# Patient Record
Sex: MALE | Race: WHITE | NOT HISPANIC OR LATINO | Employment: FULL TIME | URBAN - METROPOLITAN AREA
[De-identification: names, ages, dates, MRNs, and addresses within clinical notes are randomized per-mention and may not be internally consistent; named-entity substitution may affect disease eponyms.]

---

## 2017-01-18 DIAGNOSIS — E11.9 TYPE 2 DIABETES MELLITUS WITHOUT COMPLICATION, WITHOUT LONG-TERM CURRENT USE OF INSULIN (HCC): ICD-10-CM

## 2017-01-18 RX ORDER — METFORMIN HYDROCHLORIDE 500 MG/1
500 TABLET ORAL 2 TIMES DAILY WITH MEALS
Qty: 180 TAB | Refills: 4 | Status: SHIPPED | OUTPATIENT
Start: 2017-01-18 | End: 2017-09-21 | Stop reason: DRUGHIGH

## 2017-01-18 RX ORDER — METFORMIN HYDROCHLORIDE 500 MG/1
TABLET ORAL
Qty: 100 TAB | Refills: 4 | Status: SHIPPED | OUTPATIENT
Start: 2017-01-18 | End: 2017-01-18 | Stop reason: SDUPTHER

## 2017-01-19 ENCOUNTER — OFFICE VISIT (OUTPATIENT)
Dept: SURGERY | Facility: CLINIC | Age: 72
End: 2017-01-19

## 2017-01-19 DIAGNOSIS — Z09 FOLLOW UP: Primary | ICD-10-CM

## 2017-01-19 PROCEDURE — 99024 POSTOP FOLLOW-UP VISIT: CPT | Performed by: SURGERY

## 2017-01-19 NOTE — MR AVS SNAPSHOT
Dean AMRITA Jose   1/19/2017 1:45 PM   Office Visit    Dept Phone:  [phone]   Encounter #:  [id number]    Provider:  Chalo Dixon MD   Department:  [facility] GENERAL SURGERY                Your Full Care Plan              Your Updated Medication List          This list is accurate as of: 1/19/17  2:00 PM.  Always use your most recent med list.                amLODIPine 5 MG tablet   Commonly known as:  NORVASC       cholecalciferol 1000 UNITS tablet   Commonly known as:  VITAMIN D3       digoxin 125 MCG tablet   Commonly known as:  LANOXIN   Take 1 tablet by mouth Daily.       fish oil 1000 MG capsule capsule       glimepiride 1 MG tablet   Commonly known as:  AMARYL       INVOKANA 100 MG tablet   Generic drug:  Canagliflozin       lisinopril 20 MG tablet   Commonly known as:  PRINIVIL,ZESTRIL       metFORMIN 1000 MG tablet   Commonly known as:  GLUCOPHAGE       metoprolol succinate XL 25 MG 24 hr tablet   Commonly known as:  TOPROL-XL       omeprazole 20 MG capsule   Commonly known as:  priLOSEC       rivaroxaban 20 MG tablet   Commonly known as:  XARELTO               Instructions     None    Patient Instructions History      Upcoming Appointments     Visit Type Date Time Department    POST-OP 1/19/2017  1:45 PM [facility]    OFFICE VISIT 7/20/2017  1:00 PM [facility]      MyChart Signup     Our records indicate that you have declined [facility] MyChart signup. If you would like to sign up for MyChart, please email [email or web address] or call [phone] to obtain an activation code.             Other Info from Your Visit           Your Appointments     Jul 20, 2017  1:00 PM EDT   Office Visit with Chalo Dixon MD   [facility] GENERAL SURGERY (--)    [address]  [place] [address] [phone]           Arrive 15 minutes prior to appointment.              Allergies     No  Known Allergies      Reason for Visit     Post-op Follow-up           Vital Signs     Smoking Status                   Former Smoker

## 2017-01-19 NOTE — LETTER
January 19, 2017     Dinesh Bai MD  [address] [address]    Patient: Dean Garcia   YOB: 1945   Date of Visit: 1/19/2017       Dear Dr. Bhumika MD:    Thank you for referring Dean Garcia to me for evaluation. Below are the relevant portions of my assessment and plan of care.                   If you have questions, please do not hesitate to call me. I look forward to following Dean along with you.         Sincerely,        Chalo Dixon MD        CC: No Recipients

## 2017-01-19 NOTE — PROGRESS NOTES
PATIENT INFORMATION  Dean Garcia  5 wks 6 days s/p SB resection, pt is w/o complaints     - 1945    CHIEF COMPLAINT  Chief Complaint   Patient presents with   • Post-op Follow-up       HISTORY OF PRESENT ILLNESS  HPI    Patient seen in follow-up status post resection of carcinoid tumor.  He has seen oncology to [facility].  Is currently in the midst of workup.  They will keep me posted.  He has no problems postop.  He is return to full activity.  Has returned to work.  Problems are normal.      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   • GERD (gastroesophageal reflux disease)    • H/O: CVA (cerebrovascular accident)    • History of concussion    • Hypertension    • On anticoagulant therapy      Xarelto for Afib   • Type 2 diabetes mellitus          SURGICAL HISTORY  Past Surgical History   Procedure Laterality Date   • Colonoscopy     • Exploratory laparotomy N/A 2016     Procedure: LAPAROTOMY EXPLORATORY bowel resection excision of mesenteric mass excision/biopsy of mesenteric lymph node;  Surgeon: Chalo Dixon MD;  Location: [facility];  Service:          FAMILY HISTORY  Family History   Problem Relation Age of Onset   • Heart defect Mother    • Heart disease Mother    • Cancer Father    • Colon cancer Paternal Uncle          SOCIAL HISTORY  Social History     Occupational History   •  [employer]     Social History Main Topics   • Smoking status: Former Smoker     Years: 20.00     Types: Pipe     Quit date:    • Smokeless tobacco: Not on file   • Alcohol use No   • Drug use: No   •  Sexual activity: Defer         CURRENT MEDICATIONS    Current Outpatient Prescriptions:   •  amLODIPine (NORVASC) 5 MG tablet, Take 5 mg by mouth Daily., Disp: , Rfl:   •  Canagliflozin (INVOKANA) 100 MG tablet, Take 100 mg by mouth Daily., Disp: , Rfl:   •  cholecalciferol (VITAMIN D3) 1000 UNITS tablet, Take 2,000 Units by mouth Daily., Disp: , Rfl:   •  digoxin (LANOXIN) 125 MCG tablet, Take 1 tablet by mouth Daily., Disp: 30 tablet, Rfl: 0  •  glimepiride (AMARYL) 1 MG tablet, Take 1 mg by mouth Every Morning Before Breakfast., Disp: , Rfl:   •  lisinopril (PRINIVIL,ZESTRIL) 20 MG tablet, Take 20 mg by mouth Daily., Disp: , Rfl:   •  metFORMIN (GLUCOPHAGE) 1000 MG tablet, Take 1,000 mg by mouth 2 (Two) Times a Day With Meals. Patient takes two 500mg pills twice daily., Disp: , Rfl:   •  metoprolol succinate XL (TOPROL-XL) 25 MG 24 hr tablet, Take 25 mg by mouth Daily., Disp: , Rfl:   •  Omega-3 Fatty Acids (FISH OIL) 1000 MG capsule capsule, Take 1,000 mg by mouth Daily With Breakfast., Disp: , Rfl:   •  omeprazole (priLOSEC) 20 MG capsule, Take 20 mg by mouth Daily., Disp: , Rfl:   •  rivaroxaban (XARELTO) 20 MG tablet, Take 20 mg by mouth Daily., Disp: , Rfl:     ALLERGIES  Review of patient's allergies indicates no known allergies.    VITALS  There were no vitals filed for this visit.    LAST RESULTS   Admission on 12/09/2016, Discharged on 12/13/2016   No results displayed because visit has over 200 results.        No results found.    PHYSICAL EXAM  Physical Exam     Abdominal exam is benign.  No tenderness noted.  No evidence of herniation.  Incision is well-healed.    ASSESSMENT    Patient doing well status post carcinoid resection.  Further plan as per oncology.      PLAN    Follow-up 6 months.

## 2017-01-26 ENCOUNTER — APPOINTMENT (OUTPATIENT)
Dept: LAB | Facility: HOSPITAL | Age: 72
End: 2017-01-26
Attending: SURGERY

## 2017-01-26 ENCOUNTER — HOSPITAL ENCOUNTER (OUTPATIENT)
Dept: CT IMAGING | Facility: HOSPITAL | Age: 72
Discharge: HOME | End: 2017-01-26
Attending: SURGERY | Admitting: SURGERY

## 2017-01-26 ENCOUNTER — OFFICE VISIT (OUTPATIENT)
Dept: SURGERY | Facility: CLINIC | Age: 72
End: 2017-01-26

## 2017-01-26 DIAGNOSIS — R10.31 RIGHT LOWER QUADRANT ABDOMINAL PAIN: Primary | ICD-10-CM

## 2017-01-26 LAB
ALBUMIN SERPL-MCNC: 3.1 G/DL (ref 3.5–5.2)
ALBUMIN/GLOB SERPL: 1 G/DL
ALP SERPL-CCNC: 63 U/L (ref 40–129)
ALT SERPL W P-5'-P-CCNC: 18 U/L (ref 5–41)
ANION GAP SERPL CALCULATED.3IONS-SCNC: 16.4 MMOL/L
AST SERPL-CCNC: 11 U/L (ref 5–40)
BACTERIA UR QL AUTO: ABNORMAL /HPF
BASOPHILS # BLD AUTO: 0.02 10*3/MM3 (ref 0–0.2)
BASOPHILS NFR BLD AUTO: 0.1 % (ref 0–2)
BILIRUB SERPL-MCNC: 0.5 MG/DL (ref 0.2–1.2)
BILIRUB UR QL STRIP: NEGATIVE
BUN BLD-MCNC: 15 MG/DL (ref 8–23)
BUN/CREAT SERPL: 14 (ref 7–25)
CALCIUM SPEC-SCNC: 9.3 MG/DL (ref 8.8–10.5)
CHLORIDE SERPL-SCNC: 96 MMOL/L (ref 98–107)
CLARITY UR: CLEAR
CO2 SERPL-SCNC: 21.6 MMOL/L (ref 22–29)
COLOR UR: YELLOW
CREAT BLD-MCNC: 1.07 MG/DL (ref 0.76–1.27)
DEPRECATED RDW RBC AUTO: 46.7 FL (ref 37–54)
EOSINOPHIL # BLD AUTO: 0.02 10*3/MM3 (ref 0.1–0.3)
EOSINOPHIL NFR BLD AUTO: 0.1 % (ref 0–4)
ERYTHROCYTE [DISTWIDTH] IN BLOOD BY AUTOMATED COUNT: 14.5 % (ref 11.5–14.5)
GFR SERPL CREATININE-BSD FRML MDRD: 69 ML/MIN/1.73
GLOBULIN UR ELPH-MCNC: 3 GM/DL
GLUCOSE BLD-MCNC: 332 MG/DL (ref 65–99)
GLUCOSE UR STRIP-MCNC: ABNORMAL MG/DL
HCT VFR BLD AUTO: 41.4 % (ref 42–52)
HGB BLD-MCNC: 13.2 G/DL (ref 14–18)
HGB UR QL STRIP.AUTO: NEGATIVE
HYALINE CASTS UR QL AUTO: ABNORMAL /LPF
IMM GRANULOCYTES # BLD: 0.07 10*3/MM3 (ref 0–0.03)
IMM GRANULOCYTES NFR BLD: 0.5 % (ref 0–0.5)
KETONES UR QL STRIP: ABNORMAL
LEUKOCYTE ESTERASE UR QL STRIP.AUTO: NEGATIVE
LYMPHOCYTES # BLD AUTO: 0.86 10*3/MM3 (ref 0.6–4.8)
LYMPHOCYTES NFR BLD AUTO: 6.2 % (ref 20–45)
MCH RBC QN AUTO: 28.4 PG (ref 27–31)
MCHC RBC AUTO-ENTMCNC: 31.9 G/DL (ref 31–37)
MCV RBC AUTO: 89.2 FL (ref 80–94)
MONOCYTES # BLD AUTO: 1.23 10*3/MM3 (ref 0–1)
MONOCYTES NFR BLD AUTO: 8.9 % (ref 3–8)
NEUTROPHILS # BLD AUTO: 11.58 10*3/MM3 (ref 1.5–8.3)
NEUTROPHILS NFR BLD AUTO: 84.2 % (ref 45–70)
NITRITE UR QL STRIP: NEGATIVE
NRBC BLD MANUAL-RTO: 0 /100 WBC (ref 0–0)
PH UR STRIP.AUTO: 5.5 [PH] (ref 4.5–8)
PLATELET # BLD AUTO: 367 10*3/MM3 (ref 140–500)
PMV BLD AUTO: 9.7 FL (ref 7.4–10.4)
POTASSIUM BLD-SCNC: 4.1 MMOL/L (ref 3.5–5.2)
PROT SERPL-MCNC: 6.1 G/DL (ref 6–8.5)
PROT UR QL STRIP: NEGATIVE
RBC # BLD AUTO: 4.64 10*6/MM3 (ref 4.7–6.1)
RBC # UR: ABNORMAL /HPF
REF LAB TEST METHOD: ABNORMAL
SODIUM BLD-SCNC: 134 MMOL/L (ref 136–145)
SP GR UR STRIP: 1.01 (ref 1–1.03)
SQUAMOUS #/AREA URNS HPF: ABNORMAL /HPF
UROBILINOGEN UR QL STRIP: ABNORMAL
WBC NRBC COR # BLD: 13.78 10*3/MM3 (ref 4.8–10.8)
WBC UR QL AUTO: ABNORMAL /HPF

## 2017-01-26 PROCEDURE — 99024 POSTOP FOLLOW-UP VISIT: CPT | Performed by: SURGERY

## 2017-01-26 RX ORDER — METFORMIN HYDROCHLORIDE 500 MG/1
TABLET, EXTENDED RELEASE ORAL
COMMUNITY
Start: 2017-01-24

## 2017-01-26 RX ADMIN — IOPAMIDOL 100 ML: 755 INJECTION, SOLUTION INTRAVENOUS at 19:00

## 2017-01-26 NOTE — PROGRESS NOTES
PATIENT INFORMATION  Dean Garcia  6 wks 6 days s/p colon resection, c/o RLQ pain started on Wednesday     - 1945    CHIEF COMPLAINT  Chief Complaint   Patient presents with   • Abdominal Pain       HISTORY OF PRESENT ILLNESS  HPI    Patient started with right lower quadrant abdominal pain on Wednesday.  It was quite severe Wednesday.  He had no nausea.  Did have a normal bowel movement.  Pain has improved today however this still remains constant.  Localized done in the right lower quadrant.  Made worse with movement.  Better with rest.      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   • GERD (gastroesophageal reflux disease)    • H/O: CVA (cerebrovascular accident)    • History of concussion    • Hypertension    • On anticoagulant therapy      Xarelto for Afib   • Type 2 diabetes mellitus          SURGICAL HISTORY  Past Surgical History   Procedure Laterality Date   • Colonoscopy     • Exploratory laparotomy N/A 2016     Procedure: LAPAROTOMY EXPLORATORY bowel resection excision of mesenteric mass excision/biopsy of mesenteric lymph node;  Surgeon: Chalo Dixon MD;  Location: [facility];  Service:          FAMILY HISTORY  Family History   Problem Relation Age of Onset   • Heart defect Mother    • Heart disease Mother    • Cancer Father    • Colon cancer Paternal Uncle          SOCIAL HISTORY  Social History     Occupational History   •  [employer]     Social History Main Topics   • Smoking status: Former Smoker     Years: 20.00     Types: Pipe     Quit date:    • Smokeless tobacco: Not on  file   • Alcohol use No   • Drug use: No   • Sexual activity: Defer         CURRENT MEDICATIONS    Current Outpatient Prescriptions:   •  amLODIPine (NORVASC) 5 MG tablet, Take 5 mg by mouth Daily., Disp: , Rfl:   •  Canagliflozin (INVOKANA) 100 MG tablet, Take 100 mg by mouth Daily., Disp: , Rfl:   •  cholecalciferol (VITAMIN D3) 1000 UNITS tablet, Take 2,000 Units by mouth Daily., Disp: , Rfl:   •  digoxin (LANOXIN) 125 MCG tablet, Take 1 tablet by mouth Daily., Disp: 30 tablet, Rfl: 0  •  glimepiride (AMARYL) 1 MG tablet, Take 1 mg by mouth Every Morning Before Breakfast., Disp: , Rfl:   •  lisinopril (PRINIVIL,ZESTRIL) 20 MG tablet, Take 20 mg by mouth Daily., Disp: , Rfl:   •  metFORMIN (GLUCOPHAGE) 1000 MG tablet, Take 1,000 mg by mouth 2 (Two) Times a Day With Meals. Patient takes two 500mg pills twice daily., Disp: , Rfl:   •  metoprolol succinate XL (TOPROL-XL) 25 MG 24 hr tablet, Take 25 mg by mouth Daily., Disp: , Rfl:   •  Omega-3 Fatty Acids (FISH OIL) 1000 MG capsule capsule, Take 1,000 mg by mouth Daily With Breakfast., Disp: , Rfl:   •  omeprazole (priLOSEC) 20 MG capsule, Take 20 mg by mouth Daily., Disp: , Rfl:   •  rivaroxaban (XARELTO) 20 MG tablet, Take 20 mg by mouth Daily., Disp: , Rfl:     ALLERGIES  Review of patient's allergies indicates no known allergies.    VITALS  There were no vitals filed for this visit.    LAST RESULTS   Admission on 12/09/2016, Discharged on 12/13/2016   No results displayed because visit has over 200 results.        No results found.    PHYSICAL EXAM  Physical Exam   Constitutional: He appears well-developed and well-nourished. No distress.   Abdominal: Soft. Bowel sounds are normal. He exhibits no distension and no mass. There is tenderness (patient does have mild diffuse tenderness in his right lower quadrant.  This does extend in the left lower quadrant also.). There is no rebound and no guarding. No hernia.            ASSESSMENT    Right lower quadrant  abdominal pain etiology uncertain.      PLAN    He has been scheduled by oncology for a CT scan tomorrow.  However, he does have an elevated white count with a shift.  Feel that is imperative we proceed with CT was abdomen pelvis to rule out acute appendicitis.  We'll schedule.

## 2017-01-26 NOTE — MR AVS SNAPSHOT
Dean MARITA oJse   1/26/2017 2:15 PM   Office Visit    Dept Phone:  [phone]   Encounter #:  [id number]    Provider:  Chalo Dixon MD   Department:  [facility] GENERAL SURGERY                Your Full Care Plan              Today's Medication Changes          These changes are accurate as of: 1/26/17  2:15 PM.  If you have any questions, ask your nurse or doctor.               Medication(s)that have changed:     metFORMIN  MG 24 hr tablet   Commonly known as:  GLUCOPHAGE-XR   What changed:  Another medication with the same name was removed. Continue taking this medication, and follow the directions you see here.   Changed by:  Chalo Dixon MD                  Your Updated Medication List          This list is accurate as of: 1/26/17  2:15 PM.  Always use your most recent med list.                amLODIPine 5 MG tablet   Commonly known as:  NORVASC       cholecalciferol 1000 UNITS tablet   Commonly known as:  VITAMIN D3       digoxin 125 MCG tablet   Commonly known as:  LANOXIN   Take 1 tablet by mouth Daily.       fish oil 1000 MG capsule capsule       glimepiride 1 MG tablet   Commonly known as:  AMARYL       INVOKANA 100 MG tablet   Generic drug:  Canagliflozin       lisinopril 20 MG tablet   Commonly known as:  PRINIVIL,ZESTRIL       metFORMIN  MG 24 hr tablet   Commonly known as:  GLUCOPHAGE-XR       metoprolol succinate XL 25 MG 24 hr tablet   Commonly known as:  TOPROL-XL       omeprazole 20 MG capsule   Commonly known as:  priLOSEC       rivaroxaban 20 MG tablet   Commonly known as:  XARELTO               We Performed the Following     CBC & Differential     Comprehensive Metabolic Panel     Urinalysis With Microscopic       You Were Diagnosed With        Codes Comments    Right lower quadrant abdominal pain    -  Primary ICD-10-CM: R10.31  ICD-9-CM: 789.03       Instructions     None    Patient Instructions History      Upcoming  Appointments     Visit Type Date Time Department    OFFICE VISIT 1/26/2017  2:15 PM [facility]    LAB 1/26/2017  2:30 PM  [facility]    LAB 1/26/2017  2:35 PM  [facility]    LAB 1/26/2017  2:40 PM  [facility]    OFFICE VISIT 7/20/2017  1:00 PM [facility]      MyChart Signup     Our records indicate that you have declined [facility] MyChart signup. If you would like to sign up for MyChart, please email [email or web address] or call [phone] to obtain an activation code.             Other Info from Your Visit           Your Appointments     Jan 26, 2017  2:30 PM EST   Lab with [facility]   [facility] ([place]    [address] [place] [address]   [phone]            Jan 26, 2017  2:35 PM EST   Lab with [facility]   [facility]    [address] [place] [address]   [phone]            Jan 26, 2017  2:40 PM EST   Lab with [facility]   [facility] ([place]    [address] [place] [address]   [phone]            Jul 20, 2017  1:00 PM EDT   Office Visit with Chalo Dixon MD   [facility] GENERAL SURGERY (--)    [address]  [place] [address] [phone]           Arrive 15 minutes prior to appointment.              Allergies     No Known Allergies      Reason for Visit     Abdominal Pain     Post-op Follow-up           Vital Signs     Smoking Status                   Former Smoker           Problems and Diagnoses Noted     Abdominal pain, right lower quadrant    -  Primary

## 2017-01-27 ENCOUNTER — OFFICE VISIT (OUTPATIENT)
Dept: SURGERY | Facility: CLINIC | Age: 72
End: 2017-01-27

## 2017-01-27 ENCOUNTER — HOSPITAL ENCOUNTER (INPATIENT)
Facility: HOSPITAL | Age: 72
LOS: 5 days | Discharge: HOME | End: 2017-02-01
Attending: SURGERY | Admitting: SURGERY

## 2017-01-27 ENCOUNTER — TELEPHONE (OUTPATIENT)
Dept: GASTROENTEROLOGY | Facility: CLINIC | Age: 72
End: 2017-01-27

## 2017-01-27 DIAGNOSIS — R10.31 RIGHT LOWER QUADRANT ABDOMINAL PAIN: Primary | ICD-10-CM

## 2017-01-27 PROBLEM — IMO0002 ABDOMINAL ABSCESS: Status: ACTIVE | Noted: 2017-01-27

## 2017-01-27 PROCEDURE — 99024 POSTOP FOLLOW-UP VISIT: CPT | Performed by: SURGERY

## 2017-01-27 PROCEDURE — 99221 1ST HOSP IP/OBS SF/LOW 40: CPT | Performed by: INTERNAL MEDICINE

## 2017-01-27 PROCEDURE — 25010000002 PIPERACILLIN SOD-TAZOBACTAM PER 1 G: Performed by: SURGERY

## 2017-01-27 RX ORDER — ACETAMINOPHEN 650 MG/1
650 SUPPOSITORY RECTAL EVERY 4 HOURS PRN
Status: DISCONTINUED | OUTPATIENT
Start: 2017-01-27 | End: 2017-02-01 | Stop reason: HOSPADM

## 2017-01-27 RX ORDER — SODIUM CHLORIDE 0.9 % (FLUSH) 0.9 %
1-10 SYRINGE (ML) INJECTION AS NEEDED
Status: DISCONTINUED | OUTPATIENT
Start: 2017-01-27 | End: 2017-02-01 | Stop reason: HOSPADM

## 2017-01-27 RX ORDER — NALOXONE HCL 0.4 MG/ML
0.4 VIAL (ML) INJECTION
Status: DISCONTINUED | OUTPATIENT
Start: 2017-01-27 | End: 2017-02-01 | Stop reason: HOSPADM

## 2017-01-27 RX ORDER — MORPHINE SULFATE 2 MG/ML
1 INJECTION, SOLUTION INTRAMUSCULAR; INTRAVENOUS EVERY 4 HOURS PRN
Status: DISCONTINUED | OUTPATIENT
Start: 2017-01-27 | End: 2017-02-01 | Stop reason: HOSPADM

## 2017-01-27 RX ORDER — NITROGLYCERIN 0.4 MG/1
0.4 TABLET SUBLINGUAL
Status: DISCONTINUED | OUTPATIENT
Start: 2017-01-27 | End: 2017-02-01 | Stop reason: HOSPADM

## 2017-01-27 RX ORDER — SODIUM CHLORIDE, SODIUM LACTATE, POTASSIUM CHLORIDE, CALCIUM CHLORIDE 600; 310; 30; 20 MG/100ML; MG/100ML; MG/100ML; MG/100ML
75 INJECTION, SOLUTION INTRAVENOUS CONTINUOUS
Status: DISCONTINUED | OUTPATIENT
Start: 2017-01-27 | End: 2017-01-30

## 2017-01-27 RX ORDER — PANTOPRAZOLE SODIUM 40 MG/10ML
40 INJECTION, POWDER, LYOPHILIZED, FOR SOLUTION INTRAVENOUS
Status: DISCONTINUED | OUTPATIENT
Start: 2017-01-28 | End: 2017-01-29

## 2017-01-27 RX ADMIN — PIPERACILLIN AND TAZOBACTAM 3.38 G: 3; .375 INJECTION, POWDER, LYOPHILIZED, FOR SOLUTION INTRAVENOUS; PARENTERAL at 21:23

## 2017-01-27 RX ADMIN — SODIUM CHLORIDE, POTASSIUM CHLORIDE, SODIUM LACTATE AND CALCIUM CHLORIDE 125 ML/HR: 600; 310; 30; 20 INJECTION, SOLUTION INTRAVENOUS at 15:18

## 2017-01-27 RX ADMIN — PIPERACILLIN AND TAZOBACTAM 3.38 G: 3; .375 INJECTION, POWDER, LYOPHILIZED, FOR SOLUTION INTRAVENOUS; PARENTERAL at 15:19

## 2017-01-27 NOTE — PROGRESS NOTES
Hospitalist Team      Patient Care Team:  Dinesh Bai MD as PCP - General (Internal Medicine)  Thomas Brown MD as Consulting Physician (Cardiology)  Devyn Reed MD as Consulting Physician (Hematology and Oncology)  Chalo Dixon MD as Referring Physician (General Surgery)    CHIEF COMPLAINT: Patient is 7 weeks post resection of carcinoid tumor.    HISTORY OF PRESENT ILLNESS:    Patient had been doing well until Wednesday.  At that time he developed sudden onset of right-sided abdominal pain.  This persisted he was seen in the office.  Some have an elevated white count at that time.  CT shows a contained perforation on the right side.  He was placed on Levaquin and Flagyl.  Developed allergic reaction to one of these antibiotics.  Suspect it was Levaquin.  Remains tender in the right lower quadrant.  We'll made for IV antibiotics and observation.      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   • GERD (gastroesophageal reflux disease)    • H/O: CVA (cerebrovascular accident)    • History of concussion    • Hypertension    • On anticoagulant therapy      Xarelto for Afib   • Type 2 diabetes mellitus      Past Surgical History   Procedure Laterality Date   • Colonoscopy     • Exploratory laparotomy N/A 12/9/2016     Procedure: LAPAROTOMY EXPLORATORY bowel resection excision of mesenteric mass excision/biopsy of mesenteric lymph node;  Surgeon: Chalo Dixon MD;  Location: [facility];  Service:      Family History   Problem Relation Age of Onset   • Heart defect Mother    • Heart disease Mother    • Cancer Father    • Colon cancer Paternal Uncle      Social History   Substance Use Topics   • Smoking status: Former Smoker     Years: 20.00     Types: Pipe     Quit date: 2001   • Smokeless tobacco: Not on file   • Alcohol use No       (Not in a hospital admission)  Allergies:  Review of patient's allergies  indicates no known allergies.    REVIEW OF SYSTEMS:  Please see the above history of present illness for pertinent positives and negatives.  The remainder of the patient's systems have been reviewed and are negative.     Vital Signs            Physical Exam:  Physical Exam   Constitutional: Patient appears well-developed and well-nourished and in no acute distress   HEENT:   Head: Normocephalic and atraumatic.   Eyes:  Pupils are equal, round, and reactive to light. EOM are intact. Sclera are anicteric and non-injected.             He does have bilateral eye edema.  Worse on the right than the left.  Mouth and Throat: Patient has moist mucous membranes. Oropharynx is clear of any erythema or exudate.  Does have some edema                               around the mouth.  Mild swelling of his tongue.     Neck: Neck supple. No JVD present. No thyromegaly present. No lymphadenopathy present.  Cardiovascular: Regular rate, regular rhythm, S1 normal and S2 normal.  Exam reveals no gallop and no friction rub.  No murmur heard.  Pulmonary/Chest: Lungs are clear to auscultation bilaterally. No respiratory distress. No wheezes. No rhonchi. No rales.   Abdominal: Soft. Bowel sounds are normal. No distension and no mass. There is no hepatosplenomegaly.  Patient has some mild                    tenderness in his right lower quadrant.  No guarding or rebound noted.   Neurological: Patient is alert and oriented to person, place, and time. Cranial nerves II-XII are grossly intact with no focal deficits.  Skin: Skin is warm. No rash noted. Nails show no clubbing.  No cyanosis or erythema.     Results Review:    I reviewed the patient's new clinical results.  Lab Results (most recent)     None          Imaging Results (most recent)     None            ECG/EMG Results (most recent)     None          Assessment/Plan     Contained intra-abdominal perforation with small abscess.  Allergic reaction to his anabiotic.  We'll book for IV  hydration and antibiotics.    I discussed the patients findings and my recommendations with patient and his wife.     Chalo Dixon MD  01/27/17  12:07 PM

## 2017-01-27 NOTE — PROGRESS NOTES
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   • GERD (gastroesophageal reflux disease)    • H/O: CVA (cerebrovascular accident)    • History of concussion    • Hypertension    • On anticoagulant therapy      Xarelto for Afib   • Type 2 diabetes mellitus          SURGICAL HISTORY  Past Surgical History   Procedure Laterality Date   • Colonoscopy     • Exploratory laparotomy N/A 2016     Procedure: LAPAROTOMY EXPLORATORY bowel resection excision of mesenteric mass excision/biopsy of mesenteric lymph node;  Surgeon: Chalo Dixon MD;  Location: [facility];  Service:          FAMILY HISTORY  Family History   Problem Relation Age of Onset   • Heart defect Mother    • Heart disease Mother    • Cancer Father    • Colon cancer Paternal Uncle          SOCIAL HISTORY  Social History     Occupational History   •  [employer]     Social History Main Topics   • Smoking status: Former Smoker     Years: 20.00     Types: Pipe     Quit date:    • Smokeless tobacco: Not on file   • Alcohol use No   • Drug use: No   • Sexual activity: Defer         CURRENT MEDICATIONS    Current Outpatient Prescriptions:   •  amLODIPine (NORVASC) 5 MG tablet, Take 5 mg by mouth Daily., Disp: , Rfl:   •  Canagliflozin (INVOKANA) 100 MG tablet, Take 100 mg by mouth Daily., Disp: , Rfl:   •  cholecalciferol (VITAMIN D3) 1000 UNITS tablet, Take 2,000 Units by  mouth Daily., Disp: , Rfl:   •  digoxin (LANOXIN) 125 MCG tablet, Take 1 tablet by mouth Daily., Disp: 30 tablet, Rfl: 0  •  glimepiride (AMARYL) 1 MG tablet, Take 1 mg by mouth Every Morning Before Breakfast., Disp: , Rfl:   •  lisinopril (PRINIVIL,ZESTRIL) 20 MG tablet, Take 20 mg by mouth Daily., Disp: , Rfl:   •  metFORMIN ER (GLUCOPHAGE-XR) 500 MG 24 hr tablet, , Disp: , Rfl:   •  metoprolol succinate XL (TOPROL-XL) 25 MG 24 hr tablet, Take 25 mg by mouth Daily., Disp: , Rfl:   •  Omega-3 Fatty Acids (FISH OIL) 1000 MG capsule capsule, Take 1,000 mg by mouth Daily With Breakfast., Disp: , Rfl:   •  omeprazole (priLOSEC) 20 MG capsule, Take 20 mg by mouth Daily., Disp: , Rfl:   •  rivaroxaban (XARELTO) 20 MG tablet, Take 20 mg by mouth Daily., Disp: , Rfl:   No current facility-administered medications for this visit.     ALLERGIES  Review of patient's allergies indicates no known allergies.    VITALS  There were no vitals filed for this visit.    LAST RESULTS   Office Visit on 01/26/2017   Component Date Value Ref Range Status   • Glucose 01/26/2017 332* 65 - 99 mg/dL Final   • BUN 01/26/2017 15  8 - 23 mg/dL Final   • Creatinine 01/26/2017 1.07  0.76 - 1.27 mg/dL Final   • Sodium 01/26/2017 134* 136 - 145 mmol/L Final   • Potassium 01/26/2017 4.1  3.5 - 5.2 mmol/L Final   • Chloride 01/26/2017 96* 98 - 107 mmol/L Final   • CO2 01/26/2017 21.6* 22.0 - 29.0 mmol/L Final   • Calcium 01/26/2017 9.3  8.8 - 10.5 mg/dL Final   • Total Protein 01/26/2017 6.1  6.0 - 8.5 g/dL Final   • Albumin 01/26/2017 3.10* 3.50 - 5.20 g/dL Final   • ALT (SGPT) 01/26/2017 18  5 - 41 U/L Final   • AST (SGOT) 01/26/2017 11  5 - 40 U/L Final   • Alkaline Phosphatase 01/26/2017 63  40 - 129 U/L Final   • Total Bilirubin 01/26/2017 0.5  0.2 - 1.2 mg/dL Final   • eGFR Non African Amer 01/26/2017 69  >60 mL/min/1.73 Final   • Globulin 01/26/2017 3.0  gm/dL Final   • A/G Ratio 01/26/2017 1.0  g/dL Final   • BUN/Creatinine Ratio 01/26/2017  14.0  7.0 - 25.0 Final   • Anion Gap 01/26/2017 16.4  mmol/L Final   • WBC 01/26/2017 13.78* 4.80 - 10.80 10*3/mm3 Final   • RBC 01/26/2017 4.64* 4.70 - 6.10 10*6/mm3 Final   • Hemoglobin 01/26/2017 13.2* 14.0 - 18.0 g/dL Final   • Hematocrit 01/26/2017 41.4* 42.0 - 52.0 % Final   • MCV 01/26/2017 89.2  80.0 - 94.0 fL Final   • MCH 01/26/2017 28.4  27.0 - 31.0 pg Final   • MCHC 01/26/2017 31.9  31.0 - 37.0 g/dL Final   • RDW 01/26/2017 14.5  11.5 - 14.5 % Final   • RDW-SD 01/26/2017 46.7  37.0 - 54.0 fl Final   • MPV 01/26/2017 9.7  7.4 - 10.4 fL Final   • Platelets 01/26/2017 367  140 - 500 10*3/mm3 Final   • Neutrophil % 01/26/2017 84.2* 45.0 - 70.0 % Final   • Lymphocyte % 01/26/2017 6.2* 20.0 - 45.0 % Final   • Monocyte % 01/26/2017 8.9* 3.0 - 8.0 % Final   • Eosinophil % 01/26/2017 0.1  0.0 - 4.0 % Final   • Basophil % 01/26/2017 0.1  0.0 - 2.0 % Final   • Immature Grans % 01/26/2017 0.5  0.0 - 0.5 % Final   • Neutrophils, Absolute 01/26/2017 11.58* 1.50 - 8.30 10*3/mm3 Final   • Lymphocytes, Absolute 01/26/2017 0.86  0.60 - 4.80 10*3/mm3 Final   • Monocytes, Absolute 01/26/2017 1.23* 0.00 - 1.00 10*3/mm3 Final   • Eosinophils, Absolute 01/26/2017 0.02* 0.10 - 0.30 10*3/mm3 Final   • Basophils, Absolute 01/26/2017 0.02  0.00 - 0.20 10*3/mm3 Final   • Immature Grans, Absolute 01/26/2017 0.07* 0.00 - 0.03 10*3/mm3 Final   • nRBC 01/26/2017 0.0  0.0 - 0.0 /100 WBC Final   • Color, UA 01/26/2017 Yellow  Yellow, Straw Final   • Appearance, UA 01/26/2017 Clear  Clear Final   • pH, UA 01/26/2017 5.5  4.5 - 8.0 Final   • Specific Gravity, UA 01/26/2017 1.015  1.003 - 1.030 Final   • Glucose, UA 01/26/2017 500 mg/dL (2+)* Negative Final   • Ketones, UA 01/26/2017 15 mg/dL (1+)* Negative, 80 mg/dL (3+), >=160 mg/dL (4+) Final   • Bilirubin, UA 01/26/2017 Negative  Negative Final   • Blood, UA 01/26/2017 Negative  Negative Final   • Protein, UA 01/26/2017 Negative  Negative Final   • Leuk Esterase, UA 01/26/2017 Negative   Negative Final   • Nitrite, UA 01/26/2017 Negative  Negative Final   • Urobilinogen, UA 01/26/2017 0.2 E.U./dL  0.2 - 1.0 E.U./dL Final   • RBC, UA 01/26/2017 None Seen  None Seen /HPF Final   • WBC, UA 01/26/2017 0-2* None Seen /HPF Final   • Bacteria, UA 01/26/2017 None Seen  None Seen /HPF Final   • Squamous Epithelial Cells, UA 01/26/2017 0-2  None Seen, 0-2 /HPF Final   • Hyaline Casts, UA 01/26/2017 None Seen  None Seen /LPF Final   • Methodology 01/26/2017 Manual Light Microscopy   Final     Ct Abdomen Pelvis With Contrast    Result Date: 1/27/2017  Narrative: INDICATION: Right lower quadrant abdominal pain for one day. 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  TECHNIQUE: CT of the abdomen and pelvis with p.o. and IV contrast. Coronal and sagittal reconstructions were obtained.  Radiation dose reduction techniques were utilized, including automated exposure control and exposure modulation based on body size.  COMPARISON: CT abdomen and pelvis dated 10/24/2016.  FINDINGS: Abdomen: There is some atelectasis in both lung bases.   The solid abdominal organs are normal. The gallbladder is not distended.  There is a short segment of diffuse mesenteric edema and small bowel thickening in the right upper quadrant. The bowel wall thickening measures up to 0.6 cm in diameter. There are some small mesenteric lymph nodes. A contained perforation is noted in the right lower quadrant mesentery measuring 2.3 x 2.4 cm. This collection abuts the anterior margin of the cecum, however no significant cecal inflammation is identified. There is question of a small fistulous tract near the anastomosis. The vasculature within the mesentery appears patent. The appendix is normal.  Pelvis: No pelvic mass or free pelvic fluid.  No enlarged pelvic or inguinal lymph nodes..  No acute osseous abnormalities.      Impression: Impression:  1. Extensive inflammation within the small bowel mesentery with associated small  bowel wall thickening in the right upper quadrant. This is near the patient's enteroenteric anastomosis. This is presumably a focal enteritis. There is a small outpouching of the bowel at the anastomosis which could represent a fistulous tract. No evidence of obstruction. 2. Small contained abscess/perforation in the right lower quadrant mesentery associated with the small bowel inflammation. This also abuts the anterior wall of the cecum however there is minimal cecal wall thickening. The overall process appears to be predominantly a small bowel process, rather than a colonic process, however this is not definitive..   Initial interpretation provided by Dr. Erasmo Martinez at 18:56 on 01/26/2017.  This report was finalized on 1/27/2017 7:28 AM by Dr. Demian Vernon MD.        PHYSICAL EXAM  Physical Exam    ASSESSMENT  There are no diagnoses linked to this encounter.      PLAN  No Follow-up on file.

## 2017-01-27 NOTE — MR AVS SNAPSHOT
Dean KIRKLAND [place]   1/27/2017 12:00 PM   Office Visit    Dept Phone:  [phone]   Encounter #:  [id number]    Provider:  Chalo Dixon MD   Department:  [facility] GENERAL SURGERY                Your Full Care Plan              Today's Medication Changes      Notice     This visit is during an admission. Changes to the med list made in this visit will be reflected in the After Visit Summary of the admission.               Your Updated Medication List      Notice     This visit is during an admission. Changes to the med list made in this visit will be reflected in the After Visit Summary of the admission.            Instructions     None    Patient Instructions History      Upcoming Appointments     Visit Type Date Time Department    POST-OP 1/27/2017 12:00 PM [facility]    OFFICE VISIT 7/20/2017  1:00 PM [facility]      MyChart Signup     Our records indicate that you have declined [facility] MyChart signup. If you would like to sign up for MyChart, please email [email or web address] or call [phone] to obtain an activation code.             Other Info from Your Visit           Your Appointments     Jul 20, 2017  1:00 PM EDT   Office Visit with Chalo Dixon MD   [facility] GENERAL SURGERY (--)    [address]  [place] [address] [phone]           Arrive 15 minutes prior to appointment.              Allergies     No Known Allergies      Reason for Visit     Follow-up           Vital Signs     Smoking Status                   Former Smoker

## 2017-01-28 LAB
BASOPHILS # BLD AUTO: 0.02 10*3/MM3 (ref 0–0.2)
BASOPHILS NFR BLD AUTO: 0.2 % (ref 0–2)
DEPRECATED RDW RBC AUTO: 45.2 FL (ref 37–54)
EOSINOPHIL # BLD AUTO: 0.13 10*3/MM3 (ref 0.1–0.3)
EOSINOPHIL NFR BLD AUTO: 1.2 % (ref 0–4)
ERYTHROCYTE [DISTWIDTH] IN BLOOD BY AUTOMATED COUNT: 14.2 % (ref 11.5–14.5)
HCT VFR BLD AUTO: 40.7 % (ref 42–52)
HGB BLD-MCNC: 13.1 G/DL (ref 14–18)
IMM GRANULOCYTES # BLD: 0.07 10*3/MM3 (ref 0–0.03)
IMM GRANULOCYTES NFR BLD: 0.6 % (ref 0–0.5)
LYMPHOCYTES # BLD AUTO: 1.25 10*3/MM3 (ref 0.6–4.8)
LYMPHOCYTES NFR BLD AUTO: 11.6 % (ref 20–45)
MCH RBC QN AUTO: 28.4 PG (ref 27–31)
MCHC RBC AUTO-ENTMCNC: 32.2 G/DL (ref 31–37)
MCV RBC AUTO: 88.3 FL (ref 80–94)
MONOCYTES # BLD AUTO: 1.24 10*3/MM3 (ref 0–1)
MONOCYTES NFR BLD AUTO: 11.5 % (ref 3–8)
NEUTROPHILS # BLD AUTO: 8.11 10*3/MM3 (ref 1.5–8.3)
NEUTROPHILS NFR BLD AUTO: 74.9 % (ref 45–70)
NRBC BLD MANUAL-RTO: 0 /100 WBC (ref 0–0)
PLATELET # BLD AUTO: 332 10*3/MM3 (ref 140–500)
PMV BLD AUTO: 9.6 FL (ref 7.4–10.4)
RBC # BLD AUTO: 4.61 10*6/MM3 (ref 4.7–6.1)
WBC NRBC COR # BLD: 10.82 10*3/MM3 (ref 4.8–10.8)

## 2017-01-28 PROCEDURE — 25010000002 PIPERACILLIN SOD-TAZOBACTAM PER 1 G: Performed by: SURGERY

## 2017-01-28 PROCEDURE — 85025 COMPLETE CBC W/AUTO DIFF WBC: CPT | Performed by: SURGERY

## 2017-01-28 PROCEDURE — 25010000002 MORPHINE PER 10 MG: Performed by: SURGERY

## 2017-01-28 PROCEDURE — 99024 POSTOP FOLLOW-UP VISIT: CPT | Performed by: SURGERY

## 2017-01-28 PROCEDURE — 99232 SBSQ HOSP IP/OBS MODERATE 35: CPT | Performed by: INTERNAL MEDICINE

## 2017-01-28 RX ORDER — MELATONIN
2000 DAILY
Status: DISCONTINUED | OUTPATIENT
Start: 2017-01-28 | End: 2017-02-01 | Stop reason: HOSPADM

## 2017-01-28 RX ORDER — DIGOXIN 125 MCG
125 TABLET ORAL
Status: DISCONTINUED | OUTPATIENT
Start: 2017-01-29 | End: 2017-02-01 | Stop reason: HOSPADM

## 2017-01-28 RX ORDER — PANTOPRAZOLE SODIUM 40 MG/1
40 TABLET, DELAYED RELEASE ORAL
Status: DISCONTINUED | OUTPATIENT
Start: 2017-01-29 | End: 2017-02-01 | Stop reason: HOSPADM

## 2017-01-28 RX ADMIN — PIPERACILLIN AND TAZOBACTAM 3.38 G: 3; .375 INJECTION, POWDER, LYOPHILIZED, FOR SOLUTION INTRAVENOUS; PARENTERAL at 04:30

## 2017-01-28 RX ADMIN — PIPERACILLIN SODIUM AND TAZOBACTAM SODIUM 4.5 G: 4; .5 INJECTION, POWDER, FOR SOLUTION INTRAVENOUS at 13:51

## 2017-01-28 RX ADMIN — PIPERACILLIN SODIUM AND TAZOBACTAM SODIUM 4.5 G: 4; .5 INJECTION, POWDER, FOR SOLUTION INTRAVENOUS at 20:58

## 2017-01-28 RX ADMIN — SODIUM CHLORIDE, POTASSIUM CHLORIDE, SODIUM LACTATE AND CALCIUM CHLORIDE 125 ML/HR: 600; 310; 30; 20 INJECTION, SOLUTION INTRAVENOUS at 10:25

## 2017-01-28 RX ADMIN — SODIUM CHLORIDE, POTASSIUM CHLORIDE, SODIUM LACTATE AND CALCIUM CHLORIDE 100 ML/HR: 600; 310; 30; 20 INJECTION, SOLUTION INTRAVENOUS at 20:58

## 2017-01-28 RX ADMIN — MORPHINE SULFATE 1 MG: 2 INJECTION, SOLUTION INTRAMUSCULAR; INTRAVENOUS at 23:40

## 2017-01-28 RX ADMIN — MORPHINE SULFATE 1 MG: 2 INJECTION, SOLUTION INTRAMUSCULAR; INTRAVENOUS at 00:13

## 2017-01-28 RX ADMIN — SODIUM CHLORIDE, POTASSIUM CHLORIDE, SODIUM LACTATE AND CALCIUM CHLORIDE 125 ML/HR: 600; 310; 30; 20 INJECTION, SOLUTION INTRAVENOUS at 00:13

## 2017-01-28 RX ADMIN — PANTOPRAZOLE SODIUM 40 MG: 40 INJECTION, POWDER, FOR SOLUTION INTRAVENOUS at 06:48

## 2017-01-29 LAB
ANION GAP SERPL CALCULATED.3IONS-SCNC: 19.3 MMOL/L
BASOPHILS # BLD AUTO: 0.03 10*3/MM3 (ref 0–0.2)
BASOPHILS NFR BLD AUTO: 0.4 % (ref 0–2)
BUN BLD-MCNC: 9 MG/DL (ref 8–23)
BUN/CREAT SERPL: 11 (ref 7–25)
CALCIUM SPEC-SCNC: 9.1 MG/DL (ref 8.8–10.5)
CHLORIDE SERPL-SCNC: 100 MMOL/L (ref 98–107)
CO2 SERPL-SCNC: 17.7 MMOL/L (ref 22–29)
CREAT BLD-MCNC: 0.82 MG/DL (ref 0.76–1.27)
D-LACTATE SERPL-SCNC: 1 MMOL/L (ref 0.5–2)
DEPRECATED RDW RBC AUTO: 44.7 FL (ref 37–54)
EOSINOPHIL # BLD AUTO: 0.1 10*3/MM3 (ref 0.1–0.3)
EOSINOPHIL NFR BLD AUTO: 1.2 % (ref 0–4)
ERYTHROCYTE [DISTWIDTH] IN BLOOD BY AUTOMATED COUNT: 13.9 % (ref 11.5–14.5)
GFR SERPL CREATININE-BSD FRML MDRD: 93 ML/MIN/1.73
GLUCOSE BLD-MCNC: 104 MG/DL (ref 65–99)
HCT VFR BLD AUTO: 38.2 % (ref 42–52)
HGB BLD-MCNC: 12.4 G/DL (ref 14–18)
IMM GRANULOCYTES # BLD: 0.07 10*3/MM3 (ref 0–0.03)
IMM GRANULOCYTES NFR BLD: 0.8 % (ref 0–0.5)
LYMPHOCYTES # BLD AUTO: 1.31 10*3/MM3 (ref 0.6–4.8)
LYMPHOCYTES NFR BLD AUTO: 15.5 % (ref 20–45)
MCH RBC QN AUTO: 28.4 PG (ref 27–31)
MCHC RBC AUTO-ENTMCNC: 32.5 G/DL (ref 31–37)
MCV RBC AUTO: 87.4 FL (ref 80–94)
MONOCYTES # BLD AUTO: 0.86 10*3/MM3 (ref 0–1)
MONOCYTES NFR BLD AUTO: 10.2 % (ref 3–8)
NEUTROPHILS # BLD AUTO: 6.07 10*3/MM3 (ref 1.5–8.3)
NEUTROPHILS NFR BLD AUTO: 71.9 % (ref 45–70)
NRBC BLD MANUAL-RTO: 0 /100 WBC (ref 0–0)
PLATELET # BLD AUTO: 336 10*3/MM3 (ref 140–500)
PMV BLD AUTO: 9.5 FL (ref 7.4–10.4)
POTASSIUM BLD-SCNC: 4.1 MMOL/L (ref 3.5–5.2)
RBC # BLD AUTO: 4.37 10*6/MM3 (ref 4.7–6.1)
SODIUM BLD-SCNC: 137 MMOL/L (ref 136–145)
WBC NRBC COR # BLD: 8.44 10*3/MM3 (ref 4.8–10.8)

## 2017-01-29 PROCEDURE — 83605 ASSAY OF LACTIC ACID: CPT | Performed by: HOSPITALIST

## 2017-01-29 PROCEDURE — 99232 SBSQ HOSP IP/OBS MODERATE 35: CPT | Performed by: HOSPITALIST

## 2017-01-29 PROCEDURE — 99024 POSTOP FOLLOW-UP VISIT: CPT | Performed by: SURGERY

## 2017-01-29 PROCEDURE — 25010000002 PIPERACILLIN SOD-TAZOBACTAM PER 1 G: Performed by: SURGERY

## 2017-01-29 PROCEDURE — 80048 BASIC METABOLIC PNL TOTAL CA: CPT | Performed by: SURGERY

## 2017-01-29 PROCEDURE — 25010000002 ENOXAPARIN PER 10 MG: Performed by: HOSPITALIST

## 2017-01-29 PROCEDURE — 85025 COMPLETE CBC W/AUTO DIFF WBC: CPT | Performed by: SURGERY

## 2017-01-29 PROCEDURE — 25010000002 PIPERACILLIN-TAZOBACTAM: Performed by: SURGERY

## 2017-01-29 RX ORDER — ONDANSETRON 2 MG/ML
4 INJECTION INTRAMUSCULAR; INTRAVENOUS EVERY 6 HOURS PRN
Status: DISCONTINUED | OUTPATIENT
Start: 2017-01-29 | End: 2017-02-01 | Stop reason: HOSPADM

## 2017-01-29 RX ADMIN — DIGOXIN 125 MCG: 125 TABLET ORAL at 11:54

## 2017-01-29 RX ADMIN — SODIUM CHLORIDE, POTASSIUM CHLORIDE, SODIUM LACTATE AND CALCIUM CHLORIDE 75 ML/HR: 600; 310; 30; 20 INJECTION, SOLUTION INTRAVENOUS at 20:29

## 2017-01-29 RX ADMIN — ENOXAPARIN SODIUM 100 MG: 100 INJECTION SUBCUTANEOUS at 22:33

## 2017-01-29 RX ADMIN — PANTOPRAZOLE SODIUM 40 MG: 40 TABLET, DELAYED RELEASE ORAL at 06:02

## 2017-01-29 RX ADMIN — PIPERACILLIN SODIUM AND TAZOBACTAM SODIUM 4.5 G: 4; .5 INJECTION, POWDER, FOR SOLUTION INTRAVENOUS at 20:29

## 2017-01-29 RX ADMIN — PIPERACILLIN SODIUM AND TAZOBACTAM SODIUM 4.5 G: 4; .5 INJECTION, POWDER, FOR SOLUTION INTRAVENOUS at 14:14

## 2017-01-29 RX ADMIN — VITAMIN D, TAB 1000IU (100/BT) 2000 UNITS: 25 TAB at 08:36

## 2017-01-29 RX ADMIN — PIPERACILLIN SODIUM AND TAZOBACTAM SODIUM 4.5 G: 4; .5 INJECTION, POWDER, FOR SOLUTION INTRAVENOUS at 06:00

## 2017-01-30 LAB
ANION GAP SERPL CALCULATED.3IONS-SCNC: 16 MMOL/L
BASOPHILS # BLD AUTO: 0.03 10*3/MM3 (ref 0–0.2)
BASOPHILS NFR BLD AUTO: 0.4 % (ref 0–2)
BUN BLD-MCNC: 7 MG/DL (ref 8–23)
BUN/CREAT SERPL: 9.3 (ref 7–25)
CALCIUM SPEC-SCNC: 9.1 MG/DL (ref 8.8–10.5)
CHLORIDE SERPL-SCNC: 102 MMOL/L (ref 98–107)
CO2 SERPL-SCNC: 21 MMOL/L (ref 22–29)
CREAT BLD-MCNC: 0.75 MG/DL (ref 0.76–1.27)
DEPRECATED RDW RBC AUTO: 43 FL (ref 37–54)
EOSINOPHIL # BLD AUTO: 0.1 10*3/MM3 (ref 0.1–0.3)
EOSINOPHIL NFR BLD AUTO: 1.3 % (ref 0–4)
ERYTHROCYTE [DISTWIDTH] IN BLOOD BY AUTOMATED COUNT: 13.8 % (ref 11.5–14.5)
GFR SERPL CREATININE-BSD FRML MDRD: 103 ML/MIN/1.73
GLUCOSE BLD-MCNC: 117 MG/DL (ref 65–99)
GLUCOSE BLDC GLUCOMTR-MCNC: 124 MG/DL (ref 70–130)
GLUCOSE BLDC GLUCOMTR-MCNC: 164 MG/DL (ref 70–130)
GLUCOSE BLDC GLUCOMTR-MCNC: 175 MG/DL (ref 70–130)
GLUCOSE BLDC GLUCOMTR-MCNC: 199 MG/DL (ref 70–130)
HCT VFR BLD AUTO: 38 % (ref 42–52)
HGB BLD-MCNC: 12.5 G/DL (ref 14–18)
IMM GRANULOCYTES # BLD: 0.07 10*3/MM3 (ref 0–0.03)
IMM GRANULOCYTES NFR BLD: 0.9 % (ref 0–0.5)
LYMPHOCYTES # BLD AUTO: 1.61 10*3/MM3 (ref 0.6–4.8)
LYMPHOCYTES NFR BLD AUTO: 21.5 % (ref 20–45)
MCH RBC QN AUTO: 28.3 PG (ref 27–31)
MCHC RBC AUTO-ENTMCNC: 32.9 G/DL (ref 31–37)
MCV RBC AUTO: 86 FL (ref 80–94)
MONOCYTES # BLD AUTO: 0.74 10*3/MM3 (ref 0–1)
MONOCYTES NFR BLD AUTO: 9.9 % (ref 3–8)
NEUTROPHILS # BLD AUTO: 4.94 10*3/MM3 (ref 1.5–8.3)
NEUTROPHILS NFR BLD AUTO: 66 % (ref 45–70)
NRBC BLD MANUAL-RTO: 0 /100 WBC (ref 0–0)
PLATELET # BLD AUTO: 330 10*3/MM3 (ref 140–500)
PMV BLD AUTO: 8.8 FL (ref 7.4–10.4)
POTASSIUM BLD-SCNC: 3.8 MMOL/L (ref 3.5–5.2)
RBC # BLD AUTO: 4.42 10*6/MM3 (ref 4.7–6.1)
SODIUM BLD-SCNC: 139 MMOL/L (ref 136–145)
WBC NRBC COR # BLD: 7.49 10*3/MM3 (ref 4.8–10.8)

## 2017-01-30 PROCEDURE — 85025 COMPLETE CBC W/AUTO DIFF WBC: CPT | Performed by: SURGERY

## 2017-01-30 PROCEDURE — 80048 BASIC METABOLIC PNL TOTAL CA: CPT | Performed by: SURGERY

## 2017-01-30 PROCEDURE — 25010000002 PIPERACILLIN SOD-TAZOBACTAM PER 1 G: Performed by: SURGERY

## 2017-01-30 PROCEDURE — 99232 SBSQ HOSP IP/OBS MODERATE 35: CPT | Performed by: NURSE PRACTITIONER

## 2017-01-30 PROCEDURE — 82962 GLUCOSE BLOOD TEST: CPT

## 2017-01-30 PROCEDURE — 25010000002 ENOXAPARIN PER 10 MG: Performed by: HOSPITALIST

## 2017-01-30 PROCEDURE — 25010000002 PIPERACILLIN-TAZOBACTAM: Performed by: SURGERY

## 2017-01-30 PROCEDURE — 63710000001 INSULIN ASPART PER 5 UNITS: Performed by: NURSE PRACTITIONER

## 2017-01-30 PROCEDURE — 87493 C DIFF AMPLIFIED PROBE: CPT | Performed by: NURSE PRACTITIONER

## 2017-01-30 RX ORDER — NICOTINE POLACRILEX 4 MG
15 LOZENGE BUCCAL
Status: DISCONTINUED | OUTPATIENT
Start: 2017-01-30 | End: 2017-02-01 | Stop reason: HOSPADM

## 2017-01-30 RX ORDER — DEXTROSE MONOHYDRATE 25 G/50ML
25 INJECTION, SOLUTION INTRAVENOUS
Status: DISCONTINUED | OUTPATIENT
Start: 2017-01-30 | End: 2017-02-01 | Stop reason: HOSPADM

## 2017-01-30 RX ORDER — METRONIDAZOLE 500 MG/1
500 TABLET ORAL EVERY 8 HOURS SCHEDULED
Status: DISCONTINUED | OUTPATIENT
Start: 2017-01-30 | End: 2017-02-01 | Stop reason: HOSPADM

## 2017-01-30 RX ORDER — METOPROLOL SUCCINATE 25 MG/1
25 TABLET, EXTENDED RELEASE ORAL
Status: DISCONTINUED | OUTPATIENT
Start: 2017-01-30 | End: 2017-02-01 | Stop reason: HOSPADM

## 2017-01-30 RX ADMIN — ENOXAPARIN SODIUM 100 MG: 100 INJECTION SUBCUTANEOUS at 20:40

## 2017-01-30 RX ADMIN — PIPERACILLIN SODIUM AND TAZOBACTAM SODIUM 4.5 G: 4; .5 INJECTION, POWDER, FOR SOLUTION INTRAVENOUS at 12:25

## 2017-01-30 RX ADMIN — METRONIDAZOLE 500 MG: 500 TABLET ORAL at 22:50

## 2017-01-30 RX ADMIN — PIPERACILLIN SODIUM AND TAZOBACTAM SODIUM 4.5 G: 4; .5 INJECTION, POWDER, FOR SOLUTION INTRAVENOUS at 05:06

## 2017-01-30 RX ADMIN — METOPROLOL SUCCINATE 25 MG: 25 TABLET, EXTENDED RELEASE ORAL at 19:38

## 2017-01-30 RX ADMIN — INSULIN ASPART 2 UNITS: 100 INJECTION, SOLUTION INTRAVENOUS; SUBCUTANEOUS at 20:40

## 2017-01-30 RX ADMIN — METRONIDAZOLE 500 MG: 500 TABLET ORAL at 14:59

## 2017-01-30 RX ADMIN — SODIUM CHLORIDE, POTASSIUM CHLORIDE, SODIUM LACTATE AND CALCIUM CHLORIDE 75 ML/HR: 600; 310; 30; 20 INJECTION, SOLUTION INTRAVENOUS at 10:03

## 2017-01-30 RX ADMIN — DIGOXIN 125 MCG: 125 TABLET ORAL at 11:44

## 2017-01-30 RX ADMIN — PIPERACILLIN SODIUM AND TAZOBACTAM SODIUM 4.5 G: 4; .5 INJECTION, POWDER, FOR SOLUTION INTRAVENOUS at 20:40

## 2017-01-30 RX ADMIN — VITAMIN D, TAB 1000IU (100/BT) 2000 UNITS: 25 TAB at 08:04

## 2017-01-30 RX ADMIN — PANTOPRAZOLE SODIUM 40 MG: 40 TABLET, DELAYED RELEASE ORAL at 06:19

## 2017-01-30 RX ADMIN — ENOXAPARIN SODIUM 100 MG: 100 INJECTION SUBCUTANEOUS at 09:03

## 2017-01-30 RX ADMIN — INSULIN ASPART 2 UNITS: 100 INJECTION, SOLUTION INTRAVENOUS; SUBCUTANEOUS at 18:19

## 2017-01-31 LAB
ANION GAP SERPL CALCULATED.3IONS-SCNC: 10.5 MMOL/L
BASOPHILS # BLD AUTO: 0.03 10*3/MM3 (ref 0–0.2)
BASOPHILS NFR BLD AUTO: 0.5 % (ref 0–2)
BUN BLD-MCNC: 5 MG/DL (ref 8–23)
BUN/CREAT SERPL: 6.5 (ref 7–25)
C DIFF TOX GENS STL QL NAA+PROBE: NEGATIVE
CALCIUM SPEC-SCNC: 9.3 MG/DL (ref 8.8–10.5)
CHLORIDE SERPL-SCNC: 105 MMOL/L (ref 98–107)
CO2 SERPL-SCNC: 26.5 MMOL/L (ref 22–29)
CREAT BLD-MCNC: 0.77 MG/DL (ref 0.76–1.27)
DEPRECATED RDW RBC AUTO: 42.5 FL (ref 37–54)
EOSINOPHIL # BLD AUTO: 0.08 10*3/MM3 (ref 0.1–0.3)
EOSINOPHIL NFR BLD AUTO: 1.3 % (ref 0–4)
ERYTHROCYTE [DISTWIDTH] IN BLOOD BY AUTOMATED COUNT: 13.7 % (ref 11.5–14.5)
GFR SERPL CREATININE-BSD FRML MDRD: 100 ML/MIN/1.73
GLUCOSE BLD-MCNC: 148 MG/DL (ref 65–99)
GLUCOSE BLDC GLUCOMTR-MCNC: 146 MG/DL (ref 70–130)
GLUCOSE BLDC GLUCOMTR-MCNC: 175 MG/DL (ref 70–130)
GLUCOSE BLDC GLUCOMTR-MCNC: 194 MG/DL (ref 70–130)
GLUCOSE BLDC GLUCOMTR-MCNC: 223 MG/DL (ref 70–130)
HCT VFR BLD AUTO: 38.7 % (ref 42–52)
HGB BLD-MCNC: 12.8 G/DL (ref 14–18)
IMM GRANULOCYTES # BLD: 0.06 10*3/MM3 (ref 0–0.03)
IMM GRANULOCYTES NFR BLD: 1 % (ref 0–0.5)
LYMPHOCYTES # BLD AUTO: 1.45 10*3/MM3 (ref 0.6–4.8)
LYMPHOCYTES NFR BLD AUTO: 23.7 % (ref 20–45)
MCH RBC QN AUTO: 28.1 PG (ref 27–31)
MCHC RBC AUTO-ENTMCNC: 33.1 G/DL (ref 31–37)
MCV RBC AUTO: 85.1 FL (ref 80–94)
MONOCYTES # BLD AUTO: 0.79 10*3/MM3 (ref 0–1)
MONOCYTES NFR BLD AUTO: 12.9 % (ref 3–8)
NEUTROPHILS # BLD AUTO: 3.71 10*3/MM3 (ref 1.5–8.3)
NEUTROPHILS NFR BLD AUTO: 60.6 % (ref 45–70)
NRBC BLD MANUAL-RTO: 0 /100 WBC (ref 0–0)
PLATELET # BLD AUTO: 343 10*3/MM3 (ref 140–500)
PMV BLD AUTO: 9.6 FL (ref 7.4–10.4)
POTASSIUM BLD-SCNC: 3.5 MMOL/L (ref 3.5–5.2)
RBC # BLD AUTO: 4.55 10*6/MM3 (ref 4.7–6.1)
SODIUM BLD-SCNC: 142 MMOL/L (ref 136–145)
WBC NRBC COR # BLD: 6.12 10*3/MM3 (ref 4.8–10.8)

## 2017-01-31 PROCEDURE — 63710000001 INSULIN ASPART PER 5 UNITS: Performed by: NURSE PRACTITIONER

## 2017-01-31 PROCEDURE — 25010000002 PIPERACILLIN SOD-TAZOBACTAM PER 1 G: Performed by: SURGERY

## 2017-01-31 PROCEDURE — 25010000002 ENOXAPARIN PER 10 MG: Performed by: HOSPITALIST

## 2017-01-31 PROCEDURE — 82962 GLUCOSE BLOOD TEST: CPT

## 2017-01-31 PROCEDURE — 99231 SBSQ HOSP IP/OBS SF/LOW 25: CPT | Performed by: NURSE PRACTITIONER

## 2017-01-31 PROCEDURE — 99024 POSTOP FOLLOW-UP VISIT: CPT | Performed by: SURGERY

## 2017-01-31 PROCEDURE — 80048 BASIC METABOLIC PNL TOTAL CA: CPT | Performed by: NURSE PRACTITIONER

## 2017-01-31 PROCEDURE — 85025 COMPLETE CBC W/AUTO DIFF WBC: CPT | Performed by: NURSE PRACTITIONER

## 2017-01-31 RX ORDER — L.ACID,PARA/B.BIFIDUM/S.THERM 8B CELL
1 CAPSULE ORAL DAILY
Status: DISCONTINUED | OUTPATIENT
Start: 2017-01-31 | End: 2017-02-01 | Stop reason: HOSPADM

## 2017-01-31 RX ORDER — AMLODIPINE BESYLATE 5 MG/1
5 TABLET ORAL
Status: DISCONTINUED | OUTPATIENT
Start: 2017-01-31 | End: 2017-02-01 | Stop reason: HOSPADM

## 2017-01-31 RX ORDER — LOPERAMIDE HYDROCHLORIDE 2 MG/1
2 CAPSULE ORAL 4 TIMES DAILY PRN
Status: DISCONTINUED | OUTPATIENT
Start: 2017-01-31 | End: 2017-01-31

## 2017-01-31 RX ADMIN — PANTOPRAZOLE SODIUM 40 MG: 40 TABLET, DELAYED RELEASE ORAL at 06:16

## 2017-01-31 RX ADMIN — INSULIN ASPART 2 UNITS: 100 INJECTION, SOLUTION INTRAVENOUS; SUBCUTANEOUS at 12:16

## 2017-01-31 RX ADMIN — PIPERACILLIN SODIUM AND TAZOBACTAM SODIUM 4.5 G: 4; .5 INJECTION, POWDER, FOR SOLUTION INTRAVENOUS at 20:43

## 2017-01-31 RX ADMIN — METRONIDAZOLE 500 MG: 500 TABLET ORAL at 20:42

## 2017-01-31 RX ADMIN — ENOXAPARIN SODIUM 100 MG: 100 INJECTION SUBCUTANEOUS at 08:30

## 2017-01-31 RX ADMIN — DIGOXIN 125 MCG: 125 TABLET ORAL at 12:18

## 2017-01-31 RX ADMIN — PIPERACILLIN SODIUM AND TAZOBACTAM SODIUM 4.5 G: 4; .5 INJECTION, POWDER, FOR SOLUTION INTRAVENOUS at 14:08

## 2017-01-31 RX ADMIN — METRONIDAZOLE 500 MG: 500 TABLET ORAL at 06:16

## 2017-01-31 RX ADMIN — AMLODIPINE BESYLATE 5 MG: 5 TABLET ORAL at 15:51

## 2017-01-31 RX ADMIN — INSULIN ASPART 2 UNITS: 100 INJECTION, SOLUTION INTRAVENOUS; SUBCUTANEOUS at 17:42

## 2017-01-31 RX ADMIN — ENOXAPARIN SODIUM 100 MG: 100 INJECTION SUBCUTANEOUS at 20:43

## 2017-01-31 RX ADMIN — PIPERACILLIN SODIUM AND TAZOBACTAM SODIUM 4.5 G: 4; .5 INJECTION, POWDER, FOR SOLUTION INTRAVENOUS at 06:16

## 2017-01-31 RX ADMIN — VITAMIN D, TAB 1000IU (100/BT) 2000 UNITS: 25 TAB at 08:30

## 2017-01-31 RX ADMIN — LOPERAMIDE HYDROCHLORIDE 2 MG: 2 CAPSULE ORAL at 08:34

## 2017-01-31 RX ADMIN — METOPROLOL SUCCINATE 25 MG: 25 TABLET, EXTENDED RELEASE ORAL at 08:31

## 2017-01-31 RX ADMIN — INSULIN ASPART 3 UNITS: 100 INJECTION, SOLUTION INTRAVENOUS; SUBCUTANEOUS at 20:42

## 2017-01-31 RX ADMIN — Medication 1 CAPSULE: at 08:30

## 2017-01-31 RX ADMIN — METRONIDAZOLE 500 MG: 500 TABLET ORAL at 14:13

## 2017-02-01 VITALS
WEIGHT: 214 LBS | OXYGEN SATURATION: 96 % | HEIGHT: 71 IN | RESPIRATION RATE: 18 BRPM | BODY MASS INDEX: 29.96 KG/M2 | HEART RATE: 96 BPM | TEMPERATURE: 98.7 F | DIASTOLIC BLOOD PRESSURE: 94 MMHG | SYSTOLIC BLOOD PRESSURE: 155 MMHG

## 2017-02-01 LAB
ANION GAP SERPL CALCULATED.3IONS-SCNC: 13.6 MMOL/L
BASOPHILS # BLD AUTO: 0.04 10*3/MM3 (ref 0–0.2)
BASOPHILS NFR BLD AUTO: 0.6 % (ref 0–2)
BUN BLD-MCNC: 5 MG/DL (ref 8–23)
BUN/CREAT SERPL: 6.7 (ref 7–25)
CALCIUM SPEC-SCNC: 9.2 MG/DL (ref 8.8–10.5)
CHLORIDE SERPL-SCNC: 101 MMOL/L (ref 98–107)
CO2 SERPL-SCNC: 26.4 MMOL/L (ref 22–29)
CREAT BLD-MCNC: 0.75 MG/DL (ref 0.76–1.27)
DEPRECATED RDW RBC AUTO: 43 FL (ref 37–54)
EOSINOPHIL # BLD AUTO: 0.12 10*3/MM3 (ref 0.1–0.3)
EOSINOPHIL NFR BLD AUTO: 1.9 % (ref 0–4)
ERYTHROCYTE [DISTWIDTH] IN BLOOD BY AUTOMATED COUNT: 13.8 % (ref 11.5–14.5)
GFR SERPL CREATININE-BSD FRML MDRD: 103 ML/MIN/1.73
GLUCOSE BLD-MCNC: 159 MG/DL (ref 65–99)
GLUCOSE BLDC GLUCOMTR-MCNC: 161 MG/DL (ref 70–130)
HCT VFR BLD AUTO: 40 % (ref 42–52)
HGB BLD-MCNC: 13.1 G/DL (ref 14–18)
IMM GRANULOCYTES # BLD: 0.08 10*3/MM3 (ref 0–0.03)
IMM GRANULOCYTES NFR BLD: 1.3 % (ref 0–0.5)
LYMPHOCYTES # BLD AUTO: 1.61 10*3/MM3 (ref 0.6–4.8)
LYMPHOCYTES NFR BLD AUTO: 25.7 % (ref 20–45)
MCH RBC QN AUTO: 28.1 PG (ref 27–31)
MCHC RBC AUTO-ENTMCNC: 32.8 G/DL (ref 31–37)
MCV RBC AUTO: 85.7 FL (ref 80–94)
MONOCYTES # BLD AUTO: 0.7 10*3/MM3 (ref 0–1)
MONOCYTES NFR BLD AUTO: 11.2 % (ref 3–8)
NEUTROPHILS # BLD AUTO: 3.72 10*3/MM3 (ref 1.5–8.3)
NEUTROPHILS NFR BLD AUTO: 59.3 % (ref 45–70)
NRBC BLD MANUAL-RTO: 0 /100 WBC (ref 0–0)
PLATELET # BLD AUTO: 363 10*3/MM3 (ref 140–500)
PMV BLD AUTO: 9.3 FL (ref 7.4–10.4)
POTASSIUM BLD-SCNC: 3.4 MMOL/L (ref 3.5–5.2)
RBC # BLD AUTO: 4.67 10*6/MM3 (ref 4.7–6.1)
SODIUM BLD-SCNC: 141 MMOL/L (ref 136–145)
WBC NRBC COR # BLD: 6.27 10*3/MM3 (ref 4.8–10.8)

## 2017-02-01 PROCEDURE — 99231 SBSQ HOSP IP/OBS SF/LOW 25: CPT | Performed by: NURSE PRACTITIONER

## 2017-02-01 PROCEDURE — 63710000001 INSULIN ASPART PER 5 UNITS: Performed by: NURSE PRACTITIONER

## 2017-02-01 PROCEDURE — 80048 BASIC METABOLIC PNL TOTAL CA: CPT | Performed by: SURGERY

## 2017-02-01 PROCEDURE — 85025 COMPLETE CBC W/AUTO DIFF WBC: CPT | Performed by: SURGERY

## 2017-02-01 PROCEDURE — 82962 GLUCOSE BLOOD TEST: CPT

## 2017-02-01 PROCEDURE — 25010000002 PIPERACILLIN SOD-TAZOBACTAM PER 1 G: Performed by: SURGERY

## 2017-02-01 PROCEDURE — 25010000002 ENOXAPARIN PER 10 MG: Performed by: HOSPITALIST

## 2017-02-01 RX ORDER — L.ACID,PARA/B.BIFIDUM/S.THERM 8B CELL
1 CAPSULE ORAL DAILY
Qty: 30 CAPSULE | Refills: 0 | Status: SHIPPED | OUTPATIENT
Start: 2017-02-01

## 2017-02-01 RX ORDER — LOPERAMIDE HYDROCHLORIDE 2 MG/1
2 TABLET ORAL 4 TIMES DAILY PRN
Qty: 30 TABLET | Refills: 0 | Status: SHIPPED | OUTPATIENT
Start: 2017-02-01 | End: 2017-02-11

## 2017-02-01 RX ORDER — AMOXICILLIN AND CLAVULANATE POTASSIUM 875; 125 MG/1; MG/1
1 TABLET, FILM COATED ORAL 2 TIMES DAILY
Qty: 28 TABLET | Refills: 0 | Status: SHIPPED | OUTPATIENT
Start: 2017-02-01 | End: 2017-02-15

## 2017-02-01 RX ADMIN — AMLODIPINE BESYLATE 5 MG: 5 TABLET ORAL at 08:28

## 2017-02-01 RX ADMIN — PIPERACILLIN SODIUM AND TAZOBACTAM SODIUM 4.5 G: 4; .5 INJECTION, POWDER, FOR SOLUTION INTRAVENOUS at 06:09

## 2017-02-01 RX ADMIN — DIGOXIN 125 MCG: 125 TABLET ORAL at 11:39

## 2017-02-01 RX ADMIN — Medication 1 CAPSULE: at 08:28

## 2017-02-01 RX ADMIN — VITAMIN D, TAB 1000IU (100/BT) 2000 UNITS: 25 TAB at 08:28

## 2017-02-01 RX ADMIN — INSULIN ASPART 2 UNITS: 100 INJECTION, SOLUTION INTRAVENOUS; SUBCUTANEOUS at 08:36

## 2017-02-01 RX ADMIN — METOPROLOL SUCCINATE 25 MG: 25 TABLET, EXTENDED RELEASE ORAL at 08:28

## 2017-02-01 RX ADMIN — ENOXAPARIN SODIUM 100 MG: 100 INJECTION SUBCUTANEOUS at 08:28

## 2017-02-01 RX ADMIN — PANTOPRAZOLE SODIUM 40 MG: 40 TABLET, DELAYED RELEASE ORAL at 06:09

## 2017-02-01 RX ADMIN — METRONIDAZOLE 500 MG: 500 TABLET ORAL at 06:09

## 2017-02-01 NOTE — PROGRESS NOTES
Patient is doing well.  No complaints today.  Pain is resolved.  Diarrhea improved.    Abdominal exam is benign.  No tenderness.  No masses felt.    We'll switch to oral Augmentin plan discharge today.

## 2017-02-01 NOTE — DISCHARGE SUMMARY
GEN SURGERY DISCHARGE SUMMARY     Dean Garcia  1945  [id number]       Date of Admission: 1/27/2017  Date of Discharge:  2/1/2017    Primary Discharge Diagnoses: Abscess        PCP  Patient Care Team:  Dinesh Bai MD as PCP - General (Internal Medicine)  Thomas Brown MD as Consulting Physician (Cardiology)  Dveyn Reed MD as Consulting Physician (Hematology and Oncology)  Chalo Dixon MD as Referring Physician (General Surgery)    Consults:   Consults     Date and Time Order Name Status Description    1/27/2017 1229 Inpatient Consult to Internal Medicine            Operations and Procedures Performed:       Ct Abdomen Pelvis With Contrast    Result Date: 1/27/2017  Narrative: INDICATION: Right lower quadrant abdominal pain for one day. 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  TECHNIQUE: CT of the abdomen and pelvis with p.o. and IV contrast. Coronal and sagittal reconstructions were obtained.  Radiation dose reduction techniques were utilized, including automated exposure control and exposure modulation based on body size.  COMPARISON: CT abdomen and pelvis dated 10/24/2016.  FINDINGS: Abdomen: There is some atelectasis in both lung bases.   The solid abdominal organs are normal. The gallbladder is not distended.  There is a short segment of diffuse mesenteric edema and small bowel thickening in the right upper quadrant. The bowel wall thickening measures up to 0.6 cm in diameter. There are some small mesenteric lymph nodes. A contained perforation is noted in the right lower quadrant mesentery measuring 2.3 x 2.4 cm. This collection abuts the anterior margin of the cecum, however no significant cecal inflammation is identified. There is question of a small fistulous tract near the anastomosis. The vasculature within the mesentery appears patent. The appendix is normal.  Pelvis: No pelvic mass or free pelvic fluid.  No enlarged pelvic or inguinal lymph  nodes..  No acute osseous abnormalities.      Impression: Impression:  1. Extensive inflammation within the small bowel mesentery with associated small bowel wall thickening in the right upper quadrant. This is near the patient's enteroenteric anastomosis. This is presumably a focal enteritis. There is a small outpouching of the bowel at the anastomosis which could represent a fistulous tract. No evidence of obstruction. 2. Small contained abscess/perforation in the right lower quadrant mesentery associated with the small bowel inflammation. This also abuts the anterior wall of the cecum however there is minimal cecal wall thickening. The overall process appears to be predominantly a small bowel process, rather than a colonic process, however this is not definitive..   Initial interpretation provided by Dr. Erasmo Martinez at 18:56 on 01/26/2017.  This report was finalized on 1/27/2017 7:28 AM by Dr. Demian Vernon MD.          Discharge Medications:   Dean Garcia   Home Medication Instructions JOANNA:[id number]    Printed on:02/01/17 1054   Medication Information                      amLODIPine (NORVASC) 5 MG tablet  Take 5 mg by mouth Daily.             amoxicillin-clavulanate (AUGMENTIN) 875-125 MG per tablet  Take 1 tablet by mouth 2 (Two) Times a Day for 14 days.             cholecalciferol (VITAMIN D3) 1000 UNITS tablet  Take 2,000 Units by mouth Daily.             digoxin (LANOXIN) 125 MCG tablet  Take 1 tablet by mouth Daily.             lactobacillus acidophilus (RISAQUAD) capsule capsule  Take 1 capsule by mouth Daily.             lisinopril (PRINIVIL,ZESTRIL) 20 MG tablet  Take 20 mg by mouth Daily.             loperamide (IMODIUM A-D) 2 MG tablet  Take 1 tablet by mouth 4 (Four) Times a Day As Needed for diarrhea for up to 10 days.             metFORMIN ER (GLUCOPHAGE-XR) 500 MG 24 hr tablet               metoprolol succinate XL (TOPROL-XL) 25 MG 24 hr tablet  Take 25 mg by mouth Daily.              Omega-3 Fatty Acids (FISH OIL) 1000 MG capsule capsule  Take 1,000 mg by mouth Daily With Breakfast.             omeprazole (priLOSEC) 20 MG capsule  Take 20 mg by mouth Daily.             rivaroxaban (XARELTO) 20 MG tablet  Take 20 mg by mouth Daily.                       Hospital Course   Patient was admitted following a CT scan which showed intra-abdominal abscess.  He did not tolerate by mouth Levaquin at home.  Was admitted for IV antibiotics.  Has done well since admission.  His white count is resolved.  His abdominal pain has resolved.  Did have some diarrhea.  C. difficile studies were negative.  He was switched to by mouth Flagyl 48 hours ago to make sure he could tolerate this.  He's had no reaction to Flagyl.  We'll also discharge him on by mouth amoxicillin.  He may resume normal activities as tolerated.    Last Lab Results:   Lab Results (most recent)     Procedure Component Value Units Date/Time    CBC Auto Differential [[id number]]  (Abnormal) Collected:  01/28/17 0410    Specimen:  Blood Updated:  01/28/17 0432     WBC 10.82 (H) 10*3/mm3      RBC 4.61 (L) 10*6/mm3      Hemoglobin 13.1 (L) g/dL      Hematocrit 40.7 (L) %      MCV 88.3 fL      MCH 28.4 pg      MCHC 32.2 g/dL      RDW 14.2 %      RDW-SD 45.2 fl      MPV 9.6 fL      Platelets 332 10*3/mm3      Neutrophil % 74.9 (H) %      Lymphocyte % 11.6 (L) %      Monocyte % 11.5 (H) %      Eosinophil % 1.2 %      Basophil % 0.2 %      Immature Grans % 0.6 (H) %      Neutrophils, Absolute 8.11 10*3/mm3      Lymphocytes, Absolute 1.25 10*3/mm3      Monocytes, Absolute 1.24 (H) 10*3/mm3      Eosinophils, Absolute 0.13 10*3/mm3      Basophils, Absolute 0.02 10*3/mm3      Immature Grans, Absolute 0.07 (H) 10*3/mm3      nRBC 0.0 /100 WBC     CBC & Differential [[id number]] Collected:  01/29/17 0343    Specimen:  Blood Updated:  01/29/17 0424    Narrative:       The following orders were created for panel order CBC & Differential.  Procedure                                Abnormality         Status                     ---------                               -----------         ------                     CBC Auto Differential[[id number]]         Abnormal            Final result                 Please view results for these tests on the individual orders.    CBC Auto Differential [[id number]]  (Abnormal) Collected:  01/29/17 0343    Specimen:  Blood Updated:  01/29/17 0424     WBC 8.44 10*3/mm3      RBC 4.37 (L) 10*6/mm3      Hemoglobin 12.4 (L) g/dL      Hematocrit 38.2 (L) %      MCV 87.4 fL      MCH 28.4 pg      MCHC 32.5 g/dL      RDW 13.9 %      RDW-SD 44.7 fl      MPV 9.5 fL      Platelets 336 10*3/mm3      Neutrophil % 71.9 (H) %      Lymphocyte % 15.5 (L) %      Monocyte % 10.2 (H) %      Eosinophil % 1.2 %      Basophil % 0.4 %      Immature Grans % 0.8 (H) %      Neutrophils, Absolute 6.07 10*3/mm3      Lymphocytes, Absolute 1.31 10*3/mm3      Monocytes, Absolute 0.86 10*3/mm3      Eosinophils, Absolute 0.10 10*3/mm3      Basophils, Absolute 0.03 10*3/mm3      Immature Grans, Absolute 0.07 (H) 10*3/mm3      nRBC 0.0 /100 WBC     Basic Metabolic Panel [[id number]]  (Abnormal) Collected:  01/29/17 0343    Specimen:  Blood Updated:  01/29/17 0518     Glucose 104 (H) mg/dL      BUN 9 mg/dL      Creatinine 0.82 mg/dL      Sodium 137 mmol/L      Potassium 4.1 mmol/L      Chloride 100 mmol/L      CO2 17.7 (L) mmol/L      Calcium 9.1 mg/dL      eGFR Non African Amer 93 mL/min/1.73      BUN/Creatinine Ratio 11.0      Anion Gap 19.3 mmol/L     Narrative:       GFR Normal >60  Chronic Kidney Disease <60  Kidney Failure <15    Lactic Acid, Plasma [[id number]]  (Normal) Collected:  01/29/17 2046    Specimen:  Blood Updated:  01/29/17 2108     Lactate 1.0 mmol/L     CBC & Differential [[id number]] Collected:  01/30/17 0512    Specimen:  Blood Updated:  01/30/17 0524    Narrative:       The following orders were created for panel order CBC & Differential.  Procedure                                Abnormality         Status                     ---------                               -----------         ------                     CBC Auto Differential[[id number]]         Abnormal            Final result                 Please view results for these tests on the individual orders.    CBC Auto Differential [[id number]]  (Abnormal) Collected:  01/30/17 0512    Specimen:  Blood Updated:  01/30/17 0524     WBC 7.49 10*3/mm3      RBC 4.42 (L) 10*6/mm3      Hemoglobin 12.5 (L) g/dL      Hematocrit 38.0 (L) %      MCV 86.0 fL      MCH 28.3 pg      MCHC 32.9 g/dL      RDW 13.8 %      RDW-SD 43.0 fl      MPV 8.8 fL      Platelets 330 10*3/mm3      Neutrophil % 66.0 %      Lymphocyte % 21.5 %      Monocyte % 9.9 (H) %      Eosinophil % 1.3 %      Basophil % 0.4 %      Immature Grans % 0.9 (H) %      Neutrophils, Absolute 4.94 10*3/mm3      Lymphocytes, Absolute 1.61 10*3/mm3      Monocytes, Absolute 0.74 10*3/mm3      Eosinophils, Absolute 0.10 10*3/mm3      Basophils, Absolute 0.03 10*3/mm3      Immature Grans, Absolute 0.07 (H) 10*3/mm3      nRBC 0.0 /100 WBC     Basic Metabolic Panel [[id number]]  (Abnormal) Collected:  01/30/17 0512    Specimen:  Blood Updated:  01/30/17 0559     Glucose 117 (H) mg/dL      BUN 7 (L) mg/dL      Creatinine 0.75 (L) mg/dL      Sodium 139 mmol/L      Potassium 3.8 mmol/L      Chloride 102 mmol/L      CO2 21.0 (L) mmol/L      Calcium 9.1 mg/dL      eGFR Non African Amer 103 mL/min/1.73      BUN/Creatinine Ratio 9.3      Anion Gap 16.0 mmol/L     Narrative:       GFR Normal >60  Chronic Kidney Disease <60  Kidney Failure <15    POC Glucose Fingerstick [[id number]]  (Normal) Collected:  01/30/17 0747    Specimen:  Blood Updated:  01/30/17 0812     Glucose 124 mg/dL     Narrative:       Meter: [id number] : [id number] Brendan Pike PCA    POC Glucose Fingerstick [[id number]]  (Abnormal) Collected:  01/30/17 1137    Specimen:  Blood Updated:  01/30/17 1145     Glucose 164 (H) mg/dL      Narrative:       Meter: [id number] : [id number] Brendan Weirlupe PCA    POC Glucose Fingerstick [[id number]]  (Abnormal) Collected:  01/30/17 1639    Specimen:  Blood Updated:  01/30/17 1646     Glucose 175 (H) mg/dL     Narrative:       Meter: [id number] : [id number] Brendan Weirlupe PCA    POC Glucose Fingerstick [[id number]]  (Abnormal) Collected:  01/30/17 2009    Specimen:  Blood Updated:  01/30/17 2015     Glucose 199 (H) mg/dL     Narrative:       Meter: [id number] : [id number] Amalia Emely    Clostridium Difficile Toxin, PCR [[id number]]  (Normal) Collected:  01/30/17 1841    Specimen:  Stool from Per Rectum Updated:  01/31/17 0107     C. Difficile Toxins by PCR Negative     CBC & Differential [[id number]] Collected:  01/31/17 0326    Specimen:  Blood Updated:  01/31/17 0416    Narrative:       The following orders were created for panel order CBC & Differential.  Procedure                               Abnormality         Status                     ---------                               -----------         ------                     CBC Auto Differential[[id number]]         Abnormal            Final result                 Please view results for these tests on the individual orders.    CBC Auto Differential [[id number]]  (Abnormal) Collected:  01/31/17 0326    Specimen:  Blood Updated:  01/31/17 0416     WBC 6.12 10*3/mm3      RBC 4.55 (L) 10*6/mm3      Hemoglobin 12.8 (L) g/dL      Hematocrit 38.7 (L) %      MCV 85.1 fL      MCH 28.1 pg      MCHC 33.1 g/dL      RDW 13.7 %      RDW-SD 42.5 fl      MPV 9.6 fL      Platelets 343 10*3/mm3      Neutrophil % 60.6 %      Lymphocyte % 23.7 %      Monocyte % 12.9 (H) %      Eosinophil % 1.3 %      Basophil % 0.5 %      Immature Grans % 1.0 (H) %      Neutrophils, Absolute 3.71 10*3/mm3      Lymphocytes, Absolute 1.45 10*3/mm3      Monocytes, Absolute 0.79 10*3/mm3      Eosinophils, Absolute 0.08 (L) 10*3/mm3      Basophils, Absolute 0.03 10*3/mm3       Immature Grans, Absolute 0.06 (H) 10*3/mm3      nRBC 0.0 /100 WBC     Basic Metabolic Panel [[id number]]  (Abnormal) Collected:  01/31/17 0326    Specimen:  Blood Updated:  01/31/17 0502     Glucose 148 (H) mg/dL      BUN 5 (L) mg/dL      Creatinine 0.77 mg/dL      Sodium 142 mmol/L      Potassium 3.5 mmol/L      Chloride 105 mmol/L      CO2 26.5 mmol/L      Calcium 9.3 mg/dL      eGFR Non African Amer 100 mL/min/1.73      BUN/Creatinine Ratio 6.5 (L)      Anion Gap 10.5 mmol/L     Narrative:       GFR Normal >60  Chronic Kidney Disease <60  Kidney Failure <15    POC Glucose Fingerstick [[id number]]  (Abnormal) Collected:  01/31/17 0831    Specimen:  Blood Updated:  01/31/17 0837     Glucose 146 (H) mg/dL     Narrative:       Meter: [id number] : [id number] MiCargare Stephanie    POC Glucose Fingerstick [[id number]]  (Abnormal) Collected:  01/31/17 1110    Specimen:  Blood Updated:  01/31/17 1119     Glucose 194 (H) mg/dL     Narrative:       Meter: [id number] : [id number] [employer]argare Stephanie    POC Glucose Fingerstick [[id number]]  (Abnormal) Collected:  01/31/17 1643    Specimen:  Blood Updated:  01/31/17 1649     Glucose 175 (H) mg/dL     Narrative:       Meter: [id number] : [id number] [employer] Stephanie    POC Glucose Fingerstick [[id number]]  (Abnormal) Collected:  01/31/17 2036    Specimen:  Blood Updated:  01/31/17 2042     Glucose 223 (H) mg/dL     Narrative:       Meter: [id number] : [id number] Yvan Caal    CBC & Differential [[id number]] Collected:  02/01/17 0434    Specimen:  Blood Updated:  02/01/17 0513    Narrative:       The following orders were created for panel order CBC & Differential.  Procedure                               Abnormality         Status                     ---------                               -----------         ------                     CBC Auto Differential[[id number]]         Abnormal            Final result                 Please view results for these tests on the individual  orders.    CBC Auto Differential [[id number]]  (Abnormal) Collected:  02/01/17 0434    Specimen:  Blood Updated:  02/01/17 0513     WBC 6.27 10*3/mm3      RBC 4.67 (L) 10*6/mm3      Hemoglobin 13.1 (L) g/dL      Hematocrit 40.0 (L) %      MCV 85.7 fL      MCH 28.1 pg      MCHC 32.8 g/dL      RDW 13.8 %      RDW-SD 43.0 fl      MPV 9.3 fL      Platelets 363 10*3/mm3      Neutrophil % 59.3 %      Lymphocyte % 25.7 %      Monocyte % 11.2 (H) %      Eosinophil % 1.9 %      Basophil % 0.6 %      Immature Grans % 1.3 (H) %      Neutrophils, Absolute 3.72 10*3/mm3      Lymphocytes, Absolute 1.61 10*3/mm3      Monocytes, Absolute 0.70 10*3/mm3      Eosinophils, Absolute 0.12 10*3/mm3      Basophils, Absolute 0.04 10*3/mm3      Immature Grans, Absolute 0.08 (H) 10*3/mm3      nRBC 0.0 /100 WBC     Basic Metabolic Panel [[id number]]  (Abnormal) Collected:  02/01/17 0434    Specimen:  Blood Updated:  02/01/17 0734     Glucose 159 (H) mg/dL      BUN 5 (L) mg/dL      Creatinine 0.75 (L) mg/dL      Sodium 141 mmol/L      Potassium 3.4 (L) mmol/L      Chloride 101 mmol/L      CO2 26.4 mmol/L      Calcium 9.2 mg/dL      eGFR Non African Amer 103 mL/min/1.73      BUN/Creatinine Ratio 6.7 (L)      Anion Gap 13.6 mmol/L     Narrative:       GFR Normal >60  Chronic Kidney Disease <60  Kidney Failure <15    POC Glucose Fingerstick [[id number]]  (Abnormal) Collected:  02/01/17 0804    Specimen:  Blood Updated:  02/01/17 0813     Glucose 161 (H) mg/dL     Narrative:       Meter: [id number] : [id number] Juanita Saundra        Imaging Results (most recent)     None            Condition on Discharge:  Stable    Discharge Disposition  Home    Discharge Diet:           Dietary Orders            Start     Ordered    01/31/17 1330  Diet Regular; Consistent Carbohydrate  Diet Effective Now     Question Answer Comment   Diet Texture / Consistency Regular    Common Modifiers Consistent Carbohydrate        01/31/17 1329    01/30/17 1800  Dietary  Nutrition Supplement: Lion Romero  2 Times Daily     Question:  Select Supplement:  Answer:  Lion Romero    01/30/17 4161          Activity at Discharge:  May resume normal activities as tolerated.  May return to work if he wishes.      Follow-up Appointments    Follow-up with me next week.  Call for problems.

## 2017-02-01 NOTE — PLAN OF CARE
Problem: Patient Care Overview (Adult)  Goal: Plan of Care Review  Outcome: Ongoing (interventions implemented as appropriate)    02/01/17 0309   Coping/Psychosocial Response Interventions   Plan Of Care Reviewed With patient   Patient Care Overview   Progress improving         Problem: Pain, Acute (Adult)  Goal: Acceptable Pain Control/Comfort Level  Outcome: Ongoing (interventions implemented as appropriate)    Problem: Fall Risk (Adult)  Goal: Absence of Falls  Outcome: Ongoing (interventions implemented as appropriate)

## 2017-02-09 ENCOUNTER — OFFICE VISIT (OUTPATIENT)
Dept: SURGERY | Facility: CLINIC | Age: 72
End: 2017-02-09

## 2017-02-09 DIAGNOSIS — R10.11 RIGHT UPPER QUADRANT ABDOMINAL PAIN: Primary | ICD-10-CM

## 2017-02-09 PROCEDURE — 99024 POSTOP FOLLOW-UP VISIT: CPT | Performed by: SURGERY

## 2017-02-09 RX ORDER — OXYCODONE HYDROCHLORIDE AND ACETAMINOPHEN 5; 325 MG/1; MG/1
1 TABLET ORAL EVERY 6 HOURS PRN
COMMUNITY
End: 2017-02-23

## 2017-02-09 RX ORDER — CANAGLIFLOZIN 100 MG/1
TABLET, FILM COATED ORAL
COMMUNITY
Start: 2017-02-02

## 2017-02-09 NOTE — PROGRESS NOTES
PATIENT INFORMATION  Dean Garcia  8 wks 6 days s/p SB resection, seen in ER on 17 for ABD pain, pt c/o RUQ pain     - 1945    CHIEF COMPLAINT  Chief Complaint   Patient presents with   • Post-op Follow-up       HISTORY OF PRESENT ILLNESS  HPI    Patient seen in follow-up intra-abdominal abscess.  Has been taking his antibiotics.  Tolerating his diet .  He is trying to move more towards a diabetic schedule small servings.  Has had no problems with bowel movements.  Has had some episodes of right upper quadrant abdominal pain.  These are sporadic.  Resolves with rest.  Denies any fevers or chills.      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   • GERD (gastroesophageal reflux disease)    • H/O: CVA (cerebrovascular accident)    • History of concussion    • Hypertension    • On anticoagulant therapy      Xarelto for Afib   • Type 2 diabetes mellitus          SURGICAL HISTORY  Past Surgical History   Procedure Laterality Date   • Colonoscopy     • Exploratory laparotomy N/A 2016     Procedure: LAPAROTOMY EXPLORATORY bowel resection excision of mesenteric mass excision/biopsy of mesenteric lymph node;  Surgeon: Chalo Dixon MD;  Location: [facility];  Service:          FAMILY HISTORY  Family History   Problem Relation Age of Onset   • Heart defect Mother    • Heart disease Mother    • Cancer Father    • Colon cancer Paternal Uncle          SOCIAL HISTORY  Social History     Occupational History   •  [employer]     Social History Main Topics   • Smoking status:  Former Smoker     Years: 20.00     Types: Pipe     Quit date: 2001   • Smokeless tobacco: Not on file   • Alcohol use No   • Drug use: No   • Sexual activity: Defer         CURRENT MEDICATIONS    Current Outpatient Prescriptions:   •  amLODIPine (NORVASC) 5 MG tablet, Take 5 mg by mouth Daily., Disp: , Rfl:   •  amoxicillin-clavulanate (AUGMENTIN) 875-125 MG per tablet, Take 1 tablet by mouth 2 (Two) Times a Day for 14 days., Disp: 28 tablet, Rfl: 0  •  cholecalciferol (VITAMIN D3) 1000 UNITS tablet, Take 2,000 Units by mouth Daily., Disp: , Rfl:   •  digoxin (LANOXIN) 125 MCG tablet, Take 1 tablet by mouth Daily., Disp: 30 tablet, Rfl: 0  •  lactobacillus acidophilus (RISAQUAD) capsule capsule, Take 1 capsule by mouth Daily., Disp: 30 capsule, Rfl: 0  •  lisinopril (PRINIVIL,ZESTRIL) 20 MG tablet, Take 20 mg by mouth Daily., Disp: , Rfl:   •  loperamide (IMODIUM A-D) 2 MG tablet, Take 1 tablet by mouth 4 (Four) Times a Day As Needed for diarrhea for up to 10 days., Disp: 30 tablet, Rfl: 0  •  metFORMIN ER (GLUCOPHAGE-XR) 500 MG 24 hr tablet, , Disp: , Rfl:   •  metoprolol succinate XL (TOPROL-XL) 25 MG 24 hr tablet, Take 25 mg by mouth Daily., Disp: , Rfl:   •  Omega-3 Fatty Acids (FISH OIL) 1000 MG capsule capsule, Take 1,000 mg by mouth Daily With Breakfast., Disp: , Rfl:   •  omeprazole (priLOSEC) 20 MG capsule, Take 20 mg by mouth Daily., Disp: , Rfl:   •  rivaroxaban (XARELTO) 20 MG tablet, Take 20 mg by mouth Daily., Disp: , Rfl:     ALLERGIES  Levaquin [levofloxacin]    VITALS  There were no vitals filed for this visit.    LAST RESULTS   Admission on 01/27/2017, Discharged on 02/01/2017   Component Date Value Ref Range Status   • WBC 01/28/2017 10.82* 4.80 - 10.80 10*3/mm3 Final   • RBC 01/28/2017 4.61* 4.70 - 6.10 10*6/mm3 Final   • Hemoglobin 01/28/2017 13.1* 14.0 - 18.0 g/dL Final   • Hematocrit 01/28/2017 40.7* 42.0 - 52.0 % Final   • MCV 01/28/2017 88.3  80.0 - 94.0 fL Final   • MCH 01/28/2017 28.4   27.0 - 31.0 pg Final   • MCHC 01/28/2017 32.2  31.0 - 37.0 g/dL Final   • RDW 01/28/2017 14.2  11.5 - 14.5 % Final   • RDW-SD 01/28/2017 45.2  37.0 - 54.0 fl Final   • MPV 01/28/2017 9.6  7.4 - 10.4 fL Final   • Platelets 01/28/2017 332  140 - 500 10*3/mm3 Final   • Neutrophil % 01/28/2017 74.9* 45.0 - 70.0 % Final   • Lymphocyte % 01/28/2017 11.6* 20.0 - 45.0 % Final   • Monocyte % 01/28/2017 11.5* 3.0 - 8.0 % Final   • Eosinophil % 01/28/2017 1.2  0.0 - 4.0 % Final   • Basophil % 01/28/2017 0.2  0.0 - 2.0 % Final   • Immature Grans % 01/28/2017 0.6* 0.0 - 0.5 % Final   • Neutrophils, Absolute 01/28/2017 8.11  1.50 - 8.30 10*3/mm3 Final   • Lymphocytes, Absolute 01/28/2017 1.25  0.60 - 4.80 10*3/mm3 Final   • Monocytes, Absolute 01/28/2017 1.24* 0.00 - 1.00 10*3/mm3 Final   • Eosinophils, Absolute 01/28/2017 0.13  0.10 - 0.30 10*3/mm3 Final   • Basophils, Absolute 01/28/2017 0.02  0.00 - 0.20 10*3/mm3 Final   • Immature Grans, Absolute 01/28/2017 0.07* 0.00 - 0.03 10*3/mm3 Final   • nRBC 01/28/2017 0.0  0.0 - 0.0 /100 WBC Final   • Glucose 01/29/2017 104* 65 - 99 mg/dL Final   • BUN 01/29/2017 9  8 - 23 mg/dL Final   • Creatinine 01/29/2017 0.82  0.76 - 1.27 mg/dL Final   • Sodium 01/29/2017 137  136 - 145 mmol/L Final   • Potassium 01/29/2017 4.1  3.5 - 5.2 mmol/L Final   • Chloride 01/29/2017 100  98 - 107 mmol/L Final   • CO2 01/29/2017 17.7* 22.0 - 29.0 mmol/L Final   • Calcium 01/29/2017 9.1  8.8 - 10.5 mg/dL Final   • eGFR Non African Amer 01/29/2017 93  >60 mL/min/1.73 Final   • BUN/Creatinine Ratio 01/29/2017 11.0  7.0 - 25.0 Final   • Anion Gap 01/29/2017 19.3  mmol/L Final   • WBC 01/29/2017 8.44  4.80 - 10.80 10*3/mm3 Final   • RBC 01/29/2017 4.37* 4.70 - 6.10 10*6/mm3 Final   • Hemoglobin 01/29/2017 12.4* 14.0 - 18.0 g/dL Final   • Hematocrit 01/29/2017 38.2* 42.0 - 52.0 % Final   • MCV 01/29/2017 87.4  80.0 - 94.0 fL Final   • MCH 01/29/2017 28.4  27.0 - 31.0 pg Final   • MCHC 01/29/2017 32.5  31.0 -  37.0 g/dL Final   • RDW 01/29/2017 13.9  11.5 - 14.5 % Final   • RDW-SD 01/29/2017 44.7  37.0 - 54.0 fl Final   • MPV 01/29/2017 9.5  7.4 - 10.4 fL Final   • Platelets 01/29/2017 336  140 - 500 10*3/mm3 Final   • Neutrophil % 01/29/2017 71.9* 45.0 - 70.0 % Final   • Lymphocyte % 01/29/2017 15.5* 20.0 - 45.0 % Final   • Monocyte % 01/29/2017 10.2* 3.0 - 8.0 % Final   • Eosinophil % 01/29/2017 1.2  0.0 - 4.0 % Final   • Basophil % 01/29/2017 0.4  0.0 - 2.0 % Final   • Immature Grans % 01/29/2017 0.8* 0.0 - 0.5 % Final   • Neutrophils, Absolute 01/29/2017 6.07  1.50 - 8.30 10*3/mm3 Final   • Lymphocytes, Absolute 01/29/2017 1.31  0.60 - 4.80 10*3/mm3 Final   • Monocytes, Absolute 01/29/2017 0.86  0.00 - 1.00 10*3/mm3 Final   • Eosinophils, Absolute 01/29/2017 0.10  0.10 - 0.30 10*3/mm3 Final   • Basophils, Absolute 01/29/2017 0.03  0.00 - 0.20 10*3/mm3 Final   • Immature Grans, Absolute 01/29/2017 0.07* 0.00 - 0.03 10*3/mm3 Final   • nRBC 01/29/2017 0.0  0.0 - 0.0 /100 WBC Final   • Lactate 01/29/2017 1.0  0.5 - 2.0 mmol/L Final   • Glucose 01/30/2017 117* 65 - 99 mg/dL Final   • BUN 01/30/2017 7* 8 - 23 mg/dL Final   • Creatinine 01/30/2017 0.75* 0.76 - 1.27 mg/dL Final   • Sodium 01/30/2017 139  136 - 145 mmol/L Final   • Potassium 01/30/2017 3.8  3.5 - 5.2 mmol/L Final   • Chloride 01/30/2017 102  98 - 107 mmol/L Final   • CO2 01/30/2017 21.0* 22.0 - 29.0 mmol/L Final   • Calcium 01/30/2017 9.1  8.8 - 10.5 mg/dL Final   • eGFR Non African Amer 01/30/2017 103  >60 mL/min/1.73 Final   • BUN/Creatinine Ratio 01/30/2017 9.3  7.0 - 25.0 Final   • Anion Gap 01/30/2017 16.0  mmol/L Final   • WBC 01/30/2017 7.49  4.80 - 10.80 10*3/mm3 Final   • RBC 01/30/2017 4.42* 4.70 - 6.10 10*6/mm3 Final   • Hemoglobin 01/30/2017 12.5* 14.0 - 18.0 g/dL Final   • Hematocrit 01/30/2017 38.0* 42.0 - 52.0 % Final   • MCV 01/30/2017 86.0  80.0 - 94.0 fL Final   • MCH 01/30/2017 28.3  27.0 - 31.0 pg Final   • MCHC 01/30/2017 32.9  31.0 - 37.0  g/dL Final   • RDW 01/30/2017 13.8  11.5 - 14.5 % Final   • RDW-SD 01/30/2017 43.0  37.0 - 54.0 fl Final   • MPV 01/30/2017 8.8  7.4 - 10.4 fL Final   • Platelets 01/30/2017 330  140 - 500 10*3/mm3 Final   • Neutrophil % 01/30/2017 66.0  45.0 - 70.0 % Final   • Lymphocyte % 01/30/2017 21.5  20.0 - 45.0 % Final   • Monocyte % 01/30/2017 9.9* 3.0 - 8.0 % Final   • Eosinophil % 01/30/2017 1.3  0.0 - 4.0 % Final   • Basophil % 01/30/2017 0.4  0.0 - 2.0 % Final   • Immature Grans % 01/30/2017 0.9* 0.0 - 0.5 % Final   • Neutrophils, Absolute 01/30/2017 4.94  1.50 - 8.30 10*3/mm3 Final   • Lymphocytes, Absolute 01/30/2017 1.61  0.60 - 4.80 10*3/mm3 Final   • Monocytes, Absolute 01/30/2017 0.74  0.00 - 1.00 10*3/mm3 Final   • Eosinophils, Absolute 01/30/2017 0.10  0.10 - 0.30 10*3/mm3 Final   • Basophils, Absolute 01/30/2017 0.03  0.00 - 0.20 10*3/mm3 Final   • Immature Grans, Absolute 01/30/2017 0.07* 0.00 - 0.03 10*3/mm3 Final   • nRBC 01/30/2017 0.0  0.0 - 0.0 /100 WBC Final   • Glucose 01/30/2017 124  70 - 130 mg/dL Final   • Glucose 01/30/2017 164* 70 - 130 mg/dL Final   • C. Difficile Toxins by PCR 01/30/2017 Negative  Negative Final   • Glucose 01/30/2017 175* 70 - 130 mg/dL Final   • Glucose 01/30/2017 199* 70 - 130 mg/dL Final   • Glucose 01/31/2017 148* 65 - 99 mg/dL Final   • BUN 01/31/2017 5* 8 - 23 mg/dL Final   • Creatinine 01/31/2017 0.77  0.76 - 1.27 mg/dL Final   • Sodium 01/31/2017 142  136 - 145 mmol/L Final   • Potassium 01/31/2017 3.5  3.5 - 5.2 mmol/L Final   • Chloride 01/31/2017 105  98 - 107 mmol/L Final   • CO2 01/31/2017 26.5  22.0 - 29.0 mmol/L Final   • Calcium 01/31/2017 9.3  8.8 - 10.5 mg/dL Final   • eGFR Non African Amer 01/31/2017 100  >60 mL/min/1.73 Final   • BUN/Creatinine Ratio 01/31/2017 6.5* 7.0 - 25.0 Final   • Anion Gap 01/31/2017 10.5  mmol/L Final   • WBC 01/31/2017 6.12  4.80 - 10.80 10*3/mm3 Final   • RBC 01/31/2017 4.55* 4.70 - 6.10 10*6/mm3 Final   • Hemoglobin 01/31/2017  12.8* 14.0 - 18.0 g/dL Final   • Hematocrit 01/31/2017 38.7* 42.0 - 52.0 % Final   • MCV 01/31/2017 85.1  80.0 - 94.0 fL Final   • MCH 01/31/2017 28.1  27.0 - 31.0 pg Final   • MCHC 01/31/2017 33.1  31.0 - 37.0 g/dL Final   • RDW 01/31/2017 13.7  11.5 - 14.5 % Final   • RDW-SD 01/31/2017 42.5  37.0 - 54.0 fl Final   • MPV 01/31/2017 9.6  7.4 - 10.4 fL Final   • Platelets 01/31/2017 343  140 - 500 10*3/mm3 Final   • Neutrophil % 01/31/2017 60.6  45.0 - 70.0 % Final   • Lymphocyte % 01/31/2017 23.7  20.0 - 45.0 % Final   • Monocyte % 01/31/2017 12.9* 3.0 - 8.0 % Final   • Eosinophil % 01/31/2017 1.3  0.0 - 4.0 % Final   • Basophil % 01/31/2017 0.5  0.0 - 2.0 % Final   • Immature Grans % 01/31/2017 1.0* 0.0 - 0.5 % Final   • Neutrophils, Absolute 01/31/2017 3.71  1.50 - 8.30 10*3/mm3 Final   • Lymphocytes, Absolute 01/31/2017 1.45  0.60 - 4.80 10*3/mm3 Final   • Monocytes, Absolute 01/31/2017 0.79  0.00 - 1.00 10*3/mm3 Final   • Eosinophils, Absolute 01/31/2017 0.08* 0.10 - 0.30 10*3/mm3 Final   • Basophils, Absolute 01/31/2017 0.03  0.00 - 0.20 10*3/mm3 Final   • Immature Grans, Absolute 01/31/2017 0.06* 0.00 - 0.03 10*3/mm3 Final   • nRBC 01/31/2017 0.0  0.0 - 0.0 /100 WBC Final   • Glucose 01/31/2017 146* 70 - 130 mg/dL Final   • Glucose 01/31/2017 194* 70 - 130 mg/dL Final   • Glucose 01/31/2017 175* 70 - 130 mg/dL Final   • Glucose 01/31/2017 223* 70 - 130 mg/dL Final   • Glucose 02/01/2017 159* 65 - 99 mg/dL Final   • BUN 02/01/2017 5* 8 - 23 mg/dL Final   • Creatinine 02/01/2017 0.75* 0.76 - 1.27 mg/dL Final   • Sodium 02/01/2017 141  136 - 145 mmol/L Final   • Potassium 02/01/2017 3.4* 3.5 - 5.2 mmol/L Final   • Chloride 02/01/2017 101  98 - 107 mmol/L Final   • CO2 02/01/2017 26.4  22.0 - 29.0 mmol/L Final   • Calcium 02/01/2017 9.2  8.8 - 10.5 mg/dL Final   • eGFR Non African Amer 02/01/2017 103  >60 mL/min/1.73 Final   • BUN/Creatinine Ratio 02/01/2017 6.7* 7.0 - 25.0 Final   • Anion Gap 02/01/2017 13.6   mmol/L Final   • WBC 02/01/2017 6.27  4.80 - 10.80 10*3/mm3 Final   • RBC 02/01/2017 4.67* 4.70 - 6.10 10*6/mm3 Final   • Hemoglobin 02/01/2017 13.1* 14.0 - 18.0 g/dL Final   • Hematocrit 02/01/2017 40.0* 42.0 - 52.0 % Final   • MCV 02/01/2017 85.7  80.0 - 94.0 fL Final   • MCH 02/01/2017 28.1  27.0 - 31.0 pg Final   • MCHC 02/01/2017 32.8  31.0 - 37.0 g/dL Final   • RDW 02/01/2017 13.8  11.5 - 14.5 % Final   • RDW-SD 02/01/2017 43.0  37.0 - 54.0 fl Final   • MPV 02/01/2017 9.3  7.4 - 10.4 fL Final   • Platelets 02/01/2017 363  140 - 500 10*3/mm3 Final   • Neutrophil % 02/01/2017 59.3  45.0 - 70.0 % Final   • Lymphocyte % 02/01/2017 25.7  20.0 - 45.0 % Final   • Monocyte % 02/01/2017 11.2* 3.0 - 8.0 % Final   • Eosinophil % 02/01/2017 1.9  0.0 - 4.0 % Final   • Basophil % 02/01/2017 0.6  0.0 - 2.0 % Final   • Immature Grans % 02/01/2017 1.3* 0.0 - 0.5 % Final   • Neutrophils, Absolute 02/01/2017 3.72  1.50 - 8.30 10*3/mm3 Final   • Lymphocytes, Absolute 02/01/2017 1.61  0.60 - 4.80 10*3/mm3 Final   • Monocytes, Absolute 02/01/2017 0.70  0.00 - 1.00 10*3/mm3 Final   • Eosinophils, Absolute 02/01/2017 0.12  0.10 - 0.30 10*3/mm3 Final   • Basophils, Absolute 02/01/2017 0.04  0.00 - 0.20 10*3/mm3 Final   • Immature Grans, Absolute 02/01/2017 0.08* 0.00 - 0.03 10*3/mm3 Final   • nRBC 02/01/2017 0.0  0.0 - 0.0 /100 WBC Final   • Glucose 02/01/2017 161* 70 - 130 mg/dL Final     Ct Abdomen Pelvis With Contrast    Result Date: 1/27/2017  Narrative: INDICATION: Right lower quadrant abdominal pain for one day. 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  TECHNIQUE: CT of the abdomen and pelvis with p.o. and IV contrast. Coronal and sagittal reconstructions were obtained.  Radiation dose reduction techniques were utilized, including automated exposure control and exposure modulation based on body size.  COMPARISON: CT abdomen and pelvis dated 10/24/2016.  FINDINGS: Abdomen: There is some atelectasis in both  lung bases.   The solid abdominal organs are normal. The gallbladder is not distended.  There is a short segment of diffuse mesenteric edema and small bowel thickening in the right upper quadrant. The bowel wall thickening measures up to 0.6 cm in diameter. There are some small mesenteric lymph nodes. A contained perforation is noted in the right lower quadrant mesentery measuring 2.3 x 2.4 cm. This collection abuts the anterior margin of the cecum, however no significant cecal inflammation is identified. There is question of a small fistulous tract near the anastomosis. The vasculature within the mesentery appears patent. The appendix is normal.  Pelvis: No pelvic mass or free pelvic fluid.  No enlarged pelvic or inguinal lymph nodes..  No acute osseous abnormalities.      Impression: Impression:  1. Extensive inflammation within the small bowel mesentery with associated small bowel wall thickening in the right upper quadrant. This is near the patient's enteroenteric anastomosis. This is presumably a focal enteritis. There is a small outpouching of the bowel at the anastomosis which could represent a fistulous tract. No evidence of obstruction. 2. Small contained abscess/perforation in the right lower quadrant mesentery associated with the small bowel inflammation. This also abuts the anterior wall of the cecum however there is minimal cecal wall thickening. The overall process appears to be predominantly a small bowel process, rather than a colonic process, however this is not definitive..   Initial interpretation provided by Dr. Erasmo Martinez at 18:56 on 01/26/2017.  This report was finalized on 1/27/2017 7:28 AM by Dr. Demian Vernon MD.        PHYSICAL EXAM  Physical Exam     Abdominal exam is benign.  Does have some mild right upper quadrant tenderness.  No guarding, rebound organomegaly.        ASSESSMENT    Doing well.  Patient is to finish his antibiotics.      PLAN    Dr. Giron office has him scheduled for  scans on the 15th and 16th.  We will schedule his CT after this.  He is to follow-up with me after CT.  He is to call if his pain becomes worse or if he develops fevers or chills.

## 2017-02-15 ENCOUNTER — HOSPITAL ENCOUNTER (OUTPATIENT)
Dept: HOSPITAL 23 - CNUC | Age: 72
Discharge: HOME | End: 2017-02-15
Payer: COMMERCIAL

## 2017-02-15 DIAGNOSIS — Z53.9: ICD-10-CM

## 2017-02-15 DIAGNOSIS — C7A.00: Primary | ICD-10-CM

## 2017-02-17 ENCOUNTER — HOSPITAL ENCOUNTER (OUTPATIENT)
Dept: CT IMAGING | Facility: HOSPITAL | Age: 72
Discharge: HOME | End: 2017-02-17
Attending: SURGERY | Admitting: SURGERY

## 2017-02-17 DIAGNOSIS — R10.11 RIGHT UPPER QUADRANT ABDOMINAL PAIN: ICD-10-CM

## 2017-02-17 PROCEDURE — 0 IOPAMIDOL PER 1 ML: Performed by: SURGERY

## 2017-02-17 PROCEDURE — 74177 CT ABD & PELVIS W/CONTRAST: CPT

## 2017-02-17 RX ADMIN — IOPAMIDOL 100 ML: 755 INJECTION, SOLUTION INTRAVENOUS at 08:58

## 2017-02-23 ENCOUNTER — OFFICE VISIT (OUTPATIENT)
Dept: SURGERY | Facility: CLINIC | Age: 72
End: 2017-02-23

## 2017-02-23 DIAGNOSIS — Z09 FOLLOW UP: Primary | ICD-10-CM

## 2017-02-23 PROCEDURE — 99024 POSTOP FOLLOW-UP VISIT: CPT | Performed by: SURGERY

## 2017-02-23 RX ORDER — SULFAMETHOXAZOLE AND TRIMETHOPRIM 800; 160 MG/1; MG/1
1 TABLET ORAL 2 TIMES DAILY
Qty: 28 TABLET | Refills: 0 | Status: SHIPPED | OUTPATIENT
Start: 2017-02-23 | End: 2017-03-09

## 2017-02-23 RX ORDER — NAPROXEN SODIUM 220 MG
220 TABLET ORAL 2 TIMES DAILY PRN
COMMUNITY

## 2017-02-23 NOTE — PROGRESS NOTES
PATIENT INFORMATION  Dean Garcia  10 wks 6 days s/p Small bowel resection, pt c/o lower abd soreness but no pain     - 1945    CHIEF COMPLAINT  Chief Complaint   Patient presents with   • Post-op Follow-up       HISTORY OF PRESENT ILLNESS  HPI     Patient presents for follow-up status post resection of carcinoid tumor.  States that he feels much better.  His pain is almost totally resolved.  Still has some mild right upper quadrant tenderness.  Bowel movements are normal.  He is almost returned to normal activity.  Did review the CT with him.  Am concerned there still some inflammation around the anastomosis.        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   • GERD (gastroesophageal reflux disease)    • H/O: CVA (cerebrovascular accident)    • History of concussion    • Hypertension    • On anticoagulant therapy      Xarelto for Afib   • Type 2 diabetes mellitus          SURGICAL HISTORY  Past Surgical History   Procedure Laterality Date   • Colonoscopy     • Exploratory laparotomy N/A 2016     Procedure: LAPAROTOMY EXPLORATORY bowel resection excision of mesenteric mass excision/biopsy of mesenteric lymph node;  Surgeon: Chalo Dixon MD;  Location: [facility];  Service:          FAMILY HISTORY  Family History   Problem Relation Age of Onset   • Heart defect Mother    • Heart disease Mother    • Cancer Father    • Colon cancer Paternal Uncle          SOCIAL HISTORY  Social History     Occupational History   •  [employer]     Social History Main Topics   • Smoking  status: Former Smoker     Years: 20.00     Types: Pipe     Quit date: 2001   • Smokeless tobacco: Not on file   • Alcohol use No   • Drug use: No   • Sexual activity: Defer         CURRENT MEDICATIONS    Current Outpatient Prescriptions:   •  amLODIPine (NORVASC) 5 MG tablet, Take 5 mg by mouth Daily., Disp: , Rfl:   •  cholecalciferol (VITAMIN D3) 1000 UNITS tablet, Take 2,000 Units by mouth Daily., Disp: , Rfl:   •  digoxin (LANOXIN) 125 MCG tablet, Take 1 tablet by mouth Daily., Disp: 30 tablet, Rfl: 0  •  INVOKANA 100 MG tablet, , Disp: , Rfl:   •  lactobacillus acidophilus (RISAQUAD) capsule capsule, Take 1 capsule by mouth Daily., Disp: 30 capsule, Rfl: 0  •  lisinopril (PRINIVIL,ZESTRIL) 20 MG tablet, Take 20 mg by mouth Daily., Disp: , Rfl:   •  metFORMIN ER (GLUCOPHAGE-XR) 500 MG 24 hr tablet, , Disp: , Rfl:   •  metoprolol succinate XL (TOPROL-XL) 25 MG 24 hr tablet, Take 25 mg by mouth Daily., Disp: , Rfl:   •  Omega-3 Fatty Acids (FISH OIL) 1000 MG capsule capsule, Take 1,000 mg by mouth Daily With Breakfast., Disp: , Rfl:   •  omeprazole (priLOSEC) 20 MG capsule, Take 20 mg by mouth Daily., Disp: , Rfl:   •  oxyCODONE-acetaminophen (PERCOCET) 5-325 MG per tablet, Take 1 tablet by mouth Every 6 (Six) Hours As Needed., Disp: , Rfl:   •  rivaroxaban (XARELTO) 20 MG tablet, Take 20 mg by mouth Daily., Disp: , Rfl:     ALLERGIES  Levaquin [levofloxacin]    VITALS  There were no vitals filed for this visit.    LAST RESULTS   Admission on 01/27/2017, Discharged on 02/01/2017   Component Date Value Ref Range Status   • WBC 01/28/2017 10.82* 4.80 - 10.80 10*3/mm3 Final   • RBC 01/28/2017 4.61* 4.70 - 6.10 10*6/mm3 Final   • Hemoglobin 01/28/2017 13.1* 14.0 - 18.0 g/dL Final   • Hematocrit 01/28/2017 40.7* 42.0 - 52.0 % Final   • MCV 01/28/2017 88.3  80.0 - 94.0 fL Final   • MCH 01/28/2017 28.4  27.0 - 31.0 pg Final   • MCHC 01/28/2017 32.2  31.0 - 37.0 g/dL Final   • RDW 01/28/2017 14.2  11.5 - 14.5 % Final   •  RDW-SD 01/28/2017 45.2  37.0 - 54.0 fl Final   • MPV 01/28/2017 9.6  7.4 - 10.4 fL Final   • Platelets 01/28/2017 332  140 - 500 10*3/mm3 Final   • Neutrophil % 01/28/2017 74.9* 45.0 - 70.0 % Final   • Lymphocyte % 01/28/2017 11.6* 20.0 - 45.0 % Final   • Monocyte % 01/28/2017 11.5* 3.0 - 8.0 % Final   • Eosinophil % 01/28/2017 1.2  0.0 - 4.0 % Final   • Basophil % 01/28/2017 0.2  0.0 - 2.0 % Final   • Immature Grans % 01/28/2017 0.6* 0.0 - 0.5 % Final   • Neutrophils, Absolute 01/28/2017 8.11  1.50 - 8.30 10*3/mm3 Final   • Lymphocytes, Absolute 01/28/2017 1.25  0.60 - 4.80 10*3/mm3 Final   • Monocytes, Absolute 01/28/2017 1.24* 0.00 - 1.00 10*3/mm3 Final   • Eosinophils, Absolute 01/28/2017 0.13  0.10 - 0.30 10*3/mm3 Final   • Basophils, Absolute 01/28/2017 0.02  0.00 - 0.20 10*3/mm3 Final   • Immature Grans, Absolute 01/28/2017 0.07* 0.00 - 0.03 10*3/mm3 Final   • nRBC 01/28/2017 0.0  0.0 - 0.0 /100 WBC Final   • Glucose 01/29/2017 104* 65 - 99 mg/dL Final   • BUN 01/29/2017 9  8 - 23 mg/dL Final   • Creatinine 01/29/2017 0.82  0.76 - 1.27 mg/dL Final   • Sodium 01/29/2017 137  136 - 145 mmol/L Final   • Potassium 01/29/2017 4.1  3.5 - 5.2 mmol/L Final   • Chloride 01/29/2017 100  98 - 107 mmol/L Final   • CO2 01/29/2017 17.7* 22.0 - 29.0 mmol/L Final   • Calcium 01/29/2017 9.1  8.8 - 10.5 mg/dL Final   • eGFR Non African Amer 01/29/2017 93  >60 mL/min/1.73 Final   • BUN/Creatinine Ratio 01/29/2017 11.0  7.0 - 25.0 Final   • Anion Gap 01/29/2017 19.3  mmol/L Final   • WBC 01/29/2017 8.44  4.80 - 10.80 10*3/mm3 Final   • RBC 01/29/2017 4.37* 4.70 - 6.10 10*6/mm3 Final   • Hemoglobin 01/29/2017 12.4* 14.0 - 18.0 g/dL Final   • Hematocrit 01/29/2017 38.2* 42.0 - 52.0 % Final   • MCV 01/29/2017 87.4  80.0 - 94.0 fL Final   • MCH 01/29/2017 28.4  27.0 - 31.0 pg Final   • MCHC 01/29/2017 32.5  31.0 - 37.0 g/dL Final   • RDW 01/29/2017 13.9  11.5 - 14.5 % Final   • RDW-SD 01/29/2017 44.7  37.0 - 54.0 fl Final   • MPV  01/29/2017 9.5  7.4 - 10.4 fL Final   • Platelets 01/29/2017 336  140 - 500 10*3/mm3 Final   • Neutrophil % 01/29/2017 71.9* 45.0 - 70.0 % Final   • Lymphocyte % 01/29/2017 15.5* 20.0 - 45.0 % Final   • Monocyte % 01/29/2017 10.2* 3.0 - 8.0 % Final   • Eosinophil % 01/29/2017 1.2  0.0 - 4.0 % Final   • Basophil % 01/29/2017 0.4  0.0 - 2.0 % Final   • Immature Grans % 01/29/2017 0.8* 0.0 - 0.5 % Final   • Neutrophils, Absolute 01/29/2017 6.07  1.50 - 8.30 10*3/mm3 Final   • Lymphocytes, Absolute 01/29/2017 1.31  0.60 - 4.80 10*3/mm3 Final   • Monocytes, Absolute 01/29/2017 0.86  0.00 - 1.00 10*3/mm3 Final   • Eosinophils, Absolute 01/29/2017 0.10  0.10 - 0.30 10*3/mm3 Final   • Basophils, Absolute 01/29/2017 0.03  0.00 - 0.20 10*3/mm3 Final   • Immature Grans, Absolute 01/29/2017 0.07* 0.00 - 0.03 10*3/mm3 Final   • nRBC 01/29/2017 0.0  0.0 - 0.0 /100 WBC Final   • Lactate 01/29/2017 1.0  0.5 - 2.0 mmol/L Final   • Glucose 01/30/2017 117* 65 - 99 mg/dL Final   • BUN 01/30/2017 7* 8 - 23 mg/dL Final   • Creatinine 01/30/2017 0.75* 0.76 - 1.27 mg/dL Final   • Sodium 01/30/2017 139  136 - 145 mmol/L Final   • Potassium 01/30/2017 3.8  3.5 - 5.2 mmol/L Final   • Chloride 01/30/2017 102  98 - 107 mmol/L Final   • CO2 01/30/2017 21.0* 22.0 - 29.0 mmol/L Final   • Calcium 01/30/2017 9.1  8.8 - 10.5 mg/dL Final   • eGFR Non African Amer 01/30/2017 103  >60 mL/min/1.73 Final   • BUN/Creatinine Ratio 01/30/2017 9.3  7.0 - 25.0 Final   • Anion Gap 01/30/2017 16.0  mmol/L Final   • WBC 01/30/2017 7.49  4.80 - 10.80 10*3/mm3 Final   • RBC 01/30/2017 4.42* 4.70 - 6.10 10*6/mm3 Final   • Hemoglobin 01/30/2017 12.5* 14.0 - 18.0 g/dL Final   • Hematocrit 01/30/2017 38.0* 42.0 - 52.0 % Final   • MCV 01/30/2017 86.0  80.0 - 94.0 fL Final   • MCH 01/30/2017 28.3  27.0 - 31.0 pg Final   • MCHC 01/30/2017 32.9  31.0 - 37.0 g/dL Final   • RDW 01/30/2017 13.8  11.5 - 14.5 % Final   • RDW-SD 01/30/2017 43.0  37.0 - 54.0 fl Final   • MPV  01/30/2017 8.8  7.4 - 10.4 fL Final   • Platelets 01/30/2017 330  140 - 500 10*3/mm3 Final   • Neutrophil % 01/30/2017 66.0  45.0 - 70.0 % Final   • Lymphocyte % 01/30/2017 21.5  20.0 - 45.0 % Final   • Monocyte % 01/30/2017 9.9* 3.0 - 8.0 % Final   • Eosinophil % 01/30/2017 1.3  0.0 - 4.0 % Final   • Basophil % 01/30/2017 0.4  0.0 - 2.0 % Final   • Immature Grans % 01/30/2017 0.9* 0.0 - 0.5 % Final   • Neutrophils, Absolute 01/30/2017 4.94  1.50 - 8.30 10*3/mm3 Final   • Lymphocytes, Absolute 01/30/2017 1.61  0.60 - 4.80 10*3/mm3 Final   • Monocytes, Absolute 01/30/2017 0.74  0.00 - 1.00 10*3/mm3 Final   • Eosinophils, Absolute 01/30/2017 0.10  0.10 - 0.30 10*3/mm3 Final   • Basophils, Absolute 01/30/2017 0.03  0.00 - 0.20 10*3/mm3 Final   • Immature Grans, Absolute 01/30/2017 0.07* 0.00 - 0.03 10*3/mm3 Final   • nRBC 01/30/2017 0.0  0.0 - 0.0 /100 WBC Final   • Glucose 01/30/2017 124  70 - 130 mg/dL Final   • Glucose 01/30/2017 164* 70 - 130 mg/dL Final   • C. Difficile Toxins by PCR 01/30/2017 Negative  Negative Final   • Glucose 01/30/2017 175* 70 - 130 mg/dL Final   • Glucose 01/30/2017 199* 70 - 130 mg/dL Final   • Glucose 01/31/2017 148* 65 - 99 mg/dL Final   • BUN 01/31/2017 5* 8 - 23 mg/dL Final   • Creatinine 01/31/2017 0.77  0.76 - 1.27 mg/dL Final   • Sodium 01/31/2017 142  136 - 145 mmol/L Final   • Potassium 01/31/2017 3.5  3.5 - 5.2 mmol/L Final   • Chloride 01/31/2017 105  98 - 107 mmol/L Final   • CO2 01/31/2017 26.5  22.0 - 29.0 mmol/L Final   • Calcium 01/31/2017 9.3  8.8 - 10.5 mg/dL Final   • eGFR Non African Amer 01/31/2017 100  >60 mL/min/1.73 Final   • BUN/Creatinine Ratio 01/31/2017 6.5* 7.0 - 25.0 Final   • Anion Gap 01/31/2017 10.5  mmol/L Final   • WBC 01/31/2017 6.12  4.80 - 10.80 10*3/mm3 Final   • RBC 01/31/2017 4.55* 4.70 - 6.10 10*6/mm3 Final   • Hemoglobin 01/31/2017 12.8* 14.0 - 18.0 g/dL Final   • Hematocrit 01/31/2017 38.7* 42.0 - 52.0 % Final   • MCV 01/31/2017 85.1  80.0 - 94.0  fL Final   • MCH 01/31/2017 28.1  27.0 - 31.0 pg Final   • MCHC 01/31/2017 33.1  31.0 - 37.0 g/dL Final   • RDW 01/31/2017 13.7  11.5 - 14.5 % Final   • RDW-SD 01/31/2017 42.5  37.0 - 54.0 fl Final   • MPV 01/31/2017 9.6  7.4 - 10.4 fL Final   • Platelets 01/31/2017 343  140 - 500 10*3/mm3 Final   • Neutrophil % 01/31/2017 60.6  45.0 - 70.0 % Final   • Lymphocyte % 01/31/2017 23.7  20.0 - 45.0 % Final   • Monocyte % 01/31/2017 12.9* 3.0 - 8.0 % Final   • Eosinophil % 01/31/2017 1.3  0.0 - 4.0 % Final   • Basophil % 01/31/2017 0.5  0.0 - 2.0 % Final   • Immature Grans % 01/31/2017 1.0* 0.0 - 0.5 % Final   • Neutrophils, Absolute 01/31/2017 3.71  1.50 - 8.30 10*3/mm3 Final   • Lymphocytes, Absolute 01/31/2017 1.45  0.60 - 4.80 10*3/mm3 Final   • Monocytes, Absolute 01/31/2017 0.79  0.00 - 1.00 10*3/mm3 Final   • Eosinophils, Absolute 01/31/2017 0.08* 0.10 - 0.30 10*3/mm3 Final   • Basophils, Absolute 01/31/2017 0.03  0.00 - 0.20 10*3/mm3 Final   • Immature Grans, Absolute 01/31/2017 0.06* 0.00 - 0.03 10*3/mm3 Final   • nRBC 01/31/2017 0.0  0.0 - 0.0 /100 WBC Final   • Glucose 01/31/2017 146* 70 - 130 mg/dL Final   • Glucose 01/31/2017 194* 70 - 130 mg/dL Final   • Glucose 01/31/2017 175* 70 - 130 mg/dL Final   • Glucose 01/31/2017 223* 70 - 130 mg/dL Final   • Glucose 02/01/2017 159* 65 - 99 mg/dL Final   • BUN 02/01/2017 5* 8 - 23 mg/dL Final   • Creatinine 02/01/2017 0.75* 0.76 - 1.27 mg/dL Final   • Sodium 02/01/2017 141  136 - 145 mmol/L Final   • Potassium 02/01/2017 3.4* 3.5 - 5.2 mmol/L Final   • Chloride 02/01/2017 101  98 - 107 mmol/L Final   • CO2 02/01/2017 26.4  22.0 - 29.0 mmol/L Final   • Calcium 02/01/2017 9.2  8.8 - 10.5 mg/dL Final   • eGFR Non African Amer 02/01/2017 103  >60 mL/min/1.73 Final   • BUN/Creatinine Ratio 02/01/2017 6.7* 7.0 - 25.0 Final   • Anion Gap 02/01/2017 13.6  mmol/L Final   • WBC 02/01/2017 6.27  4.80 - 10.80 10*3/mm3 Final   • RBC 02/01/2017 4.67* 4.70 - 6.10 10*6/mm3 Final   •  Hemoglobin 02/01/2017 13.1* 14.0 - 18.0 g/dL Final   • Hematocrit 02/01/2017 40.0* 42.0 - 52.0 % Final   • MCV 02/01/2017 85.7  80.0 - 94.0 fL Final   • MCH 02/01/2017 28.1  27.0 - 31.0 pg Final   • MCHC 02/01/2017 32.8  31.0 - 37.0 g/dL Final   • RDW 02/01/2017 13.8  11.5 - 14.5 % Final   • RDW-SD 02/01/2017 43.0  37.0 - 54.0 fl Final   • MPV 02/01/2017 9.3  7.4 - 10.4 fL Final   • Platelets 02/01/2017 363  140 - 500 10*3/mm3 Final   • Neutrophil % 02/01/2017 59.3  45.0 - 70.0 % Final   • Lymphocyte % 02/01/2017 25.7  20.0 - 45.0 % Final   • Monocyte % 02/01/2017 11.2* 3.0 - 8.0 % Final   • Eosinophil % 02/01/2017 1.9  0.0 - 4.0 % Final   • Basophil % 02/01/2017 0.6  0.0 - 2.0 % Final   • Immature Grans % 02/01/2017 1.3* 0.0 - 0.5 % Final   • Neutrophils, Absolute 02/01/2017 3.72  1.50 - 8.30 10*3/mm3 Final   • Lymphocytes, Absolute 02/01/2017 1.61  0.60 - 4.80 10*3/mm3 Final   • Monocytes, Absolute 02/01/2017 0.70  0.00 - 1.00 10*3/mm3 Final   • Eosinophils, Absolute 02/01/2017 0.12  0.10 - 0.30 10*3/mm3 Final   • Basophils, Absolute 02/01/2017 0.04  0.00 - 0.20 10*3/mm3 Final   • Immature Grans, Absolute 02/01/2017 0.08* 0.00 - 0.03 10*3/mm3 Final   • nRBC 02/01/2017 0.0  0.0 - 0.0 /100 WBC Final   • Glucose 02/01/2017 161* 70 - 130 mg/dL Final     Ct Abdomen Pelvis With Contrast    Result Date: 2/17/2017  Narrative: INDICATION: 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 Abdominal abscess treated with antibiotics. Restaging..  TECHNIQUE: CT of the abdomen and pelvis with p.o. and IV contrast. Coronal and sagittal reconstructions were obtained.  Radiation dose reduction techniques were utilized, including automated exposure control and exposure modulation based on body size.  COMPARISON: CT abdomen and pelvis dated 01/26/2017.  FINDINGS: Abdomen: The solid abdominal organs are normal. The gallbladder is not distended. A borderline enlarged periportal lymph node measures 1.2 cm in short  axis compared to 0.8 cm previously.  Patient had prior small bowel resection. There is some mild wall thickening of the small bowel near the resection site. This is not significantly changed. There is significant induration and inflammation within the mesentery adjacent to the anastomosis. This inflammation is unchanged. The loculated abscess within the mesentery has resolved, however. There are several small lymph nodes within the inflamed mesentery measuring less than 4 mm.  There is a new subcapsular fluid collection along the inferior tip of the right hepatic lobe. This measures 2.5 x 3.5 x 1.7 cm.  The abdominal aorta is normal in caliber.  Pelvis: No pelvic mass or free pelvic fluid.  No enlarged pelvic or inguinal lymph nodes..  No acute osseous abnormalities.      Impression: Impression:  1. Focal wall thickening of the small bowel near the anastomosis with associated inflammation within the mesentery. The inflammation is fairly similar to the prior exam. The loculated abscess in the mesentery has resolved. 2. Development of a new subcapsular fluid collection along the inferior tip of the right hepatic lobe consistent with a small subcapsular abscess. 3. Slight enlargement of a periportal lymph node. Given the history of malignancy, this should be followed.  This report was finalized on 2/17/2017 9:31 AM by Dr. Demian Vernon MD.      Ct Abdomen Pelvis With Contrast    Result Date: 1/27/2017  Narrative: INDICATION: Right lower quadrant abdominal pain for one day. 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  TECHNIQUE: CT of the abdomen and pelvis with p.o. and IV contrast. Coronal and sagittal reconstructions were obtained.  Radiation dose reduction techniques were utilized, including automated exposure control and exposure modulation based on body size.  COMPARISON: CT abdomen and pelvis dated 10/24/2016.  FINDINGS: Abdomen: There is some atelectasis in both lung bases.   The solid  abdominal organs are normal. The gallbladder is not distended.  There is a short segment of diffuse mesenteric edema and small bowel thickening in the right upper quadrant. The bowel wall thickening measures up to 0.6 cm in diameter. There are some small mesenteric lymph nodes. A contained perforation is noted in the right lower quadrant mesentery measuring 2.3 x 2.4 cm. This collection abuts the anterior margin of the cecum, however no significant cecal inflammation is identified. There is question of a small fistulous tract near the anastomosis. The vasculature within the mesentery appears patent. The appendix is normal.  Pelvis: No pelvic mass or free pelvic fluid.  No enlarged pelvic or inguinal lymph nodes..  No acute osseous abnormalities.      Impression: Impression:  1. Extensive inflammation within the small bowel mesentery with associated small bowel wall thickening in the right upper quadrant. This is near the patient's enteroenteric anastomosis. This is presumably a focal enteritis. There is a small outpouching of the bowel at the anastomosis which could represent a fistulous tract. No evidence of obstruction. 2. Small contained abscess/perforation in the right lower quadrant mesentery associated with the small bowel inflammation. This also abuts the anterior wall of the cecum however there is minimal cecal wall thickening. The overall process appears to be predominantly a small bowel process, rather than a colonic process, however this is not definitive..   Initial interpretation provided by Dr. Erasmo Martinez at 18:56 on 01/26/2017.  This report was finalized on 1/27/2017 7:28 AM by Dr. Demian Vernon MD.        PHYSICAL EXAM  Physical Exam     Abdominal exam is benign.  Has some mild right upper quadrant tenderness.  No guarding, rebound or masses felt.  Incision well-healed.  No evidence of hernia.    ASSESSMENT    CT shows abscess is resolved.  Still has some inflammation at the anastomotic site as well  as a mild fluid collection at the lower end of the liver.      PLAN  Will continue Bactrim for 2 weeks.  Follow-up in 2 weeks.

## 2017-03-09 ENCOUNTER — TRANSCRIBE ORDERS (OUTPATIENT)
Dept: ADMINISTRATIVE | Facility: HOSPITAL | Age: 72
End: 2017-03-09

## 2017-03-09 ENCOUNTER — OFFICE VISIT (OUTPATIENT)
Dept: SURGERY | Facility: CLINIC | Age: 72
End: 2017-03-09

## 2017-03-09 ENCOUNTER — LAB (OUTPATIENT)
Dept: LAB | Facility: HOSPITAL | Age: 72
End: 2017-03-09
Attending: SURGERY

## 2017-03-09 ENCOUNTER — OFFICE VISIT (OUTPATIENT)
Dept: GASTROENTEROLOGY | Facility: CLINIC | Age: 72
End: 2017-03-09

## 2017-03-09 VITALS
SYSTOLIC BLOOD PRESSURE: 154 MMHG | HEIGHT: 71 IN | WEIGHT: 219 LBS | DIASTOLIC BLOOD PRESSURE: 90 MMHG | BODY MASS INDEX: 30.66 KG/M2

## 2017-03-09 DIAGNOSIS — R10.84 ABDOMINAL PAIN, GENERALIZED: ICD-10-CM

## 2017-03-09 DIAGNOSIS — IMO0002 ABDOMINAL ABSCESS: Primary | ICD-10-CM

## 2017-03-09 DIAGNOSIS — D3A.012 CARCINOID TUMOR OF ILEUM: ICD-10-CM

## 2017-03-09 DIAGNOSIS — R10.84 GENERALIZED ABDOMINAL PAIN: Primary | ICD-10-CM

## 2017-03-09 DIAGNOSIS — R10.84 ABDOMINAL PAIN, GENERALIZED: Primary | ICD-10-CM

## 2017-03-09 DIAGNOSIS — K21.9 GASTROESOPHAGEAL REFLUX DISEASE, ESOPHAGITIS PRESENCE NOT SPECIFIED: ICD-10-CM

## 2017-03-09 LAB
BACTERIA UR QL AUTO: ABNORMAL /HPF
BILIRUB UR QL STRIP: NEGATIVE
CLARITY UR: CLEAR
COLOR UR: YELLOW
GLUCOSE UR STRIP-MCNC: ABNORMAL MG/DL
HGB UR QL STRIP.AUTO: NEGATIVE
HYALINE CASTS UR QL AUTO: ABNORMAL /LPF
KETONES UR QL STRIP: NEGATIVE
LEUKOCYTE ESTERASE UR QL STRIP.AUTO: NEGATIVE
NITRITE UR QL STRIP: NEGATIVE
PH UR STRIP.AUTO: <=5 [PH] (ref 4.5–8)
PROT UR QL STRIP: NEGATIVE
RBC # UR: ABNORMAL /HPF
REF LAB TEST METHOD: ABNORMAL
SP GR UR STRIP: 1.01 (ref 1–1.03)
SQUAMOUS #/AREA URNS HPF: ABNORMAL /HPF
UROBILINOGEN UR QL STRIP: ABNORMAL
WBC UR QL AUTO: ABNORMAL /HPF

## 2017-03-09 PROCEDURE — 81001 URINALYSIS AUTO W/SCOPE: CPT

## 2017-03-09 PROCEDURE — 99203 OFFICE O/P NEW LOW 30 MIN: CPT | Performed by: INTERNAL MEDICINE

## 2017-03-09 PROCEDURE — 99024 POSTOP FOLLOW-UP VISIT: CPT | Performed by: SURGERY

## 2017-03-09 NOTE — PROGRESS NOTES
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  Postoperatively he has had some issues with an abscess at the level of the anastomosis.  He has been receiving antibodies for this.  He is under the care of Dr. Dixon.  He is been referred to me for possible colonoscopy for screening purposes.  His wife with his with him today.  She recalls that his last colonoscopy was many years ago but is not sure of the results.  He denies any abdominal pain at this time.  He is scheduled to see Dr. Dixon a little bit later this afternoon.  He does have underlying reflux which is controlled with his PPI therapy.  He denies any dysphagia or odynophagia.  Spicy foods and greasy food sometimes aggravate his symptoms.  He does have multiple medical problems including diabetes, hypertension, atrial fibrillation currently managed on Xarelto.        REVIEW OF SYSTEMS  Review of Systems   Cardiovascular: Positive for palpitations and leg swelling.   Gastrointestinal:        REFLUX   Endocrine: Positive for polyuria.   Hematological: Bruises/bleeds easily.   All other systems reviewed and are negative.        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   • GERD (gastroesophageal reflux disease)    • H/O: CVA (cerebrovascular accident)    • History of concussion    • Hypertension    • On anticoagulant therapy     Xarelto for Afib   • Type 2 diabetes  mellitus          SURGICAL HISTORY  Past Surgical History:   Procedure Laterality Date   • COLONOSCOPY     • EXPLORATORY LAPAROTOMY N/A 12/9/2016    Procedure: LAPAROTOMY EXPLORATORY bowel resection excision of mesenteric mass excision/biopsy of mesenteric lymph node;  Surgeon: Chalo Dixon MD;  Location: [facility] OR;  Service:          FAMILY HISTORY  Family History   Problem Relation Age of Onset   • Heart defect Mother    • Heart disease Mother    • Cancer Father    • Colon cancer Paternal Uncle          SOCIAL HISTORY  Social History     Occupational History   •  [employer]     Social History Main Topics   • Smoking status: Former Smoker     Years: 20.00     Types: Pipe     Quit date: 2001   • Smokeless tobacco: Not on file   • Alcohol use No   • Drug use: No   • Sexual activity: Defer         CURRENT MEDICATIONS    Current Outpatient Prescriptions:   •  amLODIPine (NORVASC) 5 MG tablet, Take 5 mg by mouth Daily., Disp: , Rfl:   •  cholecalciferol (VITAMIN D3) 1000 UNITS tablet, Take 2,000 Units by mouth Daily., Disp: , Rfl:   •  digoxin (LANOXIN) 125 MCG tablet, Take 1 tablet by mouth Daily., Disp: 30 tablet, Rfl: 0  •  INVOKANA 100 MG tablet, , Disp: , Rfl:   •  lactobacillus acidophilus (RISAQUAD) capsule capsule, Take 1 capsule by mouth Daily., Disp: 30 capsule, Rfl: 0  •  lisinopril (PRINIVIL,ZESTRIL) 20 MG tablet, Take 20 mg by mouth Daily., Disp: , Rfl:   •  metFORMIN ER (GLUCOPHAGE-XR) 500 MG 24 hr tablet, , Disp: , Rfl:   •  metoprolol succinate XL (TOPROL-XL) 25 MG 24 hr tablet, Take 25 mg by mouth Daily., Disp: , Rfl:   •  naproxen sodium (ALEVE) 220 MG tablet, Take 220 mg by mouth 2 (Two) Times a Day As Needed for mild pain (1-3)., Disp: , Rfl:   •  Omega-3 Fatty Acids (FISH OIL) 1000 MG capsule capsule, Take 1,000 mg by mouth Daily With Breakfast., Disp: , Rfl:   •  omeprazole (priLOSEC) 20 MG capsule, Take 20 mg by mouth Daily., Disp: , Rfl:   •  rivaroxaban (XARELTO) 20  "MG tablet, Take 20 mg by mouth Daily., Disp: , Rfl:     ALLERGIES  Levaquin [levofloxacin]    VITALS  Vitals:    03/09/17 1126   BP: 154/90   Weight: 219 lb (99.3 kg)   Height: 71\" (180.3 cm)       LAST RESULTS   Admission on 01/27/2017, Discharged on 02/01/2017   Component Date Value Ref Range Status   • WBC 01/28/2017 10.82* 4.80 - 10.80 10*3/mm3 Final   • RBC 01/28/2017 4.61* 4.70 - 6.10 10*6/mm3 Final   • Hemoglobin 01/28/2017 13.1* 14.0 - 18.0 g/dL Final   • Hematocrit 01/28/2017 40.7* 42.0 - 52.0 % Final   • MCV 01/28/2017 88.3  80.0 - 94.0 fL Final   • MCH 01/28/2017 28.4  27.0 - 31.0 pg Final   • MCHC 01/28/2017 32.2  31.0 - 37.0 g/dL Final   • RDW 01/28/2017 14.2  11.5 - 14.5 % Final   • RDW-SD 01/28/2017 45.2  37.0 - 54.0 fl Final   • MPV 01/28/2017 9.6  7.4 - 10.4 fL Final   • Platelets 01/28/2017 332  140 - 500 10*3/mm3 Final   • Neutrophil % 01/28/2017 74.9* 45.0 - 70.0 % Final   • Lymphocyte % 01/28/2017 11.6* 20.0 - 45.0 % Final   • Monocyte % 01/28/2017 11.5* 3.0 - 8.0 % Final   • Eosinophil % 01/28/2017 1.2  0.0 - 4.0 % Final   • Basophil % 01/28/2017 0.2  0.0 - 2.0 % Final   • Immature Grans % 01/28/2017 0.6* 0.0 - 0.5 % Final   • Neutrophils, Absolute 01/28/2017 8.11  1.50 - 8.30 10*3/mm3 Final   • Lymphocytes, Absolute 01/28/2017 1.25  0.60 - 4.80 10*3/mm3 Final   • Monocytes, Absolute 01/28/2017 1.24* 0.00 - 1.00 10*3/mm3 Final   • Eosinophils, Absolute 01/28/2017 0.13  0.10 - 0.30 10*3/mm3 Final   • Basophils, Absolute 01/28/2017 0.02  0.00 - 0.20 10*3/mm3 Final   • Immature Grans, Absolute 01/28/2017 0.07* 0.00 - 0.03 10*3/mm3 Final   • nRBC 01/28/2017 0.0  0.0 - 0.0 /100 WBC Final   • Glucose 01/29/2017 104* 65 - 99 mg/dL Final   • BUN 01/29/2017 9  8 - 23 mg/dL Final   • Creatinine 01/29/2017 0.82  0.76 - 1.27 mg/dL Final   • Sodium 01/29/2017 137  136 - 145 mmol/L Final   • Potassium 01/29/2017 4.1  3.5 - 5.2 mmol/L Final   • Chloride 01/29/2017 100  98 - 107 mmol/L Final   • CO2 01/29/2017 " 17.7* 22.0 - 29.0 mmol/L Final   • Calcium 01/29/2017 9.1  8.8 - 10.5 mg/dL Final   • eGFR Non African Amer 01/29/2017 93  >60 mL/min/1.73 Final   • BUN/Creatinine Ratio 01/29/2017 11.0  7.0 - 25.0 Final   • Anion Gap 01/29/2017 19.3  mmol/L Final   • WBC 01/29/2017 8.44  4.80 - 10.80 10*3/mm3 Final   • RBC 01/29/2017 4.37* 4.70 - 6.10 10*6/mm3 Final   • Hemoglobin 01/29/2017 12.4* 14.0 - 18.0 g/dL Final   • Hematocrit 01/29/2017 38.2* 42.0 - 52.0 % Final   • MCV 01/29/2017 87.4  80.0 - 94.0 fL Final   • MCH 01/29/2017 28.4  27.0 - 31.0 pg Final   • MCHC 01/29/2017 32.5  31.0 - 37.0 g/dL Final   • RDW 01/29/2017 13.9  11.5 - 14.5 % Final   • RDW-SD 01/29/2017 44.7  37.0 - 54.0 fl Final   • MPV 01/29/2017 9.5  7.4 - 10.4 fL Final   • Platelets 01/29/2017 336  140 - 500 10*3/mm3 Final   • Neutrophil % 01/29/2017 71.9* 45.0 - 70.0 % Final   • Lymphocyte % 01/29/2017 15.5* 20.0 - 45.0 % Final   • Monocyte % 01/29/2017 10.2* 3.0 - 8.0 % Final   • Eosinophil % 01/29/2017 1.2  0.0 - 4.0 % Final   • Basophil % 01/29/2017 0.4  0.0 - 2.0 % Final   • Immature Grans % 01/29/2017 0.8* 0.0 - 0.5 % Final   • Neutrophils, Absolute 01/29/2017 6.07  1.50 - 8.30 10*3/mm3 Final   • Lymphocytes, Absolute 01/29/2017 1.31  0.60 - 4.80 10*3/mm3 Final   • Monocytes, Absolute 01/29/2017 0.86  0.00 - 1.00 10*3/mm3 Final   • Eosinophils, Absolute 01/29/2017 0.10  0.10 - 0.30 10*3/mm3 Final   • Basophils, Absolute 01/29/2017 0.03  0.00 - 0.20 10*3/mm3 Final   • Immature Grans, Absolute 01/29/2017 0.07* 0.00 - 0.03 10*3/mm3 Final   • nRBC 01/29/2017 0.0  0.0 - 0.0 /100 WBC Final   • Lactate 01/29/2017 1.0  0.5 - 2.0 mmol/L Final   • Glucose 01/30/2017 117* 65 - 99 mg/dL Final   • BUN 01/30/2017 7* 8 - 23 mg/dL Final   • Creatinine 01/30/2017 0.75* 0.76 - 1.27 mg/dL Final   • Sodium 01/30/2017 139  136 - 145 mmol/L Final   • Potassium 01/30/2017 3.8  3.5 - 5.2 mmol/L Final   • Chloride 01/30/2017 102  98 - 107 mmol/L Final   • CO2 01/30/2017  21.0* 22.0 - 29.0 mmol/L Final   • Calcium 01/30/2017 9.1  8.8 - 10.5 mg/dL Final   • eGFR Non African Amer 01/30/2017 103  >60 mL/min/1.73 Final   • BUN/Creatinine Ratio 01/30/2017 9.3  7.0 - 25.0 Final   • Anion Gap 01/30/2017 16.0  mmol/L Final   • WBC 01/30/2017 7.49  4.80 - 10.80 10*3/mm3 Final   • RBC 01/30/2017 4.42* 4.70 - 6.10 10*6/mm3 Final   • Hemoglobin 01/30/2017 12.5* 14.0 - 18.0 g/dL Final   • Hematocrit 01/30/2017 38.0* 42.0 - 52.0 % Final   • MCV 01/30/2017 86.0  80.0 - 94.0 fL Final   • MCH 01/30/2017 28.3  27.0 - 31.0 pg Final   • MCHC 01/30/2017 32.9  31.0 - 37.0 g/dL Final   • RDW 01/30/2017 13.8  11.5 - 14.5 % Final   • RDW-SD 01/30/2017 43.0  37.0 - 54.0 fl Final   • MPV 01/30/2017 8.8  7.4 - 10.4 fL Final   • Platelets 01/30/2017 330  140 - 500 10*3/mm3 Final   • Neutrophil % 01/30/2017 66.0  45.0 - 70.0 % Final   • Lymphocyte % 01/30/2017 21.5  20.0 - 45.0 % Final   • Monocyte % 01/30/2017 9.9* 3.0 - 8.0 % Final   • Eosinophil % 01/30/2017 1.3  0.0 - 4.0 % Final   • Basophil % 01/30/2017 0.4  0.0 - 2.0 % Final   • Immature Grans % 01/30/2017 0.9* 0.0 - 0.5 % Final   • Neutrophils, Absolute 01/30/2017 4.94  1.50 - 8.30 10*3/mm3 Final   • Lymphocytes, Absolute 01/30/2017 1.61  0.60 - 4.80 10*3/mm3 Final   • Monocytes, Absolute 01/30/2017 0.74  0.00 - 1.00 10*3/mm3 Final   • Eosinophils, Absolute 01/30/2017 0.10  0.10 - 0.30 10*3/mm3 Final   • Basophils, Absolute 01/30/2017 0.03  0.00 - 0.20 10*3/mm3 Final   • Immature Grans, Absolute 01/30/2017 0.07* 0.00 - 0.03 10*3/mm3 Final   • nRBC 01/30/2017 0.0  0.0 - 0.0 /100 WBC Final   • Glucose 01/30/2017 124  70 - 130 mg/dL Final   • Glucose 01/30/2017 164* 70 - 130 mg/dL Final   • C. Difficile Toxins by PCR 01/30/2017 Negative  Negative Final   • Glucose 01/30/2017 175* 70 - 130 mg/dL Final   • Glucose 01/30/2017 199* 70 - 130 mg/dL Final   • Glucose 01/31/2017 148* 65 - 99 mg/dL Final   • BUN 01/31/2017 5* 8 - 23 mg/dL Final   • Creatinine  01/31/2017 0.77  0.76 - 1.27 mg/dL Final   • Sodium 01/31/2017 142  136 - 145 mmol/L Final   • Potassium 01/31/2017 3.5  3.5 - 5.2 mmol/L Final   • Chloride 01/31/2017 105  98 - 107 mmol/L Final   • CO2 01/31/2017 26.5  22.0 - 29.0 mmol/L Final   • Calcium 01/31/2017 9.3  8.8 - 10.5 mg/dL Final   • eGFR Non African Amer 01/31/2017 100  >60 mL/min/1.73 Final   • BUN/Creatinine Ratio 01/31/2017 6.5* 7.0 - 25.0 Final   • Anion Gap 01/31/2017 10.5  mmol/L Final   • WBC 01/31/2017 6.12  4.80 - 10.80 10*3/mm3 Final   • RBC 01/31/2017 4.55* 4.70 - 6.10 10*6/mm3 Final   • Hemoglobin 01/31/2017 12.8* 14.0 - 18.0 g/dL Final   • Hematocrit 01/31/2017 38.7* 42.0 - 52.0 % Final   • MCV 01/31/2017 85.1  80.0 - 94.0 fL Final   • MCH 01/31/2017 28.1  27.0 - 31.0 pg Final   • MCHC 01/31/2017 33.1  31.0 - 37.0 g/dL Final   • RDW 01/31/2017 13.7  11.5 - 14.5 % Final   • RDW-SD 01/31/2017 42.5  37.0 - 54.0 fl Final   • MPV 01/31/2017 9.6  7.4 - 10.4 fL Final   • Platelets 01/31/2017 343  140 - 500 10*3/mm3 Final   • Neutrophil % 01/31/2017 60.6  45.0 - 70.0 % Final   • Lymphocyte % 01/31/2017 23.7  20.0 - 45.0 % Final   • Monocyte % 01/31/2017 12.9* 3.0 - 8.0 % Final   • Eosinophil % 01/31/2017 1.3  0.0 - 4.0 % Final   • Basophil % 01/31/2017 0.5  0.0 - 2.0 % Final   • Immature Grans % 01/31/2017 1.0* 0.0 - 0.5 % Final   • Neutrophils, Absolute 01/31/2017 3.71  1.50 - 8.30 10*3/mm3 Final   • Lymphocytes, Absolute 01/31/2017 1.45  0.60 - 4.80 10*3/mm3 Final   • Monocytes, Absolute 01/31/2017 0.79  0.00 - 1.00 10*3/mm3 Final   • Eosinophils, Absolute 01/31/2017 0.08* 0.10 - 0.30 10*3/mm3 Final   • Basophils, Absolute 01/31/2017 0.03  0.00 - 0.20 10*3/mm3 Final   • Immature Grans, Absolute 01/31/2017 0.06* 0.00 - 0.03 10*3/mm3 Final   • nRBC 01/31/2017 0.0  0.0 - 0.0 /100 WBC Final   • Glucose 01/31/2017 146* 70 - 130 mg/dL Final   • Glucose 01/31/2017 194* 70 - 130 mg/dL Final   • Glucose 01/31/2017 175* 70 - 130 mg/dL Final   • Glucose  01/31/2017 223* 70 - 130 mg/dL Final   • Glucose 02/01/2017 159* 65 - 99 mg/dL Final   • BUN 02/01/2017 5* 8 - 23 mg/dL Final   • Creatinine 02/01/2017 0.75* 0.76 - 1.27 mg/dL Final   • Sodium 02/01/2017 141  136 - 145 mmol/L Final   • Potassium 02/01/2017 3.4* 3.5 - 5.2 mmol/L Final   • Chloride 02/01/2017 101  98 - 107 mmol/L Final   • CO2 02/01/2017 26.4  22.0 - 29.0 mmol/L Final   • Calcium 02/01/2017 9.2  8.8 - 10.5 mg/dL Final   • eGFR Non African Amer 02/01/2017 103  >60 mL/min/1.73 Final   • BUN/Creatinine Ratio 02/01/2017 6.7* 7.0 - 25.0 Final   • Anion Gap 02/01/2017 13.6  mmol/L Final   • WBC 02/01/2017 6.27  4.80 - 10.80 10*3/mm3 Final   • RBC 02/01/2017 4.67* 4.70 - 6.10 10*6/mm3 Final   • Hemoglobin 02/01/2017 13.1* 14.0 - 18.0 g/dL Final   • Hematocrit 02/01/2017 40.0* 42.0 - 52.0 % Final   • MCV 02/01/2017 85.7  80.0 - 94.0 fL Final   • MCH 02/01/2017 28.1  27.0 - 31.0 pg Final   • MCHC 02/01/2017 32.8  31.0 - 37.0 g/dL Final   • RDW 02/01/2017 13.8  11.5 - 14.5 % Final   • RDW-SD 02/01/2017 43.0  37.0 - 54.0 fl Final   • MPV 02/01/2017 9.3  7.4 - 10.4 fL Final   • Platelets 02/01/2017 363  140 - 500 10*3/mm3 Final   • Neutrophil % 02/01/2017 59.3  45.0 - 70.0 % Final   • Lymphocyte % 02/01/2017 25.7  20.0 - 45.0 % Final   • Monocyte % 02/01/2017 11.2* 3.0 - 8.0 % Final   • Eosinophil % 02/01/2017 1.9  0.0 - 4.0 % Final   • Basophil % 02/01/2017 0.6  0.0 - 2.0 % Final   • Immature Grans % 02/01/2017 1.3* 0.0 - 0.5 % Final   • Neutrophils, Absolute 02/01/2017 3.72  1.50 - 8.30 10*3/mm3 Final   • Lymphocytes, Absolute 02/01/2017 1.61  0.60 - 4.80 10*3/mm3 Final   • Monocytes, Absolute 02/01/2017 0.70  0.00 - 1.00 10*3/mm3 Final   • Eosinophils, Absolute 02/01/2017 0.12  0.10 - 0.30 10*3/mm3 Final   • Basophils, Absolute 02/01/2017 0.04  0.00 - 0.20 10*3/mm3 Final   • Immature Grans, Absolute 02/01/2017 0.08* 0.00 - 0.03 10*3/mm3 Final   • nRBC 02/01/2017 0.0  0.0 - 0.0 /100 WBC Final   • Glucose  02/01/2017 161* 70 - 130 mg/dL Final     Ct Abdomen Pelvis With Contrast    Result Date: 3/23/2017  Narrative: CT ABDOMEN AND PELVIS WITH CONTRAST  INDICATION: Status post bowel resection for cancer removal. Follow-up abdominal abscess.  PROCEDURE: Contrast enhanced CT of the abdomen and pelvis. Radiation dose reduction techniques were utilized, including automated exposure control and exposure modulation based on body size.  COMPARISON: 02/17/2017  FINDINGS:  Abdomen with contrast: Included lung bases are clear.  Liver, spleen, kidneys, adrenal glands, pancreas, and gallbladder are unremarkable bowel loops are nondilated. Redemonstration of some focal stranding and small mildly prominent lymph nodes in the mesentery adjacent to the patient's small bowel anastomosis. A very similar to the prior. No new abdominal fluid collection. There is a demonstrated fluid at the tip of the liver has resolved.  Pelvis with contrast:No pelvic mass or fluid. Previously described periportal no measures 11 mm and is stable no aggressive appearing bone lesion.      Impression: Focal stranding in the small bowel mesentery adjacent to the small bowel anastomosis is very similar to the previous study. There is no evidence for new abdominal abscess.  The previously described small collection along the inferior tip of the liver has resolved.  Previously described periportal node is stable.  This report was finalized on 3/23/2017 2:42 PM by Dr. Bal Ma MD.        PHYSICAL EXAM  Physical Exam   Constitutional: He is oriented to person, place, and time. He appears well-developed and well-nourished. No distress.   HENT:   Head: Normocephalic and atraumatic.   Eyes: EOM are normal. Pupils are equal, round, and reactive to light.   Neck: Neck supple. No tracheal deviation present.   Cardiovascular: Normal rate, regular rhythm, normal heart sounds and intact distal pulses.  Exam reveals no gallop and no friction rub.    No murmur  heard.  Pulmonary/Chest: Effort normal and breath sounds normal. No respiratory distress. He has no wheezes. He has no rales. He exhibits no tenderness.   Abdominal: Soft. Bowel sounds are normal. He exhibits no distension. There is no tenderness. There is no rebound and no guarding.   Incision well healed.   Musculoskeletal: He exhibits no edema.   Lymphadenopathy:     He has no cervical adenopathy.   Neurological: He is alert and oriented to person, place, and time.   Skin: Skin is warm. He is not diaphoretic. No erythema.   Psychiatric: He has a normal mood and affect. His behavior is normal. Judgment and thought content normal.   Nursing note and vitals reviewed.      ASSESSMENT  Diagnoses and all orders for this visit:    Abdominal abscess    Carcinoid tumor of ileum    Gastroesophageal reflux disease, esophagitis presence not specified          PLAN  No Follow-up on file.      At this point in time we'll defer schedule him for any procedures until his abscess has completely cleared.  We'll discuss his case with Dr. Dixon in detail.  I had a lengthy discussion with him and his wife in regards to reflux and antireflux measures and dietary modifications.  He will see me back in the office in follow-up once he is been cleared from a surgical standpoint.

## 2017-03-09 NOTE — PROGRESS NOTES
PATIENT INFORMATION  Dean Garcia  12 WKS 6 DAYS S/P SB RESECTION, PT IS W/O COMPLAINTS     - 1945    CHIEF COMPLAINT  Chief Complaint   Patient presents with   • Post-op Follow-up       HISTORY OF PRESENT ILLNESS  HPI     Patient seen in follow-up abscess.  Is doing well.  He has no complaints.  He is tolerating his diet.  Bowel movements normal.  He has no abdominal pain.  Is complaining of some scrotal tenderness and dysuria.        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;  Service:          FAMILY HISTORY  Family History   Problem Relation Age of Onset   • Heart defect Mother    • Heart disease Mother    • Cancer Father    • Colon cancer Paternal Uncle          SOCIAL HISTORY  Social History     Occupational History   •  [employer]     Social History Main Topics   • Smoking status: Former Smoker     Years: 20.00     Types: Pipe     Quit date:    • Smokeless tobacco: Not on file   • Alcohol use No   • Drug use: No   • Sexual activity: Defer          CURRENT MEDICATIONS    Current Outpatient Prescriptions:   •  amLODIPine (NORVASC) 5 MG tablet, Take 5 mg by mouth Daily., Disp: , Rfl:   •  cholecalciferol (VITAMIN D3) 1000 UNITS tablet, Take 2,000 Units by mouth Daily., Disp: , Rfl:   •  digoxin (LANOXIN) 125 MCG tablet, Take 1 tablet by mouth Daily., Disp: 30 tablet, Rfl: 0  •  INVOKANA 100 MG tablet, , Disp: , Rfl:   •  lactobacillus acidophilus (RISAQUAD) capsule capsule, Take 1 capsule by mouth Daily., Disp: 30 capsule, Rfl: 0  •  lisinopril (PRINIVIL,ZESTRIL) 20 MG tablet, Take 20 mg by mouth Daily., Disp: , Rfl:   •  metFORMIN ER (GLUCOPHAGE-XR) 500 MG 24 hr tablet, , Disp: , Rfl:   •  metoprolol succinate XL (TOPROL-XL) 25 MG 24 hr tablet, Take 25 mg by mouth Daily., Disp: , Rfl:   •  naproxen sodium (ALEVE) 220 MG tablet, Take 220 mg by mouth 2 (Two) Times a Day As Needed for mild pain (1-3)., Disp: , Rfl:   •  Omega-3 Fatty Acids (FISH OIL) 1000 MG capsule capsule, Take 1,000 mg by mouth Daily With Breakfast., Disp: , Rfl:   •  omeprazole (priLOSEC) 20 MG capsule, Take 20 mg by mouth Daily., Disp: , Rfl:   •  rivaroxaban (XARELTO) 20 MG tablet, Take 20 mg by mouth Daily., Disp: , Rfl:   •  sulfamethoxazole-trimethoprim (BACTRIM DS) 800-160 MG per tablet, Take 1 tablet by mouth 2 (Two) Times a Day for 14 days., Disp: 28 tablet, Rfl: 0    ALLERGIES  Levaquin [levofloxacin]    VITALS  There were no vitals filed for this visit.    LAST RESULTS   Admission on 01/27/2017, Discharged on 02/01/2017   Component Date Value Ref Range Status   • WBC 01/28/2017 10.82* 4.80 - 10.80 10*3/mm3 Final   • RBC 01/28/2017 4.61* 4.70 - 6.10 10*6/mm3 Final   • Hemoglobin 01/28/2017 13.1* 14.0 - 18.0 g/dL Final   • Hematocrit 01/28/2017 40.7* 42.0 - 52.0 % Final   • MCV 01/28/2017 88.3  80.0 - 94.0 fL Final   • MCH 01/28/2017 28.4  27.0 - 31.0 pg Final   • MCHC 01/28/2017 32.2  31.0 - 37.0 g/dL Final   • RDW 01/28/2017 14.2  11.5 - 14.5 % Final   • RDW-SD 01/28/2017  45.2  37.0 - 54.0 fl Final   • MPV 01/28/2017 9.6  7.4 - 10.4 fL Final   • Platelets 01/28/2017 332  140 - 500 10*3/mm3 Final   • Neutrophil % 01/28/2017 74.9* 45.0 - 70.0 % Final   • Lymphocyte % 01/28/2017 11.6* 20.0 - 45.0 % Final   • Monocyte % 01/28/2017 11.5* 3.0 - 8.0 % Final   • Eosinophil % 01/28/2017 1.2  0.0 - 4.0 % Final   • Basophil % 01/28/2017 0.2  0.0 - 2.0 % Final   • Immature Grans % 01/28/2017 0.6* 0.0 - 0.5 % Final   • Neutrophils, Absolute 01/28/2017 8.11  1.50 - 8.30 10*3/mm3 Final   • Lymphocytes, Absolute 01/28/2017 1.25  0.60 - 4.80 10*3/mm3 Final   • Monocytes, Absolute 01/28/2017 1.24* 0.00 - 1.00 10*3/mm3 Final   • Eosinophils, Absolute 01/28/2017 0.13  0.10 - 0.30 10*3/mm3 Final   • Basophils, Absolute 01/28/2017 0.02  0.00 - 0.20 10*3/mm3 Final   • Immature Grans, Absolute 01/28/2017 0.07* 0.00 - 0.03 10*3/mm3 Final   • nRBC 01/28/2017 0.0  0.0 - 0.0 /100 WBC Final   • Glucose 01/29/2017 104* 65 - 99 mg/dL Final   • BUN 01/29/2017 9  8 - 23 mg/dL Final   • Creatinine 01/29/2017 0.82  0.76 - 1.27 mg/dL Final   • Sodium 01/29/2017 137  136 - 145 mmol/L Final   • Potassium 01/29/2017 4.1  3.5 - 5.2 mmol/L Final   • Chloride 01/29/2017 100  98 - 107 mmol/L Final   • CO2 01/29/2017 17.7* 22.0 - 29.0 mmol/L Final   • Calcium 01/29/2017 9.1  8.8 - 10.5 mg/dL Final   • eGFR Non African Amer 01/29/2017 93  >60 mL/min/1.73 Final   • BUN/Creatinine Ratio 01/29/2017 11.0  7.0 - 25.0 Final   • Anion Gap 01/29/2017 19.3  mmol/L Final   • WBC 01/29/2017 8.44  4.80 - 10.80 10*3/mm3 Final   • RBC 01/29/2017 4.37* 4.70 - 6.10 10*6/mm3 Final   • Hemoglobin 01/29/2017 12.4* 14.0 - 18.0 g/dL Final   • Hematocrit 01/29/2017 38.2* 42.0 - 52.0 % Final   • MCV 01/29/2017 87.4  80.0 - 94.0 fL Final   • MCH 01/29/2017 28.4  27.0 - 31.0 pg Final   • MCHC 01/29/2017 32.5  31.0 - 37.0 g/dL Final   • RDW 01/29/2017 13.9  11.5 - 14.5 % Final   • RDW-SD 01/29/2017 44.7  37.0 - 54.0 fl Final   • MPV 01/29/2017 9.5  7.4  - 10.4 fL Final   • Platelets 01/29/2017 336  140 - 500 10*3/mm3 Final   • Neutrophil % 01/29/2017 71.9* 45.0 - 70.0 % Final   • Lymphocyte % 01/29/2017 15.5* 20.0 - 45.0 % Final   • Monocyte % 01/29/2017 10.2* 3.0 - 8.0 % Final   • Eosinophil % 01/29/2017 1.2  0.0 - 4.0 % Final   • Basophil % 01/29/2017 0.4  0.0 - 2.0 % Final   • Immature Grans % 01/29/2017 0.8* 0.0 - 0.5 % Final   • Neutrophils, Absolute 01/29/2017 6.07  1.50 - 8.30 10*3/mm3 Final   • Lymphocytes, Absolute 01/29/2017 1.31  0.60 - 4.80 10*3/mm3 Final   • Monocytes, Absolute 01/29/2017 0.86  0.00 - 1.00 10*3/mm3 Final   • Eosinophils, Absolute 01/29/2017 0.10  0.10 - 0.30 10*3/mm3 Final   • Basophils, Absolute 01/29/2017 0.03  0.00 - 0.20 10*3/mm3 Final   • Immature Grans, Absolute 01/29/2017 0.07* 0.00 - 0.03 10*3/mm3 Final   • nRBC 01/29/2017 0.0  0.0 - 0.0 /100 WBC Final   • Lactate 01/29/2017 1.0  0.5 - 2.0 mmol/L Final   • Glucose 01/30/2017 117* 65 - 99 mg/dL Final   • BUN 01/30/2017 7* 8 - 23 mg/dL Final   • Creatinine 01/30/2017 0.75* 0.76 - 1.27 mg/dL Final   • Sodium 01/30/2017 139  136 - 145 mmol/L Final   • Potassium 01/30/2017 3.8  3.5 - 5.2 mmol/L Final   • Chloride 01/30/2017 102  98 - 107 mmol/L Final   • CO2 01/30/2017 21.0* 22.0 - 29.0 mmol/L Final   • Calcium 01/30/2017 9.1  8.8 - 10.5 mg/dL Final   • eGFR Non African Amer 01/30/2017 103  >60 mL/min/1.73 Final   • BUN/Creatinine Ratio 01/30/2017 9.3  7.0 - 25.0 Final   • Anion Gap 01/30/2017 16.0  mmol/L Final   • WBC 01/30/2017 7.49  4.80 - 10.80 10*3/mm3 Final   • RBC 01/30/2017 4.42* 4.70 - 6.10 10*6/mm3 Final   • Hemoglobin 01/30/2017 12.5* 14.0 - 18.0 g/dL Final   • Hematocrit 01/30/2017 38.0* 42.0 - 52.0 % Final   • MCV 01/30/2017 86.0  80.0 - 94.0 fL Final   • MCH 01/30/2017 28.3  27.0 - 31.0 pg Final   • MCHC 01/30/2017 32.9  31.0 - 37.0 g/dL Final   • RDW 01/30/2017 13.8  11.5 - 14.5 % Final   • RDW-SD 01/30/2017 43.0  37.0 - 54.0 fl Final   • MPV 01/30/2017 8.8  7.4 -  10.4 fL Final   • Platelets 01/30/2017 330  140 - 500 10*3/mm3 Final   • Neutrophil % 01/30/2017 66.0  45.0 - 70.0 % Final   • Lymphocyte % 01/30/2017 21.5  20.0 - 45.0 % Final   • Monocyte % 01/30/2017 9.9* 3.0 - 8.0 % Final   • Eosinophil % 01/30/2017 1.3  0.0 - 4.0 % Final   • Basophil % 01/30/2017 0.4  0.0 - 2.0 % Final   • Immature Grans % 01/30/2017 0.9* 0.0 - 0.5 % Final   • Neutrophils, Absolute 01/30/2017 4.94  1.50 - 8.30 10*3/mm3 Final   • Lymphocytes, Absolute 01/30/2017 1.61  0.60 - 4.80 10*3/mm3 Final   • Monocytes, Absolute 01/30/2017 0.74  0.00 - 1.00 10*3/mm3 Final   • Eosinophils, Absolute 01/30/2017 0.10  0.10 - 0.30 10*3/mm3 Final   • Basophils, Absolute 01/30/2017 0.03  0.00 - 0.20 10*3/mm3 Final   • Immature Grans, Absolute 01/30/2017 0.07* 0.00 - 0.03 10*3/mm3 Final   • nRBC 01/30/2017 0.0  0.0 - 0.0 /100 WBC Final   • Glucose 01/30/2017 124  70 - 130 mg/dL Final   • Glucose 01/30/2017 164* 70 - 130 mg/dL Final   • C. Difficile Toxins by PCR 01/30/2017 Negative  Negative Final   • Glucose 01/30/2017 175* 70 - 130 mg/dL Final   • Glucose 01/30/2017 199* 70 - 130 mg/dL Final   • Glucose 01/31/2017 148* 65 - 99 mg/dL Final   • BUN 01/31/2017 5* 8 - 23 mg/dL Final   • Creatinine 01/31/2017 0.77  0.76 - 1.27 mg/dL Final   • Sodium 01/31/2017 142  136 - 145 mmol/L Final   • Potassium 01/31/2017 3.5  3.5 - 5.2 mmol/L Final   • Chloride 01/31/2017 105  98 - 107 mmol/L Final   • CO2 01/31/2017 26.5  22.0 - 29.0 mmol/L Final   • Calcium 01/31/2017 9.3  8.8 - 10.5 mg/dL Final   • eGFR Non African Amer 01/31/2017 100  >60 mL/min/1.73 Final   • BUN/Creatinine Ratio 01/31/2017 6.5* 7.0 - 25.0 Final   • Anion Gap 01/31/2017 10.5  mmol/L Final   • WBC 01/31/2017 6.12  4.80 - 10.80 10*3/mm3 Final   • RBC 01/31/2017 4.55* 4.70 - 6.10 10*6/mm3 Final   • Hemoglobin 01/31/2017 12.8* 14.0 - 18.0 g/dL Final   • Hematocrit 01/31/2017 38.7* 42.0 - 52.0 % Final   • MCV 01/31/2017 85.1  80.0 - 94.0 fL Final   • MCH  01/31/2017 28.1  27.0 - 31.0 pg Final   • MCHC 01/31/2017 33.1  31.0 - 37.0 g/dL Final   • RDW 01/31/2017 13.7  11.5 - 14.5 % Final   • RDW-SD 01/31/2017 42.5  37.0 - 54.0 fl Final   • MPV 01/31/2017 9.6  7.4 - 10.4 fL Final   • Platelets 01/31/2017 343  140 - 500 10*3/mm3 Final   • Neutrophil % 01/31/2017 60.6  45.0 - 70.0 % Final   • Lymphocyte % 01/31/2017 23.7  20.0 - 45.0 % Final   • Monocyte % 01/31/2017 12.9* 3.0 - 8.0 % Final   • Eosinophil % 01/31/2017 1.3  0.0 - 4.0 % Final   • Basophil % 01/31/2017 0.5  0.0 - 2.0 % Final   • Immature Grans % 01/31/2017 1.0* 0.0 - 0.5 % Final   • Neutrophils, Absolute 01/31/2017 3.71  1.50 - 8.30 10*3/mm3 Final   • Lymphocytes, Absolute 01/31/2017 1.45  0.60 - 4.80 10*3/mm3 Final   • Monocytes, Absolute 01/31/2017 0.79  0.00 - 1.00 10*3/mm3 Final   • Eosinophils, Absolute 01/31/2017 0.08* 0.10 - 0.30 10*3/mm3 Final   • Basophils, Absolute 01/31/2017 0.03  0.00 - 0.20 10*3/mm3 Final   • Immature Grans, Absolute 01/31/2017 0.06* 0.00 - 0.03 10*3/mm3 Final   • nRBC 01/31/2017 0.0  0.0 - 0.0 /100 WBC Final   • Glucose 01/31/2017 146* 70 - 130 mg/dL Final   • Glucose 01/31/2017 194* 70 - 130 mg/dL Final   • Glucose 01/31/2017 175* 70 - 130 mg/dL Final   • Glucose 01/31/2017 223* 70 - 130 mg/dL Final   • Glucose 02/01/2017 159* 65 - 99 mg/dL Final   • BUN 02/01/2017 5* 8 - 23 mg/dL Final   • Creatinine 02/01/2017 0.75* 0.76 - 1.27 mg/dL Final   • Sodium 02/01/2017 141  136 - 145 mmol/L Final   • Potassium 02/01/2017 3.4* 3.5 - 5.2 mmol/L Final   • Chloride 02/01/2017 101  98 - 107 mmol/L Final   • CO2 02/01/2017 26.4  22.0 - 29.0 mmol/L Final   • Calcium 02/01/2017 9.2  8.8 - 10.5 mg/dL Final   • eGFR Non African Amer 02/01/2017 103  >60 mL/min/1.73 Final   • BUN/Creatinine Ratio 02/01/2017 6.7* 7.0 - 25.0 Final   • Anion Gap 02/01/2017 13.6  mmol/L Final   • WBC 02/01/2017 6.27  4.80 - 10.80 10*3/mm3 Final   • RBC 02/01/2017 4.67* 4.70 - 6.10 10*6/mm3 Final   • Hemoglobin  02/01/2017 13.1* 14.0 - 18.0 g/dL Final   • Hematocrit 02/01/2017 40.0* 42.0 - 52.0 % Final   • MCV 02/01/2017 85.7  80.0 - 94.0 fL Final   • MCH 02/01/2017 28.1  27.0 - 31.0 pg Final   • MCHC 02/01/2017 32.8  31.0 - 37.0 g/dL Final   • RDW 02/01/2017 13.8  11.5 - 14.5 % Final   • RDW-SD 02/01/2017 43.0  37.0 - 54.0 fl Final   • MPV 02/01/2017 9.3  7.4 - 10.4 fL Final   • Platelets 02/01/2017 363  140 - 500 10*3/mm3 Final   • Neutrophil % 02/01/2017 59.3  45.0 - 70.0 % Final   • Lymphocyte % 02/01/2017 25.7  20.0 - 45.0 % Final   • Monocyte % 02/01/2017 11.2* 3.0 - 8.0 % Final   • Eosinophil % 02/01/2017 1.9  0.0 - 4.0 % Final   • Basophil % 02/01/2017 0.6  0.0 - 2.0 % Final   • Immature Grans % 02/01/2017 1.3* 0.0 - 0.5 % Final   • Neutrophils, Absolute 02/01/2017 3.72  1.50 - 8.30 10*3/mm3 Final   • Lymphocytes, Absolute 02/01/2017 1.61  0.60 - 4.80 10*3/mm3 Final   • Monocytes, Absolute 02/01/2017 0.70  0.00 - 1.00 10*3/mm3 Final   • Eosinophils, Absolute 02/01/2017 0.12  0.10 - 0.30 10*3/mm3 Final   • Basophils, Absolute 02/01/2017 0.04  0.00 - 0.20 10*3/mm3 Final   • Immature Grans, Absolute 02/01/2017 0.08* 0.00 - 0.03 10*3/mm3 Final   • nRBC 02/01/2017 0.0  0.0 - 0.0 /100 WBC Final   • Glucose 02/01/2017 161* 70 - 130 mg/dL Final     Ct Abdomen Pelvis With Contrast    Result Date: 2/17/2017  Narrative: INDICATION: 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 Abdominal abscess treated with antibiotics. Restaging..  TECHNIQUE: CT of the abdomen and pelvis with p.o. and IV contrast. Coronal and sagittal reconstructions were obtained.  Radiation dose reduction techniques were utilized, including automated exposure control and exposure modulation based on body size.  COMPARISON: CT abdomen and pelvis dated 01/26/2017.  FINDINGS: Abdomen: The solid abdominal organs are normal. The gallbladder is not distended. A borderline enlarged periportal lymph node measures 1.2 cm in short axis compared  to 0.8 cm previously.  Patient had prior small bowel resection. There is some mild wall thickening of the small bowel near the resection site. This is not significantly changed. There is significant induration and inflammation within the mesentery adjacent to the anastomosis. This inflammation is unchanged. The loculated abscess within the mesentery has resolved, however. There are several small lymph nodes within the inflamed mesentery measuring less than 4 mm.  There is a new subcapsular fluid collection along the inferior tip of the right hepatic lobe. This measures 2.5 x 3.5 x 1.7 cm.  The abdominal aorta is normal in caliber.  Pelvis: No pelvic mass or free pelvic fluid.  No enlarged pelvic or inguinal lymph nodes..  No acute osseous abnormalities.      Impression: Impression:  1. Focal wall thickening of the small bowel near the anastomosis with associated inflammation within the mesentery. The inflammation is fairly similar to the prior exam. The loculated abscess in the mesentery has resolved. 2. Development of a new subcapsular fluid collection along the inferior tip of the right hepatic lobe consistent with a small subcapsular abscess. 3. Slight enlargement of a periportal lymph node. Given the history of malignancy, this should be followed.  This report was finalized on 2/17/2017 9:31 AM by Dr. Demian Vernon MD.        PHYSICAL EXAM  Physical Exam     Abdominal exam is benign.  No tenderness or masses.    Scrotal exam shows bilateral atrophic descended testicles.  Has some mild epididymal tenderness on the left.  No inguinal hernias felt.    ASSESSMENT    We'll complaints regards to his surgery.  With his dysuria may have a UTI.    PLAN    We'll check urinalysis.  Schedule repeat CT scan to follow his abscess.

## 2017-03-16 ENCOUNTER — APPOINTMENT (OUTPATIENT)
Dept: CT IMAGING | Facility: HOSPITAL | Age: 72
End: 2017-03-16
Attending: SURGERY

## 2017-03-23 ENCOUNTER — HOSPITAL ENCOUNTER (OUTPATIENT)
Dept: CT IMAGING | Facility: HOSPITAL | Age: 72
Discharge: HOME | End: 2017-03-23
Attending: SURGERY | Admitting: SURGERY

## 2017-03-23 ENCOUNTER — APPOINTMENT (OUTPATIENT)
Dept: CT IMAGING | Facility: HOSPITAL | Age: 72
End: 2017-03-23
Attending: SURGERY

## 2017-03-23 ENCOUNTER — OFFICE VISIT (OUTPATIENT)
Dept: SURGERY | Facility: CLINIC | Age: 72
End: 2017-03-23

## 2017-03-23 VITALS
WEIGHT: 218.8 LBS | DIASTOLIC BLOOD PRESSURE: 88 MMHG | SYSTOLIC BLOOD PRESSURE: 148 MMHG | BODY MASS INDEX: 30.63 KG/M2 | HEIGHT: 71 IN

## 2017-03-23 DIAGNOSIS — R10.84 GENERALIZED ABDOMINAL PAIN: ICD-10-CM

## 2017-03-23 DIAGNOSIS — Z09 FOLLOW UP: Primary | ICD-10-CM

## 2017-03-23 PROCEDURE — 74177 CT ABD & PELVIS W/CONTRAST: CPT

## 2017-03-23 PROCEDURE — 99024 POSTOP FOLLOW-UP VISIT: CPT | Performed by: SURGERY

## 2017-03-23 PROCEDURE — 0 IOPAMIDOL PER 1 ML: Performed by: SURGERY

## 2017-03-23 RX ADMIN — IOPAMIDOL 100 ML: 755 INJECTION, SOLUTION INTRAVENOUS at 14:15

## 2017-03-23 NOTE — PROGRESS NOTES
PATIENT INFORMATION  Dean Garcia       - 1945    CHIEF COMPLAINT  Chief Complaint   Patient presents with   • Follow-up     abd pain - ct done        HISTORY OF PRESENT ILLNESS  HPI     Patient presents for follow-up carcinoid tumor small bowel.  His scan was negative with no evidence of metastatic disease.  CT scan shows resolution of his abscess.  Still some possible stranding around the anastomosis.  Discuss with radiology.  [place] this could possibly be venous congestion.  Patient is asymptomatic.  He is doing well tolerating his diet with no complaints.      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   • GERD (gastroesophageal reflux disease)    • H/O: CVA (cerebrovascular accident)    • History of concussion    • Hypertension    • On anticoagulant therapy     Xarelto for Afib   • Type 2 diabetes mellitus          SURGICAL HISTORY  Past Surgical History:   Procedure Laterality Date   • COLONOSCOPY     • EXPLORATORY LAPAROTOMY N/A 2016    Procedure: LAPAROTOMY EXPLORATORY bowel resection excision of mesenteric mass excision/biopsy of mesenteric lymph node;  Surgeon: Chalo Dixon MD;  Location: [facility] OR;  Service:          FAMILY HISTORY  Family History   Problem Relation Age of Onset   • Heart defect Mother    • Heart disease Mother    • Cancer Father    • Colon cancer Paternal Uncle          SOCIAL HISTORY  Social History     Occupational History   •  [employer]     Social History Main Topics   • Smoking status: Former Smoker     Years: 20.00     Types: Pipe      "Quit date: 2001   • Smokeless tobacco: Not on file   • Alcohol use No   • Drug use: No   • Sexual activity: Defer         CURRENT MEDICATIONS    Current Outpatient Prescriptions:   •  amLODIPine (NORVASC) 5 MG tablet, Take 5 mg by mouth Daily., Disp: , Rfl:   •  cholecalciferol (VITAMIN D3) 1000 UNITS tablet, Take 2,000 Units by mouth Daily., Disp: , Rfl:   •  digoxin (LANOXIN) 125 MCG tablet, Take 1 tablet by mouth Daily., Disp: 30 tablet, Rfl: 0  •  INVOKANA 100 MG tablet, , Disp: , Rfl:   •  lactobacillus acidophilus (RISAQUAD) capsule capsule, Take 1 capsule by mouth Daily., Disp: 30 capsule, Rfl: 0  •  lisinopril (PRINIVIL,ZESTRIL) 20 MG tablet, Take 20 mg by mouth Daily., Disp: , Rfl:   •  metFORMIN ER (GLUCOPHAGE-XR) 500 MG 24 hr tablet, , Disp: , Rfl:   •  metoprolol succinate XL (TOPROL-XL) 25 MG 24 hr tablet, Take 25 mg by mouth Daily., Disp: , Rfl:   •  naproxen sodium (ALEVE) 220 MG tablet, Take 220 mg by mouth 2 (Two) Times a Day As Needed for mild pain (1-3)., Disp: , Rfl:   •  Omega-3 Fatty Acids (FISH OIL) 1000 MG capsule capsule, Take 1,000 mg by mouth Daily With Breakfast., Disp: , Rfl:   •  omeprazole (priLOSEC) 20 MG capsule, Take 20 mg by mouth Daily., Disp: , Rfl:   •  rivaroxaban (XARELTO) 20 MG tablet, Take 20 mg by mouth Daily., Disp: , Rfl:   No current facility-administered medications for this visit.     ALLERGIES  Levaquin [levofloxacin]    VITALS  Vitals:    03/23/17 1432   BP: 148/88   Weight: 218 lb 12.8 oz (99.2 kg)   Height: 71\" (180.3 cm)       LAST RESULTS   Lab on 03/09/2017   Component Date Value Ref Range Status   • Color,  03/09/2017 Yellow  Yellow, Straw Final   • Appearance,  03/09/2017 Clear  Clear Final   • pH,  03/09/2017 <=5.0  4.5 - 8.0 Final   • Specific Gravity,  03/09/2017 1.015  1.003 - 1.030 Final   • Glucose, UA 03/09/2017 500 mg/dL (2+)* Negative Final   • Ketones, UA 03/09/2017 Negative  Negative, 80 mg/dL (3+), >=160 mg/dL (4+) Final   • Bilirubin, UA " 03/09/2017 Negative  Negative Final   • Blood, UA 03/09/2017 Negative  Negative Final   • Protein, UA 03/09/2017 Negative  Negative Final   • Leuk Esterase, UA 03/09/2017 Negative  Negative Final   • Nitrite, UA 03/09/2017 Negative  Negative Final   • Urobilinogen, UA 03/09/2017 0.2 E.U./dL  0.2 - 1.0 E.U./dL Final   • RBC, UA 03/09/2017 0-2* None Seen /HPF Final   • WBC, UA 03/09/2017 0-2* None Seen /HPF Final   • Bacteria, UA 03/09/2017 None Seen  None Seen /HPF Final   • Squamous Epithelial Cells, UA 03/09/2017 None Seen  None Seen, 0-2 /HPF Final   • Hyaline Casts, UA 03/09/2017 None Seen  None Seen /LPF Final   • Methodology 03/09/2017 Manual Light Microscopy   Final     No results found.    PHYSICAL EXAM  Physical Exam    Abdominal exam is benign.  No tenderness.  No masses are felt.  No evidence of herniation.      ASSESSMENT    Patient doing well.      PLAN    He is released to full activity.  Follow-up with me 6 weeks.  Call for problems.

## 2017-05-04 ENCOUNTER — OFFICE VISIT (OUTPATIENT)
Dept: SURGERY | Facility: CLINIC | Age: 72
End: 2017-05-04

## 2017-05-04 VITALS
SYSTOLIC BLOOD PRESSURE: 128 MMHG | BODY MASS INDEX: 30.38 KG/M2 | HEIGHT: 71 IN | WEIGHT: 217 LBS | DIASTOLIC BLOOD PRESSURE: 88 MMHG

## 2017-05-04 DIAGNOSIS — Z09 FOLLOW UP: Primary | ICD-10-CM

## 2017-05-04 PROCEDURE — 99024 POSTOP FOLLOW-UP VISIT: CPT | Performed by: SURGERY

## 2017-05-10 ENCOUNTER — APPOINTMENT (OUTPATIENT)
Dept: GENERAL RADIOLOGY | Facility: HOSPITAL | Age: 72
End: 2017-05-10

## 2017-05-10 ENCOUNTER — HOSPITAL ENCOUNTER (EMERGENCY)
Facility: HOSPITAL | Age: 72
Discharge: HOME | End: 2017-05-11
Attending: EMERGENCY MEDICINE | Admitting: EMERGENCY MEDICINE

## 2017-05-10 VITALS
SYSTOLIC BLOOD PRESSURE: 134 MMHG | TEMPERATURE: 97.8 F | DIASTOLIC BLOOD PRESSURE: 94 MMHG | WEIGHT: 235 LBS | OXYGEN SATURATION: 97 % | HEIGHT: 75 IN | RESPIRATION RATE: 20 BRPM | BODY MASS INDEX: 29.22 KG/M2 | HEART RATE: 81 BPM

## 2017-05-10 DIAGNOSIS — R07.89 ATYPICAL CHEST PAIN: Primary | ICD-10-CM

## 2017-05-10 LAB
ALBUMIN SERPL-MCNC: 4 G/DL (ref 3.5–5.2)
ALBUMIN/GLOB SERPL: 1.5 G/DL
ALP SERPL-CCNC: 69 U/L (ref 39–117)
ALT SERPL W P-5'-P-CCNC: 33 U/L (ref 1–41)
ANION GAP SERPL CALCULATED.3IONS-SCNC: 14.2 MMOL/L
AST SERPL-CCNC: 21 U/L (ref 1–40)
BASOPHILS # BLD AUTO: 0.04 10*3/MM3 (ref 0–0.2)
BASOPHILS NFR BLD AUTO: 0.4 % (ref 0–1.5)
BILIRUB SERPL-MCNC: 0.4 MG/DL (ref 0.1–1.2)
BILIRUB UR QL STRIP: NEGATIVE
BUN BLD-MCNC: 18 MG/DL (ref 8–23)
BUN/CREAT SERPL: 19.8 (ref 7–25)
CALCIUM SPEC-SCNC: 10 MG/DL (ref 8.6–10.5)
CHLORIDE SERPL-SCNC: 101 MMOL/L (ref 98–107)
CLARITY UR: CLEAR
CO2 SERPL-SCNC: 22.8 MMOL/L (ref 22–29)
COLOR UR: YELLOW
CREAT BLD-MCNC: 0.91 MG/DL (ref 0.76–1.27)
DEPRECATED RDW RBC AUTO: 44.1 FL (ref 37–54)
EOSINOPHIL # BLD AUTO: 0.54 10*3/MM3 (ref 0–0.7)
EOSINOPHIL NFR BLD AUTO: 5.9 % (ref 0.3–6.2)
ERYTHROCYTE [DISTWIDTH] IN BLOOD BY AUTOMATED COUNT: 14.1 % (ref 11.5–14.5)
GFR SERPL CREATININE-BSD FRML MDRD: 83 ML/MIN/1.73
GLOBULIN UR ELPH-MCNC: 2.7 GM/DL
GLUCOSE BLD-MCNC: 212 MG/DL (ref 65–99)
GLUCOSE UR STRIP-MCNC: ABNORMAL MG/DL
HCT VFR BLD AUTO: 43.6 % (ref 40.4–52.2)
HGB BLD-MCNC: 15 G/DL (ref 13.7–17.6)
HGB UR QL STRIP.AUTO: NEGATIVE
HOLD SPECIMEN: NORMAL
HOLD SPECIMEN: NORMAL
IMM GRANULOCYTES # BLD: 0.03 10*3/MM3 (ref 0–0.03)
IMM GRANULOCYTES NFR BLD: 0.3 % (ref 0–0.5)
KETONES UR QL STRIP: ABNORMAL
LEUKOCYTE ESTERASE UR QL STRIP.AUTO: NEGATIVE
LIPASE SERPL-CCNC: 36 U/L (ref 13–60)
LYMPHOCYTES # BLD AUTO: 1.4 10*3/MM3 (ref 0.9–4.8)
LYMPHOCYTES NFR BLD AUTO: 15.3 % (ref 19.6–45.3)
MCH RBC QN AUTO: 29.4 PG (ref 27–32.7)
MCHC RBC AUTO-ENTMCNC: 34.4 G/DL (ref 32.6–36.4)
MCV RBC AUTO: 85.3 FL (ref 79.8–96.2)
MONOCYTES # BLD AUTO: 0.93 10*3/MM3 (ref 0.2–1.2)
MONOCYTES NFR BLD AUTO: 10.2 % (ref 5–12)
NEUTROPHILS # BLD AUTO: 6.2 10*3/MM3 (ref 1.9–8.1)
NEUTROPHILS NFR BLD AUTO: 67.9 % (ref 42.7–76)
NITRITE UR QL STRIP: NEGATIVE
PH UR STRIP.AUTO: <=5 [PH] (ref 5–8)
PLATELET # BLD AUTO: 220 10*3/MM3 (ref 140–500)
PMV BLD AUTO: 10.2 FL (ref 6–12)
POTASSIUM BLD-SCNC: 4.2 MMOL/L (ref 3.5–5.2)
PROT SERPL-MCNC: 6.7 G/DL (ref 6–8.5)
PROT UR QL STRIP: NEGATIVE
RBC # BLD AUTO: 5.11 10*6/MM3 (ref 4.6–6)
SODIUM BLD-SCNC: 138 MMOL/L (ref 136–145)
SP GR UR STRIP: >=1.03 (ref 1–1.03)
TROPONIN T SERPL-MCNC: <0.01 NG/ML (ref 0–0.03)
TROPONIN T SERPL-MCNC: <0.01 NG/ML (ref 0–0.03)
UROBILINOGEN UR QL STRIP: ABNORMAL
WBC NRBC COR # BLD: 9.14 10*3/MM3 (ref 4.5–10.7)
WHOLE BLOOD HOLD SPECIMEN: NORMAL
WHOLE BLOOD HOLD SPECIMEN: NORMAL

## 2017-05-10 PROCEDURE — 93010 ELECTROCARDIOGRAM REPORT: CPT | Performed by: INTERNAL MEDICINE

## 2017-05-10 PROCEDURE — 85025 COMPLETE CBC W/AUTO DIFF WBC: CPT | Performed by: EMERGENCY MEDICINE

## 2017-05-10 PROCEDURE — 84484 ASSAY OF TROPONIN QUANT: CPT | Performed by: EMERGENCY MEDICINE

## 2017-05-10 PROCEDURE — 83690 ASSAY OF LIPASE: CPT | Performed by: EMERGENCY MEDICINE

## 2017-05-10 PROCEDURE — 81003 URINALYSIS AUTO W/O SCOPE: CPT | Performed by: EMERGENCY MEDICINE

## 2017-05-10 PROCEDURE — 93005 ELECTROCARDIOGRAM TRACING: CPT

## 2017-05-10 PROCEDURE — 99284 EMERGENCY DEPT VISIT MOD MDM: CPT

## 2017-05-10 PROCEDURE — 80053 COMPREHEN METABOLIC PANEL: CPT | Performed by: EMERGENCY MEDICINE

## 2017-05-10 PROCEDURE — 71020 HC CHEST PA AND LATERAL: CPT

## 2017-05-10 RX ORDER — SODIUM CHLORIDE 0.9 % (FLUSH) 0.9 %
10 SYRINGE (ML) INJECTION AS NEEDED
Status: DISCONTINUED | OUTPATIENT
Start: 2017-05-10 | End: 2017-05-11 | Stop reason: HOSPADM

## 2017-06-23 ENCOUNTER — OFFICE VISIT (OUTPATIENT)
Dept: FAMILY MEDICINE CLINIC | Age: 72
End: 2017-06-23

## 2017-06-23 VITALS
BODY MASS INDEX: 28.62 KG/M2 | TEMPERATURE: 97.8 F | SYSTOLIC BLOOD PRESSURE: 128 MMHG | DIASTOLIC BLOOD PRESSURE: 84 MMHG | HEIGHT: 74 IN | HEART RATE: 90 BPM | RESPIRATION RATE: 16 BRPM | WEIGHT: 223 LBS | OXYGEN SATURATION: 97 %

## 2017-06-23 DIAGNOSIS — E11.9 TYPE 2 DIABETES MELLITUS WITHOUT COMPLICATION, WITHOUT LONG-TERM CURRENT USE OF INSULIN (HCC): ICD-10-CM

## 2017-06-23 DIAGNOSIS — I10 ESSENTIAL HYPERTENSION: ICD-10-CM

## 2017-06-23 DIAGNOSIS — Z00.00 MEDICARE ANNUAL WELLNESS VISIT, INITIAL: Primary | ICD-10-CM

## 2017-06-23 RX ORDER — OMEPRAZOLE 20 MG/1
20 CAPSULE, DELAYED RELEASE ORAL DAILY
COMMUNITY
End: 2018-04-26 | Stop reason: ALTCHOICE

## 2017-06-23 NOTE — PROGRESS NOTES
Chief Complaint   Patient presents with   [facility] Annual Wellness Visit         HPI:       Khrisjuan jose Camila will soon turn 81. . Works part time now as Gynecologist.  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 He is no longer experiencing pain from psoriatic arthritis. Noted to have T2DM late last year. Last A1c = 8.8. New Issues:  Due for SAWV    No Known Allergies    Current Outpatient Prescriptions   Medication Sig    omeprazole (PRILOSEC) 20 mg capsule Take 20 mg by mouth daily.  Liraglutide (VICTOZA) 0.6 mg/0.1 mL (18 mg/3 mL) sub-q pen 0.1 mL by SubCUTAneous route daily.  metFORMIN (GLUCOPHAGE) 500 mg tablet Take 1 Tab by mouth two (2) times daily (with meals).  betamethasone valerate (VALISONE) 0.1 % topical cream     acyclovir (ZOVIRAX) 400 mg tablet Take 1 Tab by mouth daily.  hydroCHLOROthiazide (HYDRODIURIL) 12.5 mg tablet Take 1 Tab by mouth daily.  losartan (COZAAR) 50 mg tablet Take 1 Tab by mouth daily.  clonazePAM (KLONOPIN) 1 mg tablet Take 1 Tab by mouth two (2) times a day. Max Daily Amount: 2 mg. No current facility-administered medications for this visit. Past Medical History:   Diagnosis Date    Asthma     laryngospasm during prior viral illnesses    Elevated transaminase level 09/2016    GERD (gastroesophageal reflux disease)     Psoriasis     T2DM (type 2 diabetes mellitus) ([facility] [address] 09/2016    A1c 6.4;  Glucose 195         ROS:  Denies fever, chills, cough, chest pain, SOB,  nausea, vomiting, or diarrhea. Denies wt loss, wt gain, hemoptysis, hematochezia or melena.     Physical Examination:    /84 (BP 1 Location: Left arm, BP Patient Position: Sitting)  Pulse 90  Temp 97.8 °F (36.6 °C) (Oral)   Resp 16  Ht 6' 2\" (1.88 m)  Wt 223 lb (101.2 kg)  SpO2 97%  BMI 28.63 kg/m2    General: Alert and Ox3, Fluent speech  HEENT:  NC/AT, EOMI, OP: clear  Neck:  Supple, no adenopathy, JVD, mass or bruit  Chest:  Clear to Ausculation, without wheezes, rales, rubs or ronchi  Cardiac: RRR  Abdomen:  +BS, soft, nontender without palpable HSM  Extremities:  No cyanosis, clubbing or edema  Neurologic:  Ambulatory without assist, CN 2-12 grossly intact. Moves all extremities. Skin: no rash  Lymphadenopathy: no cervical or supraclavicular nodes      ASSESSMENT AND PLAN:     1. Due for SAWV  2. T2DM:  Trial of Victoza  3. HTN is well controlled  4. GERD:  Omeprazole to MSP    Orders Placed This Encounter    CBC WITH AUTOMATED DIFF    LIPID PANEL    METABOLIC PANEL, COMPREHENSIVE    MICROALBUMIN, UR, RAND W/ MICROALBUMIN/CREA RATIO    TSH 3RD GENERATION    HEMOGLOBIN A1C WITH EAG    omeprazole (PRILOSEC) 20 mg capsule     Sig: Take 20 mg by mouth daily.  Liraglutide (VICTOZA) 0.6 mg/0.1 mL (18 mg/3 mL) sub-q pen     Si.1 mL by SubCUTAneous route daily. Dispense:  1 Each     Refill:  Chucho Haynes MD, FACP        ______________________________________________________________________    Davi Wing is a 73 y.o. male and presents for annual Medicare Wellness Visit. Problem List: Reviewed with patient and discussed risk factors. Patient Active Problem List   Diagnosis Code    Psoriasis L40.9    Laryngospasm J38.5    Spinal stenosis of cervical region M48.02    Elevated transaminase level R74.0    Essential hypertension I10    Type 2 diabetes mellitus without complication, without long-term current use of insulin ([facility] [address] E11.9       Current medical providers:  Patient Care Team:  Ebony Buckner MD as PCP - General (Internal Medicine)    PM, , Medications/Allergies: reviewed, on chart. Male Alcohol Screening: On any occasion during the past 3 months, have you had more than 4 drinks containing alcohol? No    Do you average more than 14 drinks per week?   No    ROS:  Constitutional: No fever, chills or weight loss  Respiratory: No cough, SOB   CV: No chest pain or Palpitations    Objective:  Visit Vitals    /84 (BP 1 Location: Left arm, BP Patient Position: Sitting)    Pulse 90    Temp 97.8 °F (36.6 °C) (Oral)    Resp 16    Ht 6' 2\" (1.88 m)    Wt 223 lb (101.2 kg)    SpO2 97%    BMI 28.63 kg/m2    Body mass index is 28.63 kg/(m^2). Assessment of cognitive impairment: Alert and oriented x 3    Depression Screen:   PHQ over the last two weeks 6/23/2017   Little interest or pleasure in doing things Not at all   Feeling down, depressed or hopeless Not at all   Total Score PHQ 2 0       Fall Risk Assessment:    Fall Risk Assessment, last 12 mths 6/23/2017   Able to walk? Yes   Fall in past 12 months? No       Functional Ability:   Does the patient exhibit a steady gait? yes   How long did it take the patient to get up and walk from a sitting position? 12 sec   Is the patient self reliant?  (ie can do own laundry, meals, household chores)  yes     Does the patient handle his/her own medications? yes     Does the patient handle his/her own money? yes     Is the patients home safe (ie good lighting, handrails on stairs and bath, etc.)? yes     Did you notice or did patient express any hearing difficulties? yes     Did you notice or did patient express any vision difficulties?    no       Advance Care Planning:   Patient was offered the opportunity to discuss advance care planning:  yes     Does patient have an Advance Directive:  yes   If no, did you provide information on Caring Connections?  no       Plan:      Orders Placed This Encounter    CBC WITH AUTOMATED DIFF    LIPID PANEL    METABOLIC PANEL, COMPREHENSIVE    MICROALBUMIN, UR, RAND W/ MICROALBUMIN/CREA RATIO    TSH 3RD GENERATION    HEMOGLOBIN A1C WITH EAG    omeprazole (PRILOSEC) 20 mg capsule    Liraglutide (VICTOZA) 0.6 mg/0.1 mL (18 mg/3 mL) sub-q [facility]       Health Maintenance   Topic Date Due    LIPID PANEL Q1  1945    FOOT EXAM Q1  09/20/1957    MICROALBUMIN Q1  09/20/1957    DTaP/Tdap/Td series (1 - Tdap) 09/20/1968    FOBT Q 1 YEAR AGE 50-75  09/20/1997    Pneumococcal 65+ Low/Medium Risk (1 of 2 - PCV13) 09/20/2012    MEDICARE YEARLY EXAM  09/20/2012    EYE EXAM RETINAL OR DILATED Q1  07/29/2016    HEMOGLOBIN A1C Q6M  06/02/2017    GLAUCOMA SCREENING Q2Y  07/29/2017    INFLUENZA AGE 9 TO ADULT  08/01/2017    Hepatitis C Screening  Completed    ZOSTER VACCINE AGE 60>  Addressed       *Patient verbalized understanding and agreement with the plan. A copy of the After Visit Summary with personalized health plan was given to the patient today.

## 2017-06-24 LAB
ALBUMIN SERPL-MCNC: 4.5 G/DL (ref 3.6–4.8)
ALBUMIN/CREAT UR: 20.9 MG/G CREAT (ref 0–30)
ALBUMIN/GLOB SERPL: 1.7 {RATIO} (ref 1.2–2.2)
ALP SERPL-CCNC: 91 IU/L (ref 39–117)
ALT SERPL-CCNC: 56 IU/L (ref 0–44)
AST SERPL-CCNC: 36 IU/L (ref 0–40)
BASOPHILS # BLD AUTO: 0 X10E3/UL (ref 0–0.2)
BASOPHILS NFR BLD AUTO: 1 %
BILIRUB SERPL-MCNC: 0.2 MG/DL (ref 0–1.2)
BUN SERPL-MCNC: 26 MG/DL (ref 8–27)
BUN/CREAT SERPL: 27 (ref 10–24)
CALCIUM SERPL-MCNC: 9.9 MG/DL (ref 8.6–10.2)
CHLORIDE SERPL-SCNC: 98 MMOL/L (ref 96–106)
CHOLEST SERPL-MCNC: 344 MG/DL (ref 100–199)
CO2 SERPL-SCNC: 23 MMOL/L (ref 18–29)
CREAT SERPL-MCNC: 0.95 MG/DL (ref 0.76–1.27)
CREAT UR-MCNC: 145.2 MG/DL
EOSINOPHIL # BLD AUTO: 0.1 X10E3/UL (ref 0–0.4)
EOSINOPHIL NFR BLD AUTO: 2 %
ERYTHROCYTE [DISTWIDTH] IN BLOOD BY AUTOMATED COUNT: 13.6 % (ref 12.3–15.4)
EST. AVERAGE GLUCOSE BLD GHB EST-MCNC: 189 MG/DL
GLOBULIN SER CALC-MCNC: 2.6 G/DL (ref 1.5–4.5)
GLUCOSE SERPL-MCNC: 170 MG/DL (ref 65–99)
HBA1C MFR BLD: 8.2 % (ref 4.8–5.6)
HCT VFR BLD AUTO: 41.6 % (ref 37.5–51)
HDLC SERPL-MCNC: 54 MG/DL
HGB BLD-MCNC: 13.6 G/DL (ref 12.6–17.7)
IMM GRANULOCYTES # BLD: 0 X10E3/UL (ref 0–0.1)
IMM GRANULOCYTES NFR BLD: 0 %
LDLC SERPL CALC-MCNC: 216 MG/DL (ref 0–99)
LYMPHOCYTES # BLD AUTO: 1.3 X10E3/UL (ref 0.7–3.1)
LYMPHOCYTES NFR BLD AUTO: 39 %
MCH RBC QN AUTO: 30.7 PG (ref 26.6–33)
MCHC RBC AUTO-ENTMCNC: 32.7 G/DL (ref 31.5–35.7)
MCV RBC AUTO: 94 FL (ref 79–97)
MICROALBUMIN UR-MCNC: 30.4 UG/ML
MONOCYTES # BLD AUTO: 0.3 X10E3/UL (ref 0.1–0.9)
MONOCYTES NFR BLD AUTO: 8 %
NEUTROPHILS # BLD AUTO: 1.7 X10E3/UL (ref 1.4–7)
NEUTROPHILS NFR BLD AUTO: 50 %
PLATELET # BLD AUTO: 173 X10E3/UL (ref 150–379)
POTASSIUM SERPL-SCNC: 5.5 MMOL/L (ref 3.5–5.2)
PROT SERPL-MCNC: 7.1 G/DL (ref 6–8.5)
RBC # BLD AUTO: 4.43 X10E6/UL (ref 4.14–5.8)
SODIUM SERPL-SCNC: 140 MMOL/L (ref 134–144)
TRIGL SERPL-MCNC: 369 MG/DL (ref 0–149)
TSH SERPL DL<=0.005 MIU/L-ACNC: 1.32 UIU/ML (ref 0.45–4.5)
VLDLC SERPL CALC-MCNC: 74 MG/DL (ref 5–40)
WBC # BLD AUTO: 3.4 X10E3/UL (ref 3.4–10.8)

## 2017-06-26 DIAGNOSIS — E78.2 MIXED HYPERLIPIDEMIA: Primary | ICD-10-CM

## 2017-06-26 RX ORDER — FENOFIBRATE 145 MG/1
145 TABLET, COATED ORAL DAILY
Qty: 90 TAB | Refills: 4 | Status: SHIPPED | OUTPATIENT
Start: 2017-06-26 | End: 2018-03-23 | Stop reason: ALTCHOICE

## 2017-08-10 ENCOUNTER — OFFICE VISIT (OUTPATIENT)
Dept: GASTROENTEROLOGY | Facility: CLINIC | Age: 72
End: 2017-08-10

## 2017-08-10 ENCOUNTER — TELEPHONE (OUTPATIENT)
Dept: GASTROENTEROLOGY | Facility: CLINIC | Age: 72
End: 2017-08-10

## 2017-08-10 VITALS
DIASTOLIC BLOOD PRESSURE: 86 MMHG | HEIGHT: 75 IN | BODY MASS INDEX: 27.3 KG/M2 | SYSTOLIC BLOOD PRESSURE: 136 MMHG | WEIGHT: 219.6 LBS

## 2017-08-10 DIAGNOSIS — K21.9 GASTROESOPHAGEAL REFLUX DISEASE, ESOPHAGITIS PRESENCE NOT SPECIFIED: ICD-10-CM

## 2017-08-10 DIAGNOSIS — Z12.11 ENCOUNTER FOR SCREENING FOR MALIGNANT NEOPLASM OF COLON: Primary | ICD-10-CM

## 2017-08-10 DIAGNOSIS — K22.70 BARRETT'S ESOPHAGUS WITHOUT DYSPLASIA: ICD-10-CM

## 2017-08-10 DIAGNOSIS — Z86.010 PERSONAL HISTORY OF COLONIC POLYPS: ICD-10-CM

## 2017-08-10 PROCEDURE — 99214 OFFICE O/P EST MOD 30 MIN: CPT | Performed by: INTERNAL MEDICINE

## 2017-08-10 RX ORDER — SODIUM, POTASSIUM,MAG SULFATES 17.5-3.13G
1 SOLUTION, RECONSTITUTED, ORAL ORAL EVERY 12 HOURS
Qty: 2 BOTTLE | Refills: 0 | Status: ON HOLD | OUTPATIENT
Start: 2017-08-10 | End: 2017-10-06

## 2017-08-10 NOTE — PROGRESS NOTES
PATIENT INFORMATION  Dean Garcia       - 1945    CHIEF COMPLAINT  Chief Complaint   Patient presents with   • Abdominal Pain       HISTORY OF PRESENT ILLNESS  Abdominal Pain       He is here today in follow up and is doing well. No abdominal pain. He has been cleared from Dr. Dixon.     BM are daily and no blood. Last cls and egd was over 3 years ago and no polyps were removed. Pathology is reviewed and there was 3 TA removed and Barretts esophagus was noted.    Weight stable. GERD for over 5 years, maintained on ppi. No dysphagia or odynophagia.  REVIEW OF SYSTEMS  Review of Systems   Gastrointestinal: Positive for abdominal pain.   All other systems reviewed and are negative.        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   • GERD (gastroesophageal reflux disease)    • H/O: CVA (cerebrovascular accident)    • History of concussion    • Hypertension    • On anticoagulant therapy     Xarelto for Afib   • Type 2 diabetes mellitus          SURGICAL HISTORY  Past Surgical History:   Procedure Laterality Date   • COLONOSCOPY     • EXPLORATORY LAPAROTOMY N/A 2016    Procedure: LAPAROTOMY EXPLORATORY bowel resection excision of mesenteric mass excision/biopsy of mesenteric lymph node;  Surgeon: Chalo Dixon MD;  Location: [facility] OR;  Service:          FAMILY HISTORY  Family History   Problem Relation Age of Onset   • Heart defect Mother    • Heart disease Mother    • Cancer Father    • Colon cancer Paternal Uncle          SOCIAL HISTORY  Social History     Occupational History   •  [employer]     Social History  "Main Topics   • Smoking status: Former Smoker     Years: 20.00     Types: Pipe     Quit date: 2001   • Smokeless tobacco: Not on file   • Alcohol use No   • Drug use: No   • Sexual activity: Defer         CURRENT MEDICATIONS    Current Outpatient Prescriptions:   •  amLODIPine (NORVASC) 5 MG tablet, Take 5 mg by mouth Daily., Disp: , Rfl:   •  cholecalciferol (VITAMIN D3) 1000 UNITS tablet, Take 2,000 Units by mouth Daily., Disp: , Rfl:   •  digoxin (LANOXIN) 125 MCG tablet, Take 1 tablet by mouth Daily., Disp: 30 tablet, Rfl: 0  •  INVOKANA 100 MG tablet, , Disp: , Rfl:   •  lactobacillus acidophilus (RISAQUAD) capsule capsule, Take 1 capsule by mouth Daily., Disp: 30 capsule, Rfl: 0  •  lisinopril (PRINIVIL,ZESTRIL) 20 MG tablet, Take 20 mg by mouth Daily., Disp: , Rfl:   •  loperamide (IMODIUM A-D) 2 MG tablet, Take 2 mg by mouth., Disp: , Rfl:   •  metFORMIN ER (GLUCOPHAGE-XR) 500 MG 24 hr tablet, , Disp: , Rfl:   •  metoprolol succinate XL (TOPROL-XL) 25 MG 24 hr tablet, Take 25 mg by mouth Daily., Disp: , Rfl:   •  naproxen sodium (ALEVE) 220 MG tablet, Take 220 mg by mouth 2 (Two) Times a Day As Needed for mild pain (1-3)., Disp: , Rfl:   •  Omega-3 Fatty Acids (FISH OIL) 1000 MG capsule capsule, Take 1,000 mg by mouth Daily With Breakfast., Disp: , Rfl:   •  omeprazole (priLOSEC) 20 MG capsule, Take 20 mg by mouth Daily., Disp: , Rfl:   •  risperiDONE (risperDAL) 0.25 MG tablet, Take 0.25 mg by mouth., Disp: , Rfl:   •  rivaroxaban (XARELTO) 20 MG tablet, Take 20 mg by mouth Daily., Disp: , Rfl:     ALLERGIES  Levaquin [levofloxacin]    VITALS  Vitals:    08/10/17 0928   BP: 136/86   Weight: 219 lb 9.6 oz (99.6 kg)   Height: 75\" (190.5 cm)       LAST RESULTS   Admission on 05/10/2017, Discharged on 05/11/2017   Component Date Value Ref Range Status   • Glucose 05/10/2017 212* 65 - 99 mg/dL Final   • BUN 05/10/2017 18  8 - 23 mg/dL Final   • Creatinine 05/10/2017 0.91  0.76 - 1.27 mg/dL Final   • Sodium " 05/10/2017 138  136 - 145 mmol/L Final   • Potassium 05/10/2017 4.2  3.5 - 5.2 mmol/L Final   • Chloride 05/10/2017 101  98 - 107 mmol/L Final   • CO2 05/10/2017 22.8  22.0 - 29.0 mmol/L Final   • Calcium 05/10/2017 10.0  8.6 - 10.5 mg/dL Final   • Total Protein 05/10/2017 6.7  6.0 - 8.5 g/dL Final   • Albumin 05/10/2017 4.00  3.50 - 5.20 g/dL Final   • ALT (SGPT) 05/10/2017 33  1 - 41 U/L Final   • AST (SGOT) 05/10/2017 21  1 - 40 U/L Final   • Alkaline Phosphatase 05/10/2017 69  39 - 117 U/L Final   • Total Bilirubin 05/10/2017 0.4  0.1 - 1.2 mg/dL Final   • eGFR Non African Amer 05/10/2017 83  >60 mL/min/1.73 Final   • Globulin 05/10/2017 2.7  gm/dL Final   • A/G Ratio 05/10/2017 1.5  g/dL Final   • BUN/Creatinine Ratio 05/10/2017 19.8  7.0 - 25.0 Final   • Anion Gap 05/10/2017 14.2  mmol/L Final   • Lipase 05/10/2017 36  13 - 60 U/L Final   • Troponin T 05/10/2017 <0.010  0.000 - 0.030 ng/mL Final   • Color, UA 05/10/2017 Yellow  Yellow, Straw Final   • Appearance, UA 05/10/2017 Clear  Clear Final   • pH, UA 05/10/2017 <=5.0  5.0 - 8.0 Final   • Specific Gravity, UA 05/10/2017 >=1.030  1.005 - 1.030 Final   • Glucose, UA 05/10/2017 >=1000 mg/dL (3+)* Negative Final   • Ketones, UA 05/10/2017 Trace* Negative Final   • Bilirubin, UA 05/10/2017 Negative  Negative Final   • Blood, UA 05/10/2017 Negative  Negative Final   • Protein, UA 05/10/2017 Negative  Negative Final   • Leuk Esterase, UA 05/10/2017 Negative  Negative Final   • Nitrite, UA 05/10/2017 Negative  Negative Final   • Urobilinogen, UA 05/10/2017 0.2 E.U./dL  0.2 - 1.0 E.U./dL Final   • Extra Tube 05/10/2017 hold for add-on   Final    Auto resulted   • Extra Tube 05/10/2017 Hold for add-ons.   Final    Auto resulted.   • Extra Tube 05/10/2017 hold for add-on   Final    Auto resulted   • Extra Tube 05/10/2017 Hold for add-ons.   Final    Auto resulted.   • WBC 05/10/2017 9.14  4.50 - 10.70 10*3/mm3 Final   • RBC 05/10/2017 5.11  4.60 - 6.00 10*6/mm3  Final   • Hemoglobin 05/10/2017 15.0  13.7 - 17.6 g/dL Final   • Hematocrit 05/10/2017 43.6  40.4 - 52.2 % Final   • MCV 05/10/2017 85.3  79.8 - 96.2 fL Final   • MCH 05/10/2017 29.4  27.0 - 32.7 pg Final   • MCHC 05/10/2017 34.4  32.6 - 36.4 g/dL Final   • RDW 05/10/2017 14.1  11.5 - 14.5 % Final   • RDW-SD 05/10/2017 44.1  37.0 - 54.0 fl Final   • MPV 05/10/2017 10.2  6.0 - 12.0 fL Final   • Platelets 05/10/2017 220  140 - 500 10*3/mm3 Final   • Neutrophil % 05/10/2017 67.9  42.7 - 76.0 % Final   • Lymphocyte % 05/10/2017 15.3* 19.6 - 45.3 % Final   • Monocyte % 05/10/2017 10.2  5.0 - 12.0 % Final   • Eosinophil % 05/10/2017 5.9  0.3 - 6.2 % Final   • Basophil % 05/10/2017 0.4  0.0 - 1.5 % Final   • Immature Grans % 05/10/2017 0.3  0.0 - 0.5 % Final   • Neutrophils, Absolute 05/10/2017 6.20  1.90 - 8.10 10*3/mm3 Final   • Lymphocytes, Absolute 05/10/2017 1.40  0.90 - 4.80 10*3/mm3 Final   • Monocytes, Absolute 05/10/2017 0.93  0.20 - 1.20 10*3/mm3 Final   • Eosinophils, Absolute 05/10/2017 0.54  0.00 - 0.70 10*3/mm3 Final   • Basophils, Absolute 05/10/2017 0.04  0.00 - 0.20 10*3/mm3 Final   • Immature Grans, Absolute 05/10/2017 0.03  0.00 - 0.03 10*3/mm3 Final   • Troponin T 05/10/2017 <0.010  0.000 - 0.030 ng/mL Final     No results found.    PHYSICAL EXAM  Physical Exam   Constitutional: He is oriented to person, place, and time. He appears well-developed and well-nourished. No distress.   HENT:   Head: Normocephalic and atraumatic.   Eyes: EOM are normal. Pupils are equal, round, and reactive to light.   Neck: Neck supple. No tracheal deviation present.   Cardiovascular: Normal rate, regular rhythm, normal heart sounds and intact distal pulses.  Exam reveals no gallop and no friction rub.    No murmur heard.  Pulmonary/Chest: Effort normal and breath sounds normal. No respiratory distress. He has no wheezes. He has no rales. He exhibits no tenderness.   Abdominal: Soft. Bowel sounds are normal. He exhibits no  distension. There is no tenderness. There is no rebound and no guarding.   Musculoskeletal: He exhibits no edema.   Lymphadenopathy:     He has no cervical adenopathy.   Neurological: He is alert and oriented to person, place, and time.   Skin: Skin is warm. He is not diaphoretic. No erythema.   Psychiatric: He has a normal mood and affect. His behavior is normal. Judgment and thought content normal.   Nursing note and vitals reviewed.      ASSESSMENT  Diagnoses and all orders for this visit:    Encounter for screening for malignant neoplasm of colon  -     Case Request; Standing  -     Case Request    Gastroesophageal reflux disease, esophagitis presence not specified  -     Case Request; Standing  -     Case Request    Personal history of colonic polyps    Magaña's esophagus without dysplasia    Other orders  -     Implement Anesthesia orders day of procedure.; Standing  -     Obtain informed consent; Standing          PLAN  No Follow-up on file.      Antireflux measures and dietary modifications reviewed. Low acid diet reviewed. Keep head of bed elevated. Stop eating/drinking at least 3 hours prior to bedtime. Eliminate caffeine and carbonated beverages.  Weight loss encouraged if BMI over 25.      Risks, benefits and alternatives discussed including but not limited to the complications of bleeding, perforation and sedation related problems.

## 2017-09-19 ENCOUNTER — TELEPHONE (OUTPATIENT)
Dept: FAMILY MEDICINE CLINIC | Age: 72
End: 2017-09-19

## 2017-09-19 NOTE — TELEPHONE ENCOUNTER
Dr. WE[facility]STAR [facility] would like to have Dr. Edith Daily to call him at his convienence to talk about a drug that he is considering.

## 2017-09-21 DIAGNOSIS — E11.9 TYPE 2 DIABETES MELLITUS WITHOUT COMPLICATION, WITHOUT LONG-TERM CURRENT USE OF INSULIN (HCC): Primary | ICD-10-CM

## 2017-09-26 DIAGNOSIS — E11.9 TYPE 2 DIABETES MELLITUS WITHOUT COMPLICATION, WITHOUT LONG-TERM CURRENT USE OF INSULIN (HCC): ICD-10-CM

## 2017-10-05 ENCOUNTER — ANESTHESIA EVENT (OUTPATIENT)
Dept: PERIOP | Facility: HOSPITAL | Age: 72
End: 2017-10-05

## 2017-10-06 ENCOUNTER — HOSPITAL ENCOUNTER (OUTPATIENT)
Facility: HOSPITAL | Age: 72
Setting detail: HOSPITAL OUTPATIENT SURGERY
Discharge: HOME | End: 2017-10-06
Attending: INTERNAL MEDICINE | Admitting: INTERNAL MEDICINE

## 2017-10-06 ENCOUNTER — ANESTHESIA (OUTPATIENT)
Dept: PERIOP | Facility: HOSPITAL | Age: 72
End: 2017-10-06

## 2017-10-06 VITALS
OXYGEN SATURATION: 97 % | DIASTOLIC BLOOD PRESSURE: 90 MMHG | BODY MASS INDEX: 29.9 KG/M2 | HEIGHT: 71 IN | SYSTOLIC BLOOD PRESSURE: 148 MMHG | HEART RATE: 86 BPM | RESPIRATION RATE: 12 BRPM | WEIGHT: 213.6 LBS | TEMPERATURE: 98.2 F

## 2017-10-06 DIAGNOSIS — Z12.11 ENCOUNTER FOR SCREENING FOR MALIGNANT NEOPLASM OF COLON: ICD-10-CM

## 2017-10-06 DIAGNOSIS — K21.9 GASTROESOPHAGEAL REFLUX DISEASE, ESOPHAGITIS PRESENCE NOT SPECIFIED: ICD-10-CM

## 2017-10-06 LAB — GLUCOSE BLDC GLUCOMTR-MCNC: 192 MG/DL (ref 70–130)

## 2017-10-06 PROCEDURE — 45380 COLONOSCOPY AND BIOPSY: CPT | Performed by: INTERNAL MEDICINE

## 2017-10-06 PROCEDURE — 43239 EGD BIOPSY SINGLE/MULTIPLE: CPT | Performed by: INTERNAL MEDICINE

## 2017-10-06 PROCEDURE — 82962 GLUCOSE BLOOD TEST: CPT

## 2017-10-06 PROCEDURE — 25010000002 PROPOFOL 10 MG/ML EMULSION: Performed by: NURSE ANESTHETIST, CERTIFIED REGISTERED

## 2017-10-06 RX ORDER — PROPOFOL 10 MG/ML
VIAL (ML) INTRAVENOUS AS NEEDED
Status: DISCONTINUED | OUTPATIENT
Start: 2017-10-06 | End: 2017-10-06 | Stop reason: SURG

## 2017-10-06 RX ORDER — SODIUM CHLORIDE 0.9 % (FLUSH) 0.9 %
3 SYRINGE (ML) INJECTION AS NEEDED
Status: DISCONTINUED | OUTPATIENT
Start: 2017-10-06 | End: 2017-10-06 | Stop reason: HOSPADM

## 2017-10-06 RX ORDER — SODIUM CHLORIDE 9 MG/ML
500 INJECTION, SOLUTION INTRAVENOUS CONTINUOUS PRN
Status: DISCONTINUED | OUTPATIENT
Start: 2017-10-06 | End: 2017-10-06 | Stop reason: HOSPADM

## 2017-10-06 RX ORDER — LIDOCAINE HYDROCHLORIDE 20 MG/ML
INJECTION, SOLUTION INFILTRATION; PERINEURAL AS NEEDED
Status: DISCONTINUED | OUTPATIENT
Start: 2017-10-06 | End: 2017-10-06 | Stop reason: SURG

## 2017-10-06 RX ORDER — SODIUM CHLORIDE, SODIUM LACTATE, POTASSIUM CHLORIDE, CALCIUM CHLORIDE 600; 310; 30; 20 MG/100ML; MG/100ML; MG/100ML; MG/100ML
9 INJECTION, SOLUTION INTRAVENOUS CONTINUOUS
Status: DISCONTINUED | OUTPATIENT
Start: 2017-10-06 | End: 2017-10-06 | Stop reason: HOSPADM

## 2017-10-06 RX ORDER — LIDOCAINE HYDROCHLORIDE 10 MG/ML
0.5 INJECTION, SOLUTION INFILTRATION; PERINEURAL ONCE AS NEEDED
Status: COMPLETED | OUTPATIENT
Start: 2017-10-06 | End: 2017-10-06

## 2017-10-06 RX ORDER — LIDOCAINE HYDROCHLORIDE 10 MG/ML
INJECTION, SOLUTION EPIDURAL; INFILTRATION; INTRACAUDAL; PERINEURAL
Status: COMPLETED
Start: 2017-10-06 | End: 2017-10-06

## 2017-10-06 RX ORDER — MAGNESIUM HYDROXIDE 1200 MG/15ML
LIQUID ORAL AS NEEDED
Status: DISCONTINUED | OUTPATIENT
Start: 2017-10-06 | End: 2017-10-06 | Stop reason: HOSPADM

## 2017-10-06 RX ADMIN — SODIUM CHLORIDE, POTASSIUM CHLORIDE, SODIUM LACTATE AND CALCIUM CHLORIDE: 600; 310; 30; 20 INJECTION, SOLUTION INTRAVENOUS at 08:45

## 2017-10-06 RX ADMIN — PROPOFOL 30 MG: 10 INJECTION, EMULSION INTRAVENOUS at 09:38

## 2017-10-06 RX ADMIN — PROPOFOL 30 MG: 10 INJECTION, EMULSION INTRAVENOUS at 09:45

## 2017-10-06 RX ADMIN — PROPOFOL 50 MG: 10 INJECTION, EMULSION INTRAVENOUS at 09:22

## 2017-10-06 RX ADMIN — PROPOFOL 30 MG: 10 INJECTION, EMULSION INTRAVENOUS at 09:28

## 2017-10-06 RX ADMIN — PROPOFOL 50 MG: 10 INJECTION, EMULSION INTRAVENOUS at 09:15

## 2017-10-06 RX ADMIN — PROPOFOL 50 MG: 10 INJECTION, EMULSION INTRAVENOUS at 09:17

## 2017-10-06 RX ADMIN — LIDOCAINE HYDROCHLORIDE 60 MG: 20 INJECTION, SOLUTION INFILTRATION; PERINEURAL at 09:06

## 2017-10-06 RX ADMIN — SODIUM CHLORIDE, POTASSIUM CHLORIDE, SODIUM LACTATE AND CALCIUM CHLORIDE 9 ML/HR: 600; 310; 30; 20 INJECTION, SOLUTION INTRAVENOUS at 08:27

## 2017-10-06 RX ADMIN — PROPOFOL 10 MG: 10 INJECTION, EMULSION INTRAVENOUS at 09:42

## 2017-10-06 RX ADMIN — PROPOFOL 20 MG: 10 INJECTION, EMULSION INTRAVENOUS at 09:19

## 2017-10-06 RX ADMIN — PROPOFOL 20 MG: 10 INJECTION, EMULSION INTRAVENOUS at 09:07

## 2017-10-06 RX ADMIN — PROPOFOL 30 MG: 10 INJECTION, EMULSION INTRAVENOUS at 09:25

## 2017-10-06 RX ADMIN — PROPOFOL 30 MG: 10 INJECTION, EMULSION INTRAVENOUS at 09:24

## 2017-10-06 RX ADMIN — PROPOFOL 50 MG: 10 INJECTION, EMULSION INTRAVENOUS at 09:11

## 2017-10-06 RX ADMIN — LIDOCAINE HYDROCHLORIDE 0.5 ML: 10 INJECTION, SOLUTION EPIDURAL; INFILTRATION; INTRACAUDAL; PERINEURAL at 08:21

## 2017-10-06 RX ADMIN — PROPOFOL 50 MG: 10 INJECTION, EMULSION INTRAVENOUS at 09:13

## 2017-10-06 RX ADMIN — PROPOFOL 30 MG: 10 INJECTION, EMULSION INTRAVENOUS at 09:33

## 2017-10-06 RX ADMIN — LIDOCAINE HYDROCHLORIDE 0.5 ML: 10 INJECTION, SOLUTION INFILTRATION; PERINEURAL at 08:21

## 2017-10-06 NOTE — PLAN OF CARE
Problem: Patient Care Overview (Adult)  Goal: Plan of Care Review  Outcome: Ongoing (interventions implemented as appropriate)    10/06/17 0959   Coping/Psychosocial Response Interventions   Plan Of Care Reviewed With patient   Patient Care Overview   Progress improving   Outcome Evaluation   Outcome Summary/Follow up Plan vss, resting in phase 2

## 2017-10-06 NOTE — PLAN OF CARE
Problem: GI Endoscopy (Adult)  Goal: Signs and Symptoms of Listed Potential Problems Will be Absent or Manageable (GI Endoscopy)  Outcome: Ongoing (interventions implemented as appropriate)    10/06/17 0921   GI Endoscopy   Problems Assessed (GI Endoscopy) all   Problems Present (GI Endoscopy) none

## 2017-10-06 NOTE — PLAN OF CARE
Problem: Patient Care Overview (Adult)  Goal: Adult Individualization and Mutuality  Outcome: Ongoing (interventions implemented as appropriate)    10/06/17 0972   Individualization   Patient Specific Preferences goes by kayleen

## 2017-10-06 NOTE — PLAN OF CARE
Problem: GI Endoscopy (Adult)  Goal: Signs and Symptoms of Listed Potential Problems Will be Absent or Manageable (GI Endoscopy)  Outcome: Ongoing (interventions implemented as appropriate)    10/06/17 0771   GI Endoscopy   Problems Assessed (GI Endoscopy) all   Problems Present (GI Endoscopy) none

## 2017-10-06 NOTE — ANESTHESIA PREPROCEDURE EVALUATION
" Anesthesia Evaluation     Patient summary reviewed and Nursing notes reviewed   history of anesthetic complications ( patient reports \"they lost my heart beat for 30 seconds during a colonoscopy\"...possibly vagal response. No problems with a GA since that time):  NPO Solid Status: > 8 hours  NPO Liquid Status: > 6 hours     Airway   Mallampati: II  TM distance: >3 FB  Neck ROM: full  no difficulty expected  Dental          Pulmonary - normal exam    breath sounds clear to auscultation  (+) shortness of breath,   Smoker:  quit smoking a pipe, 1.5 years ago.  Cardiovascular   Exercise tolerance: good (4-7 METS)    ECG reviewed  PT is on anticoagulation therapy  Patient on routine beta blocker  Rhythm: irregular  Rate: abnormal    (+) hypertension well controlled, dysrhythmias Atrial Fib, angina ( one episode of chest pain during the summer. Hospital said it was acid reflux),       Neuro/Psych  GI/Hepatic/Renal/Endo    (+)  GERD well controlled, diabetes mellitus type 2 well controlled,     Musculoskeletal (-) negative ROS    Abdominal    Substance History - negative use     OB/GYN negative ob/gyn ROS         Other      history of cancer ( lymphoma, hx bowel resection and lymph node excision. no other treatment) remission    ROS/Med Hx Other: . ATRIAL FIBRILLATION, V-RATE   ([facility] * NO SIGNIFICANT CHANGE FROM PREVIOUS ECG      BS = 192                                Anesthesia Plan    ASA 3     MAC     intravenous induction   Anesthetic plan and risks discussed with patient.  Use of blood products discussed with patient  Consented to blood products.     "

## 2017-10-06 NOTE — PLAN OF CARE
Problem: Patient Care Overview (Adult)  Goal: Plan of Care Review  Outcome: Ongoing (interventions implemented as appropriate)    10/06/17 0749   Coping/Psychosocial Response Interventions   Plan Of Care Reviewed With patient   Patient Care Overview   Progress improving   Outcome Evaluation   Outcome Summary/Follow up Plan vss, ready for procedure

## 2017-10-06 NOTE — OP NOTE
EGD and Colonoscopy Note:      Indication: History of colon polyps, GERD, Magaña's esophagus    Consent: Procedure of EGD and colonoscopy was explained to the patient and detail including but not limited to the complications of bleeding perforation and possible reactions to sedation.  The patient understood all this and was willing to proceed.    Sedation: Sedation was provided by anesthesia.    Procedure:    After excellent sedation a flexible endoscope was passed into the oropharynx into the distal esophagus.  Z line is irregular consistent with his previous diagnosis of Magaña's esophagus.  This is only very short segment.  There is a small sliding hiatal hernia noted.  The scope was easily traversed into the stomach although into the antrum.  Gastritis is noted biopsies are obtained.  The scope was retroflexed here straightened and passed into the duodenal bulb all the way into the second portion with ease.  Scope was then slowly withdrawn out of the patient with no immediate complications and he tolerated the procedure well.  He was then turned around to the appropriate position and digital rectal examinations performed and a flexible colonoscope was inserted into the rectum passed to the cecum.  The cecum was identified by both the ileocecal valve and the appendiceal orifice.  The overall bowel preparation was fair.  He had some solid residual material in the cecal bowl such that this could not be completely washed and suctioned out.  2 polyps in the cecum were noted that were about 2 mm each removed completely with forceps.  A single descending colon that was 3 mm sessile removed with forceps.  A total of 5 sigmoid colon polyps that were between 2 and 3 mm sessile removed with forceps.  There is sigmoid diverticulosis noted.  The scope was slowly withdrawn to the rectum where it 3-4 mm sessile polyp was removed with forceps.  The scope was retroflexed here were internal hemorrhoids are noted.  The scope  was then straightened and withdrawn out of the patient with no immediate complications and he tolerated the procedure well.    Impression/Plan:  Colon polyps removed with forceps and sent diverticulosis  Internal hemorrhoids  Hiatal hernia  Magaña's esophagus we'll await biopsy results  Gastritis  He'll be notified in regards to pathology results.  A repeat colonoscopy will likely be recommended within 3-5 years but based on pathology results.  And his repeat upper endoscopy will likely be within 3 years.

## 2017-10-06 NOTE — PLAN OF CARE
Problem: GI Endoscopy (Adult)  Goal: Signs and Symptoms of Listed Potential Problems Will be Absent or Manageable (GI Endoscopy)  Outcome: Ongoing (interventions implemented as appropriate)    10/06/17 5455   GI Endoscopy   Problems Assessed (GI Endoscopy) all   Problems Present (GI Endoscopy) none

## 2017-10-06 NOTE — PLAN OF CARE
Problem: Patient Care Overview (Adult)  Goal: Adult Individualization and Mutuality  Outcome: Ongoing (interventions implemented as appropriate)    10/06/17 0749   Individualization   Patient Specific Preferences goes by Conrad

## 2017-10-06 NOTE — H&P
PATIENT INFORMATION  Dean Garcia         - 1945     CHIEF COMPLAINT      Chief Complaint   Patient presents with   • Abdominal Pain         HISTORY OF PRESENT ILLNESS  Abdominal Pain      He is here today in follow up and is doing well. No abdominal pain. He has been cleared from Dr. Dixon.      BM are daily and no blood. Last cls and egd was over 3 years ago and no polyps were removed. Pathology is reviewed and there was 3 TA removed and Barretts esophagus was noted.     Weight stable. GERD for over 5 years, maintained on ppi. No dysphagia or odynophagia.  REVIEW OF SYSTEMS  Review of Systems   Gastrointestinal: Positive for abdominal pain.   All other systems reviewed and are negative.           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   • GERD (gastroesophageal reflux disease)     • H/O: CVA (cerebrovascular accident)     • History of concussion     • Hypertension     • On anticoagulant therapy       Xarelto for Afib   • Type 2 diabetes mellitus                 SURGICAL HISTORY   Surgical History          Past Surgical History:   Procedure Laterality Date   • COLONOSCOPY       • EXPLORATORY LAPAROTOMY N/A 2016     Procedure: LAPAROTOMY EXPLORATORY bowel resection excision of mesenteric mass excision/biopsy of mesenteric lymph node;  Surgeon: Chalo Dixon MD;  Location: [facility];  Service:                FAMILY HISTORY        Family History   Problem Relation Age of Onset   • Heart defect Mother     • Heart disease Mother     • Cancer Father     • Colon cancer Paternal Uncle    "           SOCIAL HISTORY  Social History           Occupational History   •   [employer]            Social History Main Topics   • Smoking status: Former Smoker       Years: 20.00       Types: Pipe       Quit date: 2001   • Smokeless tobacco: Not on file   • Alcohol use No   • Drug use: No   • Sexual activity: Defer            CURRENT MEDICATIONS     Current Outpatient Prescriptions:   •  amLODIPine (NORVASC) 5 MG tablet, Take 5 mg by mouth Daily., Disp: , Rfl:   •  cholecalciferol (VITAMIN D3) 1000 UNITS tablet, Take 2,000 Units by mouth Daily., Disp: , Rfl:   •  digoxin (LANOXIN) 125 MCG tablet, Take 1 tablet by mouth Daily., Disp: 30 tablet, Rfl: 0  •  INVOKANA 100 MG tablet, , Disp: , Rfl:   •  lactobacillus acidophilus (RISAQUAD) capsule capsule, Take 1 capsule by mouth Daily., Disp: 30 capsule, Rfl: 0  •  lisinopril (PRINIVIL,ZESTRIL) 20 MG tablet, Take 20 mg by mouth Daily., Disp: , Rfl:   •  loperamide (IMODIUM A-D) 2 MG tablet, Take 2 mg by mouth., Disp: , Rfl:   •  metFORMIN ER (GLUCOPHAGE-XR) 500 MG 24 hr tablet, , Disp: , Rfl:   •  metoprolol succinate XL (TOPROL-XL) 25 MG 24 hr tablet, Take 25 mg by mouth Daily., Disp: , Rfl:   •  naproxen sodium (ALEVE) 220 MG tablet, Take 220 mg by mouth 2 (Two) Times a Day As Needed for mild pain (1-3)., Disp: , Rfl:   •  Omega-3 Fatty Acids (FISH OIL) 1000 MG capsule capsule, Take 1,000 mg by mouth Daily With Breakfast., Disp: , Rfl:   •  omeprazole (priLOSEC) 20 MG capsule, Take 20 mg by mouth Daily., Disp: , Rfl:   •  risperiDONE (risperDAL) 0.25 MG tablet, Take 0.25 mg by mouth., Disp: , Rfl:   •  rivaroxaban (XARELTO) 20 MG tablet, Take 20 mg by mouth Daily., Disp: , Rfl:      ALLERGIES  Levaquin [levofloxacin]     VITALS   Vitals        Vitals:     08/10/17 0928   BP: 136/86   Weight: 219 lb 9.6 oz (99.6 kg)   Height: 75\" (190.5 cm)            LAST RESULTS                             Admission on 05/10/2017, Discharged on 05/11/2017 "   Component Date Value Ref Range Status   • Glucose 05/10/2017 212* 65 - 99 mg/dL Final   • BUN 05/10/2017 18  8 - 23 mg/dL Final   • Creatinine 05/10/2017 0.91  0.76 - 1.27 mg/dL Final   • Sodium 05/10/2017 138  136 - 145 mmol/L Final   • Potassium 05/10/2017 4.2  3.5 - 5.2 mmol/L Final   • Chloride 05/10/2017 101  98 - 107 mmol/L Final   • CO2 05/10/2017 22.8  22.0 - 29.0 mmol/L Final   • Calcium 05/10/2017 10.0  8.6 - 10.5 mg/dL Final   • Total Protein 05/10/2017 6.7  6.0 - 8.5 g/dL Final   • Albumin 05/10/2017 4.00  3.50 - 5.20 g/dL Final   • ALT (SGPT) 05/10/2017 33  1 - 41 U/L Final   • AST (SGOT) 05/10/2017 21  1 - 40 U/L Final   • Alkaline Phosphatase 05/10/2017 69  39 - 117 U/L Final   • Total Bilirubin 05/10/2017 0.4  0.1 - 1.2 mg/dL Final   • eGFR Non African Amer 05/10/2017 83  >60 mL/min/1.73 Final   • Globulin 05/10/2017 2.7  gm/dL Final   • A/G Ratio 05/10/2017 1.5  g/dL Final   • BUN/Creatinine Ratio 05/10/2017 19.8  7.0 - 25.0 Final   • Anion Gap 05/10/2017 14.2  mmol/L Final   • Lipase 05/10/2017 36  13 - 60 U/L Final   • Troponin T 05/10/2017 <0.010  0.000 - 0.030 ng/mL Final   • Color, UA 05/10/2017 Yellow  Yellow, Straw Final   • Appearance, UA 05/10/2017 Clear  Clear Final   • pH, UA 05/10/2017 <=5.0  5.0 - 8.0 Final   • Specific Gravity, UA 05/10/2017 >=1.030  1.005 - 1.030 Final   • Glucose, UA 05/10/2017 >=1000 mg/dL (3+)* Negative Final   • Ketones, UA 05/10/2017 Trace* Negative Final   • Bilirubin, UA 05/10/2017 Negative  Negative Final   • Blood, UA 05/10/2017 Negative  Negative Final   • Protein, UA 05/10/2017 Negative  Negative Final   • Leuk Esterase, UA 05/10/2017 Negative  Negative Final   • Nitrite, UA 05/10/2017 Negative  Negative Final   • Urobilinogen, UA 05/10/2017 0.2 E.U./dL  0.2 - 1.0 E.U./dL Final   • Extra Tube 05/10/2017 hold for add-on    Final     Auto resulted   • Extra Tube 05/10/2017 Hold for add-ons.    Final     Auto resulted.   • Extra Tube 05/10/2017 hold for  add-on    Final     Auto resulted   • Extra Tube 05/10/2017 Hold for add-ons.    Final     Auto resulted.   • WBC 05/10/2017 9.14  4.50 - 10.70 10*3/mm3 Final   • RBC 05/10/2017 5.11  4.60 - 6.00 10*6/mm3 Final   • Hemoglobin 05/10/2017 15.0  13.7 - 17.6 g/dL Final   • Hematocrit 05/10/2017 43.6  40.4 - 52.2 % Final   • MCV 05/10/2017 85.3  79.8 - 96.2 fL Final   • MCH 05/10/2017 29.4  27.0 - 32.7 pg Final   • MCHC 05/10/2017 34.4  32.6 - 36.4 g/dL Final   • RDW 05/10/2017 14.1  11.5 - 14.5 % Final   • RDW-SD 05/10/2017 44.1  37.0 - 54.0 fl Final   • MPV 05/10/2017 10.2  6.0 - 12.0 fL Final   • Platelets 05/10/2017 220  140 - 500 10*3/mm3 Final   • Neutrophil % 05/10/2017 67.9  42.7 - 76.0 % Final   • Lymphocyte % 05/10/2017 15.3* 19.6 - 45.3 % Final   • Monocyte % 05/10/2017 10.2  5.0 - 12.0 % Final   • Eosinophil % 05/10/2017 5.9  0.3 - 6.2 % Final   • Basophil % 05/10/2017 0.4  0.0 - 1.5 % Final   • Immature Grans % 05/10/2017 0.3  0.0 - 0.5 % Final   • Neutrophils, Absolute 05/10/2017 6.20  1.90 - 8.10 10*3/mm3 Final   • Lymphocytes, Absolute 05/10/2017 1.40  0.90 - 4.80 10*3/mm3 Final   • Monocytes, Absolute 05/10/2017 0.93  0.20 - 1.20 10*3/mm3 Final   • Eosinophils, Absolute 05/10/2017 0.54  0.00 - 0.70 10*3/mm3 Final   • Basophils, Absolute 05/10/2017 0.04  0.00 - 0.20 10*3/mm3 Final   • Immature Grans, Absolute 05/10/2017 0.03  0.00 - 0.03 10*3/mm3 Final   • Troponin T 05/10/2017 <0.010  0.000 - 0.030 ng/mL Final      No results found.     PHYSICAL EXAM  Physical Exam   Constitutional: He is oriented to person, place, and time. He appears well-developed and well-nourished. No distress.   HENT:   Head: Normocephalic and atraumatic.   Eyes: EOM are normal. Pupils are equal, round, and reactive to light.   Neck: Neck supple. No tracheal deviation present.   Cardiovascular: Normal rate, regular rhythm, normal heart sounds and intact distal pulses.  Exam reveals no gallop and no friction rub.    No murmur  heard.  Pulmonary/Chest: Effort normal and breath sounds normal. No respiratory distress. He has no wheezes. He has no rales. He exhibits no tenderness.   Abdominal: Soft. Bowel sounds are normal. He exhibits no distension. There is no tenderness. There is no rebound and no guarding.   Musculoskeletal: He exhibits no edema.   Lymphadenopathy:     He has no cervical adenopathy.   Neurological: He is alert and oriented to person, place, and time.   Skin: Skin is warm. He is not diaphoretic. No erythema.   Psychiatric: He has a normal mood and affect. His behavior is normal. Judgment and thought content normal.   Nursing note and vitals reviewed.        ASSESSMENT  Diagnoses and all orders for this visit:     Encounter for screening for malignant neoplasm of colon  -     Case Request; Standing  -     Case Request     Gastroesophageal reflux disease, esophagitis presence not specified  -     Case Request; Standing  -     Case Request     Personal history of colonic polyps     Magaañ's esophagus without dysplasia     Other orders  -     Implement Anesthesia orders day of procedure.; Standing  -     Obtain informed consent; Standing              PLAN  No Follow-up on file.        Antireflux measures and dietary modifications reviewed. Low acid diet reviewed. Keep head of bed elevated. Stop eating/drinking at least 3 hours prior to bedtime. Eliminate caffeine and carbonated beverages.  Weight loss encouraged if BMI over 25.        Risks, benefits and alternatives discussed including but not limited to the complications of bleeding, perforation and sedation related problems.

## 2017-10-06 NOTE — ANESTHESIA POSTPROCEDURE EVALUATION
Patient: Dean Garcia    Procedure Summary     Date Anesthesia Start Anesthesia Stop Room / Location    10/06/17 0902 0953 [facility] / [facility]       Procedure Diagnosis Surgeon Provider    ESOPHAGOGASTRODUODENOSCOPY, biopsy (N/A Esophagus); COLONOSCOPY, polypectomy (N/A ) Encounter for screening for malignant neoplasm of colon; Gastroesophageal reflux disease, esophagitis presence not specified  (Encounter for screening for malignant neoplasm of colon [Z12.11]; Gastroesophageal reflux disease, esophagitis presence not specified [K21.9]) Amaris Dial MD Fernando Skyler Landeros CRNA          Anesthesia Type: MAC  Last vitals  BP   148/90 (10/06/17 1020)    Temp   98.2 °F (36.8 °C) (10/06/17 0959)    Pulse   86 (10/06/17 1020)   Resp   12 (10/06/17 1020)    SpO2   97 % (10/06/17 1020)      Post Anesthesia Care and Evaluation    Patient location during evaluation: bedside  Patient participation: complete - patient participated  Level of consciousness: awake and alert  Pain score: 0  Pain management: adequate  Airway patency: patent  Anesthetic complications: No anesthetic complications  PONV Status: none  Cardiovascular status: acceptable  Respiratory status: acceptable  Hydration status: acceptable

## 2017-10-10 LAB
LAB AP CASE REPORT: NORMAL
Lab: NORMAL
PATH REPORT.FINAL DX SPEC: NORMAL

## 2017-10-19 PROBLEM — K21.00 GASTROESOPHAGEAL REFLUX DISEASE WITH ESOPHAGITIS: Status: ACTIVE | Noted: 2017-08-10

## 2017-10-19 PROBLEM — K22.70 BARRETT'S ESOPHAGUS WITHOUT DYSPLASIA: Status: ACTIVE | Noted: 2017-10-19

## 2017-10-19 PROBLEM — D12.4 ADENOMATOUS POLYP OF DESCENDING COLON: Status: ACTIVE | Noted: 2017-10-19

## 2017-10-31 ENCOUNTER — TELEPHONE (OUTPATIENT)
Dept: FAMILY MEDICINE CLINIC | Age: 72
End: 2017-10-31

## 2017-10-31 NOTE — TELEPHONE ENCOUNTER
Call from [facility], would like to change him to Metformin 500mg BID #180, Januvia 100mg #90 1 daily. He would be about to get that for free.

## 2017-11-01 RX ORDER — METFORMIN HYDROCHLORIDE 500 MG/1
TABLET ORAL 2 TIMES DAILY WITH MEALS
COMMUNITY
End: 2017-11-01 | Stop reason: SDUPTHER

## 2017-11-01 RX ORDER — METFORMIN HYDROCHLORIDE 500 MG/1
500 TABLET ORAL 2 TIMES DAILY WITH MEALS
Qty: 180 TAB | Refills: 3 | Status: SHIPPED | OUTPATIENT
Start: 2017-11-01 | End: 2018-03-22 | Stop reason: SDUPTHER

## 2018-01-19 DIAGNOSIS — R05.9 COUGH: Primary | ICD-10-CM

## 2018-03-12 ENCOUNTER — TELEPHONE (OUTPATIENT)
Dept: FAMILY MEDICINE CLINIC | Age: 73
End: 2018-03-12

## 2018-03-12 NOTE — TELEPHONE ENCOUNTER
Patient would like to come in for a follow up visit and labs. Could Cher take a look at upcoming schedule and find a slot.

## 2018-03-23 ENCOUNTER — OFFICE VISIT (OUTPATIENT)
Dept: FAMILY MEDICINE CLINIC | Age: 73
End: 2018-03-23

## 2018-03-23 VITALS
WEIGHT: 223.4 LBS | OXYGEN SATURATION: 95 % | SYSTOLIC BLOOD PRESSURE: 94 MMHG | BODY MASS INDEX: 28.67 KG/M2 | HEIGHT: 74 IN | HEART RATE: 108 BPM | DIASTOLIC BLOOD PRESSURE: 60 MMHG | RESPIRATION RATE: 16 BRPM

## 2018-03-23 DIAGNOSIS — E78.2 MIXED HYPERLIPIDEMIA: ICD-10-CM

## 2018-03-23 DIAGNOSIS — R07.2 PRECORDIAL PAIN: ICD-10-CM

## 2018-03-23 DIAGNOSIS — I10 ESSENTIAL HYPERTENSION: ICD-10-CM

## 2018-03-23 DIAGNOSIS — Z00.00 MEDICARE ANNUAL WELLNESS VISIT, SUBSEQUENT: ICD-10-CM

## 2018-03-23 DIAGNOSIS — E11.9 TYPE 2 DIABETES MELLITUS WITHOUT COMPLICATION, WITHOUT LONG-TERM CURRENT USE OF INSULIN (HCC): Primary | ICD-10-CM

## 2018-03-23 DIAGNOSIS — Z12.5 SCREENING FOR PROSTATE CANCER: ICD-10-CM

## 2018-03-23 DIAGNOSIS — R35.1 BENIGN PROSTATIC HYPERPLASIA WITH NOCTURIA: ICD-10-CM

## 2018-03-23 DIAGNOSIS — N40.1 BENIGN PROSTATIC HYPERPLASIA WITH NOCTURIA: ICD-10-CM

## 2018-03-23 NOTE — PROGRESS NOTES
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 Doing well with Naprosyn 500 mg BID. Skin findings have resolved and he is presently not taking any meds for psoriasis. For the past several months he has experienced periodic substernal chest pressure lasting several minutes. He first became aware of this while golfing: typically he would start his round of golf and experience chest pressure that persisted through the third hole and then this would subside. He has not had this discomfort for the past 4 weeks. He has not smoked for 19 years. T2DM. Cholesterol is up. He has been treated for HTN but has felt weak and dizzy for at least 3 months. He has had syncope at home at least once during that time. Very ill with a flu like illness around December that seemed to take weeks to resolve. Still does not feel well. Sedrick Ivory noted extreme unslakeable thirst for weeks. Suspecting worsening glycemic control, he began Saxenda 1.2 ml SQ daily and his polydipsia has resolved. Not checking glucose. No Known Allergies    Current Outpatient Prescriptions   Medication Sig    liraglutide (SAXENDA) 3 mg/0.5 mL (18 mg/3 mL) pen 0.2 mL by SubCUTAneous route daily.  metFORMIN (GLUCOPHAGE) 500 mg tablet TAKE 1 TABLET BY MOUTH TWICE DAILY    acyclovir (ZOVIRAX) 400 mg tablet TAKE 1 TABLET BY MOUTH ONCE DAILY    omeprazole (PRILOSEC) 20 mg capsule Take 20 mg by mouth daily.  betamethasone valerate (VALISONE) 0.1 % topical cream     clonazePAM (KLONOPIN) 1 mg tablet Take 1 Tab by mouth two (2) times a day. Max Daily Amount: 2 mg. No current facility-administered medications for this visit.         Past Medical History:   Diagnosis Date    Asthma     laryngospasm during prior viral illnesses    Elevated transaminase level 09/2016    GERD (gastroesophageal reflux disease)     Psoriasis     [facility]    A1c 6.4;  Glucose 195         ROS:  Denies fever, chills, nausea, vomiting, or diarrhea. Denies wt loss, wt gain, hemoptysis, hematochezia or melena. Physical Examination:    BP 94/60 (BP 1 Location: Right arm, BP Patient Position: Sitting)  Pulse (!) 108  Resp 16  Ht 6' 2\" (1.88 m)  Wt 223 lb 6.4 oz (101.3 kg)  SpO2 95%  BMI 28.68 kg/m2    General: Alert and Ox3, Fluent speech  HEENT:  NC/AT, EOMI, OP: clear  Neck:  Supple, no adenopathy, JVD, mass or bruit  Chest:  Clear to Ausculation, without wheezes, rales, rubs or ronchi  Cardiac: RRR  Abdomen:  +BS, soft, nontender without palpable HSM  Extremities:  No cyanosis, clubbing or edema  Neurologic:  Ambulatory without assist, CN 2-12 grossly intact. Moves all extremities. Skin: no rash  Lymphadenopathy: no cervical or supraclavicular nodes    EKG:  S tach 110. No acute changes    ASSESSMENT AND PLAN:     1. Chest pressure:  Needs NM EST and ECHO. Concerns include CAD, cardiomyopathy  2. Hypotension:  Stop Losartan and HCTZ now  3. T2DM:  Labs today; check glucose at least 3 times weekly. 4.  Syncope: May need Holter. 5.  Close follow up:  will contact patient with results and arrange further testing and referral    Orders Placed This Encounter    [address] / REST MULT     Standing Status:   Future     Standing Expiration Date:   4/23/2019    PROSTATE SPECIFIC AG    METABOLIC PANEL, COMPREHENSIVE    LIPID PANEL    HEMOGLOBIN A1C WITH EAG    CBC WITH AUTOMATED DIFF    TSH 3RD GENERATION    AMB POC EKG ROUTINE W/ 12 LEADS, INTER & REP     Order Specific Question:   Reason for Exam:     Answer:   chest pain    NUCLEAR STRESS TEST     Substernal chest pressure with exertion; please schedule ASAP.      Standing Status:   Future     Standing Expiration Date:   9/20/2018     Scheduling Instructions:      [facility]     Order Specific Question:   Reason for Exam:     Answer:   Substernal chest pressure with exertion     Order Specific Question:   Stressing Agent     Answer:   Exercise    2D ECHO COMPLETE ADULT (TTE) W OR WO CONTR     Chest pain with exertion  Fatigue     Standing Status:   Future     Standing Expiration Date:   2018     Scheduling Instructions:      [facility]     Order Specific Question:   Reason for Exam:     Answer:   chest pressure     Order Specific Question:   Contrast Enhancement (Bubble Study, Definity, Optison) may be used if criteria listed in established evidence-based protocol has been identified. Answer: Yes    liraglutide (SAXENDA) 3 mg/0.5 mL (18 mg/3 mL) pen     Si.2 mL by SubCUTAneous route daily.      Dispense:  1 Adjustable Dose Pre-filled Pen Syringe     Refill:  Shaina Serna MD, [place]

## 2018-03-26 LAB
ALBUMIN SERPL-MCNC: NORMAL G/DL
ALP SERPL-CCNC: NORMAL U/L
ALT SERPL-CCNC: NORMAL U/L
AST SERPL-CCNC: NORMAL U/L
BASOPHILS # BLD AUTO: 0 X10E3/UL (ref 0–0.2)
BASOPHILS NFR BLD AUTO: 0 %
BILIRUB SERPL-MCNC: NORMAL MG/DL
BUN SERPL-MCNC: NORMAL MG/DL
CALCIUM SERPL-MCNC: NORMAL MG/DL
CHLORIDE SERPL-SCNC: NORMAL MMOL/L
CHOLEST SERPL-MCNC: NORMAL MG/DL
CO2 SERPL-SCNC: NORMAL MMOL/L
CREAT SERPL-MCNC: NORMAL MG/DL
EOSINOPHIL # BLD AUTO: 0.1 X10E3/UL (ref 0–0.4)
EOSINOPHIL NFR BLD AUTO: 2 %
ERYTHROCYTE [DISTWIDTH] IN BLOOD BY AUTOMATED COUNT: 14 % (ref 12.3–15.4)
EST. AVERAGE GLUCOSE BLD GHB EST-MCNC: 346 MG/DL
GLUCOSE SERPL-MCNC: NORMAL MG/DL
HBA1C MFR BLD: 13.7 % (ref 4.8–5.6)
HCT VFR BLD AUTO: 40.5 % (ref 37.5–51)
HDLC SERPL-MCNC: NORMAL MG/DL
HGB BLD-MCNC: 12.9 G/DL (ref 13–17.7)
IMM GRANULOCYTES # BLD: 0 X10E3/UL (ref 0–0.1)
IMM GRANULOCYTES NFR BLD: 0 %
LYMPHOCYTES # BLD AUTO: 1.2 X10E3/UL (ref 0.7–3.1)
LYMPHOCYTES NFR BLD AUTO: 27 %
MCH RBC QN AUTO: 30.1 PG (ref 26.6–33)
MCHC RBC AUTO-ENTMCNC: 31.9 G/DL (ref 31.5–35.7)
MCV RBC AUTO: 94 FL (ref 79–97)
MONOCYTES # BLD AUTO: 0.3 X10E3/UL (ref 0.1–0.9)
MONOCYTES NFR BLD AUTO: 6 %
NEUTROPHILS # BLD AUTO: 2.9 X10E3/UL (ref 1.4–7)
NEUTROPHILS NFR BLD AUTO: 65 %
PLATELET # BLD AUTO: 182 X10E3/UL (ref 150–379)
POTASSIUM SERPL-SCNC: NORMAL MMOL/L
PROT SERPL-MCNC: NORMAL G/DL
PSA SERPL-MCNC: 1.2 NG/ML (ref 0–4)
RBC # BLD AUTO: 4.29 X10E6/UL (ref 4.14–5.8)
SODIUM SERPL-SCNC: NORMAL MMOL/L
TRIGL SERPL-MCNC: NORMAL MG/DL (ref ?–150)
TSH SERPL DL<=0.005 MIU/L-ACNC: 1.57 UIU/ML (ref 0.45–4.5)
WBC # BLD AUTO: 4.5 X10E3/UL (ref 3.4–10.8)

## 2018-04-03 ENCOUNTER — TELEPHONE (OUTPATIENT)
Dept: FAMILY MEDICINE CLINIC | Age: 73
End: 2018-04-03

## 2018-04-03 DIAGNOSIS — F41.9 ANXIETY: Primary | ICD-10-CM

## 2018-04-03 RX ORDER — METOPROLOL SUCCINATE 25 MG/1
25 TABLET, EXTENDED RELEASE ORAL DAILY
Qty: 30 TAB | Refills: 1 | Status: SHIPPED | OUTPATIENT
Start: 2018-04-03 | End: 2018-05-29 | Stop reason: SDUPTHER

## 2018-04-03 RX ORDER — CLONAZEPAM 1 MG/1
1 TABLET ORAL 2 TIMES DAILY
Qty: 30 TAB | Refills: 2 | Status: SHIPPED | OUTPATIENT
Start: 2018-04-03 | End: 2018-04-26 | Stop reason: ALTCHOICE

## 2018-04-03 RX ORDER — NITROGLYCERIN 0.4 MG/1
0.4 TABLET SUBLINGUAL
Qty: 25 TAB | Refills: 1 | Status: SHIPPED | OUTPATIENT
Start: 2018-04-03

## 2018-04-03 NOTE — TELEPHONE ENCOUNTER
Spoke with Dr Ubaldo Franklin. NM EST (+). Spoke with Jorge Alberto Lorenz RN, NN for Dr Jodi Wells and Dr Meryl Varela (who is out of town). Prachi Celaya will contact Dr Tez Barrera regarding an appt this week with the cardiologists at [id number] [facility]. He will need cath. Spoke with Dr Tez Barrera who is heading to [facility], [facility] [place] to  his Metoprolol, NTG and ASA.     Cyrena Horn

## 2018-04-06 ENCOUNTER — OFFICE VISIT (OUTPATIENT)
Dept: CARDIOLOGY CLINIC | Age: 73
End: 2018-04-06

## 2018-04-06 VITALS
WEIGHT: 220 LBS | SYSTOLIC BLOOD PRESSURE: 146 MMHG | DIASTOLIC BLOOD PRESSURE: 86 MMHG | OXYGEN SATURATION: 97 % | RESPIRATION RATE: 16 BRPM | BODY MASS INDEX: 28.23 KG/M2 | HEART RATE: 77 BPM | HEIGHT: 74 IN

## 2018-04-06 DIAGNOSIS — R94.39 ABNORMAL NUCLEAR STRESS TEST: ICD-10-CM

## 2018-04-06 DIAGNOSIS — R06.09 DOE (DYSPNEA ON EXERTION): Primary | ICD-10-CM

## 2018-04-06 DIAGNOSIS — E11.9 TYPE 2 DIABETES MELLITUS WITHOUT COMPLICATION, WITHOUT LONG-TERM CURRENT USE OF INSULIN (HCC): ICD-10-CM

## 2018-04-06 DIAGNOSIS — E78.2 MIXED HYPERLIPIDEMIA: ICD-10-CM

## 2018-04-06 DIAGNOSIS — I10 ESSENTIAL HYPERTENSION: ICD-10-CM

## 2018-04-06 RX ORDER — ASPIRIN 325 MG
325 TABLET ORAL DAILY
Status: ON HOLD | COMMUNITY
End: 2018-04-12 | Stop reason: ALTCHOICE

## 2018-04-06 RX ORDER — HYDROCHLOROTHIAZIDE 12.5 MG/1
CAPSULE ORAL
COMMUNITY
Start: 2018-02-28 | End: 2018-04-26 | Stop reason: ALTCHOICE

## 2018-04-06 RX ORDER — ROSUVASTATIN CALCIUM 10 MG/1
10 TABLET, COATED ORAL
Qty: 30 TAB | Refills: 3 | Status: SHIPPED | OUTPATIENT
Start: 2018-04-06 | End: 2018-08-07 | Stop reason: SDUPTHER

## 2018-04-06 RX ORDER — ISOSORBIDE MONONITRATE 30 MG/1
15 TABLET, EXTENDED RELEASE ORAL DAILY
Qty: 15 TAB | Refills: 0 | Status: SHIPPED | OUTPATIENT
Start: 2018-04-06 | End: 2018-04-26 | Stop reason: ALTCHOICE

## 2018-04-06 NOTE — PROGRESS NOTES
1. Have you been to the ER, urgent care clinic since your last visit? Hospitalized since your last visit? No    2. Have you seen or consulted any other health care providers outside of the [address] [place] [address] since your last visit? Include any pap smears or colon screening.   No    Chief Complaint   Patient presents with   [facility]     new pt; referred for positive stress test    Chest Pain     pt c/o on an doff pressure pain to chest; onset x 1 yr ago    Palpitations     PACs     Shortness of Breath     on exertion

## 2018-04-06 NOTE — PROGRESS NOTES
Richad Mortimer DNP, ANP-BC  Subjective/HPI:     Adán Medina is a 81 y.o. male is here for new patient consultation regarding abnormal nuclear stress test.  Patient reports he is been experiencing moderate amounts of dyspnea on exertion and chest tightness which are limiting with minimal activities. His cardiac risk factors include male, uncontrolled hyperlipidemia, uncontrolled diabetes. PCP Provider  Chelsi Blackmon MD  Past Medical History:   Diagnosis Date    Elevated transaminase level 09/2016    GERD (gastroesophageal reflux disease)     Psoriasis     T2DM (type 2 diabetes mellitus) ([facility] [address] 09/2016    A1c 6.4;  Glucose 195      Past Surgical History:   Procedure Laterality Date    HX HEENT      bilateral cataract extraction    HX HEENT      tonsillectomy    HX ORTHOPAEDIC      right shoulder acromioplasty    HX ORTHOPAEDIC      left achilles tendon surgery     No Known Allergies   Family History   Problem Relation Age of Onset    Heart Disease Father     Heart Disease Maternal Uncle       No current facility-administered medications for this visit. No current outpatient prescriptions on file.      Facility-Administered Medications Ordered in Other Visits   Medication Dose Route Frequency    fentaNYL citrate (PF) injection 25-50 mcg  25-50 mcg IntraVENous Multiple    heparinized saline 2 units/mL infusion 1,000 Units  500 mL Irrigation ONCE    heparinized saline 2 units/mL infusion 1,000 Units  500 mL Irrigation ONCE    heparinized saline 2 units/mL infusion 1,000 Units  500 mL Irrigation ONCE    lidocaine (PF) (XYLOCAINE) 10 mg/mL (1 %) injection 1-30 mL  1-30 mL IntraDERMal Multiple    midazolam (VERSED) injection 0.5-2 mg  0.5-2 mg IntraVENous Multiple    nitroglycerin 100 mcg/ml compounded injection  0.2 Vial IntraarTERial Multiple    heparin (porcine) 1,000 unit/mL injection 1,000-10,000 Units  1,000-10,000 Units IntraVENous Multiple    iopamidol (ISOVUE-370) 76 % injection 0-150 mL  0-150 mL IntraVENous Multiple    iopamidol (ISOVUE-370) 76 % injection 10-50 mL  10-50 mL IntraVENous Multiple    verapamil (ISOPTIN) 2.5 mg/mL injection 2.5 mg  2.5 mg IntraarTERial ONCE    verapamil (ISOPTIN) 2.5 mg/mL injection        lidocaine (PF) (XYLOCAINE) 10 mg/mL (1 %) injection        iopamidol (ISOVUE-370) 76 % injection        heparinized saline 2 units/mL 1,000 unit/500 mL infusion        nitroglycerin 1 mg/10mL injection          Vitals:    04/06/18 1025 04/06/18 1039   BP: 142/82 146/86   Pulse: 77    Resp: 16    SpO2: 97%    Weight: 220 lb (99.8 kg)    Height: 6' 2\" (1.88 m)      Social History     Social History    Marital status:      Spouse name: N/A    Number of children: N/A    Years of education: N/A     Occupational History    Not on file. Social History Main Topics    Smoking status: Former Smoker    Smokeless tobacco: Never Used    Alcohol use 0.6 oz/week     0 Standard drinks or equivalent, 1 Glasses of wine per week    Drug use: No    Sexual activity: Not on file     Other Topics Concern    Not on file     Social History Narrative       I have reviewed the nurses notes, vitals, problem list, allergy list, medical history, family, social history and medications. Review of Symptoms:    General: Pt denies excessive weight gain or loss. Pt is able to conduct ADL's  HEENT: Denies blurred vision, headaches, epistaxis and difficulty swallowing. Respiratory: Denies shortness of breath, + SHANE, wheezing or stridor. Cardiovascular: + Chest fullness, denies palpitations, edema or PND  Gastrointestinal: Denies poor appetite, indigestion, abdominal pain or blood in stool  Musculoskeletal: Denies pain or swelling from muscles or joints  Neurologic: Denies tremor, paresthesias, or sensory motor disturbance  Skin: Denies rash, itching or texture change.       Physical Exam:      General: Well developed, in no acute distress, cooperative and alert  HEENT: No carotid bruits, no JVD, trach is midline. Neck Supple, PEERL, EOM intact. Heart:  Normal S1/S2 negative S3 or S4. Regular, no murmur, gallop or rub.   Respiratory: Clear bilaterally x 4, no wheezing or rales  Abdomen:   Soft, non-tender, no masses, bowel sounds are active.   Extremities:  No edema, normal cap refill, no cyanosis, atraumatic. Neuro: A&Ox3, speech clear, gait stable. Skin: Skin color is normal. No rashes or lesions.  Non diaphoretic  Vascular: 2+ pulses symmetric in all extremities    Cardiographics    ECG: Sinus rhythm  Results for orders placed or performed during the hospital encounter of 10/06/16   EKG, 12 LEAD, INITIAL   Result Value Ref Range    Ventricular Rate 92 BPM    Atrial Rate 92 BPM    P-R Interval 168 ms    QRS Duration 88 ms    Q-T Interval 358 ms    QTC Calculation (Bezet) 442 ms    Calculated P Axis 54 degrees    Calculated R Axis 23 degrees    Calculated T Axis 52 degrees    Diagnosis       Normal sinus rhythm  Normal ECG  No previous ECGs available  Confirmed by Claudy Hansen MD, Robb Kocher ([id number]) on 10/6/2016 8:22:49 AM           Cardiology Labs:  Lab Results   Component Value Date/Time    Cholesterol, total 299 (H) 03/30/2018 11:24 AM    HDL Cholesterol 49 03/30/2018 11:24 AM    LDL, calculated 199.4 (H) 03/30/2018 11:24 AM    Triglyceride 253 (H) 03/30/2018 11:24 AM    CHOL/HDL Ratio 6.1 (H) 03/30/2018 11:24 AM       Lab Results   Component Value Date/Time    Sodium 135 (L) 04/11/2018 11:11 AM    Potassium 4.8 04/11/2018 11:11 AM    Chloride 98 04/11/2018 11:11 AM    CO2 25 04/11/2018 11:11 AM    Anion gap 12 04/11/2018 11:11 AM    Glucose 215 (H) 04/11/2018 11:11 AM    BUN 19 (H) 04/11/2018 11:11 AM    Creatinine 1.22 04/11/2018 11:11 AM    BUN/Creatinine ratio 16 04/11/2018 11:11 AM    GFR est AA >60 04/11/2018 11:11 AM    GFR est non-AA 59 (L) 04/11/2018 11:11 AM    Calcium 9.7 04/11/2018 11:11 AM    Bilirubin, total 0.5 04/11/2018 11:11 AM    AST (SGOT) 77 (H) 04/11/2018 11:11 AM    Alk. phosphatase 138 (H) 04/11/2018 11:11 AM    Protein, total 7.8 04/11/2018 11:11 AM    Albumin 3.9 04/11/2018 11:11 AM    Globulin 3.9 04/11/2018 11:11 AM    A-G Ratio 1.0 (L) 04/11/2018 11:11 AM    ALT (SGPT) 139 (H) 04/11/2018 11:11 AM           Assessment:     Assessment:     Diagnoses and all orders for this visit:    1. SHANE (dyspnea on exertion)  -     CARDIAC CATHETERIZATION; Future  -     METABOLIC PANEL, COMPREHENSIVE  -     CBC WITH AUTOMATED DIFF  -     PROTHROMBIN TIME + INR    2. Mixed hyperlipidemia  -     AMB POC EKG ROUTINE W/ 12 LEADS, INTER & REP  -     CARDIAC CATHETERIZATION; Future  -     METABOLIC PANEL, COMPREHENSIVE  -     CBC WITH AUTOMATED DIFF  -     PROTHROMBIN TIME + INR    3. Type 2 diabetes mellitus without complication, without long-term current use of insulin ([facility] [address]  -     CARDIAC CATHETERIZATION; Future  -     METABOLIC PANEL, COMPREHENSIVE  -     CBC WITH AUTOMATED DIFF  -     PROTHROMBIN TIME + INR    4. Essential hypertension  -     CARDIAC CATHETERIZATION; Future  -     METABOLIC PANEL, COMPREHENSIVE  -     CBC WITH AUTOMATED DIFF  -     PROTHROMBIN TIME + INR    5. Abnormal nuclear stress test  -     CARDIAC CATHETERIZATION; Future  -     METABOLIC PANEL, COMPREHENSIVE  -     CBC WITH AUTOMATED DIFF  -     PROTHROMBIN TIME + INR    Other orders  -     isosorbide mononitrate ER (IMDUR) 30 mg tablet; Take 0.5 Tabs by mouth daily. -     rosuvastatin (CRESTOR) 10 mg tablet; Take 1 Tab by mouth nightly. ICD-10-CM ICD-9-CM    1. SHANE (dyspnea on exertion) R06.09 786.09 CARDIAC CATHETERIZATION      METABOLIC PANEL, COMPREHENSIVE      CBC WITH AUTOMATED DIFF      PROTHROMBIN TIME + INR   2. Mixed hyperlipidemia E78.2 272.2 AMB POC EKG ROUTINE W/ 12 LEADS, INTER & REP      CARDIAC CATHETERIZATION      METABOLIC PANEL, COMPREHENSIVE      CBC WITH AUTOMATED DIFF      PROTHROMBIN TIME + INR   3.  Type 2 diabetes mellitus without complication, without long-term current use of insulin (HCC) E11.9 250.00 CARDIAC CATHETERIZATION      METABOLIC PANEL, COMPREHENSIVE      CBC WITH AUTOMATED DIFF      PROTHROMBIN TIME + INR   4. Essential hypertension I10 401.9 CARDIAC CATHETERIZATION      METABOLIC PANEL, COMPREHENSIVE      CBC WITH AUTOMATED DIFF      PROTHROMBIN TIME + INR   5. Abnormal nuclear stress test R94.39 794.39 CARDIAC CATHETERIZATION      METABOLIC PANEL, COMPREHENSIVE      CBC WITH AUTOMATED DIFF      PROTHROMBIN TIME + INR     Orders Placed This Encounter    METABOLIC PANEL, COMPREHENSIVE    CBC WITH AUTOMATED DIFF    PROTHROMBIN TIME + INR    CARDIAC CATHETERIZATION     Standing Status:   Future     Number of Occurrences:   1     Standing Expiration Date:   10/6/2018     Order Specific Question:   Reason for Exam:     Answer:   angina f/c 3    AMB POC EKG ROUTINE W/ 12 LEADS, INTER & REP     Order Specific Question:   Reason for Exam:     Answer:   Routine    hydroCHLOROthiazide (MICROZIDE) 12.5 mg capsule    aspirin (ASPIRIN) 325 mg tablet     Sig: Take 325 mg by mouth daily.  isosorbide mononitrate ER (IMDUR) 30 mg tablet     Sig: Take 0.5 Tabs by mouth daily. Dispense:  15 Tab     Refill:  0    rosuvastatin (CRESTOR) 10 mg tablet     Sig: Take 1 Tab by mouth nightly. Dispense:  30 Tab     Refill:  3        Plan:     Patient is a 46-year-old male with uncontrolled diabetes, marked hyperlipidemia with a high risk abnormal nuclear stress test with symptoms consistent with angina functional class III. Currently on dual antianginal therapy discussed risks and benefits of coronary angiography PTCA stenting. Patient willing to proceed. Patient was started on Crestor 10 mg. Follow-up after procedure. Saba Colon MD    This note was created using voice recognition software. Despite editing, there may be syntax errors.

## 2018-04-12 ENCOUNTER — HOSPITAL ENCOUNTER (OUTPATIENT)
Dept: CARDIAC CATH/INVASIVE PROCEDURES | Age: 73
Discharge: HOME | End: 2018-04-13
Attending: INTERNAL MEDICINE | Admitting: INTERNAL MEDICINE
Payer: MEDICARE

## 2018-04-12 DIAGNOSIS — E11.9 TYPE 2 DIABETES MELLITUS WITHOUT COMPLICATION, WITHOUT LONG-TERM CURRENT USE OF INSULIN (HCC): ICD-10-CM

## 2018-04-12 DIAGNOSIS — I10 ESSENTIAL HYPERTENSION: ICD-10-CM

## 2018-04-12 DIAGNOSIS — E78.2 MIXED HYPERLIPIDEMIA: ICD-10-CM

## 2018-04-12 DIAGNOSIS — R06.09 DOE (DYSPNEA ON EXERTION): ICD-10-CM

## 2018-04-12 DIAGNOSIS — R94.39 ABNORMAL NUCLEAR STRESS TEST: ICD-10-CM

## 2018-04-12 PROBLEM — R93.1 ABNORMAL NUCLEAR CARDIAC IMAGING TEST: Status: ACTIVE | Noted: 2018-04-12

## 2018-04-12 PROBLEM — Z95.5 S/P CORONARY ARTERY STENT PLACEMENT: Status: ACTIVE | Noted: 2018-04-12

## 2018-04-12 PROBLEM — I20.9 ANGINA, CLASS III (HCC): Status: ACTIVE | Noted: 2018-04-12

## 2018-04-12 LAB
GLUCOSE BLD STRIP.AUTO-MCNC: 175 MG/DL (ref 65–100)
GLUCOSE BLD STRIP.AUTO-MCNC: 225 MG/DL (ref 65–100)
SERVICE CMNT-IMP: ABNORMAL
SERVICE CMNT-IMP: ABNORMAL

## 2018-04-12 PROCEDURE — 77030008543 HC TBNG MON PRSS MRTM -A

## 2018-04-12 PROCEDURE — 82962 GLUCOSE BLOOD TEST: CPT

## 2018-04-12 PROCEDURE — C1769 GUIDE WIRE: HCPCS

## 2018-04-12 PROCEDURE — 77030004549 HC CATH ANGI DX PRF MRTM -A

## 2018-04-12 PROCEDURE — 74011250636 HC RX REV CODE- 250/636

## 2018-04-12 PROCEDURE — 77030015766

## 2018-04-12 PROCEDURE — 77030019697 HC SYR ANGI INFL MRTM -B

## 2018-04-12 PROCEDURE — 93005 ELECTROCARDIOGRAM TRACING: CPT

## 2018-04-12 PROCEDURE — C1725 CATH, TRANSLUMIN NON-LASER: HCPCS

## 2018-04-12 PROCEDURE — 74011636320 HC RX REV CODE- 636/320: Performed by: INTERNAL MEDICINE

## 2018-04-12 PROCEDURE — 74011250637 HC RX REV CODE- 250/637: Performed by: INTERNAL MEDICINE

## 2018-04-12 PROCEDURE — 74011000258 HC RX REV CODE- 258: Performed by: INTERNAL MEDICINE

## 2018-04-12 PROCEDURE — 74011000250 HC RX REV CODE- 250

## 2018-04-12 PROCEDURE — C1874 STENT, COATED/COV W/DEL SYS: HCPCS

## 2018-04-12 PROCEDURE — 77030019569 HC BND COMPR RAD TERU -B

## 2018-04-12 PROCEDURE — 77030010221 HC SPLNT WR POS TELE -B

## 2018-04-12 PROCEDURE — 74011636320 HC RX REV CODE- 636/320

## 2018-04-12 PROCEDURE — C1887 CATHETER, GUIDING: HCPCS

## 2018-04-12 PROCEDURE — 77030012468 HC VLV BLEEDBK CNTRL ABBT -B

## 2018-04-12 PROCEDURE — 74011636637 HC RX REV CODE- 636/637: Performed by: NURSE PRACTITIONER

## 2018-04-12 PROCEDURE — 74011250636 HC RX REV CODE- 250/636: Performed by: INTERNAL MEDICINE

## 2018-04-12 PROCEDURE — 99153 MOD SED SAME PHYS/QHP EA: CPT

## 2018-04-12 PROCEDURE — 77030019698 HC SYR ANGI MDLON MRTM -A

## 2018-04-12 PROCEDURE — C1894 INTRO/SHEATH, NON-LASER: HCPCS

## 2018-04-12 PROCEDURE — 74011250637 HC RX REV CODE- 250/637: Performed by: NURSE PRACTITIONER

## 2018-04-12 RX ORDER — HYDROCHLOROTHIAZIDE 12.5 MG/1
12.5 CAPSULE ORAL DAILY
Status: DISCONTINUED | OUTPATIENT
Start: 2018-04-13 | End: 2018-04-13 | Stop reason: HOSPADM

## 2018-04-12 RX ORDER — METOPROLOL TARTRATE 5 MG/5ML
INJECTION INTRAVENOUS
Status: COMPLETED
Start: 2018-04-12 | End: 2018-04-12

## 2018-04-12 RX ORDER — BIVALIRUDIN 250 MG/5ML
INJECTION, POWDER, LYOPHILIZED, FOR SOLUTION INTRAVENOUS
Status: DISCONTINUED
Start: 2018-04-12 | End: 2018-04-13 | Stop reason: HOSPADM

## 2018-04-12 RX ORDER — SODIUM CHLORIDE 900 MG/100ML
INJECTION INTRAVENOUS
Status: DISCONTINUED
Start: 2018-04-12 | End: 2018-04-13 | Stop reason: HOSPADM

## 2018-04-12 RX ORDER — CLOPIDOGREL 300 MG/1
TABLET, FILM COATED ORAL
Status: DISCONTINUED
Start: 2018-04-12 | End: 2018-04-13 | Stop reason: HOSPADM

## 2018-04-12 RX ORDER — FENTANYL CITRATE 50 UG/ML
25-50 INJECTION, SOLUTION INTRAMUSCULAR; INTRAVENOUS
Status: DISCONTINUED | OUTPATIENT
Start: 2018-04-12 | End: 2018-04-12 | Stop reason: HOSPADM

## 2018-04-12 RX ORDER — DEXTROSE 50 % IN WATER (D50W) INTRAVENOUS SYRINGE
12.5-25 AS NEEDED
Status: DISCONTINUED | OUTPATIENT
Start: 2018-04-12 | End: 2018-04-13 | Stop reason: HOSPADM

## 2018-04-12 RX ORDER — ASPIRIN 81 MG/1
81 TABLET ORAL DAILY
Status: DISCONTINUED | OUTPATIENT
Start: 2018-04-13 | End: 2018-04-13 | Stop reason: HOSPADM

## 2018-04-12 RX ORDER — METOPROLOL TARTRATE 5 MG/5ML
5 INJECTION INTRAVENOUS ONCE
Status: COMPLETED | OUTPATIENT
Start: 2018-04-12 | End: 2018-04-12

## 2018-04-12 RX ORDER — ASPIRIN 325 MG
325 TABLET ORAL ONCE
Status: COMPLETED | OUTPATIENT
Start: 2018-04-12 | End: 2018-04-12

## 2018-04-12 RX ORDER — HEPARIN SODIUM 200 [USP'U]/100ML
INJECTION, SOLUTION INTRAVENOUS
Status: COMPLETED
Start: 2018-04-12 | End: 2018-04-12

## 2018-04-12 RX ORDER — ACETAMINOPHEN 325 MG/1
650 TABLET ORAL
Status: DISCONTINUED | OUTPATIENT
Start: 2018-04-12 | End: 2018-04-13 | Stop reason: HOSPADM

## 2018-04-12 RX ORDER — MIDAZOLAM HYDROCHLORIDE 1 MG/ML
INJECTION, SOLUTION INTRAMUSCULAR; INTRAVENOUS
Status: COMPLETED
Start: 2018-04-12 | End: 2018-04-12

## 2018-04-12 RX ORDER — VERAPAMIL HYDROCHLORIDE 2.5 MG/ML
INJECTION, SOLUTION INTRAVENOUS
Status: COMPLETED
Start: 2018-04-12 | End: 2018-04-12

## 2018-04-12 RX ORDER — HEPARIN SODIUM 200 [USP'U]/100ML
500 INJECTION, SOLUTION INTRAVENOUS ONCE
Status: COMPLETED | OUTPATIENT
Start: 2018-04-12 | End: 2018-04-12

## 2018-04-12 RX ORDER — ADENOSINE 3 MG/ML
INJECTION, SOLUTION INTRAVENOUS
Status: DISCONTINUED
Start: 2018-04-12 | End: 2018-04-13 | Stop reason: HOSPADM

## 2018-04-12 RX ORDER — VERAPAMIL HYDROCHLORIDE 2.5 MG/ML
2.5 INJECTION, SOLUTION INTRAVENOUS ONCE
Status: COMPLETED | OUTPATIENT
Start: 2018-04-12 | End: 2018-04-12

## 2018-04-12 RX ORDER — HEPARIN SODIUM 1000 [USP'U]/ML
1000-10000 INJECTION, SOLUTION INTRAVENOUS; SUBCUTANEOUS
Status: DISCONTINUED | OUTPATIENT
Start: 2018-04-12 | End: 2018-04-12 | Stop reason: HOSPADM

## 2018-04-12 RX ORDER — MIDAZOLAM HYDROCHLORIDE 1 MG/ML
.5-2 INJECTION, SOLUTION INTRAMUSCULAR; INTRAVENOUS
Status: DISCONTINUED | OUTPATIENT
Start: 2018-04-12 | End: 2018-04-12 | Stop reason: HOSPADM

## 2018-04-12 RX ORDER — PANTOPRAZOLE SODIUM 40 MG/1
40 TABLET, DELAYED RELEASE ORAL
Status: DISCONTINUED | OUTPATIENT
Start: 2018-04-13 | End: 2018-04-13 | Stop reason: HOSPADM

## 2018-04-12 RX ORDER — SODIUM CHLORIDE 0.9 % (FLUSH) 0.9 %
5-10 SYRINGE (ML) INJECTION AS NEEDED
Status: DISCONTINUED | OUTPATIENT
Start: 2018-04-12 | End: 2018-04-13 | Stop reason: HOSPADM

## 2018-04-12 RX ORDER — MAGNESIUM SULFATE 100 %
4 CRYSTALS MISCELLANEOUS AS NEEDED
Status: DISCONTINUED | OUTPATIENT
Start: 2018-04-12 | End: 2018-04-13 | Stop reason: HOSPADM

## 2018-04-12 RX ORDER — CLOPIDOGREL 300 MG/1
600 TABLET, FILM COATED ORAL ONCE
Status: COMPLETED | OUTPATIENT
Start: 2018-04-12 | End: 2018-04-12

## 2018-04-12 RX ORDER — ATORVASTATIN CALCIUM 20 MG/1
20 TABLET, FILM COATED ORAL
Status: DISCONTINUED | OUTPATIENT
Start: 2018-04-12 | End: 2018-04-13 | Stop reason: HOSPADM

## 2018-04-12 RX ORDER — CLONAZEPAM 0.5 MG/1
1 TABLET ORAL 2 TIMES DAILY
Status: DISCONTINUED | OUTPATIENT
Start: 2018-04-12 | End: 2018-04-13 | Stop reason: HOSPADM

## 2018-04-12 RX ORDER — LIDOCAINE HYDROCHLORIDE 10 MG/ML
INJECTION, SOLUTION EPIDURAL; INFILTRATION; INTRACAUDAL; PERINEURAL
Status: COMPLETED
Start: 2018-04-12 | End: 2018-04-12

## 2018-04-12 RX ORDER — ACYCLOVIR 200 MG/1
400 CAPSULE ORAL DAILY
Status: DISCONTINUED | OUTPATIENT
Start: 2018-04-13 | End: 2018-04-13 | Stop reason: HOSPADM

## 2018-04-12 RX ORDER — LABETALOL HYDROCHLORIDE 5 MG/ML
INJECTION, SOLUTION INTRAVENOUS
Status: COMPLETED
Start: 2018-04-12 | End: 2018-04-12

## 2018-04-12 RX ORDER — ISOSORBIDE MONONITRATE 30 MG/1
15 TABLET, EXTENDED RELEASE ORAL DAILY
Status: DISCONTINUED | OUTPATIENT
Start: 2018-04-13 | End: 2018-04-13 | Stop reason: HOSPADM

## 2018-04-12 RX ORDER — FENTANYL CITRATE 50 UG/ML
INJECTION, SOLUTION INTRAMUSCULAR; INTRAVENOUS
Status: COMPLETED
Start: 2018-04-12 | End: 2018-04-12

## 2018-04-12 RX ORDER — SODIUM CHLORIDE 9 MG/ML
INJECTION, SOLUTION INTRAVENOUS
Status: DISCONTINUED
Start: 2018-04-12 | End: 2018-04-13 | Stop reason: HOSPADM

## 2018-04-12 RX ORDER — METOPROLOL SUCCINATE 25 MG/1
25 TABLET, EXTENDED RELEASE ORAL DAILY
Status: DISCONTINUED | OUTPATIENT
Start: 2018-04-13 | End: 2018-04-13 | Stop reason: HOSPADM

## 2018-04-12 RX ORDER — SODIUM CHLORIDE 0.9 % (FLUSH) 0.9 %
5-10 SYRINGE (ML) INJECTION EVERY 8 HOURS
Status: DISCONTINUED | OUTPATIENT
Start: 2018-04-12 | End: 2018-04-13 | Stop reason: HOSPADM

## 2018-04-12 RX ORDER — LIDOCAINE HYDROCHLORIDE 10 MG/ML
1-30 INJECTION, SOLUTION EPIDURAL; INFILTRATION; INTRACAUDAL; PERINEURAL
Status: DISCONTINUED | OUTPATIENT
Start: 2018-04-12 | End: 2018-04-12 | Stop reason: HOSPADM

## 2018-04-12 RX ORDER — NALOXONE HYDROCHLORIDE 0.4 MG/ML
0.4 INJECTION, SOLUTION INTRAMUSCULAR; INTRAVENOUS; SUBCUTANEOUS AS NEEDED
Status: DISCONTINUED | OUTPATIENT
Start: 2018-04-12 | End: 2018-04-13 | Stop reason: HOSPADM

## 2018-04-12 RX ORDER — LABETALOL HYDROCHLORIDE 5 MG/ML
10 INJECTION, SOLUTION INTRAVENOUS ONCE
Status: COMPLETED | OUTPATIENT
Start: 2018-04-12 | End: 2018-04-12

## 2018-04-12 RX ORDER — INSULIN LISPRO 100 [IU]/ML
INJECTION, SOLUTION INTRAVENOUS; SUBCUTANEOUS
Status: DISCONTINUED | OUTPATIENT
Start: 2018-04-12 | End: 2018-04-13 | Stop reason: HOSPADM

## 2018-04-12 RX ADMIN — LIDOCAINE HYDROCHLORIDE 1 ML: 10 INJECTION, SOLUTION EPIDURAL; INFILTRATION; INTRACAUDAL; PERINEURAL at 14:21

## 2018-04-12 RX ADMIN — HEPARIN SODIUM 1000 UNITS: 200 INJECTION, SOLUTION INTRAVENOUS at 14:11

## 2018-04-12 RX ADMIN — BIVALIRUDIN 1.75 MG/KG/HR: 250 INJECTION, POWDER, LYOPHILIZED, FOR SOLUTION INTRAVENOUS at 14:42

## 2018-04-12 RX ADMIN — MIDAZOLAM HYDROCHLORIDE 1 MG: 1 INJECTION, SOLUTION INTRAMUSCULAR; INTRAVENOUS at 14:11

## 2018-04-12 RX ADMIN — VERAPAMIL HYDROCHLORIDE 2.5 MG: 2.5 INJECTION, SOLUTION INTRAVENOUS at 14:23

## 2018-04-12 RX ADMIN — FENTANYL CITRATE 25 MCG: 50 INJECTION, SOLUTION INTRAMUSCULAR; INTRAVENOUS at 14:54

## 2018-04-12 RX ADMIN — METOPROLOL TARTRATE 5 MG: 5 INJECTION INTRAVENOUS at 14:27

## 2018-04-12 RX ADMIN — MIDAZOLAM HYDROCHLORIDE 2 MG: 1 INJECTION, SOLUTION INTRAMUSCULAR; INTRAVENOUS at 15:16

## 2018-04-12 RX ADMIN — BIVALIRUDIN 1.75 MG/KG/HR: 250 INJECTION, POWDER, LYOPHILIZED, FOR SOLUTION INTRAVENOUS at 15:05

## 2018-04-12 RX ADMIN — FENTANYL CITRATE 25 MCG: 50 INJECTION, SOLUTION INTRAMUSCULAR; INTRAVENOUS at 13:57

## 2018-04-12 RX ADMIN — METOROPROLOL TARTRATE 5 MG: 5 INJECTION, SOLUTION INTRAVENOUS at 14:27

## 2018-04-12 RX ADMIN — IOPAMIDOL 130 ML: 755 INJECTION, SOLUTION INTRAVENOUS at 15:12

## 2018-04-12 RX ADMIN — MIDAZOLAM HYDROCHLORIDE 2 MG: 1 INJECTION, SOLUTION INTRAMUSCULAR; INTRAVENOUS at 15:04

## 2018-04-12 RX ADMIN — LABETALOL HYDROCHLORIDE 10 MG: 5 INJECTION INTRAVENOUS at 15:21

## 2018-04-12 RX ADMIN — HEPARIN SODIUM 2500 UNITS: 1000 INJECTION, SOLUTION INTRAVENOUS; SUBCUTANEOUS at 14:22

## 2018-04-12 RX ADMIN — MIDAZOLAM 1 MG: 1 INJECTION INTRAMUSCULAR; INTRAVENOUS at 14:51

## 2018-04-12 RX ADMIN — HEPARIN SODIUM 1000 UNITS: 200 INJECTION, SOLUTION INTRAVENOUS at 14:12

## 2018-04-12 RX ADMIN — CLONAZEPAM 1 MG: 0.5 TABLET ORAL at 19:01

## 2018-04-12 RX ADMIN — FENTANYL CITRATE 25 MCG: 50 INJECTION, SOLUTION INTRAMUSCULAR; INTRAVENOUS at 14:11

## 2018-04-12 RX ADMIN — INSULIN LISPRO 2 UNITS: 100 INJECTION, SOLUTION INTRAVENOUS; SUBCUTANEOUS at 17:35

## 2018-04-12 RX ADMIN — IOPAMIDOL 30 ML: 755 INJECTION, SOLUTION INTRAVENOUS at 14:42

## 2018-04-12 RX ADMIN — NITROGLYCERIN 200 MCG: 5 INJECTION, SOLUTION INTRAVENOUS at 14:22

## 2018-04-12 RX ADMIN — HEPARIN SODIUM 1000 UNITS: 200 INJECTION, SOLUTION INTRAVENOUS at 14:09

## 2018-04-12 RX ADMIN — IOPAMIDOL 75 ML: 755 INJECTION, SOLUTION INTRAVENOUS at 15:26

## 2018-04-12 RX ADMIN — MIDAZOLAM HYDROCHLORIDE 1 MG: 1 INJECTION, SOLUTION INTRAMUSCULAR; INTRAVENOUS at 14:33

## 2018-04-12 RX ADMIN — MIDAZOLAM HYDROCHLORIDE 1 MG: 1 INJECTION, SOLUTION INTRAMUSCULAR; INTRAVENOUS at 14:54

## 2018-04-12 RX ADMIN — MIDAZOLAM HYDROCHLORIDE 1 MG: 1 INJECTION, SOLUTION INTRAMUSCULAR; INTRAVENOUS at 14:21

## 2018-04-12 RX ADMIN — ADENOSINE 140 MCG/KG/MIN: 3 INJECTION, SOLUTION INTRAVENOUS at 14:54

## 2018-04-12 RX ADMIN — FENTANYL CITRATE 25 MCG: 50 INJECTION, SOLUTION INTRAMUSCULAR; INTRAVENOUS at 15:10

## 2018-04-12 RX ADMIN — LABETALOL HYDROCHLORIDE 10 MG: 5 INJECTION, SOLUTION INTRAVENOUS at 15:21

## 2018-04-12 RX ADMIN — MIDAZOLAM HYDROCHLORIDE 1 MG: 1 INJECTION, SOLUTION INTRAMUSCULAR; INTRAVENOUS at 13:57

## 2018-04-12 RX ADMIN — VERAPAMIL HYDROCHLORIDE 2.5 MG: 2.5 INJECTION INTRAVENOUS at 14:23

## 2018-04-12 RX ADMIN — MIDAZOLAM 1 MG: 1 INJECTION INTRAMUSCULAR; INTRAVENOUS at 13:57

## 2018-04-12 RX ADMIN — MIDAZOLAM HYDROCHLORIDE 1 MG: 1 INJECTION, SOLUTION INTRAMUSCULAR; INTRAVENOUS at 14:51

## 2018-04-12 RX ADMIN — ASPIRIN 325 MG: 325 TABLET ORAL at 12:12

## 2018-04-12 RX ADMIN — CLOPIDOGREL BISULFATE 600 MG: 300 TABLET, FILM COATED ORAL at 15:26

## 2018-04-12 NOTE — PROGRESS NOTES
CM Note:  Reason for Admission:   Abnormal nuclear stress test     RRAT Score:      12  Plan for utilizing home health:    None needed     Likelihood of Readmission:       Low  Transition of Care Plan: Will f/u with his PCP and specialists. PTA pt was independent with ADL's, was driving and walked unaided. There is a stairlift at home that had collins installed for his parent. He has never had home health. He lives with his wife in a 2 story house with 17 steps between floors and no entry steps. His emergency contact is his wife, Nehemias Arnold ([phone]) who will drive him home at d/c. Pt has Rx coverage and gets his medications from [facility]  in [place]. No needs identified for d/c. Home when medically stable. MANDY Muller    Care Management Interventions  PCP Verified by CM:  Yes  Palliative Care Criteria Met (RRAT>21 & CHF Dx)?: No  MyChart Signup: No  Discharge Durable Medical Equipment: No  Physical Therapy Consult: No  Occupational Therapy Consult: No  Speech Therapy Consult: No  Current Support Network: Lives with Spouse  Confirm Follow Up Transport: Family  Plan discussed with Pt/Family/Caregiver: Yes  Discharge Location  Discharge Placement: Home with two level

## 2018-04-12 NOTE — INTERVAL H&P NOTE
H&P Update:  Sarina Garcia was seen and examined. History and physical has been reviewed. The patient has been examined.  There have been no significant clinical changes since the completion of the originally dated History and Physical.    Signed By: Jamey Reid MD     April 12, 2018 2:07 PM

## 2018-04-12 NOTE — H&P (VIEW-ONLY)
Paul Edward DNP, ANP-BC  Subjective/HPI:     Robert Pack is a 81 y.o. male is here for new patient consultation regarding abnormal nuclear stress test.  Patient reports he is been experiencing moderate amounts of dyspnea on exertion and chest tightness which are limiting with minimal activities. His cardiac risk factors include male, uncontrolled hyperlipidemia, uncontrolled diabetes. PCP Provider  Kofi Cedeño MD  Past Medical History:   Diagnosis Date    Elevated transaminase level 09/2016    GERD (gastroesophageal reflux disease)     Psoriasis     T2DM (type 2 diabetes mellitus) ([facility] [address] 09/2016    A1c 6.4;  Glucose 195      Past Surgical History:   Procedure Laterality Date    HX HEENT      bilateral cataract extraction    HX HEENT      tonsillectomy    HX ORTHOPAEDIC      right shoulder acromioplasty    HX ORTHOPAEDIC      left achilles tendon surgery     No Known Allergies   Family History   Problem Relation Age of Onset    Heart Disease Father     Heart Disease Maternal Uncle       No current facility-administered medications for this visit. No current outpatient prescriptions on file.      Facility-Administered Medications Ordered in Other Visits   Medication Dose Route Frequency    fentaNYL citrate (PF) injection 25-50 mcg  25-50 mcg IntraVENous Multiple    heparinized saline 2 units/mL infusion 1,000 Units  500 mL Irrigation ONCE    heparinized saline 2 units/mL infusion 1,000 Units  500 mL Irrigation ONCE    heparinized saline 2 units/mL infusion 1,000 Units  500 mL Irrigation ONCE    lidocaine (PF) (XYLOCAINE) 10 mg/mL (1 %) injection 1-30 mL  1-30 mL IntraDERMal Multiple    midazolam (VERSED) injection 0.5-2 mg  0.5-2 mg IntraVENous Multiple    nitroglycerin 100 mcg/ml compounded injection  0.2 Vial IntraarTERial Multiple    heparin (porcine) 1,000 unit/mL injection 1,000-10,000 Units  1,000-10,000 Units IntraVENous Multiple    iopamidol (ISOVUE-370) 76 % injection 0-150 mL  0-150 mL IntraVENous Multiple    iopamidol (ISOVUE-370) 76 % injection 10-50 mL  10-50 mL IntraVENous Multiple    verapamil (ISOPTIN) 2.5 mg/mL injection 2.5 mg  2.5 mg IntraarTERial ONCE    verapamil (ISOPTIN) 2.5 mg/mL injection        lidocaine (PF) (XYLOCAINE) 10 mg/mL (1 %) injection        iopamidol (ISOVUE-370) 76 % injection        heparinized saline 2 units/mL 1,000 unit/500 mL infusion        nitroglycerin 1 mg/10mL injection          Vitals:    04/06/18 1025 04/06/18 1039   BP: 142/82 146/86   Pulse: 77    Resp: 16    SpO2: 97%    Weight: 220 lb (99.8 kg)    Height: 6' 2\" (1.88 m)      Social History     Social History    Marital status:      Spouse name: N/A    Number of children: N/A    Years of education: N/A     Occupational History    Not on file. Social History Main Topics    Smoking status: Former Smoker    Smokeless tobacco: Never Used    Alcohol use 0.6 oz/week     0 Standard drinks or equivalent, 1 Glasses of wine per week    Drug use: No    Sexual activity: Not on file     Other Topics Concern    Not on file     Social History Narrative       I have reviewed the nurses notes, vitals, problem list, allergy list, medical history, family, social history and medications. Review of Symptoms:    General: Pt denies excessive weight gain or loss. Pt is able to conduct ADL's  HEENT: Denies blurred vision, headaches, epistaxis and difficulty swallowing. Respiratory: Denies shortness of breath, + SHANE, wheezing or stridor. Cardiovascular: + Chest fullness, denies palpitations, edema or PND  Gastrointestinal: Denies poor appetite, indigestion, abdominal pain or blood in stool  Musculoskeletal: Denies pain or swelling from muscles or joints  Neurologic: Denies tremor, paresthesias, or sensory motor disturbance  Skin: Denies rash, itching or texture change.       Physical Exam:      General: Well developed, in no acute distress, cooperative and alert  HEENT: No carotid bruits, no JVD, trach is midline. Neck Supple, PEERL, EOM intact. Heart:  Normal S1/S2 negative S3 or S4. Regular, no murmur, gallop or rub.   Respiratory: Clear bilaterally x 4, no wheezing or rales  Abdomen:   Soft, non-tender, no masses, bowel sounds are active.   Extremities:  No edema, normal cap refill, no cyanosis, atraumatic. Neuro: A&Ox3, speech clear, gait stable. Skin: Skin color is normal. No rashes or lesions.  Non diaphoretic  Vascular: 2+ pulses symmetric in all extremities    Cardiographics    ECG: Sinus rhythm  Results for orders placed or performed during the hospital encounter of 10/06/16   EKG, 12 LEAD, INITIAL   Result Value Ref Range    Ventricular Rate 92 BPM    Atrial Rate 92 BPM    P-R Interval 168 ms    QRS Duration 88 ms    Q-T Interval 358 ms    QTC Calculation (Bezet) 442 ms    Calculated P Axis 54 degrees    Calculated R Axis 23 degrees    Calculated T Axis 52 degrees    Diagnosis       Normal sinus rhythm  Normal ECG  No previous ECGs available  Confirmed by Carmita Pastrana MD, Patria Wellington ([id number]) on 10/6/2016 8:22:49 AM           Cardiology Labs:  Lab Results   Component Value Date/Time    Cholesterol, total 299 (H) 03/30/2018 11:24 AM    HDL Cholesterol 49 03/30/2018 11:24 AM    LDL, calculated 199.4 (H) 03/30/2018 11:24 AM    Triglyceride 253 (H) 03/30/2018 11:24 AM    CHOL/HDL Ratio 6.1 (H) 03/30/2018 11:24 AM       Lab Results   Component Value Date/Time    Sodium 135 (L) 04/11/2018 11:11 AM    Potassium 4.8 04/11/2018 11:11 AM    Chloride 98 04/11/2018 11:11 AM    CO2 25 04/11/2018 11:11 AM    Anion gap 12 04/11/2018 11:11 AM    Glucose 215 (H) 04/11/2018 11:11 AM    BUN 19 (H) 04/11/2018 11:11 AM    Creatinine 1.22 04/11/2018 11:11 AM    BUN/Creatinine ratio 16 04/11/2018 11:11 AM    GFR est AA >60 04/11/2018 11:11 AM    GFR est non-AA 59 (L) 04/11/2018 11:11 AM    Calcium 9.7 04/11/2018 11:11 AM    Bilirubin, total 0.5 04/11/2018 11:11 AM    AST (SGOT) 77 (H) 04/11/2018 11:11 AM    Alk. phosphatase 138 (H) 04/11/2018 11:11 AM    Protein, total 7.8 04/11/2018 11:11 AM    Albumin 3.9 04/11/2018 11:11 AM    Globulin 3.9 04/11/2018 11:11 AM    A-G Ratio 1.0 (L) 04/11/2018 11:11 AM    ALT (SGPT) 139 (H) 04/11/2018 11:11 AM           Assessment:     Assessment:     Diagnoses and all orders for this visit:    1. SHANE (dyspnea on exertion)  -     CARDIAC CATHETERIZATION; Future  -     METABOLIC PANEL, COMPREHENSIVE  -     CBC WITH AUTOMATED DIFF  -     PROTHROMBIN TIME + INR    2. Mixed hyperlipidemia  -     AMB POC EKG ROUTINE W/ 12 LEADS, INTER & REP  -     CARDIAC CATHETERIZATION; Future  -     METABOLIC PANEL, COMPREHENSIVE  -     CBC WITH AUTOMATED DIFF  -     PROTHROMBIN TIME + INR    3. Type 2 diabetes mellitus without complication, without long-term current use of insulin ([facility] [address]  -     CARDIAC CATHETERIZATION; Future  -     METABOLIC PANEL, COMPREHENSIVE  -     CBC WITH AUTOMATED DIFF  -     PROTHROMBIN TIME + INR    4. Essential hypertension  -     CARDIAC CATHETERIZATION; Future  -     METABOLIC PANEL, COMPREHENSIVE  -     CBC WITH AUTOMATED DIFF  -     PROTHROMBIN TIME + INR    5. Abnormal nuclear stress test  -     CARDIAC CATHETERIZATION; Future  -     METABOLIC PANEL, COMPREHENSIVE  -     CBC WITH AUTOMATED DIFF  -     PROTHROMBIN TIME + INR    Other orders  -     isosorbide mononitrate ER (IMDUR) 30 mg tablet; Take 0.5 Tabs by mouth daily. -     rosuvastatin (CRESTOR) 10 mg tablet; Take 1 Tab by mouth nightly. ICD-10-CM ICD-9-CM    1. SHANE (dyspnea on exertion) R06.09 786.09 CARDIAC CATHETERIZATION      METABOLIC PANEL, COMPREHENSIVE      CBC WITH AUTOMATED DIFF      PROTHROMBIN TIME + INR   2. Mixed hyperlipidemia E78.2 272.2 AMB POC EKG ROUTINE W/ 12 LEADS, INTER & REP      CARDIAC CATHETERIZATION      METABOLIC PANEL, COMPREHENSIVE      CBC WITH AUTOMATED DIFF      PROTHROMBIN TIME + INR   3.  Type 2 diabetes mellitus without complication, without long-term current use of insulin (HCC) E11.9 250.00 CARDIAC CATHETERIZATION      METABOLIC PANEL, COMPREHENSIVE      CBC WITH AUTOMATED DIFF      PROTHROMBIN TIME + INR   4. Essential hypertension I10 401.9 CARDIAC CATHETERIZATION      METABOLIC PANEL, COMPREHENSIVE      CBC WITH AUTOMATED DIFF      PROTHROMBIN TIME + INR   5. Abnormal nuclear stress test R94.39 794.39 CARDIAC CATHETERIZATION      METABOLIC PANEL, COMPREHENSIVE      CBC WITH AUTOMATED DIFF      PROTHROMBIN TIME + INR     Orders Placed This Encounter    METABOLIC PANEL, COMPREHENSIVE    CBC WITH AUTOMATED DIFF    PROTHROMBIN TIME + INR    CARDIAC CATHETERIZATION     Standing Status:   Future     Number of Occurrences:   1     Standing Expiration Date:   10/6/2018     Order Specific Question:   Reason for Exam:     Answer:   angina f/c 3    AMB POC EKG ROUTINE W/ 12 LEADS, INTER & REP     Order Specific Question:   Reason for Exam:     Answer:   Routine    hydroCHLOROthiazide (MICROZIDE) 12.5 mg capsule    aspirin (ASPIRIN) 325 mg tablet     Sig: Take 325 mg by mouth daily.  isosorbide mononitrate ER (IMDUR) 30 mg tablet     Sig: Take 0.5 Tabs by mouth daily. Dispense:  15 Tab     Refill:  0    rosuvastatin (CRESTOR) 10 mg tablet     Sig: Take 1 Tab by mouth nightly. Dispense:  30 Tab     Refill:  3        Plan:     Patient is a 74-year-old male with uncontrolled diabetes, marked hyperlipidemia with a high risk abnormal nuclear stress test with symptoms consistent with angina functional class III. Currently on dual antianginal therapy discussed risks and benefits of coronary angiography PTCA stenting. Patient willing to proceed. Patient was started on Crestor 10 mg. Follow-up after procedure. Harris Jorge MD    This note was created using voice recognition software. Despite editing, there may be syntax errors.

## 2018-04-12 NOTE — PROGRESS NOTES
Primary Nurse Denny Alcaraz RN and Veronica Araujo RN performed a dual skin assessment on this patient No impairment noted  Salvatore score is 23

## 2018-04-12 NOTE — PROGRESS NOTES
1930 - Bedside report from [facility]. R wrist benign. NSR. No complaints. 2015 - TR band off without issue. Ambulation in hallway 100 yards without difficulty or complaints. R wrist benign. No complaints. Bedside report to RN.   NSR.

## 2018-04-13 ENCOUNTER — TELEPHONE (OUTPATIENT)
Dept: FAMILY MEDICINE CLINIC | Age: 73
End: 2018-04-13

## 2018-04-13 VITALS
TEMPERATURE: 98.5 F | BODY MASS INDEX: 28.23 KG/M2 | DIASTOLIC BLOOD PRESSURE: 92 MMHG | RESPIRATION RATE: 18 BRPM | WEIGHT: 220 LBS | HEART RATE: 93 BPM | OXYGEN SATURATION: 95 % | HEIGHT: 74 IN | SYSTOLIC BLOOD PRESSURE: 150 MMHG

## 2018-04-13 LAB
ANION GAP SERPL CALC-SCNC: 8 MMOL/L (ref 5–15)
BUN SERPL-MCNC: 12 MG/DL (ref 6–20)
BUN/CREAT SERPL: 12 (ref 12–20)
CALCIUM SERPL-MCNC: 8.9 MG/DL (ref 8.5–10.1)
CHLORIDE SERPL-SCNC: 105 MMOL/L (ref 97–108)
CO2 SERPL-SCNC: 25 MMOL/L (ref 21–32)
CREAT SERPL-MCNC: 0.98 MG/DL (ref 0.7–1.3)
GLUCOSE BLD STRIP.AUTO-MCNC: 208 MG/DL (ref 65–100)
GLUCOSE SERPL-MCNC: 183 MG/DL (ref 65–100)
POTASSIUM SERPL-SCNC: 3.8 MMOL/L (ref 3.5–5.1)
SERVICE CMNT-IMP: ABNORMAL
SODIUM SERPL-SCNC: 138 MMOL/L (ref 136–145)

## 2018-04-13 PROCEDURE — 36415 COLL VENOUS BLD VENIPUNCTURE: CPT | Performed by: NURSE PRACTITIONER

## 2018-04-13 PROCEDURE — 74011250637 HC RX REV CODE- 250/637: Performed by: NURSE PRACTITIONER

## 2018-04-13 PROCEDURE — 80048 BASIC METABOLIC PNL TOTAL CA: CPT | Performed by: NURSE PRACTITIONER

## 2018-04-13 PROCEDURE — 93005 ELECTROCARDIOGRAM TRACING: CPT

## 2018-04-13 PROCEDURE — 82962 GLUCOSE BLOOD TEST: CPT

## 2018-04-13 RX ORDER — ASPIRIN 325 MG
325 TABLET ORAL DAILY
Qty: 30 TAB | Refills: 11 | Status: SHIPPED | OUTPATIENT
Start: 2018-04-13 | End: 2018-04-26 | Stop reason: ALTCHOICE

## 2018-04-13 RX ORDER — CLOPIDOGREL BISULFATE 75 MG/1
75 TABLET ORAL DAILY
Qty: 30 TAB | Refills: 11 | Status: SHIPPED | OUTPATIENT
Start: 2018-04-13 | End: 2019-05-02 | Stop reason: ALTCHOICE

## 2018-04-13 RX ORDER — CLOPIDOGREL BISULFATE 75 MG/1
75 TABLET ORAL DAILY
Status: DISCONTINUED | OUTPATIENT
Start: 2018-04-13 | End: 2018-04-13 | Stop reason: HOSPADM

## 2018-04-13 RX ADMIN — ISOSORBIDE MONONITRATE 15 MG: 30 TABLET, EXTENDED RELEASE ORAL at 08:19

## 2018-04-13 RX ADMIN — METOPROLOL SUCCINATE 25 MG: 25 TABLET, EXTENDED RELEASE ORAL at 08:19

## 2018-04-13 RX ADMIN — CLOPIDOGREL BISULFATE 75 MG: 75 TABLET ORAL at 09:29

## 2018-04-13 RX ADMIN — CLONAZEPAM 1 MG: 0.5 TABLET ORAL at 08:19

## 2018-04-13 RX ADMIN — ATORVASTATIN CALCIUM 20 MG: 20 TABLET, FILM COATED ORAL at 00:00

## 2018-04-13 RX ADMIN — PANTOPRAZOLE SODIUM 40 MG: 40 TABLET, DELAYED RELEASE ORAL at 07:15

## 2018-04-13 RX ADMIN — Medication 10 ML: at 04:23

## 2018-04-13 RX ADMIN — ASPIRIN 81 MG: 81 TABLET, COATED ORAL at 08:19

## 2018-04-13 NOTE — DISCHARGE INSTRUCTIONS
[address] [address]  [phone]        Patient ID:  Xuan Segovia  [id number]  81 y.o.  1945    Admit Date: 4/12/2018    Discharge Date: 4/13/2018     Admitting Physician: Toney Sanchez MD     Discharge Physician: Celine Flores NP    Admission Diagnoses:   Mixed hyperlipidemia [E78.2]  Type 2 diabetes mellitus without complication, without long-term current use of insulin ([address] [E11.9]  Essential hypertension [I10]  SHANE (dyspnea on exertion) [R06.09]  Abnormal nuclear stress test [R94.39]    Discharge Diagnoses: Active Problems:    Angina, class III ([address] (4/12/2018)      S/P coronary artery stent placement (4/12/2018)      Overview: 4/12/18 PCI/BLAZE to LAD and LCX      Abnormal nuclear cardiac imaging test (4/12/2018)        Discharge Condition: Good    Cardiology Procedures this Admission:  Left heart catheterization with PCI    Disposition: home    Reference discharge instructions provided by nursing for diet and activity. Signed:  Celine Flores NP  4/13/2018  9:39 AM      Radial Cardiac Catheterization/Angiography Discharge Instructions    It is normal to feel tired the first couple days. Take it easy and follow the physicians instructions. CHECK THE CATHETER INSERTION SITE DAILY:    Remove the wrist dressing 24 hours after the procedure. You may shower 24 hours after the procedure. Wash with soap and water and pat dry. Gentle cleaning of the site with soap and water is sufficient, cover with a dry clean dressing or bandage. Do not apply creams or powders to the area. No soaking the wrist for 3 days. Leave the puncture site open to air after 24 hours post-procedure. CALL THE PHYSICIANS:     If the site becomes red, swollen or feels warm to the touch  If there is bleeding or drainage or if there is unusual pain at the radial site. If there is any minor oozing, you may apply a band-aid and remove after 12 hours.    If the bleeding continues, hold pressure with the middle finger against the puncture site and the thumb against the back of the wrist,call 911 to be transported to the hospital.  DO NOT DRIVE YOURSELF, Adriano Escalante. ACTIVITY:   For the first 24 hours do not manipulate the wrist.  No lifting, pushing or pulling over 3-5 pounds with the affected wrist for 7 daysand no straining the insertion site. Do not life grocery bags or the garbage can, do not run the vacuum  or  for 7 days. Start with short walks as in the hospital and gradually increase as tolerated each day. It is recommended to walk 30 minutes 5-7 days per week. Follow your physicians instructions on activity. Avoid walking outside in extremes of heat or cold. Walk inside when it is cold and windy or hot and humid. Things to keep in mind:  No driving for at least 24 hours, or as designated by your physician. Limit the number of times you go up and down the stairs  Take rests and pace yourself with activity. Be careful and do not strain with bowel movements. MEDICATIONS:    Take all medications as prescribed  Call your physician if you have any questions  Keep an updated list of your medications with you at all times and give a list to your physician and pharmacist    SIGNS AND SYMPTOMS:   Be cautious of symptoms of angina or recurrent symptoms such as chest discomfort, unusual shortness of breath or fatigue. These could be symptoms of restenosis, a new blockage or a heart attack. If your symptoms are relieved with rest it is still recommended that you notify your physician of recurrent chest pain or discomfort. For CHEST PAIN or symptoms of angina not relieved with rest:  If the discomfort is not relieved with rest, and you have been prescribed Nitroglycerin, take as directed (taken under the tongue, one at a time 5 minutes apart for a total of 3 doses).  If the discomfort is not relieved after the 3rd nitroglycerin, call 911. If you have not been prescribed Nitroglycerin  and your chest discomfort is not relieved with rest, call 911. AFTER CARE:   Follow up with your physician as instructed. Follow a heart healthy diet with proper portion control, daily stress management, daily exercise, blood pressure and cholesterol control , and smoking cessation.

## 2018-04-13 NOTE — CARDIO/PULMONARY
Cardiopulmonary Rehab:    Chart reviewed. Pt is a 81 y.o. male admitted with angina. S/p PCI/BLAZE. Pt visited. Son and daughter in law at the bedside. CAD education folder given to Judith Gaxiola. Educated using teach back method. Reviewed CAD diagnosis definition and purpose of intervention. Discussed risk factors for CAD to include the following: family history, elevated BMI, hyperlipidemia, hypertension, diabetes, stress, and smoking. Smoking Cessation Program link added to AVS. Discussed Heart Healthy/Low Sodium (2000 mg) diet. Stressed importance of increasing fiber in his diet, and suggested 4-5 egg yolks a week (as he often eats duck eggs). Reviewed the importance of medication compliance, the purpose of Plavix, baby asprin, and potential side effects. Discussed follow up appointments with cardiologist, signs and symptoms of angina, and what to report to physician after discharge. Emphasized the value of cardiac rehab. Discussed Cardiac Rehab Program format, benefits, and encouraged enrollment to assist with risk modification and management. Pt declines cardiac rehab at this time as he resides in [place], [place], 1.5 hours away. Encouraged regular exercise including walking for 30 minutes 5 days a week. Judith Gaxiola verbalized understanding with questions answered.  Deedee Blakely RN

## 2018-04-13 NOTE — ROUTINE PROCESS
Patient very anxious about discharge. Patient instructed to wait until paper work was ready. Patient left anyway without signing paperwork. Consulted NP who said plavix prescription was sent to patients pharmacy. Nurse called patient and informed him that prescription was sent and that Dr. Yonathan Harris office would call to set up an appointment in a week. Radial site care instructions were discussed including: Weight restrictions, bleeding precautions/ steps to follow, and signs/symptoms of infection. Patient verbalized understanding.

## 2018-04-13 NOTE — PROGRESS NOTES
[address] [phone]      Cardiology Progress Note      4/13/2018 9:41 AM    Admit Date: 4/12/2018    Admit Diagnosis:   Mixed hyperlipidemia [E78.2]  Type 2 diabetes mellitus without complication, without long-term current use of insulin (HCC) [E11.9]  Essential hypertension [I10]  SHANE (dyspnea on exertion) [R06.09]  Abnormal nuclear stress test [R94.39]    Subjective:     Rebel Ye has no c/o CP, SOB. S/P PCI/BLAZE LAD and LCx. Ambulating without difficulty. BP elevated, just started on BB last week. Previous hx of HCTZ and Losartan for BP management,  but after viral illness this past winter, stopped taking Losartan. Followed by PCP who he will see and have medications adjusted. Visit Vitals    BP (!) 150/92 (BP 1 Location: Left arm, BP Patient Position: At rest)    Pulse 93    Temp 98.5 °F (36.9 °C)    Resp 18    Ht 6' 2\" (1.88 m)    Wt 99.8 kg (220 lb)    SpO2 95%    BMI 28.25 kg/m2       Current Facility-Administered Medications   Medication Dose Route Frequency    clopidogrel (PLAVIX) tablet 75 mg  75 mg Oral DAILY    0.9% sodium chloride infusion        adenosine (ADENOSCAN) 3 mg/mL injection        bivalirudin (ANGIOMAX) 250 mg injection        0.9% sodium chloride (MBP/ADV) infusion        bivalirudin (ANGIOMAX) 250 mg injection        0.9% sodium chloride (MBP/ADV) infusion        ADDaptor        clopidogrel (PLAVIX) 300 mg        acyclovir (ZOVIRAX) capsule 400 mg  400 mg Oral DAILY    clonazePAM (KlonoPIN) tablet 1 mg  1 mg Oral BID    hydroCHLOROthiazide (MICROZIDE) capsule 12.5 mg  12.5 mg Oral DAILY    isosorbide mononitrate ER (IMDUR) tablet 15 mg  15 mg Oral DAILY    . PHARMACY TO SUBSTITUTE PER PROTOCOL    Per Protocol    metoprolol succinate (TOPROL-XL) XL tablet 25 mg  25 mg Oral DAILY    pantoprazole (PROTONIX) tablet 40 mg  40 mg Oral 6am    atorvastatin (LIPITOR) tablet 20 mg  20 mg Oral QHS    sodium chloride (NS) flush 5-10 mL  5-10 mL IntraVENous Q8H    sodium chloride (NS) flush 5-10 mL  5-10 mL IntraVENous PRN    acetaminophen (TYLENOL) tablet 650 mg  650 mg Oral Q4H PRN    naloxone (NARCAN) injection 0.4 mg  0.4 mg IntraVENous PRN    aspirin delayed-release tablet 81 mg  81 mg Oral DAILY    insulin lispro (HUMALOG) injection   SubCUTAneous ACB&D    glucose chewable tablet 16 g  4 Tab Oral PRN    dextrose (D50W) injection syrg 12.5-25 g  12.5-25 g IntraVENous PRN    glucagon (GLUCAGEN) injection 1 mg  1 mg IntraMUSCular PRN       Objective:      Physical Exam:  General Appearance:  WNWD  male in no acute distress  Chest:   Clear  Cardiovascular:  Regular rate and rhythm, no murmur.   Abdomen:   Soft, non-tender, bowel sounds are active.   Extremities: right radial site D/I, no hematoma  Skin:  Warm and dry.     Data Review:   Recent Labs      04/11/18   1111   WBC  4.2   HGB  14.0   HCT  40.9   PLT  164     Recent Labs      04/13/18   0422  04/11/18   1111   NA  138  135*   K  3.8  4.8   CL  105  98   CO2  25  25   GLU  183*  215*   BUN  12  19*   CREA  0.98  1.22   CA  8.9  9.7   ALB   --   3.9   TBILI   --   0.5   SGOT   --   77*   ALT   --   139*   INR   --   1.0       No results for input(s): TROIQ, CPK, CKMB in the last 72 hours.       Intake/Output Summary (Last 24 hours) at 04/13/18 0941  Last data filed at 04/13/18 0420   Gross per 24 hour   Intake              840 ml   Output                0 ml   Net              840 ml        Telemetry: SR  EKG: SR with no acute changes    Assessment:     Active Problems:    Angina, class III (HCC) (4/12/2018)      S/P coronary artery stent placement (4/12/2018)      Overview: 4/12/18 PCI/BLAZE to LAD and LCX      Abnormal nuclear cardiac imaging test (4/12/2018)        Plan:     Angina Class III with abnormal nuclear stress study:  S/p PCI/BLAZE to LAD and LCx  Started on Plavix 75mg daily x 1 year, continue on ASA 325mg daily, metoprolol BID, and statin. HTN:  Continue on BB.   Will f/u with PCP to discuss further management  Patient monitors BP at home daily    Follow up with Dr. Veronica Murcia, Cardiology, in [place] [id number] 1-2 weeks      [facility]  Cardiology

## 2018-04-13 NOTE — DIABETES MGMT
[facility]    Elevated A1C Visit Note    Recommendations/ Comments: Pt discharging today, already dressed in room for discharge -reported he was leaving right after I finished talking with him. Patient is a 81 y.o. male with known DM on metformin 500 mg BID at home. Pt reported that his hgb A1c was 6.6 % 6 months ago and knows that it is around 13 % now. Pt shared that he was sick over the winter for 6 months and feels this is why his A1c has increased so much. Pt reported that he has \"completely changed his diet\". Pt was frequently eating sweets throughout the day (chocolate eclairs, cookies, cakes) but has discontinued this. Pt reported that he does not check blood sugars. 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 Pt was overall pleasant during conversation.        A1c:   Lab Results   Component Value Date/Time    Hemoglobin A1c 13.7 (H) 03/23/2018 03:58 PM    Hemoglobin A1c 8.2 (H) 06/23/2017 10:43 AM       Recent Glucose Results:   Lab Results   Component Value Date/Time     (H) 04/13/2018 04:22 AM    GLUCPOC 208 (H) 04/13/2018 07:54 AM    GLUCPOC 175 (H) 04/12/2018 03:44 PM        Lab Results   Component Value Date/Time    Creatinine 0.98 04/13/2018 04:22 AM       Active Orders   Diet    DIET CARDIAC Regular        Assessed and instructed patient on the following:   ·  illness management - educator strongly encouraged checking BG at home 1-2x/daily, discussed that although pt is resuming Metformin that an hgb A1c of 13.7% will likely/may require insulin or additional medications, educator encouraged, again, to check BG at home vs waiting 3 months for hgb A1c lab draw - discussed importance of DM care, nutrition (hank discussed use of Myplate method at meals, emphasized reduction in portion sizes vs completely omitting CHO),and referred to Diabetes Educator - Pt lives in [facility] and so declined education with [facility] due to long drive. Provided patient with the following: [x]          Diabetes Self-Care Guide               []          Insulin education materials               []          Diabetes survival skills handout               []          BG guidelines for post-op patients               []          \"Decreasing  the Cost of Diabetes Care\"                   [x]          Outpatient [facility] contact number            Will continue to follow as needed. Thank you.     Walter Jenkins, [address] [place] [address]    [phone]

## 2018-04-14 LAB
ATRIAL RATE: 69 BPM
ATRIAL RATE: 78 BPM
CALCULATED P AXIS, ECG09: 46 DEGREES
CALCULATED P AXIS, ECG09: 60 DEGREES
CALCULATED R AXIS, ECG10: 17 DEGREES
CALCULATED R AXIS, ECG10: 43 DEGREES
CALCULATED T AXIS, ECG11: 58 DEGREES
CALCULATED T AXIS, ECG11: 83 DEGREES
DIAGNOSIS, 93000: NORMAL
DIAGNOSIS, 93000: NORMAL
P-R INTERVAL, ECG05: 184 MS
P-R INTERVAL, ECG05: 190 MS
Q-T INTERVAL, ECG07: 392 MS
Q-T INTERVAL, ECG07: 410 MS
QRS DURATION, ECG06: 82 MS
QRS DURATION, ECG06: 84 MS
QTC CALCULATION (BEZET), ECG08: 439 MS
QTC CALCULATION (BEZET), ECG08: 446 MS
VENTRICULAR RATE, ECG03: 69 BPM
VENTRICULAR RATE, ECG03: 78 BPM

## 2018-04-26 ENCOUNTER — OFFICE VISIT (OUTPATIENT)
Dept: CARDIOLOGY CLINIC | Age: 73
End: 2018-04-26

## 2018-04-26 VITALS
DIASTOLIC BLOOD PRESSURE: 94 MMHG | BODY MASS INDEX: 28.11 KG/M2 | WEIGHT: 219 LBS | SYSTOLIC BLOOD PRESSURE: 164 MMHG | HEART RATE: 76 BPM | OXYGEN SATURATION: 98 % | HEIGHT: 74 IN | RESPIRATION RATE: 18 BRPM

## 2018-04-26 DIAGNOSIS — I25.10 CORONARY ARTERY DISEASE INVOLVING NATIVE CORONARY ARTERY OF NATIVE HEART, ANGINA PRESENCE UNSPECIFIED: Primary | ICD-10-CM

## 2018-04-26 DIAGNOSIS — Z95.5 S/P CORONARY ARTERY STENT PLACEMENT: ICD-10-CM

## 2018-04-26 DIAGNOSIS — E78.2 MIXED HYPERLIPIDEMIA: ICD-10-CM

## 2018-04-26 DIAGNOSIS — E11.9 TYPE 2 DIABETES MELLITUS WITHOUT COMPLICATION, WITHOUT LONG-TERM CURRENT USE OF INSULIN (HCC): ICD-10-CM

## 2018-04-26 DIAGNOSIS — I10 ESSENTIAL HYPERTENSION: ICD-10-CM

## 2018-04-26 PROBLEM — I25.111 CORONARY ARTERY DISEASE INVOLVING NATIVE CORONARY ARTERY OF NATIVE HEART WITH ANGINA PECTORIS WITH DOCUMENTED SPASM (HCC): Status: ACTIVE | Noted: 2018-04-26

## 2018-04-26 RX ORDER — ASPIRIN 81 MG/1
TABLET ORAL DAILY
COMMUNITY

## 2018-04-26 RX ORDER — LOSARTAN POTASSIUM 50 MG/1
50 TABLET ORAL DAILY
Qty: 90 TAB | Refills: 1 | Status: SHIPPED | OUTPATIENT
Start: 2018-04-26 | End: 2018-12-18 | Stop reason: ALTCHOICE

## 2018-04-26 NOTE — PROGRESS NOTES
Verified patient with two patient identifiers. Medications reviewed/approved by Dr. Dayron Watts. Verbal from Dr. Dayron Watts to remove the medications that were deleted during the visit. Chief Complaint   Patient presents with    Coronary Artery Disease     New patient evaluation     1. Have you been to the ER, urgent care clinic since your last visit? Hospitalized since your last visit? New pt.    2. Have you seen or consulted any other health care providers outside of the [address] [place] [address] since your last visit? Include any pap smears or colon screening. New pt.

## 2018-04-26 NOTE — PROGRESS NOTES
Melany Harris is a 81 y.o. male is here for hospital f/u, establish local cardiology care. 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 On dual antiplatelet rx (ASA/Plavix), statin, DM meds, Metoprolol Xl, Imdur. No CP/other CV sx post d/c. F/u planned with Dr. Venus Osuna. The patient denies chest pain/ shortness of breath, orthopnea, PND, LE edema, palpitations, syncope, presyncope or fatigue.        Patient Active Problem List    Diagnosis Date Noted    Coronary artery disease involving native coronary artery of native heart with angina pectoris with documented spasm ([address] 04/26/2018    Angina, class III ([address] 04/12/2018    S/P coronary artery stent placement 04/12/2018    Abnormal nuclear cardiac imaging test 04/12/2018    Mixed hyperlipidemia 06/26/2017    Type 2 diabetes mellitus without complication, without long-term current use of insulin ([address] 06/23/2017    Essential hypertension 12/02/2016    Elevated transaminase level 11/30/2016    Spinal stenosis of cervical region 10/06/2016    Psoriasis 07/10/2015    Laryngospasm 07/10/2015      Bing Mayer MD  Past Medical History:   Diagnosis Date    Abnormal nuclear cardiac imaging test 4/12/2018    CAD (coronary artery disease)     Dyslipidemia     Elevated transaminase level 09/2016    GERD (gastroesophageal reflux disease)     HTN (hypertension)     Psoriasis     S/P coronary artery stent placement 4/12/2018    T2DM (type 2 diabetes mellitus) ([address] 09/2016    A1c 6.4;  Glucose 195      Past Surgical History:   Procedure Laterality Date    HX HEENT      bilateral cataract extraction    HX HEENT      tonsillectomy    HX ORTHOPAEDIC      right shoulder acromioplasty    HX ORTHOPAEDIC      left achilles tendon surgery    HX PTCA 04/12/2018    BLAZE mid LAD (Synergy 2.75/38) + prox LCX (Synergy 2.5/32)     No Known Allergies   Family History   Problem Relation Age of Onset    Heart Disease Father     Heart Disease Maternal Uncle       Social History     Social History    Marital status:      Spouse name: N/A    Number of children: N/A    Years of education: N/A     Occupational History    Not on file. Social History Main Topics    Smoking status: Former Smoker    Smokeless tobacco: Never Used    Alcohol use 0.6 oz/week     0 Standard drinks or equivalent, 1 Glasses of wine per week    Drug use: No    Sexual activity: Not on file     Other Topics Concern    Not on file     Social History Narrative      Current Outpatient Prescriptions   Medication Sig    aspirin (ASPIRIN) 325 mg tablet Take 1 Tab by mouth daily.  clopidogrel (PLAVIX) 75 mg tab Take 1 Tab by mouth daily.  hydroCHLOROthiazide (MICROZIDE) 12.5 mg capsule     isosorbide mononitrate ER (IMDUR) 30 mg tablet Take 0.5 Tabs by mouth daily.  rosuvastatin (CRESTOR) 10 mg tablet Take 1 Tab by mouth nightly.  metoprolol succinate (TOPROL-XL) 25 mg XL tablet Take 1 Tab by mouth daily.  nitroglycerin (NITROSTAT) 0.4 mg SL tablet 1 Tab by SubLINGual route every five (5) minutes as needed for Chest Pain.  clonazePAM (KLONOPIN) 1 mg tablet Take 1 Tab by mouth two (2) times a day. Max Daily Amount: 2 mg.  liraglutide (SAXENDA) 3 mg/0.5 mL (18 mg/3 mL) pen 0.2 mL by SubCUTAneous route daily.  metFORMIN (GLUCOPHAGE) 500 mg tablet TAKE 1 TABLET BY MOUTH TWICE DAILY    acyclovir (ZOVIRAX) 400 mg tablet TAKE 1 TABLET BY MOUTH ONCE DAILY    omeprazole (PRILOSEC) 20 mg capsule Take 20 mg by mouth daily.  betamethasone valerate (VALISONE) 0.1 % topical cream      No current facility-administered medications for this visit. Review of Symptoms:    CONST  No weight change.  No fever, chills, sweats    ENT No visual changes, URI sx, sore throat CV  See HPI   RESP  No cough, or sputum, wheezing. Also see HPI   GI  No abdominal pain or change in bowel habits. No heartburn or dysphagia. No melena or rectal bleeding.   No dysuria, urgency, frequency, hematuria   MSKEL  No joint pain, swelling. No muscle pain. SKIN  No rash or lesions. NEURO  No headache, syncope, or seizure. No weakness, loss of sensation, or paresthesias. PSYCH  No low mood or depression  No anxiety. HE/LYMPH  No easy bruising, abnormal bleeding, or enlarged glands. Physical ExamPhysical Exam:    There were no vitals taken for this visit.   Gen: NAD  HEENT:  PERRL, throat clear  Neck: no adenopathy, no thyromegaly, no JVD   Heart:  Regular,Nl S1S2,  no murmur, gallop or rub.   Lungs:  clear  Abdomen:   Soft, non-tender, bowel sounds are active.   Extremities:  No edema  Pulse: symmetric  Neuro: A&O times 3, No focal neuro deficits    Cardiographics    ECG: NSR, wnl    Labs:   Lab Results   Component Value Date/Time    Sodium 138 04/13/2018 04:22 AM    Sodium 135 (L) 04/11/2018 11:11 AM    Sodium 138 03/30/2018 11:24 AM    Sodium CANCELED 03/23/2018 03:58 PM    Sodium 140 06/23/2017 10:43 AM    Potassium 3.8 04/13/2018 04:22 AM    Potassium 4.8 04/11/2018 11:11 AM    Potassium 4.7 03/30/2018 11:24 AM    Potassium CANCELED 03/23/2018 03:58 PM    Potassium 5.5 (H) 06/23/2017 10:43 AM    Chloride 105 04/13/2018 04:22 AM    Chloride 98 04/11/2018 11:11 AM    Chloride 100 03/30/2018 11:24 AM    Chloride CANCELED 03/23/2018 03:58 PM    Chloride 98 06/23/2017 10:43 AM    CO2 25 04/13/2018 04:22 AM    CO2 25 04/11/2018 11:11 AM    CO2 29 03/30/2018 11:24 AM    CO2 CANCELED 03/23/2018 03:58 PM    CO2 23 06/23/2017 10:43 AM    Anion gap 8 04/13/2018 04:22 AM    Anion gap 12 04/11/2018 11:11 AM    Anion gap 9 03/30/2018 11:24 AM    Glucose 183 (H) 04/13/2018 04:22 AM    Glucose 215 (H) 04/11/2018 11:11 AM    Glucose 316 (H) 03/30/2018 11:24 AM    Glucose CANCELED 03/23/2018 03:58 PM    Glucose 170 (H) 06/23/2017 10:43 AM    BUN 12 04/13/2018 04:22 AM    BUN 19 (H) 04/11/2018 11:11 AM    BUN 17 03/30/2018 11:24 AM    BUN CANCELED 03/23/2018 03:58 PM    BUN 26 06/23/2017 10:43 AM    Creatinine 0.98 04/13/2018 04:22 AM    Creatinine 1.22 04/11/2018 11:11 AM    Creatinine 1.31 (H) 03/30/2018 11:24 AM    Creatinine CANCELED 03/23/2018 03:58 PM    Creatinine 0.95 06/23/2017 10:43 AM    BUN/Creatinine ratio 12 04/13/2018 04:22 AM    BUN/Creatinine ratio 16 04/11/2018 11:11 AM    BUN/Creatinine ratio 13 03/30/2018 11:24 AM    BUN/Creatinine ratio 27 (H) 06/23/2017 10:43 AM    BUN/Creatinine ratio 19 12/02/2016 02:59 PM    GFR est AA >60 04/13/2018 04:22 AM    GFR est AA >60 04/11/2018 11:11 AM    GFR est AA >60 03/30/2018 11:24 AM    GFR est AA 94 06/23/2017 10:43 AM    GFR est AA 81 12/02/2016 02:59 PM    GFR est non-AA >60 04/13/2018 04:22 AM    GFR est non-AA 59 (L) 04/11/2018 11:11 AM    GFR est non-AA 54 (L) 03/30/2018 11:24 AM    GFR est non-AA 81 06/23/2017 10:43 AM    GFR est non-AA 70 12/02/2016 02:59 PM    Calcium 8.9 04/13/2018 04:22 AM    Calcium 9.7 04/11/2018 11:11 AM    Calcium 9.1 03/30/2018 11:24 AM    Calcium CANCELED 03/23/2018 03:58 PM    Calcium 9.9 06/23/2017 10:43 AM    Bilirubin, total 0.5 04/11/2018 11:11 AM    Bilirubin, total 0.5 03/30/2018 11:24 AM    Bilirubin, total CANCELED 03/23/2018 03:58 PM    Bilirubin, total 0.2 06/23/2017 10:43 AM    Bilirubin, total 0.4 12/02/2016 02:59 PM    AST (SGOT) 77 (H) 04/11/2018 11:11 AM    AST (SGOT) 89 (H) 03/30/2018 11:24 AM    AST (SGOT) CANCELED 03/23/2018 03:58 PM    AST (SGOT) 36 06/23/2017 10:43 AM    AST (SGOT) 30 12/02/2016 02:59 PM    Alk. phosphatase 138 (H) 04/11/2018 11:11 AM    Alk. phosphatase 117 03/30/2018 11:24 AM    Alk. phosphatase CANCELED 03/23/2018 03:58 PM    Alk. phosphatase 91 06/23/2017 10:43 AM    Alk.  phosphatase 82 12/02/2016 02:59 PM    Protein, total 7.8 04/11/2018 11:11 AM    Protein, total 7.1 03/30/2018 11:24 AM    Protein, total CANCELED 03/23/2018 03:58 PM    Protein, total 7.1 06/23/2017 10:43 AM    Protein, total 7.2 12/02/2016 02:59 PM    Albumin 3.9 04/11/2018 11:11 AM    Albumin 3.8 03/30/2018 11:24 AM    Albumin CANCELED 03/23/2018 03:58 PM    Albumin 4.5 06/23/2017 10:43 AM    Albumin 4.6 12/02/2016 02:59 PM    Globulin 3.9 04/11/2018 11:11 AM    Globulin 3.3 03/30/2018 11:24 AM    A-G Ratio 1.0 (L) 04/11/2018 11:11 AM    A-G Ratio 1.2 03/30/2018 11:24 AM    A-G Ratio 1.7 06/23/2017 10:43 AM    A-G Ratio 1.8 12/02/2016 02:59 PM    A-G Ratio 1.9 07/10/2015 10:20 AM    ALT (SGPT) 139 (H) 04/11/2018 11:11 AM    ALT (SGPT) 123 (H) 03/30/2018 11:24 AM    ALT (SGPT) CANCELED 03/23/2018 03:58 PM    ALT (SGPT) 56 (H) 06/23/2017 10:43 AM    ALT (SGPT) 39 12/02/2016 02:59 PM     No results found for: CPK, CPKX, CPX  Lab Results   Component Value Date/Time    Cholesterol, total 299 (H) 03/30/2018 11:24 AM    Cholesterol, total CANCELED 03/23/2018 03:58 PM    Cholesterol, total 344 (H) 06/23/2017 10:43 AM    HDL Cholesterol 49 03/30/2018 11:24 AM    HDL Cholesterol CANCELED 03/23/2018 03:58 PM    HDL Cholesterol 54 06/23/2017 10:43 AM    LDL, calculated 199.4 (H) 03/30/2018 11:24 AM    LDL, calculated 216 (H) 06/23/2017 10:43 AM    Triglyceride 253 (H) 03/30/2018 11:24 AM    Triglyceride CANCELED 03/23/2018 03:58 PM    Triglyceride 369 (H) 06/23/2017 10:43 AM    CHOL/HDL Ratio 6.1 (H) 03/30/2018 11:24 AM     No results found for this or any previous visit.     Assessment:         Patient Active Problem List    Diagnosis Date Noted    Coronary artery disease involving native coronary artery of native heart with angina pectoris with documented spasm ([address] 04/26/2018    Angina, class III ([address] 04/12/2018    S/P coronary artery stent placement 04/12/2018    Abnormal nuclear cardiac imaging test 04/12/2018    Mixed hyperlipidemia 06/26/2017    Type 2 diabetes mellitus without complication, without long-term current use of insulin ([facility] 06/23/2017    Essential hypertension 12/02/2016    Elevated transaminase level 11/30/2016    Spinal stenosis of cervical region 10/06/2016    Psoriasis 07/10/2015    Laryngospasm 07/10/2015      81 y.o. male is here for hospital f/u, establish local cardiology care. 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 Normal LVEF 65-70 by Echo and Stress MPI, cath. On dual antiplatelet rx (ASA/Plavix), statin, DM meds, Metoprolol Xl, Imdur. No CP/other CV sx post d/c. F/u planned with Dr. Kaylah Mcpherson. Plan:      Add back Losartan 50mg every day  Continue ASA (can reduce to 81), Plavix  Continue metoprolol XL 25  Stop Imdur  Sl NTG prn  Continue DM meds  Continue Crestor--likely will need higher dose (now on 10), but issues with statins in past (myalgias), try coQ 10  Dietary rx  F/u with PCP as planned  RTC here 3 mos, sooner prn    Hilary Farmer MD

## 2018-04-26 NOTE — MR AVS SNAPSHOT
[facility] [facility] Barrett 
 
 
 [address] [facility] [address] [address] [phone] Patient: Elizabeth Bartlett MRN: [id number] PRK:7/72/[id number] Visit Information Date & Time Provider Department Dept. Phone Encounter #  
 4/26/2018  9:00 AM Kelli Dodd, [address] [facility] Cardiology [facility] [phone] [id number] Your Appointments 8/2/2018 10:20 AM  
ESTABLISHED PATIENT with Kelli Dodd MD  
[address] [facility] [place] [facility] [place] [facility]) Appt Note: 3 mo fu $0cp [address] [facility] [address] [id number] [phone]  
  
   
 [address] [place] [address] Upcoming Health Maintenance Date Due  
 FOOT EXAM Q1 9/20/1957 DTaP/Tdap/Td series (1 - Tdap) 9/20/1968 FOBT Q 1 YEAR AGE 50-75 9/20/1997 Pneumococcal 65+ Low/Medium Risk (2 of 2 - PPSV23) 12/3/2017 EYE EXAM RETINAL OR DILATED Q1 6/22/2018 MICROALBUMIN Q1 6/23/2018 MEDICARE YEARLY EXAM 6/24/2018 HEMOGLOBIN A1C Q6M 9/23/2018 LIPID PANEL Q1 3/30/2019 GLAUCOMA SCREENING Q2Y 6/22/2019 Allergies as of 4/26/2018  Review Complete On: 4/26/2018 By: Kelli Dodd MD  
 No Known Allergies Current Immunizations  Never Reviewed Name Date Pneumococcal Conjugate (PCV-13) 12/3/2016 Not reviewed this visit You Were Diagnosed With   
  
 Codes Comments Coronary artery disease involving native coronary artery of native heart, angina presence unspecified    -  Primary ICD-10-CM: I25.10 ICD-9-CM: 414.01   
 S/P coronary artery stent placement     ICD-10-CM: Z95.5 ICD-9-CM: V45.82 Type 2 diabetes mellitus without complication, without long-term current use of insulin (HCC)     ICD-10-CM: E11.9 ICD-9-CM: 250.00 Essential hypertension     ICD-10-CM: I10 
ICD-9-CM: 401.9 Mixed hyperlipidemia     ICD-10-CM: E78.2 ICD-9-CM: 272.2 Vitals BP Pulse Resp Height(growth percentile) Weight(growth percentile) SpO2  
 (!) 164/94 (BP 1 Location: Left arm, BP Patient Position: Sitting) 76 18 6' 2\" (1.88 m) 219 lb (99.3 kg) 98% BMI Smoking Status 28.12 kg/m2 Former Smoker Vitals History BMI and BSA Data Body Mass Index Body Surface Area  
 28.12 kg/m 2 2.28 m 2 Preferred Pharmacy Pharmacy Name Phone [facility] - [facility] 79 [phone] Your Updated Medication List  
  
   
This list is accurate as of 4/26/18  9:59 AM.  Always use your most recent med list.  
  
  
  
  
 acyclovir 400 mg tablet Commonly known as:  ZOVIRAX TAKE 1 TABLET BY MOUTH ONCE DAILY  
  
 aspirin delayed-release 81 mg tablet Take  by mouth daily. clopidogrel 75 mg Tab Commonly known as:  PLAVIX Take 1 Tab by mouth daily. liraglutide 3 mg/0.5 mL (18 mg/3 mL) pen Commonly known as:  SAXENDA  
0.2 mL by SubCUTAneous route daily. losartan 50 mg tablet Commonly known as:  COZAAR Take 1 Tab by mouth daily. metFORMIN 500 mg tablet Commonly known as:  GLUCOPHAGE  
TAKE 1 TABLET BY MOUTH TWICE DAILY  
  
 metoprolol succinate 25 mg XL tablet Commonly known as:  TOPROL-XL Take 1 Tab by mouth daily. nitroglycerin 0.4 mg SL tablet Commonly known as:  NITROSTAT  
1 Tab by SubLINGual route every five (5) minutes as needed for Chest Pain. rosuvastatin 10 mg tablet Commonly known as:  CRESTOR Take 1 Tab by mouth nightly. Prescriptions Sent to Pharmacy Refills  
 losartan (COZAAR) 50 mg tablet 1 Sig: Take 1 Tab by mouth daily. Class: Normal  
 Pharmacy: [place] [address] Ph #: [phone] Route: Oral  
  
We Performed the Following AMB POC EKG ROUTINE W/ 12 LEADS, INTER & REP [51585 CPT(R)] Patient Instructions Stop Isosorbide Try Co-Q10 (200 mg daily) Switch aspirin to 81 mg daily Introducing [facility]! [employer] introduces [employer] patient portal. Now you can access parts of your medical record, email your doctor's office, and request medication refills online. 1. In your internet browser, go to [email or web address] 2. Click on the First Time User? Click Here link in the Sign In box. You will see the New Member Sign Up page. 3. Enter your [employer] Access Code exactly as it appears below. You will not need to use this code after youve completed the sign-up process. If you do not sign up before the expiration date, you must request a new code. · [employer] Access Code: [email or web address] Expires: 7/25/2018  9:59 AM 
 
4. Enter the last four digits of your Social Security Number (xxxx) and Date of Birth (mm/dd/yyyy) as indicated and click Submit. You will be taken to the next sign-up page. 5. Create a [employer] ID. This will be your [employer] login ID and cannot be changed, so think of one that is secure and easy to remember. 6. Create a [employer] password. You can change your password at any time. 7. Enter your Password Reset Question and Answer. This can be used at a later time if you forget your password. 8. Enter your e-mail address. You will receive e-mail notification when new information is available in [address] 9. Click Sign Up. You can now view and download portions of your medical record. 10. Click the Download Summary menu link to download a portable copy of your medical information. If you have questions, please visit the Frequently Asked Questions section of the [employer] website. Remember, [employer] is NOT to be used for urgent needs. For medical emergencies, dial 911. Now available from your iPhone and Android! Please provide this summary of care documentation to your next provider. Your primary care clinician is listed as Yetta Riedel.  If you have any questions after today's visit, please call [phone].

## 2018-07-16 ENCOUNTER — TELEPHONE (OUTPATIENT)
Dept: FAMILY MEDICINE CLINIC | Age: 73
End: 2018-07-16

## 2018-07-16 RX ORDER — METFORMIN HYDROCHLORIDE 500 MG/1
500 TABLET ORAL
Qty: 180 TAB | Refills: 4
Start: 2018-07-16 | End: 2018-09-13 | Stop reason: DRUGHIGH

## 2018-07-16 NOTE — TELEPHONE ENCOUNTER
----- Message from Sav Canseco sent at 7/16/2018 11:33 AM EDT -----  Regarding: Dr. Allyson Morrell  Please call the patient at [phone].

## 2018-07-16 NOTE — TELEPHONE ENCOUNTER
Seen in the ER yesterday, sudden onset nausea and vomiting, had restarted Victoza, thinks it's a side effect of that. In the ER  and Aic 9.0, concerned medication might need changed, should he increase metformin.

## 2018-08-02 ENCOUNTER — OFFICE VISIT (OUTPATIENT)
Dept: CARDIOLOGY CLINIC | Age: 73
End: 2018-08-02

## 2018-08-02 VITALS
HEIGHT: 74 IN | OXYGEN SATURATION: 96 % | HEART RATE: 94 BPM | DIASTOLIC BLOOD PRESSURE: 98 MMHG | RESPIRATION RATE: 18 BRPM | SYSTOLIC BLOOD PRESSURE: 142 MMHG | WEIGHT: 214 LBS | BODY MASS INDEX: 27.46 KG/M2

## 2018-08-02 DIAGNOSIS — I25.10 CORONARY ARTERY DISEASE INVOLVING NATIVE CORONARY ARTERY OF NATIVE HEART, ANGINA PRESENCE UNSPECIFIED: Primary | ICD-10-CM

## 2018-08-02 DIAGNOSIS — E78.2 MIXED HYPERLIPIDEMIA: ICD-10-CM

## 2018-08-02 DIAGNOSIS — E11.9 TYPE 2 DIABETES MELLITUS WITHOUT COMPLICATION, WITHOUT LONG-TERM CURRENT USE OF INSULIN (HCC): ICD-10-CM

## 2018-08-02 DIAGNOSIS — Z95.5 S/P CORONARY ARTERY STENT PLACEMENT: ICD-10-CM

## 2018-08-02 DIAGNOSIS — I10 ESSENTIAL HYPERTENSION: ICD-10-CM

## 2018-08-02 NOTE — MR AVS SNAPSHOT
[address] 
 
 
 [address] [facility] [address] [address] [phone] Patient: Haleigh Senior MRN: [id number] OVQ:2/40/0232 Visit Information Date & Time Provider Department Dept. Phone Encounter #  
 8/2/2018 10:20 AM Mireya Begum, [address] [facility] Cardiology [facility] [phone] Follow-up Instructions Return in about 6 months (around 2/2/2019). Follow-up and Disposition History Your Appointments 2/25/2019  1:00 PM  
ESTABLISHED PATIENT with Mireya Begum MD  
[address] [facility] [place] [facility]) Appt Note: 6 mth fu  $0 cp  
 [address] [place] [address] [phone]  
  
   
 [address] [place] [address] Upcoming Health Maintenance Date Due  
 FOOT EXAM Q1 9/20/1957 DTaP/Tdap/Td series (1 - Tdap) 9/20/1968 FOBT Q 1 YEAR AGE 50-75 9/20/1997 Pneumococcal 65+ Low/Medium Risk (2 of 2 - PPSV23) 12/3/2017 EYE EXAM RETINAL OR DILATED Q1 6/22/2018 MICROALBUMIN Q1 6/23/2018 MEDICARE YEARLY EXAM 6/24/2018 Influenza Age 5 to Adult 8/1/2018 HEMOGLOBIN A1C Q6M 1/15/2019 LIPID PANEL Q1 3/30/2019 GLAUCOMA SCREENING Q2Y 6/22/2019 Allergies as of 8/2/2018  Review Complete On: 8/2/2018 By: Mireya Begum MD  
 No Known Allergies Current Immunizations  Never Reviewed Name Date Pneumococcal Conjugate (PCV-13) 12/3/2016 Not reviewed this visit You Were Diagnosed With   
  
 Codes Comments Coronary artery disease involving native coronary artery of native heart, angina presence unspecified    -  Primary ICD-10-CM: I25.10 ICD-9-CM: 414.01   
 S/P coronary artery stent placement     ICD-10-CM: Z95.5 ICD-9-CM: V45.82 Type 2 diabetes mellitus without complication, without long-term current use of insulin (HCC)     ICD-10-CM: E11.9 ICD-9-CM: 250.00 Essential hypertension     ICD-10-CM: I10 
ICD-9-CM: 401.9 Mixed hyperlipidemia     ICD-10-CM: E78.2 ICD-9-CM: 272.2 Vitals BP Pulse Resp Height(growth percentile) Weight(growth percentile) SpO2  
 (!) 142/98 (BP 1 Location: Left arm, BP Patient Position: Sitting) 94 18 6' 2\" (1.88 m) 214 lb (97.1 kg) 96% BMI Smoking Status 27.48 kg/m2 Former Smoker Vitals History BMI and BSA Data Body Mass Index Body Surface Area  
 27.48 kg/m 2 2.25 m 2 Preferred Pharmacy Pharmacy Name Phone [facility]  Anastacia [facility] 79 [phone] Your Updated Medication List  
  
   
This list is accurate as of 8/2/18 10:54 AM.  Always use your most recent med list.  
  
  
  
  
 acyclovir 400 mg tablet Commonly known as:  ZOVIRAX TAKE 1 TABLET BY MOUTH ONCE DAILY  
  
 aspirin delayed-release 81 mg tablet Take  by mouth daily. clopidogrel 75 mg Tab Commonly known as:  PLAVIX Take 1 Tab by mouth daily. liraglutide 3 mg/0.5 mL (18 mg/3 mL) pen Commonly known as:  SAXENDA  
0.2 mL by SubCUTAneous route daily. losartan 50 mg tablet Commonly known as:  COZAAR Take 1 Tab by mouth daily. metFORMIN 500 mg tablet Commonly known as:  GLUCOPHAGE Take 1 Tab by mouth three (3) times daily (with meals). metoprolol succinate 25 mg XL tablet Commonly known as:  TOPROL-XL  
TAKE 1 TABLET BY MOUTH EVERY DAY  
  
 nitroglycerin 0.4 mg SL tablet Commonly known as:  NITROSTAT  
1 Tab by SubLINGual route every five (5) minutes as needed for Chest Pain. ondansetron 4 mg disintegrating tablet Commonly known as:  ZOFRAN ODT Take 1 Tab by mouth every six (6) hours as needed for Nausea. rosuvastatin 10 mg tablet Commonly known as:  CRESTOR Take 1 Tab by mouth nightly. Follow-up Instructions Return in about 6 months (around 2/2/2019). Introducing [facility]! [employer] introduces [employer] patient portal. Now you can access parts of your medical record, email your doctor's office, and request medication refills online. 1. In your internet browser, go to [email or web address] 2. Click on the First Time User? Click Here link in the Sign In box. You will see the New Member Sign Up page. 3. Enter your [employer] Access Code exactly as it appears below. You will not need to use this code after youve completed the sign-up process. If you do not sign up before the expiration date, you must request a new code. · [employer] Access Code: [email or web address] Expires: 10/31/2018 10:54 AM 
 
4. Enter the last four digits of your Social Security Number (xxxx) and Date of Birth (mm/dd/yyyy) as indicated and click Submit. You will be taken to the next sign-up page. 5. Create a [employer] ID. This will be your [employer] login ID and cannot be changed, so think of one that is secure and easy to remember. 6. Create a [employer] password. You can change your password at any time. 7. Enter your Password Reset Question and Answer. This can be used at a later time if you forget your password. 8. Enter your e-mail address. You will receive e-mail notification when new information is available in [address] 9. Click Sign Up. You can now view and download portions of your medical record. 10. Click the Download Summary menu link to download a portable copy of your medical information. If you have questions, please visit the Frequently Asked Questions section of the [employer] website. Remember, [employer] is NOT to be used for urgent needs. For medical emergencies, dial 911. Now available from your iPhone and Android! Please provide this summary of care documentation to your next provider. Your primary care clinician is listed as Usha Galvez. If you have any questions after today's visit, please call [phone].

## 2018-08-02 NOTE — PROGRESS NOTES
Verified patient with two patient identifiers. Medications reviewed/approved by Dr. Aurelio Pino. Verbal from Dr. Aurelio Pino to remove the medications that were deleted during the visit. Medication(s) removed: Chief Complaint Patient presents with  Coronary Artery Disease 3 month follow up  Hypertension  Cholesterol Problem 1. Have you been to the ER, urgent care clinic since your last visit? Hospitalized since your last visit? Yes, [facility] 7/2018 for nausea 2. Have you seen or consulted any other health care providers outside of the [facility] since your last visit? Include any pap smears or colon screening. no

## 2018-08-02 NOTE — PROGRESS NOTES
Rodolfo Singh is a 81 y.o. male is here for routine f/u. 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 On dual antiplatelet rx (ASA/Plavix), statin, DM meds, Metoprolol Xl, Imdur. No CP/other CV sx post d/c. F/u planned with Dr. Lesly Severs. Had N/diaph episode 7/15--seen in ER here with neg w/u. The patient denies chest pain/ shortness of breath, orthopnea, PND, LE edema, palpitations, syncope, presyncope or fatigue. Patient Active Problem List  
 Diagnosis Date Noted  Coronary artery disease involving native coronary artery of native heart with angina pectoris with documented spasm ([address] 04/26/2018  Angina, class III ([address] 04/12/2018  S/P coronary artery stent placement 04/12/2018  Abnormal nuclear cardiac imaging test 04/12/2018  Mixed hyperlipidemia 06/26/2017  Type 2 diabetes mellitus without complication, without long-term current use of insulin ([address] 06/23/2017  Essential hypertension 12/02/2016  Elevated transaminase level 11/30/2016  Spinal stenosis of cervical region 10/06/2016  Psoriasis 07/10/2015  Laryngospasm 07/10/2015 Hortencia Andrade MD 
Past Medical History:  
Diagnosis Date  Abnormal nuclear cardiac imaging test 4/12/2018  CAD (coronary artery disease)  Dyslipidemia  Elevated transaminase level 09/2016  GERD (gastroesophageal reflux disease)  HTN (hypertension)  Psoriasis  S/P coronary artery stent placement 4/12/2018  T2DM (type 2 diabetes mellitus) ([address] 09/2016 A1c 6.4;  Glucose 195 Past Surgical History:  
Procedure Laterality Date  HX HEENT    
 bilateral cataract extraction  HX HEENT    
 tonsillectomy  HX ORTHOPAEDIC    
 right shoulder acromioplasty  HX ORTHOPAEDIC    
 left achilles tendon surgery  HX PTCA  04/12/2018 BLAZE mid LAD (Synergy 2.75/38) + prox LCX (Synergy 2.5/32) No Known Allergies Family History Problem Relation Age of Onset  Heart Disease Father  Heart Disease Maternal Uncle Social History Social History  Marital status:  Spouse name: N/A  
 Number of children: N/A  
 Years of education: N/A Occupational History  Not on file. Social History Main Topics  Smoking status: Former Smoker Types: Cigarettes Quit date: 4/26/1998  Smokeless tobacco: Never Used  Alcohol use 0.6 oz/week 1 Glasses of wine, 0 Standard drinks or equivalent per week  Drug use: No  
 Sexual activity: Not on file Other Topics Concern  Not on file Social History Narrative Current Outpatient Prescriptions Medication Sig  
 metFORMIN (GLUCOPHAGE) 500 mg tablet Take 1 Tab by mouth three (3) times daily (with meals).  ondansetron (ZOFRAN ODT) 4 mg disintegrating tablet Take 1 Tab by mouth every six (6) hours as needed for Nausea.  metoprolol succinate (TOPROL-XL) 25 mg XL tablet TAKE 1 TABLET BY MOUTH EVERY DAY  losartan (COZAAR) 50 mg tablet Take 1 Tab by mouth daily.  aspirin delayed-release 81 mg tablet Take  by mouth daily.  clopidogrel (PLAVIX) 75 mg tab Take 1 Tab by mouth daily.  rosuvastatin (CRESTOR) 10 mg tablet Take 1 Tab by mouth nightly.  nitroglycerin (NITROSTAT) 0.4 mg SL tablet 1 Tab by SubLINGual route every five (5) minutes as needed for Chest Pain.  liraglutide (SAXENDA) 3 mg/0.5 mL (18 mg/3 mL) pen 0.2 mL by SubCUTAneous route daily.  acyclovir (ZOVIRAX) 400 mg tablet TAKE 1 TABLET BY MOUTH ONCE DAILY No current facility-administered medications for this visit. Review of Symptoms: 
 
CONST  No weight change. No fever, chills, sweats ENT No visual changes, URI sx, sore throat CV  See HPI  
RESP  No cough, or sputum, wheezing.  Also see HPI  
GI  No abdominal pain or change in bowel habits. No heartburn or dysphagia. No melena or rectal bleeding.   No dysuria, urgency, frequency, hematuria MSKEL  No joint pain, swelling. No muscle pain. SKIN  No rash or lesions. NEURO  No headache, syncope, or seizure. No weakness, loss of sensation, or paresthesias. PSYCH  No low mood or depression No anxiety. HE/LYMPH  No easy bruising, abnormal bleeding, or enlarged glands. Physical ExamPhysical Exam:   
There were no vitals taken for this visit. Gen: NAD HEENT:  PERRL, throat clear Neck: no adenopathy, no thyromegaly, no JVD Heart:  Regular,Nl S1S2,  no murmur, gallop or rub.  
Lungs:  clear Abdomen:   Soft, non-tender, bowel sounds are active.  
Extremities:  No edema Pulse: symmetric Neuro: A&O times 3, No focal neuro deficits Cardiographics Labs:  
Lab Results Component Value Date/Time  Sodium 136 07/15/2018 11:30 AM  
 Sodium 138 04/13/2018 04:22 AM  
 Sodium 135 (L) 04/11/2018 11:11 AM  
 Sodium 138 03/30/2018 11:24 AM  
 Sodium CANCELED 03/23/2018 03:58 PM  
 Potassium 5.1 07/15/2018 11:30 AM  
 Potassium 3.8 04/13/2018 04:22 AM  
 Potassium 4.8 04/11/2018 11:11 AM  
 Potassium 4.7 03/30/2018 11:24 AM  
 Potassium CANCELED 03/23/2018 03:58 PM  
 Chloride 99 07/15/2018 11:30 AM  
 Chloride 105 04/13/2018 04:22 AM  
 Chloride 98 04/11/2018 11:11 AM  
 Chloride 100 03/30/2018 11:24 AM  
 Chloride CANCELED 03/23/2018 03:58 PM  
 CO2 27 07/15/2018 11:30 AM  
 CO2 25 04/13/2018 04:22 AM  
 CO2 25 04/11/2018 11:11 AM  
 CO2 29 03/30/2018 11:24 AM  
 CO2 CANCELED 03/23/2018 03:58 PM  
 Anion gap 10 07/15/2018 11:30 AM  
 Anion gap 8 04/13/2018 04:22 AM  
 Anion gap 12 04/11/2018 11:11 AM  
 Anion gap 9 03/30/2018 11:24 AM  
 Glucose 301 (H) 07/15/2018 11:30 AM  
 Glucose 183 (H) 04/13/2018 04:22 AM  
 Glucose 215 (H) 04/11/2018 11:11 AM  
 Glucose 316 (H) 03/30/2018 11:24 AM  
 Glucose CANCELED 03/23/2018 03:58 PM  
 BUN 17 07/15/2018 11:30 AM  
 BUN 12 04/13/2018 04:22 AM  
 BUN 19 (H) 04/11/2018 11:11 AM  
 BUN 17 03/30/2018 11:24 AM  
 BUN CANCELED 03/23/2018 03:58 PM  
 Creatinine 1.26 07/15/2018 11:30 AM  
 Creatinine 0.98 04/13/2018 04:22 AM  
 Creatinine 1.22 04/11/2018 11:11 AM  
 Creatinine 1.31 (H) 03/30/2018 11:24 AM  
 Creatinine CANCELED 03/23/2018 03:58 PM  
 BUN/Creatinine ratio 13 07/15/2018 11:30 AM  
 BUN/Creatinine ratio 12 04/13/2018 04:22 AM  
 BUN/Creatinine ratio 16 04/11/2018 11:11 AM  
 BUN/Creatinine ratio 13 03/30/2018 11:24 AM  
 BUN/Creatinine ratio 27 (H) 06/23/2017 10:43 AM  
 GFR est AA >60 07/15/2018 11:30 AM  
 GFR est AA >60 04/13/2018 04:22 AM  
 GFR est AA >60 04/11/2018 11:11 AM  
 GFR est AA >60 03/30/2018 11:24 AM  
 GFR est AA 94 06/23/2017 10:43 AM  
 GFR est non-AA 57 (L) 07/15/2018 11:30 AM  
 GFR est non-AA >60 04/13/2018 04:22 AM  
 GFR est non-AA 59 (L) 04/11/2018 11:11 AM  
 GFR est non-AA 54 (L) 03/30/2018 11:24 AM  
 GFR est non-AA 81 06/23/2017 10:43 AM  
 Calcium 9.6 07/15/2018 11:30 AM  
 Calcium 8.9 04/13/2018 04:22 AM  
 Calcium 9.7 04/11/2018 11:11 AM  
 Calcium 9.1 03/30/2018 11:24 AM  
 Calcium CANCELED 03/23/2018 03:58 PM  
 Bilirubin, total 0.4 07/15/2018 11:30 AM  
 Bilirubin, total 0.5 04/11/2018 11:11 AM  
 Bilirubin, total 0.5 03/30/2018 11:24 AM  
 Bilirubin, total CANCELED 03/23/2018 03:58 PM  
 Bilirubin, total 0.2 06/23/2017 10:43 AM  
 AST (SGOT) 39 (H) 07/15/2018 11:30 AM  
 AST (SGOT) 77 (H) 04/11/2018 11:11 AM  
 AST (SGOT) 89 (H) 03/30/2018 11:24 AM  
 AST (SGOT) CANCELED 03/23/2018 03:58 PM  
 AST (SGOT) 36 06/23/2017 10:43 AM  
 Alk. phosphatase 138 (H) 07/15/2018 11:30 AM  
 Alk. phosphatase 138 (H) 04/11/2018 11:11 AM  
 Alk. phosphatase 117 03/30/2018 11:24 AM  
 Alk. phosphatase CANCELED 03/23/2018 03:58 PM  
 Alk.  phosphatase 91 06/23/2017 10:43 AM  
 Protein, total 7.9 07/15/2018 11:30 AM  
 Protein, total 7.8 04/11/2018 11:11 AM  
 Protein, total 7.1 03/30/2018 11:24 AM Protein, total CANCELED 03/23/2018 03:58 PM  
 Protein, total 7.1 06/23/2017 10:43 AM  
 Albumin 4.0 07/15/2018 11:30 AM  
 Albumin 3.9 04/11/2018 11:11 AM  
 Albumin 3.8 03/30/2018 11:24 AM  
 Albumin CANCELED 03/23/2018 03:58 PM  
 Albumin 4.5 06/23/2017 10:43 AM  
 Globulin 3.9 07/15/2018 11:30 AM  
 Globulin 3.9 04/11/2018 11:11 AM  
 Globulin 3.3 03/30/2018 11:24 AM  
 A-G Ratio 1.0 (L) 07/15/2018 11:30 AM  
 A-G Ratio 1.0 (L) 04/11/2018 11:11 AM  
 A-G Ratio 1.2 03/30/2018 11:24 AM  
 A-G Ratio 1.7 06/23/2017 10:43 AM  
 A-G Ratio 1.8 12/02/2016 02:59 PM  
 ALT (SGPT) 65 07/15/2018 11:30 AM  
 ALT (SGPT) 139 (H) 04/11/2018 11:11 AM  
 ALT (SGPT) 123 (H) 03/30/2018 11:24 AM  
 ALT (SGPT) CANCELED 03/23/2018 03:58 PM  
 ALT (SGPT) 56 (H) 06/23/2017 10:43 AM  
 
Lab Results Component Value Date/Time CK 91 07/15/2018 11:35 AM  
 
Lab Results Component Value Date/Time Cholesterol, total 299 (H) 03/30/2018 11:24 AM  
 Cholesterol, total CANCELED 03/23/2018 03:58 PM  
 Cholesterol, total 344 (H) 06/23/2017 10:43 AM  
 HDL Cholesterol 49 03/30/2018 11:24 AM  
 HDL Cholesterol CANCELED 03/23/2018 03:58 PM  
 HDL Cholesterol 54 06/23/2017 10:43 AM  
 LDL, calculated 199.4 (H) 03/30/2018 11:24 AM  
 LDL, calculated 216 (H) 06/23/2017 10:43 AM  
 Triglyceride 253 (H) 03/30/2018 11:24 AM  
 Triglyceride CANCELED 03/23/2018 03:58 PM  
 Triglyceride 369 (H) 06/23/2017 10:43 AM  
 CHOL/HDL Ratio 6.1 (H) 03/30/2018 11:24 AM  
 
No results found for this or any previous visit. Assessment:  
  
  
Patient Active Problem List  
 Diagnosis Date Noted  Coronary artery disease involving native coronary artery of native heart with angina pectoris with documented spasm ([address] 04/26/2018  Angina, class III ([address] 04/12/2018  S/P coronary artery stent placement 04/12/2018  Abnormal nuclear cardiac imaging test 04/12/2018  Mixed hyperlipidemia 06/26/2017  Type 2 diabetes mellitus without complication, without long-term current use of insulin ([facility] 06/23/2017  Essential hypertension 12/02/2016  Elevated transaminase level 11/30/2016  Spinal stenosis of cervical region 10/06/2016  Psoriasis 07/10/2015  Laryngospasm 07/10/2015 Ángel Noriega Retired Gyn, 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 On dual antiplatelet rx (ASA/Plavix), statin, DM meds, Metoprolol Xl, Imdur. No CP/other CV sx post d/c. F/u planned with Dr. Frederic Ramirez. Had N/diaph episode 7/15--seen in ER here with neg w/u. Had N/diaph episode 7/15--seen in ER here with neg w/u. Plan:  
 
Doing well with no adverse cardiac symptoms. BP running high--had restarted losartan but not taking regularly--will restart and monitor at home Continue ASA, Plavix, statin, DM meds, beta blocker Lipids and labs followed by PCP. Continue current care and f/u in 6 months.  
 
Alex Da Silva MD

## 2018-08-07 DIAGNOSIS — E78.2 MIXED HYPERLIPIDEMIA: ICD-10-CM

## 2018-08-07 DIAGNOSIS — I25.111 CORONARY ARTERY DISEASE INVOLVING NATIVE CORONARY ARTERY OF NATIVE HEART WITH ANGINA PECTORIS WITH DOCUMENTED SPASM (HCC): Primary | ICD-10-CM

## 2018-08-07 RX ORDER — ROSUVASTATIN CALCIUM 10 MG/1
10 TABLET, COATED ORAL
Qty: 30 TAB | Refills: 3 | Status: SHIPPED | OUTPATIENT
Start: 2018-08-07 | End: 2018-08-07 | Stop reason: SDUPTHER

## 2018-08-07 RX ORDER — ROSUVASTATIN CALCIUM 10 MG/1
10 TABLET, COATED ORAL
Qty: 30 TAB | Refills: 3 | Status: SHIPPED | OUTPATIENT
Start: 2018-08-07 | End: 2019-08-15

## 2018-08-22 RX ORDER — OMEPRAZOLE 20 MG/1
20 CAPSULE, DELAYED RELEASE ORAL DAILY
Qty: 90 CAPSULE | Refills: 3 | Status: SHIPPED | OUTPATIENT
Start: 2018-08-22

## 2018-09-05 ENCOUNTER — LAB REQUISITION (OUTPATIENT)
Dept: LAB | Facility: OTHER | Age: 73
End: 2018-09-05

## 2018-09-05 DIAGNOSIS — Z00.00 ANNUAL PHYSICAL EXAM: ICD-10-CM

## 2018-09-05 LAB — HBV SURFACE AB SER RIA-ACNC: REACTIVE

## 2018-09-05 PROCEDURE — 86706 HEP B SURFACE ANTIBODY: CPT | Performed by: PHYSICAL MEDICINE & REHABILITATION

## 2018-10-03 ENCOUNTER — TELEPHONE (OUTPATIENT)
Dept: CARDIOLOGY CLINIC | Age: 73
End: 2018-10-03

## 2018-10-03 NOTE — TELEPHONE ENCOUNTER
Returned pt's call,verified pt with two pt identifiers, pt advised he was seen by his PCP on 9/13/18 and he ordered stress test-which was completed on 10/1/18. He advised the PCP did contact him and sent Álvaro Anju a message regarding his stress test is abnormal and there are some changes. I advised the pt I would let  and contact him as to what to do next. Pt verbalized understanding. Message  Received: Today       MD Suzy Sher LPN       Caller: Unspecified (Today,  1:59 PM)                     Let's see tomorrow am at 9 if he can come in. [id number] pt,verified pt with two pt identifiers, told pt that Álvaro Rene would like to see him tomorrow at 9 am to discuss the stress test result. Pt advised that would be okay. He has been added to schedule for 10/4/18 at 9 am with Stephanie Young. Pt verbalized understanding.

## 2018-10-03 NOTE — TELEPHONE ENCOUNTER
Please call patient regarding stress test that was done on Monday at [facility]. [phone].     Thanks  Bryant MCLAUGHLIN ext [phone]

## 2018-10-04 ENCOUNTER — OFFICE VISIT (OUTPATIENT)
Dept: CARDIOLOGY CLINIC | Age: 73
End: 2018-10-04

## 2018-10-04 VITALS
SYSTOLIC BLOOD PRESSURE: 124 MMHG | BODY MASS INDEX: 27.59 KG/M2 | HEIGHT: 74 IN | DIASTOLIC BLOOD PRESSURE: 80 MMHG | HEART RATE: 86 BPM | WEIGHT: 215 LBS | OXYGEN SATURATION: 97 % | RESPIRATION RATE: 16 BRPM

## 2018-10-04 DIAGNOSIS — R94.39 ABNORMAL NUCLEAR STRESS TEST: ICD-10-CM

## 2018-10-04 DIAGNOSIS — I20.9 ANGINA, CLASS III (HCC): ICD-10-CM

## 2018-10-04 DIAGNOSIS — E11.9 TYPE 2 DIABETES MELLITUS WITHOUT COMPLICATION, WITHOUT LONG-TERM CURRENT USE OF INSULIN (HCC): ICD-10-CM

## 2018-10-04 DIAGNOSIS — I25.111 CORONARY ARTERY DISEASE INVOLVING NATIVE CORONARY ARTERY OF NATIVE HEART WITH ANGINA PECTORIS WITH DOCUMENTED SPASM (HCC): Primary | ICD-10-CM

## 2018-10-04 DIAGNOSIS — I10 ESSENTIAL HYPERTENSION: ICD-10-CM

## 2018-10-04 DIAGNOSIS — E78.2 MIXED HYPERLIPIDEMIA: ICD-10-CM

## 2018-10-04 DIAGNOSIS — Z95.5 S/P CORONARY ARTERY STENT PLACEMENT: ICD-10-CM

## 2018-10-04 DIAGNOSIS — I25.9 MYOCARDIAL ISCHEMIA: ICD-10-CM

## 2018-10-04 RX ORDER — ISOSORBIDE MONONITRATE 30 MG/1
30 TABLET, EXTENDED RELEASE ORAL DAILY
Qty: 30 TAB | Refills: 3 | Status: SHIPPED | OUTPATIENT
Start: 2018-10-04 | End: 2019-02-04

## 2018-10-04 NOTE — PROGRESS NOTES
Verified patient with two patient identifiers. Medications reviewed/approved by Dr. Mohsen Teixeira. Chief Complaint   Patient presents with    Abnormal Cardiac Test     Chest pain and follow up post abn nuclear stress test    Chest Pain     1. Have you been to the ER, urgent care clinic since your last visit? Hospitalized since your last visit? no    2. Have you seen or consulted any other health care providers outside of the [address] [place] [address] since your last visit? Include any pap smears or colon screening.  no

## 2018-10-04 NOTE — PROGRESS NOTES
María Contreras is a 72 y.o. male is here for symptom-based f/u. Hx CAD, uncontrolled DM, hypertension, dyslipidemia with SHANE/chest tightness--abnormal Stress MPI and had cardiac cath 4/12/18 at [facility]) with BLAZE x2 to mid LAD and prox LCx without complication.  On dual antiplatelet rx (ASA/Plavix), statin, DM meds, Metoprolol Xl, Imdur, losartan. Recurrent intermittent chest tightness/dyspnea--not clearly exertional--seen by Dr Justice Pleitez and had repeat Stress MPI 10/1/18 with Brad 4:16, , 1 mm inferior wall ST, no chest pain, MPI with inferoseptal wall ischemia (larger area than previous), LVEF 62%. The patient denies  orthopnea, PND, LE edema, palpitations, syncope, presyncope or fatigue.        Patient Active Problem List    Diagnosis Date Noted    Coronary artery disease involving native coronary artery of native heart with angina pectoris with documented spasm ([address] 04/26/2018    Angina, class III ([address] 04/12/2018    S/P coronary artery stent placement 04/12/2018    Abnormal nuclear cardiac imaging test 04/12/2018    Mixed hyperlipidemia 06/26/2017    Type 2 diabetes mellitus without complication, without long-term current use of insulin ([address] 06/23/2017    Essential hypertension 12/02/2016    Elevated transaminase level 11/30/2016    Spinal stenosis of cervical region 10/06/2016    Psoriasis 07/10/2015    Laryngospasm 07/10/2015      Rl Orozco MD  Past Medical History:   Diagnosis Date    Abnormal nuclear cardiac imaging test 4/12/2018    CAD (coronary artery disease)     Dyslipidemia     Elevated transaminase level 09/2016    GERD (gastroesophageal reflux disease)     HTN (hypertension)     Psoriasis     S/P coronary artery stent placement 4/12/2018    T2DM (type 2 diabetes mellitus) ([address] 09/2016    A1c 6.4;  Glucose 195      Past Surgical History:   Procedure Laterality Date    HX HEENT      bilateral cataract extraction    HX HEENT      tonsillectomy  HX ORTHOPAEDIC      right shoulder acromioplasty    HX ORTHOPAEDIC      left achilles tendon surgery    HX PTCA  04/12/2018    BLAZE mid LAD (Synergy 2.75/38) + prox LCX (Synergy 2.5/32)     No Known Allergies   Family History   Problem Relation Age of Onset    Heart Disease Father     Heart Disease Maternal Uncle       Social History     Social History    Marital status:      Spouse name: N/A    Number of children: N/A    Years of education: N/A     Occupational History    Not on file. Social History Main Topics    Smoking status: Former Smoker     Types: Cigarettes     Quit date: 4/26/1998    Smokeless tobacco: Never Used    Alcohol use 0.6 oz/week     1 Glasses of wine, 0 Standard drinks or equivalent per week    Drug use: No    Sexual activity: Not on file     Other Topics Concern    Not on file     Social History Narrative      Current Outpatient Prescriptions   Medication Sig    liraglutide (SAXENDA) 3 mg/0.5 mL (18 mg/3 mL) pen 0.2 mL by SubCUTAneous route daily.  metFORMIN (GLUCOPHAGE) 1,000 mg tablet Take 1 Tab by mouth two (2) times daily (with meals).  zolpidem (AMBIEN) 5 mg tablet Take 1 Tab by mouth nightly as needed for Sleep. Max Daily Amount: 5 mg.  rosuvastatin (CRESTOR) 10 mg tablet Take 1 Tab by mouth nightly.  metoprolol succinate (TOPROL-XL) 25 mg XL tablet TAKE 1 TABLET BY MOUTH EVERY DAY    losartan (COZAAR) 50 mg tablet Take 1 Tab by mouth daily. (Patient taking differently: Take 25 mg by mouth.)    aspirin delayed-release 81 mg tablet Take  by mouth daily.  clopidogrel (PLAVIX) 75 mg tab Take 1 Tab by mouth daily.  nitroglycerin (NITROSTAT) 0.4 mg SL tablet 1 Tab by SubLINGual route every five (5) minutes as needed for Chest Pain.  acyclovir (ZOVIRAX) 400 mg tablet TAKE 1 TABLET BY MOUTH ONCE DAILY     No current facility-administered medications for this visit. Review of Symptoms:    CONST  No weight change.  No fever, chills, sweats    ENT No visual changes, URI sx, sore throat    CV  See HPI   RESP  No cough, or sputum, wheezing. Also see HPI   GI  No abdominal pain or change in bowel habits. No heartburn or dysphagia. No melena or rectal bleeding.   No dysuria, urgency, frequency, hematuria   MSKEL  No joint pain, swelling. No muscle pain. SKIN  No rash or lesions. NEURO  No headache, syncope, or seizure. No weakness, loss of sensation, or paresthesias. PSYCH  No low mood or depression  No anxiety. HE/LYMPH  No easy bruising, abnormal bleeding, or enlarged glands. Physical ExamPhysical Exam:    There were no vitals taken for this visit.   Gen: NAD  HEENT:  PERRL, throat clear  Neck: no adenopathy, no thyromegaly, no JVD   Heart:  Regular,Nl S1S2,  no murmur, gallop or rub.   Lungs:  clear  Abdomen:   Soft, non-tender, bowel sounds are active.   Extremities:  No edema  Pulse: symmetric  Neuro: A&O times 3, No focal neuro deficits    Cardiographics    ECG: NSR, freq PAC's, NST    Labs:   Lab Results   Component Value Date/Time    Sodium 137 09/11/2018 08:53 AM    Sodium 136 07/15/2018 11:30 AM    Sodium 138 04/13/2018 04:22 AM    Sodium 135 (L) 04/11/2018 11:11 AM    Sodium 138 03/30/2018 11:24 AM    Potassium 5.2 (H) 09/11/2018 08:53 AM    Potassium 5.1 07/15/2018 11:30 AM    Potassium 3.8 04/13/2018 04:22 AM    Potassium 4.8 04/11/2018 11:11 AM    Potassium 4.7 03/30/2018 11:24 AM    Chloride 99 09/11/2018 08:53 AM    Chloride 99 07/15/2018 11:30 AM    Chloride 105 04/13/2018 04:22 AM    Chloride 98 04/11/2018 11:11 AM    Chloride 100 03/30/2018 11:24 AM    CO2 29 09/11/2018 08:53 AM    CO2 27 07/15/2018 11:30 AM    CO2 25 04/13/2018 04:22 AM    CO2 25 04/11/2018 11:11 AM    CO2 29 03/30/2018 11:24 AM    Anion gap 9 09/11/2018 08:53 AM    Anion gap 10 07/15/2018 11:30 AM    Anion gap 8 04/13/2018 04:22 AM    Anion gap 12 04/11/2018 11:11 AM    Anion gap 9 03/30/2018 11:24 AM    Glucose 475 (H) 09/11/2018 08:53 AM    Glucose 301 (H) 07/15/2018 11:30 AM    Glucose 183 (H) 04/13/2018 04:22 AM    Glucose 215 (H) 04/11/2018 11:11 AM    Glucose 316 (H) 03/30/2018 11:24 AM    BUN 24 (H) 09/11/2018 08:53 AM    BUN 17 07/15/2018 11:30 AM    BUN 12 04/13/2018 04:22 AM    BUN 19 (H) 04/11/2018 11:11 AM    BUN 17 03/30/2018 11:24 AM    Creatinine 1.43 (H) 09/11/2018 08:53 AM    Creatinine 1.26 07/15/2018 11:30 AM    Creatinine 0.98 04/13/2018 04:22 AM    Creatinine 1.22 04/11/2018 11:11 AM    Creatinine 1.31 (H) 03/30/2018 11:24 AM    BUN/Creatinine ratio 17 09/11/2018 08:53 AM    BUN/Creatinine ratio 13 07/15/2018 11:30 AM    BUN/Creatinine ratio 12 04/13/2018 04:22 AM    BUN/Creatinine ratio 16 04/11/2018 11:11 AM    BUN/Creatinine ratio 13 03/30/2018 11:24 AM    GFR est AA 59 (L) 09/11/2018 08:53 AM    GFR est AA >60 07/15/2018 11:30 AM    GFR est AA >60 04/13/2018 04:22 AM    GFR est AA >60 04/11/2018 11:11 AM    GFR est AA >60 03/30/2018 11:24 AM    GFR est non-AA 49 (L) 09/11/2018 08:53 AM    GFR est non-AA 57 (L) 07/15/2018 11:30 AM    GFR est non-AA >60 04/13/2018 04:22 AM    GFR est non-AA 59 (L) 04/11/2018 11:11 AM    GFR est non-AA 54 (L) 03/30/2018 11:24 AM    Calcium 9.9 09/11/2018 08:53 AM    Calcium 9.6 07/15/2018 11:30 AM    Calcium 8.9 04/13/2018 04:22 AM    Calcium 9.7 04/11/2018 11:11 AM    Calcium 9.1 03/30/2018 11:24 AM    Bilirubin, total 0.4 09/11/2018 08:53 AM    Bilirubin, total 0.4 07/15/2018 11:30 AM    Bilirubin, total 0.5 04/11/2018 11:11 AM    Bilirubin, total 0.5 03/30/2018 11:24 AM    Bilirubin, total CANCELED 03/23/2018 03:58 PM    AST (SGOT) 40 (H) 09/11/2018 08:53 AM    AST (SGOT) 39 (H) 07/15/2018 11:30 AM    AST (SGOT) 77 (H) 04/11/2018 11:11 AM    AST (SGOT) 89 (H) 03/30/2018 11:24 AM    AST (SGOT) CANCELED 03/23/2018 03:58 PM    Alk. phosphatase 147 (H) 09/11/2018 08:53 AM    Alk. phosphatase 138 (H) 07/15/2018 11:30 AM    Alk. phosphatase 138 (H) 04/11/2018 11:11 AM    Alk.  phosphatase 117 03/30/2018 11:24 AM    Alk.  phosphatase CANCELED 03/23/2018 03:58 PM    Protein, total 7.9 09/11/2018 08:53 AM    Protein, total 7.9 07/15/2018 11:30 AM    Protein, total 7.8 04/11/2018 11:11 AM    Protein, total 7.1 03/30/2018 11:24 AM    Protein, total CANCELED 03/23/2018 03:58 PM    Albumin 3.9 09/11/2018 08:53 AM    Albumin 4.0 07/15/2018 11:30 AM    Albumin 3.9 04/11/2018 11:11 AM    Albumin 3.8 03/30/2018 11:24 AM    Albumin CANCELED 03/23/2018 03:58 PM    Globulin 4.0 09/11/2018 08:53 AM    Globulin 3.9 07/15/2018 11:30 AM    Globulin 3.9 04/11/2018 11:11 AM    Globulin 3.3 03/30/2018 11:24 AM    A-G Ratio 1.0 (L) 09/11/2018 08:53 AM    A-G Ratio 1.0 (L) 07/15/2018 11:30 AM    A-G Ratio 1.0 (L) 04/11/2018 11:11 AM    A-G Ratio 1.2 03/30/2018 11:24 AM    A-G Ratio 1.7 06/23/2017 10:43 AM    ALT (SGPT) 55 09/11/2018 08:53 AM    ALT (SGPT) 65 07/15/2018 11:30 AM    ALT (SGPT) 139 (H) 04/11/2018 11:11 AM    ALT (SGPT) 123 (H) 03/30/2018 11:24 AM    ALT (SGPT) CANCELED 03/23/2018 03:58 PM     Lab Results   Component Value Date/Time    CK 72 09/11/2018 08:53 AM     Lab Results   Component Value Date/Time    Cholesterol, total 232 (H) 09/11/2018 08:53 AM    Cholesterol, total 299 (H) 03/30/2018 11:24 AM    Cholesterol, total CANCELED 03/23/2018 03:58 PM    Cholesterol, total 344 (H) 06/23/2017 10:43 AM    HDL Cholesterol 53 09/11/2018 08:53 AM    HDL Cholesterol 49 03/30/2018 11:24 AM    HDL Cholesterol CANCELED 03/23/2018 03:58 PM    HDL Cholesterol 54 06/23/2017 10:43 AM    LDL, calculated 121.6 (H) 09/11/2018 08:53 AM    LDL, calculated 199.4 (H) 03/30/2018 11:24 AM    LDL, calculated 216 (H) 06/23/2017 10:43 AM    Triglyceride 287 (H) 09/11/2018 08:53 AM    Triglyceride 253 (H) 03/30/2018 11:24 AM    Triglyceride CANCELED 03/23/2018 03:58 PM    Triglyceride 369 (H) 06/23/2017 10:43 AM    CHOL/HDL Ratio 4.4 09/11/2018 08:53 AM    CHOL/HDL Ratio 6.1 (H) 03/30/2018 11:24 AM     No results found for this or any previous visit. Assessment:         Patient Active Problem List    Diagnosis Date Noted    Coronary artery disease involving native coronary artery of native heart with angina pectoris with documented spasm ([facility] 04/26/2018    Angina, class III ([facility] [address] 04/12/2018    S/P coronary artery stent placement 04/12/2018    Abnormal nuclear cardiac imaging test 04/12/2018    Mixed hyperlipidemia 06/26/2017    Type 2 diabetes mellitus without complication, without long-term current use of insulin ([facility] [address] 06/23/2017    Essential hypertension 12/02/2016    Elevated transaminase level 11/30/2016    Spinal stenosis of cervical region 10/06/2016    Psoriasis 07/10/2015    Laryngospasm 07/10/2015      Hx CAD, uncontrolled DM, hypertension, dyslipidemia with SHANE/chest tightness--abnormal Stress MPI and had cardiac cath 4/12/18 at [id number] [place] [address]) with BLAZE x2 to mid LAD and prox LCx without complication.  On dual antiplatelet rx (ASA/Plavix), statin, DM meds, Metoprolol Xl, Imdur, losartan. Recurrent intermittent chest tightness/dyspnea--not clearly exertional--seen by Dr Suzy Church and had repeat Stress MPI 10/1/18 with Brad 4:16, , 1 mm inferior wall ST, no chest pain, MPI with inferoseptal wall ischemia (larger area than previous), LVEF 62%.      Plan:     Recommend repeat cardiac cath/possible PCI--discussed at length  Continue current meds--metoprolol, losartan, statin, ASA/Plavix, sl NTG prn, DM meds  Recheck labs pre-cath  ADD Imdur 30mg qd    Tiffanie John MD

## 2018-10-04 NOTE — MR AVS SNAPSHOT
Josh Ferguson 
 
 
 [address] [facility] [address] [address] [phone] Patient: Duran Capone MRN: [id number] OFR:[id number]/03/[id number] Visit Information Date & Time Provider Department Dept. Phone Encounter #  
 10/4/2018  9:00 AM [facility] Cardiology [facility] [phone] Your Appointments 2/25/2019  1:00 PM  
ESTABLISHED PATIENT with Kassandra Pittman MD  
[address] [facility] [place] [facility] [address] [place] [address]) Appt Note: 6 mth fu  $0 cp  
 [address] [facility] [address] [address] [phone]  
  
   
 [address] [place] [address] [address] [address] Upcoming Health Maintenance Date Due DTaP/Tdap/Td series (1 - Tdap) 9/20/1968 Shingrix Vaccine Age 50> (1 of 2) 9/20/1997 Pneumococcal 65+ Low/Medium Risk (2 of 2 - PPSV23) 12/3/2017 EYE EXAM RETINAL OR DILATED Q1 6/22/2018 MICROALBUMIN Q1 6/23/2018 MEDICARE YEARLY EXAM 6/24/2018 Influenza Age 5 to Adult 11/1/2018* HEMOGLOBIN A1C Q6M 1/15/2019 GLAUCOMA SCREENING Q2Y 6/22/2019 FOOT EXAM Q1 9/10/2019 LIPID PANEL Q1 9/11/2019 FOBT Q 1 YEAR AGE 50-75 9/11/2019 *Topic was postponed. The date shown is not the original due date. Allergies as of 10/4/2018  Review Complete On: 10/4/2018 By: Kassandra Pittman MD  
 No Known Allergies Current Immunizations  Never Reviewed Name Date Pneumococcal Conjugate (PCV-13) 12/3/2016 Not reviewed this visit You Were Diagnosed With   
  
 Codes Comments Coronary artery disease involving native coronary artery of native heart with angina pectoris with documented spasm ([facility]    -  Primary ICD-10-CM: I25.111 ICD-9-CM: 414.01, 413.9 Angina, class III ([facility]     ICD-10-CM: I20.9 ICD-9-CM: 413.9 Myocardial ischemia     ICD-10-CM: I25.9 ICD-9-CM: 414.8 S/P coronary artery stent placement     ICD-10-CM: Z95.5 ICD-9-CM: V45.82   
 Type 2 diabetes mellitus without complication, without long-term current use of insulin (HCC)     ICD-10-CM: E11.9 ICD-9-CM: 250.00 Essential hypertension     ICD-10-CM: I10 
ICD-9-CM: 401.9 Mixed hyperlipidemia     ICD-10-CM: E78.2 ICD-9-CM: 272.2 Abnormal nuclear stress test     ICD-10-CM: R94.39 
ICD-9-CM: 794.39 Vitals BP Pulse Resp Height(growth percentile) Weight(growth percentile) SpO2  
 124/80 (BP 1 Location: Right arm, BP Patient Position: Sitting) 86 16 6' 2\" (1.88 m) 215 lb (97.5 kg) 97% BMI Smoking Status 27.6 kg/m2 Former Smoker Vitals History BMI and BSA Data Body Mass Index Body Surface Area  
 27.6 kg/m 2 2.26 m 2 Preferred Pharmacy Pharmacy Name Phone [place] [address] [phone] Your Updated Medication List  
  
   
This list is accurate as of 10/4/18 10:04 AM.  Always use your most recent med list.  
  
  
  
  
 acyclovir 400 mg tablet Commonly known as:  ZOVIRAX TAKE 1 TABLET BY MOUTH ONCE DAILY  
  
 aspirin delayed-release 81 mg tablet Take  by mouth daily. clopidogrel 75 mg Tab Commonly known as:  PLAVIX Take 1 Tab by mouth daily. isosorbide mononitrate ER 30 mg tablet Commonly known as:  IMDUR Take 1 Tab by mouth daily. liraglutide 3 mg/0.5 mL (18 mg/3 mL) pen Commonly known as:  SAXENDA  
0.2 mL by SubCUTAneous route daily. losartan 50 mg tablet Commonly known as:  COZAAR Take 1 Tab by mouth daily. metFORMIN 1,000 mg tablet Commonly known as:  GLUCOPHAGE Take 1 Tab by mouth two (2) times daily (with meals). metoprolol succinate 25 mg XL tablet Commonly known as:  TOPROL-XL  
TAKE 1 TABLET BY MOUTH EVERY DAY  
  
 nitroglycerin 0.4 mg SL tablet Commonly known as:  NITROSTAT  
1 Tab by SubLINGual route every five (5) minutes as needed for Chest Pain. rosuvastatin 10 mg tablet Commonly known as:  CRESTOR Take 1 Tab by mouth nightly. zolpidem 5 mg tablet Commonly known as:  AMBIEN Take 1 Tab by mouth nightly as needed for Sleep. Max Daily Amount: 5 mg. Prescriptions Sent to Pharmacy Refills  
 isosorbide mononitrate ER (IMDUR) 30 mg tablet 3 Sig: Take 1 Tab by mouth daily. Class: Normal  
 Pharmacy: [address] #: [phone] Route: Oral  
  
We Performed the Following AMB POC EKG ROUTINE W/ 12 LEADS, INTER & REP [24695 CPT(R)] To-Do List   
 Around 10/04/2018 Lab:  CBC WITH AUTOMATED DIFF Around 10/04/2018 Lab:  METABOLIC PANEL, COMPREHENSIVE   
  
 10/04/2018 Lab:  PROTHROMBIN TIME + INR Patient Instructions You will be contacted by Christa Norris regarding the cardiac cath date/time. Introducing [facility] & HEALTH SERVICES! [employer] introduces Squirro patient portal. Now you can access parts of your medical record, email your doctor's office, and request medication refills online. 1. In your internet browser, go to [email or web address] 2. Click on the First Time User? Click Here link in the Sign In box. You will see the New Member Sign Up page. 3. Enter your Squirro Access Code exactly as it appears below. You will not need to use this code after youve completed the sign-up process. If you do not sign up before the expiration date, you must request a new code. · Squirro Access Code: [email or web address] Expires: 10/31/2018 10:54 AM 
 
4. Enter the last four digits of your Social Security Number (xxxx) and Date of Birth (mm/dd/yyyy) as indicated and click Submit. You will be taken to the next sign-up page. 5. Create a Squirro ID. This will be your Squirro login ID and cannot be changed, so think of one that is secure and easy to remember. 6. Create a Spot formerly PlacePopt password. You can change your password at any time. 7. Enter your Password Reset Question and Answer. This can be used at a later time if you forget your password. 8. Enter your e-mail address. You will receive e-mail notification when new information is available in [address] 9. Click Sign Up. You can now view and download portions of your medical record. 10. Click the Download Summary menu link to download a portable copy of your medical information. If you have questions, please visit the Frequently Asked Questions section of the Hachiko website. Remember, Hachiko is NOT to be used for urgent needs. For medical emergencies, dial 911. Now available from your iPhone and Android! Please provide this summary of care documentation to your next provider. Your primary care clinician is listed as Renay Guerra. If you have any questions after today's visit, please call [phone].

## 2018-10-11 ENCOUNTER — HOSPITAL ENCOUNTER (OUTPATIENT)
Dept: CARDIAC CATH/INVASIVE PROCEDURES | Age: 73
Discharge: HOME | End: 2018-10-12
Attending: INTERNAL MEDICINE | Admitting: INTERNAL MEDICINE
Payer: MEDICARE

## 2018-10-11 LAB
GLUCOSE BLD STRIP.AUTO-MCNC: 176 MG/DL (ref 65–100)
GLUCOSE BLD STRIP.AUTO-MCNC: 254 MG/DL (ref 65–100)
SERVICE CMNT-IMP: ABNORMAL
SERVICE CMNT-IMP: ABNORMAL

## 2018-10-11 PROCEDURE — 74011000250 HC RX REV CODE- 250

## 2018-10-11 PROCEDURE — C1887 CATHETER, GUIDING: HCPCS

## 2018-10-11 PROCEDURE — 77030004549 HC CATH ANGI DX PRF MRTM -A

## 2018-10-11 PROCEDURE — 77030015766

## 2018-10-11 PROCEDURE — C1769 GUIDE WIRE: HCPCS

## 2018-10-11 PROCEDURE — 77030019697 HC SYR ANGI INFL MRTM -B

## 2018-10-11 PROCEDURE — 74011636637 HC RX REV CODE- 636/637: Performed by: NURSE PRACTITIONER

## 2018-10-11 PROCEDURE — 77030008543 HC TBNG MON PRSS MRTM -A

## 2018-10-11 PROCEDURE — 77030012468 HC VLV BLEEDBK CNTRL ABBT -B

## 2018-10-11 PROCEDURE — 74011250636 HC RX REV CODE- 250/636: Performed by: INTERNAL MEDICINE

## 2018-10-11 PROCEDURE — 77030019698 HC SYR ANGI MDLON MRTM -A

## 2018-10-11 PROCEDURE — 82962 GLUCOSE BLOOD TEST: CPT

## 2018-10-11 PROCEDURE — C1725 CATH, TRANSLUMIN NON-LASER: HCPCS

## 2018-10-11 PROCEDURE — 74011250637 HC RX REV CODE- 250/637: Performed by: NURSE PRACTITIONER

## 2018-10-11 PROCEDURE — 93005 ELECTROCARDIOGRAM TRACING: CPT

## 2018-10-11 PROCEDURE — 77030019569 HC BND COMPR RAD TERU -B

## 2018-10-11 PROCEDURE — 93454 CORONARY ARTERY ANGIO S&I: CPT

## 2018-10-11 PROCEDURE — 77030010221 HC SPLNT WR POS TELE -B

## 2018-10-11 PROCEDURE — 74011250636 HC RX REV CODE- 250/636

## 2018-10-11 PROCEDURE — C1894 INTRO/SHEATH, NON-LASER: HCPCS

## 2018-10-11 PROCEDURE — 77030028837 HC SYR ANGI PWR INJ COEU -A

## 2018-10-11 PROCEDURE — 74011636320 HC RX REV CODE- 636/320

## 2018-10-11 PROCEDURE — 74011000258 HC RX REV CODE- 258: Performed by: INTERNAL MEDICINE

## 2018-10-11 RX ORDER — CLONAZEPAM 0.5 MG/1
0.5 TABLET ORAL
Status: DISCONTINUED | OUTPATIENT
Start: 2018-10-11 | End: 2018-10-11 | Stop reason: SDUPTHER

## 2018-10-11 RX ORDER — MIDAZOLAM HYDROCHLORIDE 1 MG/ML
INJECTION, SOLUTION INTRAMUSCULAR; INTRAVENOUS
Status: COMPLETED
Start: 2018-10-11 | End: 2018-10-11

## 2018-10-11 RX ORDER — DEXTROSE 50 % IN WATER (D50W) INTRAVENOUS SYRINGE
12.5-25 AS NEEDED
Status: DISCONTINUED | OUTPATIENT
Start: 2018-10-11 | End: 2018-10-12 | Stop reason: HOSPADM

## 2018-10-11 RX ORDER — PANTOPRAZOLE SODIUM 40 MG/1
40 TABLET, DELAYED RELEASE ORAL DAILY
Status: DISCONTINUED | OUTPATIENT
Start: 2018-10-12 | End: 2018-10-12 | Stop reason: HOSPADM

## 2018-10-11 RX ORDER — SODIUM CHLORIDE 900 MG/100ML
INJECTION INTRAVENOUS
Status: DISCONTINUED
Start: 2018-10-11 | End: 2018-10-12 | Stop reason: HOSPADM

## 2018-10-11 RX ORDER — HEPARIN SODIUM 1000 [USP'U]/ML
2500 INJECTION, SOLUTION INTRAVENOUS; SUBCUTANEOUS ONCE
Status: COMPLETED | OUTPATIENT
Start: 2018-10-11 | End: 2018-10-11

## 2018-10-11 RX ORDER — MAGNESIUM SULFATE 100 %
4 CRYSTALS MISCELLANEOUS AS NEEDED
Status: DISCONTINUED | OUTPATIENT
Start: 2018-10-11 | End: 2018-10-12 | Stop reason: HOSPADM

## 2018-10-11 RX ORDER — CLOPIDOGREL BISULFATE 75 MG/1
75 TABLET ORAL DAILY
Status: DISCONTINUED | OUTPATIENT
Start: 2018-10-12 | End: 2018-10-12 | Stop reason: HOSPADM

## 2018-10-11 RX ORDER — METOPROLOL SUCCINATE 25 MG/1
25 TABLET, EXTENDED RELEASE ORAL DAILY
Status: DISCONTINUED | OUTPATIENT
Start: 2018-10-12 | End: 2018-10-12 | Stop reason: HOSPADM

## 2018-10-11 RX ORDER — HEPARIN SODIUM 1000 [USP'U]/ML
INJECTION, SOLUTION INTRAVENOUS; SUBCUTANEOUS
Status: COMPLETED
Start: 2018-10-11 | End: 2018-10-11

## 2018-10-11 RX ORDER — HEPARIN SODIUM 200 [USP'U]/100ML
500 INJECTION, SOLUTION INTRAVENOUS ONCE
Status: COMPLETED | OUTPATIENT
Start: 2018-10-11 | End: 2018-10-11

## 2018-10-11 RX ORDER — OMEPRAZOLE 20 MG/1
20 CAPSULE, DELAYED RELEASE ORAL DAILY
COMMUNITY
End: 2019-05-30 | Stop reason: ALTCHOICE

## 2018-10-11 RX ORDER — INSULIN LISPRO 100 [IU]/ML
INJECTION, SOLUTION INTRAVENOUS; SUBCUTANEOUS
Status: DISCONTINUED | OUTPATIENT
Start: 2018-10-11 | End: 2018-10-12 | Stop reason: HOSPADM

## 2018-10-11 RX ORDER — ACYCLOVIR 800 MG/1
400 TABLET ORAL DAILY
Status: DISCONTINUED | OUTPATIENT
Start: 2018-10-12 | End: 2018-10-12 | Stop reason: HOSPADM

## 2018-10-11 RX ORDER — LIDOCAINE HYDROCHLORIDE 10 MG/ML
INJECTION, SOLUTION EPIDURAL; INFILTRATION; INTRACAUDAL; PERINEURAL
Status: COMPLETED
Start: 2018-10-11 | End: 2018-10-11

## 2018-10-11 RX ORDER — LIDOCAINE HYDROCHLORIDE 10 MG/ML
1-30 INJECTION, SOLUTION EPIDURAL; INFILTRATION; INTRACAUDAL; PERINEURAL
Status: DISCONTINUED | OUTPATIENT
Start: 2018-10-11 | End: 2018-10-11

## 2018-10-11 RX ORDER — SODIUM CHLORIDE 9 MG/ML
300 INJECTION, SOLUTION INTRAVENOUS CONTINUOUS
Status: DISPENSED | OUTPATIENT
Start: 2018-10-11 | End: 2018-10-11

## 2018-10-11 RX ORDER — NALOXONE HYDROCHLORIDE 0.4 MG/ML
0.4 INJECTION, SOLUTION INTRAMUSCULAR; INTRAVENOUS; SUBCUTANEOUS AS NEEDED
Status: DISCONTINUED | OUTPATIENT
Start: 2018-10-11 | End: 2018-10-12 | Stop reason: HOSPADM

## 2018-10-11 RX ORDER — ATORVASTATIN CALCIUM 10 MG/1
20 TABLET, FILM COATED ORAL
Status: DISCONTINUED | OUTPATIENT
Start: 2018-10-11 | End: 2018-10-12 | Stop reason: HOSPADM

## 2018-10-11 RX ORDER — BIVALIRUDIN 250 MG/5ML
INJECTION, POWDER, LYOPHILIZED, FOR SOLUTION INTRAVENOUS
Status: DISCONTINUED
Start: 2018-10-11 | End: 2018-10-12 | Stop reason: HOSPADM

## 2018-10-11 RX ORDER — CLONAZEPAM 0.5 MG/1
0.5 TABLET, ORALLY DISINTEGRATING ORAL
Status: DISCONTINUED | OUTPATIENT
Start: 2018-10-11 | End: 2018-10-12 | Stop reason: HOSPADM

## 2018-10-11 RX ORDER — SODIUM CHLORIDE 9 MG/ML
75 INJECTION, SOLUTION INTRAVENOUS CONTINUOUS
Status: DISCONTINUED | OUTPATIENT
Start: 2018-10-11 | End: 2018-10-12 | Stop reason: HOSPADM

## 2018-10-11 RX ORDER — VERAPAMIL HYDROCHLORIDE 2.5 MG/ML
2.5 INJECTION, SOLUTION INTRAVENOUS ONCE
Status: COMPLETED | OUTPATIENT
Start: 2018-10-11 | End: 2018-10-11

## 2018-10-11 RX ORDER — SODIUM CHLORIDE 9 MG/ML
INJECTION, SOLUTION INTRAVENOUS
Status: DISCONTINUED
Start: 2018-10-11 | End: 2018-10-12 | Stop reason: HOSPADM

## 2018-10-11 RX ORDER — LOSARTAN POTASSIUM 50 MG/1
50 TABLET ORAL
Status: DISCONTINUED | OUTPATIENT
Start: 2018-10-11 | End: 2018-10-12 | Stop reason: HOSPADM

## 2018-10-11 RX ORDER — LOSARTAN POTASSIUM 50 MG/1
50 TABLET ORAL DAILY
Status: DISCONTINUED | OUTPATIENT
Start: 2018-10-12 | End: 2018-10-11 | Stop reason: SDUPTHER

## 2018-10-11 RX ORDER — FENTANYL CITRATE 50 UG/ML
INJECTION, SOLUTION INTRAMUSCULAR; INTRAVENOUS
Status: COMPLETED
Start: 2018-10-11 | End: 2018-10-11

## 2018-10-11 RX ORDER — SODIUM CHLORIDE 0.9 % (FLUSH) 0.9 %
5-10 SYRINGE (ML) INJECTION AS NEEDED
Status: DISCONTINUED | OUTPATIENT
Start: 2018-10-11 | End: 2018-10-12 | Stop reason: HOSPADM

## 2018-10-11 RX ORDER — SODIUM CHLORIDE 0.9 % (FLUSH) 0.9 %
5-10 SYRINGE (ML) INJECTION EVERY 8 HOURS
Status: DISCONTINUED | OUTPATIENT
Start: 2018-10-11 | End: 2018-10-12 | Stop reason: HOSPADM

## 2018-10-11 RX ORDER — ASPIRIN 81 MG/1
81 TABLET ORAL DAILY
Status: DISCONTINUED | OUTPATIENT
Start: 2018-10-12 | End: 2018-10-12 | Stop reason: HOSPADM

## 2018-10-11 RX ORDER — ISOSORBIDE MONONITRATE 30 MG/1
30 TABLET, EXTENDED RELEASE ORAL DAILY
Status: DISCONTINUED | OUTPATIENT
Start: 2018-10-12 | End: 2018-10-12 | Stop reason: HOSPADM

## 2018-10-11 RX ORDER — ACETAMINOPHEN 325 MG/1
650 TABLET ORAL
Status: DISCONTINUED | OUTPATIENT
Start: 2018-10-11 | End: 2018-10-12 | Stop reason: HOSPADM

## 2018-10-11 RX ORDER — ADENOSINE 3 MG/ML
INJECTION, SOLUTION INTRAVENOUS
Status: DISCONTINUED
Start: 2018-10-11 | End: 2018-10-12 | Stop reason: HOSPADM

## 2018-10-11 RX ORDER — FENTANYL CITRATE 50 UG/ML
25-50 INJECTION, SOLUTION INTRAMUSCULAR; INTRAVENOUS
Status: DISCONTINUED | OUTPATIENT
Start: 2018-10-11 | End: 2018-10-11

## 2018-10-11 RX ORDER — MIDAZOLAM HYDROCHLORIDE 1 MG/ML
.5-2 INJECTION, SOLUTION INTRAMUSCULAR; INTRAVENOUS
Status: DISCONTINUED | OUTPATIENT
Start: 2018-10-11 | End: 2018-10-11

## 2018-10-11 RX ORDER — HEPARIN SODIUM 200 [USP'U]/100ML
INJECTION, SOLUTION INTRAVENOUS
Status: COMPLETED
Start: 2018-10-11 | End: 2018-10-11

## 2018-10-11 RX ORDER — VERAPAMIL HYDROCHLORIDE 2.5 MG/ML
INJECTION, SOLUTION INTRAVENOUS
Status: COMPLETED
Start: 2018-10-11 | End: 2018-10-11

## 2018-10-11 RX ADMIN — IOPAMIDOL 125 ML: 755 INJECTION, SOLUTION INTRAVENOUS at 12:49

## 2018-10-11 RX ADMIN — ADENOSINE 140 MCG/KG/MIN: 3 INJECTION, SOLUTION INTRAVENOUS at 12:31

## 2018-10-11 RX ADMIN — INSULIN LISPRO 5 UNITS: 100 INJECTION, SOLUTION INTRAVENOUS; SUBCUTANEOUS at 16:51

## 2018-10-11 RX ADMIN — HEPARIN SODIUM IN SODIUM CHLORIDE 1000 UNITS: 200 INJECTION INTRAVENOUS at 12:04

## 2018-10-11 RX ADMIN — FENTANYL CITRATE 25 MCG: 50 INJECTION, SOLUTION INTRAMUSCULAR; INTRAVENOUS at 11:48

## 2018-10-11 RX ADMIN — Medication 10 ML: at 22:35

## 2018-10-11 RX ADMIN — SODIUM CHLORIDE 300 ML/HR: 900 INJECTION, SOLUTION INTRAVENOUS at 09:48

## 2018-10-11 RX ADMIN — BIVALIRUDIN 1.75 MG/KG/HR: 250 INJECTION, POWDER, LYOPHILIZED, FOR SOLUTION INTRAVENOUS at 12:55

## 2018-10-11 RX ADMIN — MIDAZOLAM 2 MG: 1 INJECTION INTRAMUSCULAR; INTRAVENOUS at 11:48

## 2018-10-11 RX ADMIN — HEPARIN SODIUM IN SODIUM CHLORIDE 1000 UNITS: 200 INJECTION INTRAVENOUS at 12:05

## 2018-10-11 RX ADMIN — Medication 10 ML: at 14:36

## 2018-10-11 RX ADMIN — VERAPAMIL HYDROCHLORIDE 2.5 MG: 2.5 INJECTION, SOLUTION INTRAVENOUS at 12:15

## 2018-10-11 RX ADMIN — IOPAMIDOL 10 ML: 755 INJECTION, SOLUTION INTRAVENOUS at 13:00

## 2018-10-11 RX ADMIN — MIDAZOLAM HYDROCHLORIDE 1 MG: 1 INJECTION, SOLUTION INTRAMUSCULAR; INTRAVENOUS at 12:27

## 2018-10-11 RX ADMIN — HEPARIN SODIUM 2500 UNITS: 1000 INJECTION, SOLUTION INTRAVENOUS; SUBCUTANEOUS at 12:14

## 2018-10-11 RX ADMIN — BIVALIRUDIN 1.75 MG/KG/HR: 250 INJECTION, POWDER, LYOPHILIZED, FOR SOLUTION INTRAVENOUS at 12:25

## 2018-10-11 RX ADMIN — FENTANYL CITRATE 25 MCG: 50 INJECTION, SOLUTION INTRAMUSCULAR; INTRAVENOUS at 12:24

## 2018-10-11 RX ADMIN — MIDAZOLAM HYDROCHLORIDE 1 MG: 1 INJECTION, SOLUTION INTRAMUSCULAR; INTRAVENOUS at 12:08

## 2018-10-11 RX ADMIN — HEPARIN SODIUM 2500 UNITS: 1000 INJECTION INTRAVENOUS; SUBCUTANEOUS at 12:14

## 2018-10-11 RX ADMIN — FENTANYL CITRATE 25 MCG: 50 INJECTION, SOLUTION INTRAMUSCULAR; INTRAVENOUS at 12:09

## 2018-10-11 RX ADMIN — LIDOCAINE HYDROCHLORIDE 1 ML: 10 INJECTION, SOLUTION EPIDURAL; INFILTRATION; INTRACAUDAL; PERINEURAL at 12:10

## 2018-10-11 RX ADMIN — HEPARIN SODIUM 1000 UNITS: 200 INJECTION, SOLUTION INTRAVENOUS at 12:04

## 2018-10-11 RX ADMIN — VERAPAMIL HYDROCHLORIDE 2.5 MG: 2.5 INJECTION INTRAVENOUS at 12:15

## 2018-10-11 RX ADMIN — ADENOSINE 140 MCG/KG/MIN: 3 INJECTION, SOLUTION INTRAVENOUS at 12:55

## 2018-10-11 RX ADMIN — MIDAZOLAM HYDROCHLORIDE 1 MG: 1 INJECTION, SOLUTION INTRAMUSCULAR; INTRAVENOUS at 12:16

## 2018-10-11 RX ADMIN — NITROGLYCERIN 200 MCG: 5 INJECTION, SOLUTION INTRAVENOUS at 12:15

## 2018-10-11 RX ADMIN — CLONAZEPAM 0.5 MG: 0.5 TABLET, ORALLY DISINTEGRATING ORAL at 22:35

## 2018-10-11 RX ADMIN — FENTANYL CITRATE 25 MCG: 50 INJECTION, SOLUTION INTRAMUSCULAR; INTRAVENOUS at 12:38

## 2018-10-11 RX ADMIN — LOSARTAN POTASSIUM 50 MG: 50 TABLET ORAL at 22:35

## 2018-10-11 RX ADMIN — MIDAZOLAM HYDROCHLORIDE 2 MG: 1 INJECTION, SOLUTION INTRAMUSCULAR; INTRAVENOUS at 11:48

## 2018-10-11 NOTE — PROGRESS NOTES
Bedrest complete. Assisted patient with walking in the nix. No signs or symptoms of chest pain, shortness of breath, or dizziness. Tolerated well. VSS.

## 2018-10-11 NOTE — IP AVS SNAPSHOT
[address] [facility] 
[phone] Patient: Ankur Duval MRN: [id number] CIQ:0/36/1942 About your hospitalization You were admitted on:  October 11, 2018 You last received care in the:  [facility] 2 INTRVNTNL CARDIO You were discharged on:  October 12, 2018 Why you were hospitalized Your primary diagnosis was:  Not on File Your diagnoses also included:  Angina, Class Iii ([facility]), S/P Cardiac Cath, Mixed Hyperlipidemia, Essential Hypertension, Type 2 Diabetes Mellitus Without Complication, Without Long-Term Current Use Of Insulin ([facility]) Follow-up Information Follow up With Details Comments Contact Info Phil Villalobos MD Schedule an appointment as soon as possible for a visit  [address] [employer] [address] [phone] Aakash Garduno MD Schedule an appointment as soon as possible for a visit in 2 weeks  [address] 
[phone] Phil Villalobos MD   [address] [employer] [facility],[address] [place] [address] [phone] Discharge Orders None A check gabrielle indicates which time of day the medication should be taken. My Medications CHANGE how you take these medications Instructions Each Dose to Equal  
 Morning Noon Evening Bedtime  
 metFORMIN 1,000 mg tablet Commonly known as:  GLUCOPHAGE What changed:  additional instructions Your last dose was: Your next dose is: Take 1 Tab by mouth two (2) times daily (with meals). Restart Meformin on 10/13/18  
 1000 mg CONTINUE taking these medications Instructions Each Dose to Equal  
 Morning Noon Evening Bedtime  
 acyclovir 400 mg tablet Commonly known as:  ZOVIRAX Your last dose was: Your next dose is: TAKE 1 TABLET BY MOUTH ONCE DAILY  
     
   
   
   
  
 aspirin delayed-release 81 mg tablet Your last dose was: Your next dose is: Take  by mouth daily. clopidogrel 75 mg Tab Commonly known as:  PLAVIX Your last dose was: Your next dose is: Take 1 Tab by mouth daily. 75 mg  
    
   
   
   
  
 isosorbide mononitrate ER 30 mg tablet Commonly known as:  IMDUR Your last dose was: Your next dose is: Take 1 Tab by mouth daily. 30 mg  
    
   
   
   
  
 liraglutide 3 mg/0.5 mL (18 mg/3 mL) pen Commonly known as:  Yovani Pena Your last dose was: Your next dose is:    
   
   
 0.2 mL by SubCUTAneous route daily. 1.2 mg  
    
   
   
   
  
 losartan 50 mg tablet Commonly known as:  COZAAR Your last dose was: Your next dose is: Take 1 Tab by mouth daily. 50 mg  
    
   
   
   
  
 metoprolol succinate 25 mg XL tablet Commonly known as:  TOPROL-XL Your last dose was: Your next dose is: TAKE 1 TABLET BY MOUTH EVERY DAY  
     
   
   
   
  
 nitroglycerin 0.4 mg SL tablet Commonly known as:  NITROSTAT Your last dose was: Your next dose is:    
   
   
 1 Tab by SubLINGual route every five (5) minutes as needed for Chest Pain. 0.4 mg  
    
   
   
   
  
 omeprazole 20 mg capsule Commonly known as:  PRILOSEC Your last dose was: Your next dose is: Take 20 mg by mouth daily. 20 mg  
    
   
   
   
  
 rosuvastatin 10 mg tablet Commonly known as:  CRESTOR Your last dose was: Your next dose is: Take 1 Tab by mouth nightly. 10 mg  
    
   
   
   
  
 zolpidem 5 mg tablet Commonly known as:  AMBIEN Your last dose was: Your next dose is: Take 1 Tab by mouth nightly as needed for Sleep. Max Daily Amount: 5 mg.  
 5 mg Where to Get Your Medications Information on where to get these meds will be given to you by the nurse or doctor. ! Ask your nurse or doctor about these medications  
  metFORMIN 1,000 mg tablet Discharge Instructions [address] [facility] [place], [address] [facility]  [phone] Patient ID: Joselyn Giron 
[id number] 
72 y.o. 
1945 Admit Date: 10/11/2018 Discharge Date: 10/12/2018 Admitting Physician: Albert Gu MD  
 
Discharge Physician: Albaro Hightower NP Admission Diagnoses:  
cath Angina, class III ([address] Discharge Diagnoses: Active Problems: 
  Essential hypertension (12/2/2016) Type 2 diabetes mellitus without complication, without long-term current use of insulin ([address] (6/23/2017) Mixed hyperlipidemia (6/26/2017) Angina, class III ([address] (4/12/2018) S/P cardiac cath (10/12/2018) Overview: 10/11/18 PTCA D1, neg FFR to LCX and RCA Discharge Condition: Good Cardiology Procedures this Admission:  Left heart catheterization with PCI Disposition: home Reference discharge instructions provided by nursing for diet and activity. Signed: 
Albaro Hightower NP 
10/12/2018 
8:51 AM 
 
 
Radial Cardiac Catheterization/Angiography Discharge Instructions It is normal to feel tired the first couple days. Take it easy and follow the physicians instructions. CHECK THE CATHETER INSERTION SITE DAILY: 
 
Remove the wrist dressing 24 hours after the procedure. You may shower 24 hours after the procedure. Wash with soap and water and pat dry. Gentle cleaning of the site with soap and water is sufficient, cover with a dry clean dressing or bandage. Do not apply creams or powders to the area. No soaking the wrist for 3 days. Leave the puncture site open to air after 24 hours post-procedure. CALL THE PHYSICIANS:  
 
If the site becomes red, swollen or feels warm to the touch If there is bleeding or drainage or if there is unusual pain at the radial site. If there is any minor oozing, you may apply a band-aid and remove after 12 hours. If the bleeding continues, hold pressure with the middle finger against the puncture site and the thumb against the back of the wrist,call 911 to be transported to the hospital. 
DO NOT DRIVE YOURSELF, [address] ACTIVITY:  
For the first 24 hours do not manipulate the wrist. 
No lifting, pushing or pulling over 3-5 pounds with the affected wrist for 7 daysand no straining the insertion site. Do not life grocery bags or the garbage can, do not run the vacuum  or  for 7 days. Start with short walks as in the hospital and gradually increase as tolerated each day. It is recommended to walk 30 minutes 5-7 days per week. Follow your physicians instructions on activity. Avoid walking outside in extremes of heat or cold. Walk inside when it is cold and windy or hot and humid. Things to keep in mind: 
No driving for at least 24 hours, or as designated by your physician. Limit the number of times you go up and down the stairs Take rests and pace yourself with activity. Be careful and do not strain with bowel movements. MEDICATIONS: 
 
Take all medications as prescribed Call your physician if you have any questions Keep an updated list of your medications with you at all times and give a list to your physician and pharmacist 
 
SIGNS AND SYMPTOMS:  
Be cautious of symptoms of angina or recurrent symptoms such as chest discomfort, unusual shortness of breath or fatigue. These could be symptoms of restenosis, a new blockage or a heart attack. If your symptoms are relieved with rest it is still recommended that you notify your physician of recurrent chest pain or discomfort.  
For CHEST PAIN or symptoms of angina not relieved with rest:  If the discomfort is not relieved with rest, and you have been prescribed Nitroglycerin, take as directed (taken under the tongue, one at a time 5 minutes apart for a total of 3 doses). If the discomfort is not relieved after the 3rd nitroglycerin, call 911. If you have not been prescribed Nitroglycerin  and your chest discomfort is not relieved with rest, call 911. AFTER CARE:  
Follow up with your physician as instructed. Follow a heart healthy diet with proper portion control, daily stress management, daily exercise, blood pressure and cholesterol control , and smoking cessation. ACO Transitions of Care Introducing [employer] [address] Roxie Segovia offers a voluntary care coordination program to provide high quality service and care to [facility] fee-for-service beneficiaries. Maribel Hatch was designed to help you enhance your health and well-being through the following services: ? Transitions of Care  support for individuals who are transitioning from one care setting to another (example: Hospital to home). ? Chronic and Complex Care Coordination  support for individuals and caregivers of those with serious or chronic illnesses or with more than one chronic (ongoing) condition and those who take a number of different medications. If you meet specific medical criteria, a [facility] [address] may call you directly to coordinate your care with your primary care physician and your other care providers. For questions about the [facility] programs, please, contact your physicians office. For general questions or additional information about Accountable Care Organizations: 
Please visit www.medicare.gov/acos. html or call 1-800-MEDICARE (8-418.347.6047) TTY users should call 2-970.914.2208. Introducing [facility] HEALTH SERVICES!    
 [employer] introduces Healthrageous patient portal. Now you can access parts of your medical record, email your doctor's office, and request medication refills online. 1. In your internet browser, go to [email or web address] 2. Click on the First Time User? Click Here link in the Sign In box. You will see the New Member Sign Up page. 3. Enter your FastHealth Access Code exactly as it appears below. You will not need to use this code after youve completed the sign-up process. If you do not sign up before the expiration date, you must request a new code. · FastHealth Access Code: [email or web address] Expires: 10/31/2018 10:54 AM 
 
4. Enter the last four digits of your Social Security Number (xxxx) and Date of Birth (mm/dd/yyyy) as indicated and click Submit. You will be taken to the next sign-up page. 5. Create a FastHealth ID. This will be your FastHealth login ID and cannot be changed, so think of one that is secure and easy to remember. 6. Create a FastHealth password. You can change your password at any time. 7. Enter your Password Reset Question and Answer. This can be used at a later time if you forget your password. 8. Enter your e-mail address. You will receive e-mail notification when new information is available in [address] 9. Click Sign Up. You can now view and download portions of your medical record. 10. Click the Download Summary menu link to download a portable copy of your medical information. If you have questions, please visit the Frequently Asked Questions section of the FastHealth website. Remember, FastHealth is NOT to be used for urgent needs. For medical emergencies, dial 911. Now available from your iPhone and Android! Introducing Corey Posey As a [employer] patient, I wanted to make you aware of our electronic visit tool called Corey Posey. [employer] 24/7 allows you to connect within minutes with a medical provider 24 hours a day, seven days a week via a mobile device or tablet or logging into a secure website from your computer. You can access Freedom2 from anywhere in the [place]. A virtual visit might be right for you when you have a simple condition and feel like you just dont want to get out of bed, or cant get away from work for an appointment, when your regular [facility] provider is not available (evenings, weekends or holidays), or when youre out of town and need minor care. Electronic visits cost only $49 and if the [employer] [place] [employer] provider determines a prescription is needed to treat your condition, one can be electronically transmitted to a nearby pharmacy*. Please take a moment to enroll today if you have not already done so. The enrollment process is free and takes just a few minutes. To enroll, please download the YUPIQ rajendra to your tablet or phone, or visit [email or web address] to enroll on your computer. And, as an [address] [place] [address] patient with a TXCOM account, the results of your visits will be scanned into your electronic medical record and your primary care provider will be able to view the scanned results. We urge you to continue to see your regular [facility] provider for your ongoing medical care. And while your primary care provider may not be the one available when you seek a [employer] Alert[employer] virtual visit, the peace of mind you get from getting a real diagnosis real time can be priceless. For more information on Freedom2, view our Frequently Asked Questions (FAQs) at [email or web address]. Sincerely, 
 
Christopher Ngo MD 
Chief Medical Officer Natasha Segovia *:  certain medications cannot be prescribed via [employer] Providers Seen During Your Hospitalization Provider Specialty Primary office phone Javan Tucker MD Cardiology [phone] Immunizations Administered for This Admission Name Date Influenza Vaccine (Quad) PF 10/12/2018 Your Primary Care Physician (PCP) Primary Care Physician Office Phone Office Fax Marce Huggins [phone] You are allergic to the following No active allergies Recent Documentation Height Weight BMI Smoking Status 1.88 m 97.5 kg 27.6 kg/m2 Former Smoker Emergency Contacts Name Discharge Info Relation Home Work Mobile Cher Moyer DISCHARGE CAREGIVER [3] Spouse [3] [phone] Gautam Moyer DISCHARGE CAREGIVER [3] Child [2]   [phone] Patient Belongings The following personal items are in your possession at time of discharge: 
  Dental Appliances: None  Visual Aid: With patient, Glasses      Home Medications: None   Jewelry: Ring, With patient  Clothing: At bedside    Other Valuables: At bedside, Cell Phone Please provide this summary of care documentation to your next provider. Signatures-by signing, you are acknowledging that this After Visit Summary has been reviewed with you and you have received a copy. Patient Signature:  ____________________________________________________________ Date:  ____________________________________________________________  
  
Pedrito Shields Provider Signature:  ____________________________________________________________ Date:  ____________________________________________________________

## 2018-10-11 NOTE — H&P (VIEW-ONLY)
Moises Zeng is a 72 y.o. male is here for symptom-based f/u. Hx CAD, uncontrolled DM, hypertension, dyslipidemia with SHANE/chest tightness--abnormal Stress MPI and had cardiac cath 4/12/18 at [facility]) with BLAZE x2 to mid LAD and prox LCx without complication.  On dual antiplatelet rx (ASA/Plavix), statin, DM meds, Metoprolol Xl, Imdur, losartan. Recurrent intermittent chest tightness/dyspnea--not clearly exertional--seen by Dr Gwyn Gamboa and had repeat Stress MPI 10/1/18 with Brad 4:16, , 1 mm inferior wall ST, no chest pain, MPI with inferoseptal wall ischemia (larger area than previous), LVEF 62%. The patient denies  orthopnea, PND, LE edema, palpitations, syncope, presyncope or fatigue. Patient Active Problem List  
 Diagnosis Date Noted  Coronary artery disease involving native coronary artery of native heart with angina pectoris with documented spasm ([address] 04/26/2018  Angina, class III ([address] 04/12/2018  S/P coronary artery stent placement 04/12/2018  Abnormal nuclear cardiac imaging test 04/12/2018  Mixed hyperlipidemia 06/26/2017  Type 2 diabetes mellitus without complication, without long-term current use of insulin ([address] 06/23/2017  Essential hypertension 12/02/2016  Elevated transaminase level 11/30/2016  Spinal stenosis of cervical region 10/06/2016  Psoriasis 07/10/2015  Laryngospasm 07/10/2015 Min Booker MD 
Past Medical History:  
Diagnosis Date  Abnormal nuclear cardiac imaging test 4/12/2018  CAD (coronary artery disease)  Dyslipidemia  Elevated transaminase level 09/2016  GERD (gastroesophageal reflux disease)  HTN (hypertension)  Psoriasis  S/P coronary artery stent placement 4/12/2018  T2DM (type 2 diabetes mellitus) ([address] 09/2016 A1c 6.4;  Glucose 195 Past Surgical History:  
Procedure Laterality Date  HX HEENT    
 bilateral cataract extraction  HX HEENT    
 tonsillectomy  HX ORTHOPAEDIC    
 right shoulder acromioplasty  HX ORTHOPAEDIC    
 left achilles tendon surgery  HX PTCA  04/12/2018 BLAZE mid LAD (Synergy 2.75/38) + prox LCX (Synergy 2.5/32) No Known Allergies Family History Problem Relation Age of Onset  Heart Disease Father  Heart Disease Maternal Uncle Social History Social History  Marital status:  Spouse name: N/A  
 Number of children: N/A  
 Years of education: N/A Occupational History  Not on file. Social History Main Topics  Smoking status: Former Smoker Types: Cigarettes Quit date: 4/26/1998  Smokeless tobacco: Never Used  Alcohol use 0.6 oz/week 1 Glasses of wine, 0 Standard drinks or equivalent per week  Drug use: No  
 Sexual activity: Not on file Other Topics Concern  Not on file Social History Narrative Current Outpatient Prescriptions Medication Sig  liraglutide (SAXENDA) 3 mg/0.5 mL (18 mg/3 mL) pen 0.2 mL by SubCUTAneous route daily.  metFORMIN (GLUCOPHAGE) 1,000 mg tablet Take 1 Tab by mouth two (2) times daily (with meals).  zolpidem (AMBIEN) 5 mg tablet Take 1 Tab by mouth nightly as needed for Sleep. Max Daily Amount: 5 mg.  rosuvastatin (CRESTOR) 10 mg tablet Take 1 Tab by mouth nightly.  metoprolol succinate (TOPROL-XL) 25 mg XL tablet TAKE 1 TABLET BY MOUTH EVERY DAY  losartan (COZAAR) 50 mg tablet Take 1 Tab by mouth daily. (Patient taking differently: Take 25 mg by mouth.)  aspirin delayed-release 81 mg tablet Take  by mouth daily.  clopidogrel (PLAVIX) 75 mg tab Take 1 Tab by mouth daily.  nitroglycerin (NITROSTAT) 0.4 mg SL tablet 1 Tab by SubLINGual route every five (5) minutes as needed for Chest Pain.  acyclovir (ZOVIRAX) 400 mg tablet TAKE 1 TABLET BY MOUTH ONCE DAILY No current facility-administered medications for this visit. Review of Symptoms: CONST  No weight change. No fever, chills, sweats ENT No visual changes, URI sx, sore throat CV  See HPI  
RESP  No cough, or sputum, wheezing. Also see HPI  
GI  No abdominal pain or change in bowel habits. No heartburn or dysphagia. No melena or rectal bleeding.   No dysuria, urgency, frequency, hematuria MSKEL  No joint pain, swelling. No muscle pain. SKIN  No rash or lesions. NEURO  No headache, syncope, or seizure. No weakness, loss of sensation, or paresthesias. PSYCH  No low mood or depression No anxiety. HE/LYMPH  No easy bruising, abnormal bleeding, or enlarged glands. Physical ExamPhysical Exam:   
There were no vitals taken for this visit. Gen: NAD HEENT:  PERRL, throat clear Neck: no adenopathy, no thyromegaly, no JVD Heart:  Regular,Nl S1S2,  no murmur, gallop or rub.  
Lungs:  clear Abdomen:   Soft, non-tender, bowel sounds are active.  
Extremities:  No edema Pulse: symmetric Neuro: A&O times 3, No focal neuro deficits Cardiographics ECG: NSR, freq PAC's, NST Labs:  
Lab Results Component Value Date/Time  Sodium 137 09/11/2018 08:53 AM  
 Sodium 136 07/15/2018 11:30 AM  
 Sodium 138 04/13/2018 04:22 AM  
 Sodium 135 (L) 04/11/2018 11:11 AM  
 Sodium 138 03/30/2018 11:24 AM  
 Potassium 5.2 (H) 09/11/2018 08:53 AM  
 Potassium 5.1 07/15/2018 11:30 AM  
 Potassium 3.8 04/13/2018 04:22 AM  
 Potassium 4.8 04/11/2018 11:11 AM  
 Potassium 4.7 03/30/2018 11:24 AM  
 Chloride 99 09/11/2018 08:53 AM  
 Chloride 99 07/15/2018 11:30 AM  
 Chloride 105 04/13/2018 04:22 AM  
 Chloride 98 04/11/2018 11:11 AM  
 Chloride 100 03/30/2018 11:24 AM  
 CO2 29 09/11/2018 08:53 AM  
 CO2 27 07/15/2018 11:30 AM  
 CO2 25 04/13/2018 04:22 AM  
 CO2 25 04/11/2018 11:11 AM  
 CO2 29 03/30/2018 11:24 AM  
 Anion gap 9 09/11/2018 08:53 AM  
 Anion gap 10 07/15/2018 11:30 AM  
 Anion gap 8 04/13/2018 04:22 AM  
 Anion gap 12 04/11/2018 11:11 AM  
 Anion gap 9 03/30/2018 11:24 AM  
 Glucose 475 (H) 09/11/2018 08:53 AM  
 Glucose 301 (H) 07/15/2018 11:30 AM  
 Glucose 183 (H) 04/13/2018 04:22 AM  
 Glucose 215 (H) 04/11/2018 11:11 AM  
 Glucose 316 (H) 03/30/2018 11:24 AM  
 BUN 24 (H) 09/11/2018 08:53 AM  
 BUN 17 07/15/2018 11:30 AM  
 BUN 12 04/13/2018 04:22 AM  
 BUN 19 (H) 04/11/2018 11:11 AM  
 BUN 17 03/30/2018 11:24 AM  
 Creatinine 1.43 (H) 09/11/2018 08:53 AM  
 Creatinine 1.26 07/15/2018 11:30 AM  
 Creatinine 0.98 04/13/2018 04:22 AM  
 Creatinine 1.22 04/11/2018 11:11 AM  
 Creatinine 1.31 (H) 03/30/2018 11:24 AM  
 BUN/Creatinine ratio 17 09/11/2018 08:53 AM  
 BUN/Creatinine ratio 13 07/15/2018 11:30 AM  
 BUN/Creatinine ratio 12 04/13/2018 04:22 AM  
 BUN/Creatinine ratio 16 04/11/2018 11:11 AM  
 BUN/Creatinine ratio 13 03/30/2018 11:24 AM  
 GFR est AA 59 (L) 09/11/2018 08:53 AM  
 GFR est AA >60 07/15/2018 11:30 AM  
 GFR est AA >60 04/13/2018 04:22 AM  
 GFR est AA >60 04/11/2018 11:11 AM  
 GFR est AA >60 03/30/2018 11:24 AM  
 GFR est non-AA 49 (L) 09/11/2018 08:53 AM  
 GFR est non-AA 57 (L) 07/15/2018 11:30 AM  
 GFR est non-AA >60 04/13/2018 04:22 AM  
 GFR est non-AA 59 (L) 04/11/2018 11:11 AM  
 GFR est non-AA 54 (L) 03/30/2018 11:24 AM  
 Calcium 9.9 09/11/2018 08:53 AM  
 Calcium 9.6 07/15/2018 11:30 AM  
 Calcium 8.9 04/13/2018 04:22 AM  
 Calcium 9.7 04/11/2018 11:11 AM  
 Calcium 9.1 03/30/2018 11:24 AM  
 Bilirubin, total 0.4 09/11/2018 08:53 AM  
 Bilirubin, total 0.4 07/15/2018 11:30 AM  
 Bilirubin, total 0.5 04/11/2018 11:11 AM  
 Bilirubin, total 0.5 03/30/2018 11:24 AM  
 Bilirubin, total CANCELED 03/23/2018 03:58 PM  
 AST (SGOT) 40 (H) 09/11/2018 08:53 AM  
 AST (SGOT) 39 (H) 07/15/2018 11:30 AM  
 AST (SGOT) 77 (H) 04/11/2018 11:11 AM  
 AST (SGOT) 89 (H) 03/30/2018 11:24 AM  
 AST (SGOT) CANCELED 03/23/2018 03:58 PM  
 Alk. phosphatase 147 (H) 09/11/2018 08:53 AM  
 Alk.  phosphatase 138 (H) 07/15/2018 11:30 AM  
 Alk. phosphatase 138 (H) 04/11/2018 11:11 AM  
 Alk. phosphatase 117 03/30/2018 11:24 AM  
 Alk. phosphatase CANCELED 03/23/2018 03:58 PM  
 Protein, total 7.9 09/11/2018 08:53 AM  
 Protein, total 7.9 07/15/2018 11:30 AM  
 Protein, total 7.8 04/11/2018 11:11 AM  
 Protein, total 7.1 03/30/2018 11:24 AM  
 Protein, total CANCELED 03/23/2018 03:58 PM  
 Albumin 3.9 09/11/2018 08:53 AM  
 Albumin 4.0 07/15/2018 11:30 AM  
 Albumin 3.9 04/11/2018 11:11 AM  
 Albumin 3.8 03/30/2018 11:24 AM  
 Albumin CANCELED 03/23/2018 03:58 PM  
 Globulin 4.0 09/11/2018 08:53 AM  
 Globulin 3.9 07/15/2018 11:30 AM  
 Globulin 3.9 04/11/2018 11:11 AM  
 Globulin 3.3 03/30/2018 11:24 AM  
 A-G Ratio 1.0 (L) 09/11/2018 08:53 AM  
 A-G Ratio 1.0 (L) 07/15/2018 11:30 AM  
 A-G Ratio 1.0 (L) 04/11/2018 11:11 AM  
 A-G Ratio 1.2 03/30/2018 11:24 AM  
 A-G Ratio 1.7 06/23/2017 10:43 AM  
 ALT (SGPT) 55 09/11/2018 08:53 AM  
 ALT (SGPT) 65 07/15/2018 11:30 AM  
 ALT (SGPT) 139 (H) 04/11/2018 11:11 AM  
 ALT (SGPT) 123 (H) 03/30/2018 11:24 AM  
 ALT (SGPT) CANCELED 03/23/2018 03:58 PM  
 
Lab Results Component Value Date/Time CK 72 09/11/2018 08:53 AM  
 
Lab Results Component Value Date/Time  Cholesterol, total 232 (H) 09/11/2018 08:53 AM  
 Cholesterol, total 299 (H) 03/30/2018 11:24 AM  
 Cholesterol, total CANCELED 03/23/2018 03:58 PM  
 Cholesterol, total 344 (H) 06/23/2017 10:43 AM  
 HDL Cholesterol 53 09/11/2018 08:53 AM  
 HDL Cholesterol 49 03/30/2018 11:24 AM  
 HDL Cholesterol CANCELED 03/23/2018 03:58 PM  
 HDL Cholesterol 54 06/23/2017 10:43 AM  
 LDL, calculated 121.6 (H) 09/11/2018 08:53 AM  
 LDL, calculated 199.4 (H) 03/30/2018 11:24 AM  
 LDL, calculated 216 (H) 06/23/2017 10:43 AM  
 Triglyceride 287 (H) 09/11/2018 08:53 AM  
 Triglyceride 253 (H) 03/30/2018 11:24 AM  
 Triglyceride CANCELED 03/23/2018 03:58 PM  
 Triglyceride 369 (H) 06/23/2017 10:43 AM  
 CHOL/HDL Ratio 4.4 09/11/2018 08:53 AM  
 CHOL/HDL Ratio 6.1 (H) 03/30/2018 11:24 AM  
 
No results found for this or any previous visit. Assessment:  
  
  
Patient Active Problem List  
 Diagnosis Date Noted  Coronary artery disease involving native coronary artery of native heart with angina pectoris with documented spasm [address] 04/26/2018  Angina, class III ([address] 04/12/2018  S/P coronary artery stent placement 04/12/2018  Abnormal nuclear cardiac imaging test 04/12/2018  Mixed hyperlipidemia 06/26/2017  Type 2 diabetes mellitus without complication, without long-term current use of insulin ([facility] 06/23/2017  Essential hypertension 12/02/2016  Elevated transaminase level 11/30/2016  Spinal stenosis of cervical region 10/06/2016  Psoriasis 07/10/2015  Laryngospasm 07/10/2015 Hx CAD, uncontrolled DM, hypertension, dyslipidemia with SHANE/chest tightness--abnormal Stress MPI and had cardiac cath 4/12/18 at [facility]) with BLAZE x2 to mid LAD and prox LCx without complication.  On dual antiplatelet rx (ASA/Plavix), statin, DM meds, Metoprolol Xl, Imdur, losartan. Recurrent intermittent chest tightness/dyspnea--not clearly exertional--seen by Dr Teodoro Jordan and had repeat Stress MPI 10/1/18 with Brad 4:16, , 1 mm inferior wall ST, no chest pain, MPI with inferoseptal wall ischemia (larger area than previous), LVEF 62%. Plan:  
 
Recommend repeat cardiac cath/possible PCI--discussed at length Continue current meds--metoprolol, losartan, statin, ASA/Plavix, sl NTG prn, DM meds Recheck labs pre-cath ADD Imdur 30mg qd Alesha Elkins MD

## 2018-10-11 NOTE — INTERVAL H&P NOTE
H&P Update:  Clovis Palumbo was seen and examined. History and physical has been reviewed. The patient has been examined.  There have been no significant clinical changes since the completion of the originally dated History and Physical.    Signed By: Deshaun Bradshaw MD     October 11, 2018 11:25 AM

## 2018-10-12 VITALS
HEART RATE: 95 BPM | OXYGEN SATURATION: 97 % | RESPIRATION RATE: 18 BRPM | HEIGHT: 74 IN | WEIGHT: 215 LBS | SYSTOLIC BLOOD PRESSURE: 133 MMHG | TEMPERATURE: 98.4 F | DIASTOLIC BLOOD PRESSURE: 73 MMHG | BODY MASS INDEX: 27.59 KG/M2

## 2018-10-12 PROBLEM — Z98.890 S/P CARDIAC CATH: Status: ACTIVE | Noted: 2018-10-12

## 2018-10-12 LAB
ANION GAP SERPL CALC-SCNC: 9 MMOL/L (ref 5–15)
ATRIAL RATE: 81 BPM
ATRIAL RATE: 87 BPM
BUN SERPL-MCNC: 13 MG/DL (ref 6–20)
BUN/CREAT SERPL: 16 (ref 12–20)
CALCIUM SERPL-MCNC: 8.2 MG/DL (ref 8.5–10.1)
CALCULATED P AXIS, ECG09: 13 DEGREES
CALCULATED R AXIS, ECG10: 11 DEGREES
CALCULATED R AXIS, ECG10: 12 DEGREES
CALCULATED T AXIS, ECG11: 48 DEGREES
CALCULATED T AXIS, ECG11: 52 DEGREES
CHLORIDE SERPL-SCNC: 107 MMOL/L (ref 97–108)
CO2 SERPL-SCNC: 22 MMOL/L (ref 21–32)
CREAT SERPL-MCNC: 0.83 MG/DL (ref 0.7–1.3)
DIAGNOSIS, 93000: NORMAL
DIAGNOSIS, 93000: NORMAL
GLUCOSE BLD STRIP.AUTO-MCNC: 169 MG/DL (ref 65–100)
GLUCOSE SERPL-MCNC: 138 MG/DL (ref 65–100)
P-R INTERVAL, ECG05: 184 MS
P-R INTERVAL, ECG05: 184 MS
POTASSIUM SERPL-SCNC: 4 MMOL/L (ref 3.5–5.1)
Q-T INTERVAL, ECG07: 374 MS
Q-T INTERVAL, ECG07: 388 MS
QRS DURATION, ECG06: 80 MS
QRS DURATION, ECG06: 82 MS
QTC CALCULATION (BEZET), ECG08: 450 MS
QTC CALCULATION (BEZET), ECG08: 450 MS
SERVICE CMNT-IMP: ABNORMAL
SODIUM SERPL-SCNC: 138 MMOL/L (ref 136–145)
VENTRICULAR RATE, ECG03: 81 BPM
VENTRICULAR RATE, ECG03: 87 BPM

## 2018-10-12 PROCEDURE — 90686 IIV4 VACC NO PRSV 0.5 ML IM: CPT | Performed by: INTERNAL MEDICINE

## 2018-10-12 PROCEDURE — 90471 IMMUNIZATION ADMIN: CPT

## 2018-10-12 PROCEDURE — 36415 COLL VENOUS BLD VENIPUNCTURE: CPT | Performed by: NURSE PRACTITIONER

## 2018-10-12 PROCEDURE — 74011250636 HC RX REV CODE- 250/636: Performed by: INTERNAL MEDICINE

## 2018-10-12 PROCEDURE — 93005 ELECTROCARDIOGRAM TRACING: CPT

## 2018-10-12 PROCEDURE — 82962 GLUCOSE BLOOD TEST: CPT

## 2018-10-12 PROCEDURE — 74011250637 HC RX REV CODE- 250/637: Performed by: NURSE PRACTITIONER

## 2018-10-12 PROCEDURE — 80048 BASIC METABOLIC PNL TOTAL CA: CPT | Performed by: NURSE PRACTITIONER

## 2018-10-12 RX ORDER — METFORMIN HYDROCHLORIDE 1000 MG/1
1000 TABLET ORAL 2 TIMES DAILY WITH MEALS
Qty: 180 TAB | Refills: 5 | Status: SHIPPED
Start: 2018-10-12 | End: 2018-12-18 | Stop reason: DRUGHIGH

## 2018-10-12 RX ADMIN — ISOSORBIDE MONONITRATE 30 MG: 30 TABLET, EXTENDED RELEASE ORAL at 08:25

## 2018-10-12 RX ADMIN — INFLUENZA VIRUS VACCINE 0.5 ML: 15; 15; 15; 15 SUSPENSION INTRAMUSCULAR at 08:25

## 2018-10-12 RX ADMIN — Medication 10 ML: at 06:31

## 2018-10-12 RX ADMIN — METOPROLOL SUCCINATE 25 MG: 25 TABLET, EXTENDED RELEASE ORAL at 08:24

## 2018-10-12 RX ADMIN — PANTOPRAZOLE SODIUM 40 MG: 40 TABLET, DELAYED RELEASE ORAL at 08:24

## 2018-10-12 RX ADMIN — ACYCLOVIR 400 MG: 800 TABLET ORAL at 08:24

## 2018-10-12 RX ADMIN — ASPIRIN 81 MG: 81 TABLET, COATED ORAL at 08:24

## 2018-10-12 RX ADMIN — CLOPIDOGREL BISULFATE 75 MG: 75 TABLET ORAL at 08:24

## 2018-10-12 NOTE — DISCHARGE INSTRUCTIONS
[address] [place] [address]  [phone]        Patient ID:  Opal Door  [id number]  28 y.o.  1945    Admit Date: 10/11/2018    Discharge Date: 10/12/2018     Admitting Physician: Cornelia Massey MD     Discharge Physician: Cruz Castellanos NP    Admission Diagnoses:   cath  Angina, class III [facility])    Discharge Diagnoses: Active Problems:    Essential hypertension (12/2/2016)      Type 2 diabetes mellitus without complication, without long-term current use of insulin ([address] (6/23/2017)      Mixed hyperlipidemia (6/26/2017)      Angina, class III ([facility] (4/12/2018)      S/P cardiac cath (10/12/2018)      Overview: 10/11/18 PTCA D1, neg FFR to LCX and RCA              Discharge Condition: Good    Cardiology Procedures this Admission:  Left heart catheterization with PCI    Disposition: home    Reference discharge instructions provided by nursing for diet and activity. Signed:  Cruz Castellanos NP  10/12/2018  8:51 AM      Radial Cardiac Catheterization/Angiography Discharge Instructions    It is normal to feel tired the first couple days. Take it easy and follow the physicians instructions. CHECK THE CATHETER INSERTION SITE DAILY:    Remove the wrist dressing 24 hours after the procedure. You may shower 24 hours after the procedure. Wash with soap and water and pat dry. Gentle cleaning of the site with soap and water is sufficient, cover with a dry clean dressing or bandage. Do not apply creams or powders to the area. No soaking the wrist for 3 days. Leave the puncture site open to air after 24 hours post-procedure. CALL THE PHYSICIANS:     If the site becomes red, swollen or feels warm to the touch  If there is bleeding or drainage or if there is unusual pain at the radial site. If there is any minor oozing, you may apply a band-aid and remove after 12 hours.    If the bleeding continues, hold pressure with the middle finger against the puncture site and the thumb against the back of the wrist,call 911 to be transported to the hospital.  DO NOT DRIVE YOURSELF, Adriano Zuniga. ACTIVITY:   For the first 24 hours do not manipulate the wrist.  No lifting, pushing or pulling over 3-5 pounds with the affected wrist for 7 daysand no straining the insertion site. Do not life grocery bags or the garbage can, do not run the vacuum  or  for 7 days. Start with short walks as in the hospital and gradually increase as tolerated each day. It is recommended to walk 30 minutes 5-7 days per week. Follow your physicians instructions on activity. Avoid walking outside in extremes of heat or cold. Walk inside when it is cold and windy or hot and humid. Things to keep in mind:  No driving for at least 24 hours, or as designated by your physician. Limit the number of times you go up and down the stairs  Take rests and pace yourself with activity. Be careful and do not strain with bowel movements. MEDICATIONS:    Take all medications as prescribed  Call your physician if you have any questions  Keep an updated list of your medications with you at all times and give a list to your physician and pharmacist    SIGNS AND SYMPTOMS:   Be cautious of symptoms of angina or recurrent symptoms such as chest discomfort, unusual shortness of breath or fatigue. These could be symptoms of restenosis, a new blockage or a heart attack. If your symptoms are relieved with rest it is still recommended that you notify your physician of recurrent chest pain or discomfort. For CHEST PAIN or symptoms of angina not relieved with rest:  If the discomfort is not relieved with rest, and you have been prescribed Nitroglycerin, take as directed (taken under the tongue, one at a time 5 minutes apart for a total of 3 doses). If the discomfort is not relieved after the 3rd nitroglycerin, call 911.   If you have not been prescribed Nitroglycerin  and your chest discomfort is not relieved with rest, call 911. AFTER CARE:   Follow up with your physician as instructed. Follow a heart healthy diet with proper portion control, daily stress management, daily exercise, blood pressure and cholesterol control , and smoking cessation.

## 2018-10-12 NOTE — PROGRESS NOTES
Bedside shift change report given to *** (oncoming nurse) by Clare Armenta RN (offgoing nurse). Report included the following information SBAR, Kardex, Procedure Summary, Intake/Output, MAR, Accordion, Recent Results, Med Rec Status, Cardiac Rhythm NSR, Alarm Parameters , Pre Procedure Checklist, Procedure Verification and Quality Measures.

## 2018-10-12 NOTE — PROGRESS NOTES
... 
 
2235 - Pt. Request for Klonopin PRN to help pt. To sleep., RN provided for pt. 
 
7809 - Pt. Refuse schedule Lipitor. Pt. Chucky Oh to take his own medication when he go home tomorrow. Inform RN not to wake him up when he sleep.

## 2018-10-12 NOTE — PROGRESS NOTES
[address] [address]  [phone]      Cardiology Progress Note      10/12/2018 9:31 AM    Admit Date: 10/11/2018    Admit Diagnosis:   cath  Angina, class III ([address]    Subjective:     Shweta Merida has no c/o SOB, CP s/p PCI D1, neg FFR LCX and RCA.       Visit Vitals    /73 (BP 1 Location: Left arm, BP Patient Position: Sitting)    Pulse 95    Temp 98.4 °F (36.9 °C)    Resp 18    Ht 6' 2\" (1.88 m)    Wt 97.5 kg (215 lb)    SpO2 97%    BMI 27.6 kg/m2       Current Facility-Administered Medications   Medication Dose Route Frequency    0.9% sodium chloride infusion  75 mL/hr IntraVENous CONTINUOUS    bivalirudin (ANGIOMAX) 250 mg injection        0.9% sodium chloride infusion        0.9% sodium chloride infusion        adenosine (ADENOSCAN) 3 mg/mL injection        ADDaptor        bivalirudin (ANGIOMAX) 250 mg injection        0.9% sodium chloride (MBP/ADV) infusion        ADDaptor        acyclovir (ZOVIRAX) tablet 400 mg  400 mg Oral DAILY    aspirin delayed-release tablet 81 mg  81 mg Oral DAILY    clopidogrel (PLAVIX) tablet 75 mg  75 mg Oral DAILY    isosorbide mononitrate ER (IMDUR) tablet 30 mg  30 mg Oral DAILY    liraglutide (SAXENDA) pen 1.2 mg  1.2 mg SubCUTAneous DAILY    metoprolol succinate (TOPROL-XL) XL tablet 25 mg  25 mg Oral DAILY    pantoprazole (PROTONIX) tablet 40 mg  40 mg Oral DAILY    atorvastatin (LIPITOR) tablet 20 mg  20 mg Oral QHS    sodium chloride (NS) flush 5-10 mL  5-10 mL IntraVENous Q8H    sodium chloride (NS) flush 5-10 mL  5-10 mL IntraVENous PRN    acetaminophen (TYLENOL) tablet 650 mg  650 mg Oral Q4H PRN    naloxone (NARCAN) injection 0.4 mg  0.4 mg IntraVENous PRN    insulin lispro (HUMALOG) injection   SubCUTAneous ACB&D    glucose chewable tablet 16 g  4 Tab Oral PRN    dextrose (D50W) injection syrg 12.5-25 g  12.5-25 g IntraVENous PRN    glucagon (GLUCAGEN) injection 1 mg  1 mg IntraMUSCular PRN  losartan (COZAAR) tablet 50 mg  50 mg Oral QHS    clonazePAM (KlonoPIN) disintegrating tablet 0.5 mg  0.5 mg Oral QHS PRN       Objective:      Physical Exam:  General Appearance:  WNWD  male in no acute distress  Chest:   Clear  Cardiovascular:  Regular rate and rhythm, no murmur.   Abdomen:   Soft, non-tender, bowel sounds are active.   Extremities: right radial site D/I, no hematoma  Skin:  Warm and dry.     Data Review:   No results for input(s): WBC, HGB, HCT, PLT, HGBEXT, HCTEXT, PLTEXT, HGBEXT, HCTEXT, PLTEXT in the last 72 hours. Recent Labs      10/12/18   0232   NA  138   K  4.0   CL  107   CO2  22   GLU  138*   BUN  13   CREA  0.83   CA  8.2*       No results for input(s): TROIQ, CPK, CKMB in the last 72 hours. Intake/Output Summary (Last 24 hours) at 10/12/18 0931  Last data filed at 10/11/18 2246   Gross per 24 hour   Intake              700 ml   Output              800 ml   Net             -100 ml        Telemetry: SR    Assessment:     Active Problems:    Essential hypertension (12/2/2016)      Type 2 diabetes mellitus without complication, without long-term current use of insulin ([address] (6/23/2017)      Mixed hyperlipidemia (6/26/2017)      Angina, class III ([address] (4/12/2018)      S/P coronary artery stent placement (4/12/2018)      Overview: 4/12/18 PCI/BLAZE to LAD and LCX      S/P cardiac cath (10/12/2018)      Overview: 10/11/18 PTCA D1, neg FFR to LCX and RCA              Plan:     Angina III:  PCI to D1, continue on Plavix 75mg daily, ASA, BB, statin  Follow up with Dr. Itz Briceno in 2 weeks.          Jak Garcia [facility]  Cardiology

## 2018-10-12 NOTE — PROGRESS NOTES
Problem: Falls - Risk of  Goal: *Absence of Falls  Document Amilcar Fall Risk and appropriate interventions in the flowsheet. Fall Risk Interventions:            Medication Interventions: Assess postural VS orthostatic hypotension, Evaluate medications/consider consulting pharmacy, Patient to call before getting OOB, Teach patient to arise slowly                  Problem: Pressure Injury - Risk of  Goal: *Prevention of pressure injury  Document Salvatore Scale and appropriate interventions in the flowsheet.    Pressure Injury Interventions:

## 2019-02-25 ENCOUNTER — OFFICE VISIT (OUTPATIENT)
Dept: CARDIOLOGY CLINIC | Age: 74
End: 2019-02-25

## 2019-02-25 VITALS
HEIGHT: 74 IN | BODY MASS INDEX: 28.11 KG/M2 | RESPIRATION RATE: 12 BRPM | OXYGEN SATURATION: 95 % | HEART RATE: 103 BPM | SYSTOLIC BLOOD PRESSURE: 130 MMHG | WEIGHT: 219 LBS | DIASTOLIC BLOOD PRESSURE: 70 MMHG

## 2019-02-25 DIAGNOSIS — E11.9 TYPE 2 DIABETES MELLITUS WITHOUT COMPLICATION, WITHOUT LONG-TERM CURRENT USE OF INSULIN (HCC): ICD-10-CM

## 2019-02-25 DIAGNOSIS — I10 ESSENTIAL HYPERTENSION: ICD-10-CM

## 2019-02-25 DIAGNOSIS — E78.2 MIXED HYPERLIPIDEMIA: ICD-10-CM

## 2019-02-25 DIAGNOSIS — I25.111 CORONARY ARTERY DISEASE INVOLVING NATIVE CORONARY ARTERY OF NATIVE HEART WITH ANGINA PECTORIS WITH DOCUMENTED SPASM (HCC): Primary | ICD-10-CM

## 2019-02-25 DIAGNOSIS — Z95.5 S/P CORONARY ARTERY STENT PLACEMENT: ICD-10-CM

## 2019-02-25 NOTE — PROGRESS NOTES
Jason Ruano is a 72 y.o. male is here for routine f/u. Hx CAD, uncontrolled DM, hypertension, dyslipidemia with SHANE/chest tightness--abnormal Stress MPI and had cardiac cath 4/12/18 at [facility]) with BLAZE x2 to mid LAD and prox LCx without complication.  Was on  dual antiplatelet rx (ASA/Plavix), statin, DM meds, Metoprolol Xl, Imdur, losartan  Recurrent intermittent chest tightness/dyspnea--not clearly exertional--seen by Dr Vanessa Iniguez and had repeat Stress MPI 10/1/18 with Brad 4:16, , 1 mm inferior wall ST, no chest pain, MPI with inferoseptal wall ischemia (larger area than previous), LVEF 62%. Repeat Cardiac Cath 10/11/18 with patent previously placed stents, ostial D1 90%--successful PTCA with good results; mod mid RCA stenosis and LCx disease with FFR (fractional flow reserve) on both of the sites--negative and no further PCI done at that time. No current CV sx or complaints. Continues to see Dr. Vanessa Iniguez for management of DM--HbA1c has been high and lipids suboptimal on current rx. He has stopped taking the losartan and metoprolol on his own. The patient denies chest pain/ shortness of breath, orthopnea, PND, LE edema, palpitations, syncope, presyncope or fatigue.        Patient Active Problem List    Diagnosis Date Noted    S/P cardiac cath 10/12/2018    Coronary artery disease involving native coronary artery of native heart with angina pectoris with documented spasm ([address] 04/26/2018    Angina, class III ([address] 04/12/2018    S/P coronary artery stent placement 04/12/2018    Abnormal nuclear cardiac imaging test 04/12/2018    Mixed hyperlipidemia 06/26/2017    Type 2 diabetes mellitus without complication, without long-term current use of insulin ([address] 06/23/2017    Essential hypertension 12/02/2016    Elevated transaminase level 11/30/2016    Spinal stenosis of cervical region 10/06/2016    Psoriasis 07/10/2015    Laryngospasm 07/10/2015      Britni Shen MD  Past Medical History:   Diagnosis Date    Abnormal nuclear cardiac imaging test 2018    CAD (coronary artery disease)     Dyslipidemia     Elevated transaminase level 2016    GERD (gastroesophageal reflux disease)     HTN (hypertension)     Psoriasis     S/P cardiac cath 10/12/2018    10/11/18 PTCA D1, neg FFR to LCX and RCA     S/P coronary artery stent placement 2018    [facility]DM [facility]type 2 diabetes mellitus) [facility] 2016    A1c 6.4;  Glucose 195      Past Surgical History:   Procedure Laterality Date    HX HEENT      bilateral cataract extraction    HX HEENT      tonsillectomy    HX ORTHOPAEDIC      right shoulder acromioplasty    HX ORTHOPAEDIC      left achilles tendon surgery    HX PTCA  2018    BLAZE mid LAD (Synergy 2.75/38) + prox LCX (Synergy 2.5/32)     No Known Allergies   Family History   Problem Relation Age of Onset    Heart Disease Father     Heart Disease Maternal Uncle       Social History     Socioeconomic History    Marital status:      Spouse name: Not on file    Number of children: Not on file    Years of education: Not on file    Highest education level: Not on file   Social Needs    Financial resource strain: Not on file    Food insecurity - worry: Not on file    Food insecurity - inability: Not on file   [employer] needs - medical: Not on file   [employer] needs - non-medical: Not on file   Occupational History    Not on file   Tobacco Use    Smoking status: Former Smoker     Types: Cigarettes     Last attempt to quit: 1998     Years since quittin.8    Smokeless tobacco: Never Used   Substance and Sexual Activity    Alcohol use:  Yes     Alcohol/week: 0.6 oz     Types: 1 Glasses of wine per week    Drug use: No    Sexual activity: Not on file   Other Topics Concern    Not on file   Social History Narrative    Not on file      Current Outpatient Medications   Medication Sig    glucose blood VI test strips (ASCENSIA AUTODISC VI, ONE TOUCH ULTRA TEST VI) strip Truemetrix strips, Test daily; E11.9    Blood-Glucose Meter (TRUE METRIX GLUCOSE METER) misc Use daily to test glucose E11.9    lancets 30 gauge misc by Does Not Apply route daily. E11.9    liraglutide (VICTOZA 3-ILAN) 0.6 mg/0.1 mL (18 mg/3 mL) pnij 0.6 mg by SubCUTAneous route every seven (7) days.  metFORMIN (GLUCOPHAGE) 1,000 mg tablet Take 1 Tab by mouth two (2) times daily (with meals).  omeprazole (PRILOSEC) 20 mg capsule Take 20 mg by mouth daily.  rosuvastatin (CRESTOR) 10 mg tablet Take 1 Tab by mouth nightly.  aspirin delayed-release 81 mg tablet Take  by mouth daily.  clopidogrel (PLAVIX) 75 mg tab Take 1 Tab by mouth daily.  nitroglycerin (NITROSTAT) 0.4 mg SL tablet 1 Tab by SubLINGual route every five (5) minutes as needed for Chest Pain.  acyclovir (ZOVIRAX) 400 mg tablet TAKE 1 TABLET BY MOUTH ONCE DAILY     No current facility-administered medications for this visit. Review of Symptoms:    CONST  No weight change. No fever, chills, sweats    ENT No visual changes, URI sx, sore throat    CV  See HPI   RESP  No cough, or sputum, wheezing. Also see HPI   GI  No abdominal pain or change in bowel habits. No heartburn or dysphagia. No melena or rectal bleeding.   No dysuria, urgency, frequency, hematuria   MSKEL  No joint pain, swelling. No muscle pain. SKIN  No rash or lesions. NEURO  No headache, syncope, or seizure. No weakness, loss of sensation, or paresthesias. PSYCH  No low mood or depression  No anxiety. HE/LYMPH  No easy bruising, abnormal bleeding, or enlarged glands. Physical ExamPhysical Exam:    There were no vitals taken for this visit.   Gen: NAD  HEENT:  PERRL, throat clear  Neck: no adenopathy, no thyromegaly, no JVD   Heart:  Regular,Nl S1S2,  no murmur, gallop or rub.   Lungs:  clear  Abdomen:   Soft, non-tender, bowel sounds are active.   Extremities:  No edema  Pulse: symmetric  Neuro: A&O times 3, No focal neuro deficits    Cardiographics    ECG: , PRWP, NST, low voltage limb leads      Labs:   Lab Results   Component Value Date/Time    Sodium 136 12/18/2018 02:04 PM    Sodium 138 10/12/2018 02:32 AM    Sodium 139 10/04/2018 02:21 PM    Sodium 137 09/11/2018 08:53 AM    Sodium 136 07/15/2018 11:30 AM    Potassium 5.2 12/18/2018 02:04 PM    Potassium 4.0 10/12/2018 02:32 AM    Potassium 5.0 10/04/2018 02:21 PM    Potassium 5.2 (H) 09/11/2018 08:53 AM    Potassium 5.1 07/15/2018 11:30 AM    Chloride 96 12/18/2018 02:04 PM    Chloride 107 10/12/2018 02:32 AM    Chloride 101 10/04/2018 02:21 PM    Chloride 99 09/11/2018 08:53 AM    Chloride 99 07/15/2018 11:30 AM    CO2 23 12/18/2018 02:04 PM    CO2 22 10/12/2018 02:32 AM    CO2 27 10/04/2018 02:21 PM    CO2 29 09/11/2018 08:53 AM    CO2 27 07/15/2018 11:30 AM    Anion gap 9 10/12/2018 02:32 AM    Anion gap 11 10/04/2018 02:21 PM    Anion gap 9 09/11/2018 08:53 AM    Anion gap 10 07/15/2018 11:30 AM    Anion gap 8 04/13/2018 04:22 AM    Glucose 378 (H) 12/18/2018 02:04 PM    Glucose 138 (H) 10/12/2018 02:32 AM    Glucose 219 (H) 10/04/2018 02:21 PM    Glucose 475 (H) 09/11/2018 08:53 AM    Glucose 301 (H) 07/15/2018 11:30 AM    BUN 20 12/18/2018 02:04 PM    BUN 13 10/12/2018 02:32 AM    BUN 20 10/04/2018 02:21 PM    BUN 24 (H) 09/11/2018 08:53 AM    BUN 17 07/15/2018 11:30 AM    Creatinine 1.08 12/18/2018 02:04 PM    Creatinine 0.83 10/12/2018 02:32 AM    Creatinine 1.48 (H) 10/04/2018 02:21 PM    Creatinine 1.43 (H) 09/11/2018 08:53 AM    Creatinine 1.26 07/15/2018 11:30 AM    BUN/Creatinine ratio 19 12/18/2018 02:04 PM    BUN/Creatinine ratio 16 10/12/2018 02:32 AM    BUN/Creatinine ratio 14 10/04/2018 02:21 PM    BUN/Creatinine ratio 17 09/11/2018 08:53 AM    BUN/Creatinine ratio 13 07/15/2018 11:30 AM    GFR est AA 79 12/18/2018 02:04 PM    GFR est AA >60 10/12/2018 02:32 AM    GFR est AA 57 (L) 10/04/2018 02:21 PM    GFR est AA 59 (L) 09/11/2018 08:53 AM    GFR est AA >60 07/15/2018 11:30 AM    GFR est non-AA 69 12/18/2018 02:04 PM    GFR est non-AA >60 10/12/2018 02:32 AM    GFR est non-AA 47 (L) 10/04/2018 02:21 PM    GFR est non-AA 49 (L) 09/11/2018 08:53 AM    GFR est non-AA 57 (L) 07/15/2018 11:30 AM    Calcium 9.7 12/18/2018 02:04 PM    Calcium 8.2 (L) 10/12/2018 02:32 AM    Calcium 9.4 10/04/2018 02:21 PM    Calcium 9.9 09/11/2018 08:53 AM    Calcium 9.6 07/15/2018 11:30 AM    Bilirubin, total 0.3 12/18/2018 02:04 PM    Bilirubin, total 0.3 10/04/2018 02:21 PM    Bilirubin, total 0.4 09/11/2018 08:53 AM    Bilirubin, total 0.4 07/15/2018 11:30 AM    Bilirubin, total 0.5 04/11/2018 11:11 AM    AST (SGOT) 32 12/18/2018 02:04 PM    AST (SGOT) 82 (H) 10/04/2018 02:21 PM    AST (SGOT) 40 (H) 09/11/2018 08:53 AM    AST (SGOT) 39 (H) 07/15/2018 11:30 AM    AST (SGOT) 77 (H) 04/11/2018 11:11 AM    Alk. phosphatase 106 12/18/2018 02:04 PM    Alk. phosphatase 152 (H) 10/04/2018 02:21 PM    Alk. phosphatase 147 (H) 09/11/2018 08:53 AM    Alk. phosphatase 138 (H) 07/15/2018 11:30 AM    Alk.  phosphatase 138 (H) 04/11/2018 11:11 AM    Protein, total 6.9 12/18/2018 02:04 PM    Protein, total 7.6 10/04/2018 02:21 PM    Protein, total 7.9 09/11/2018 08:53 AM    Protein, total 7.9 07/15/2018 11:30 AM    Protein, total 7.8 04/11/2018 11:11 AM    Albumin 4.3 12/18/2018 02:04 PM    Albumin 3.9 10/04/2018 02:21 PM    Albumin 3.9 09/11/2018 08:53 AM    Albumin 4.0 07/15/2018 11:30 AM    Albumin 3.9 04/11/2018 11:11 AM    Globulin 3.7 10/04/2018 02:21 PM    Globulin 4.0 09/11/2018 08:53 AM    Globulin 3.9 07/15/2018 11:30 AM    Globulin 3.9 04/11/2018 11:11 AM    Globulin 3.3 03/30/2018 11:24 AM    A-G Ratio 1.7 12/18/2018 02:04 PM    A-G Ratio 1.1 10/04/2018 02:21 PM    A-G Ratio 1.0 (L) 09/11/2018 08:53 AM    A-G Ratio 1.0 (L) 07/15/2018 11:30 AM    A-G Ratio 1.0 (L) 04/11/2018 11:11 AM    ALT (SGPT) 39 12/18/2018 02:04 PM    ALT (SGPT) 103 (H) 10/04/2018 02:21 PM    ALT (SGPT) 55 09/11/2018 08:53 AM    ALT (SGPT) 65 07/15/2018 11:30 AM    ALT (SGPT) 139 (H) 04/11/2018 11:11 AM     Lab Results   Component Value Date/Time    CK 72 09/11/2018 08:53 AM     Lab Results   Component Value Date/Time    Cholesterol, total 232 (H) 09/11/2018 08:53 AM    Cholesterol, total 299 (H) 03/30/2018 11:24 AM    Cholesterol, total CANCELED 03/23/2018 03:58 PM    Cholesterol, total 344 (H) 06/23/2017 10:43 AM    HDL Cholesterol 53 09/11/2018 08:53 AM    HDL Cholesterol 49 03/30/2018 11:24 AM    HDL Cholesterol CANCELED 03/23/2018 03:58 PM    HDL Cholesterol 54 06/23/2017 10:43 AM    LDL, calculated 121.6 (H) 09/11/2018 08:53 AM    LDL, calculated 199.4 (H) 03/30/2018 11:24 AM    LDL, calculated 216 (H) 06/23/2017 10:43 AM    Triglyceride 287 (H) 09/11/2018 08:53 AM    Triglyceride 253 (H) 03/30/2018 11:24 AM    Triglyceride CANCELED 03/23/2018 03:58 PM    Triglyceride 369 (H) 06/23/2017 10:43 AM    CHOL/HDL Ratio 4.4 09/11/2018 08:53 AM    CHOL/HDL Ratio 6.1 (H) 03/30/2018 11:24 AM     No results found for this or any previous visit.     Assessment:         Patient Active Problem List    Diagnosis Date Noted    S/P cardiac cath 10/12/2018    Coronary artery disease involving native coronary artery of native heart with angina pectoris with documented spasm ([address] 04/26/2018    Angina, class III ([address] 04/12/2018    S/P coronary artery stent placement 04/12/2018    Abnormal nuclear cardiac imaging test 04/12/2018    Mixed hyperlipidemia 06/26/2017    Type 2 diabetes mellitus without complication, without long-term current use of insulin ([address] 06/23/2017    Essential hypertension 12/02/2016    Elevated transaminase level 11/30/2016    Spinal stenosis of cervical region 10/06/2016    Psoriasis 07/10/2015    Laryngospasm 07/10/2015      Hx CAD, uncontrolled DM, hypertension, dyslipidemia with SHANE/chest tightness--abnormal Stress MPI and had cardiac cath 4/12/18 at [facility]) with BLAZE x2 to mid LAD and prox LCx without complication.  Was on  dual antiplatelet rx (ASA/Plavix), statin, DM meds, Metoprolol Xl, Imdur, losartan  Recurrent intermittent chest tightness/dyspnea--not clearly exertional--seen by Dr Christina Vivas and had repeat Stress MPI 10/1/18 with Brad 4:16, , 1 mm inferior wall ST, no chest pain, MPI with inferoseptal wall ischemia (larger area than previous), LVEF 62%. Repeat Cardiac Cath 10/11/18 with patent previously placed stents, ostial D1 90%--successful PTCA with good results; mod mid RCA stenosis and LCx disease with FFR (fractional flow reserve) on both of the sites--negative and no further PCI done at that time. No current CV sx or complaints. Continues to see Dr. Christina Vivas for management of DM--HbA1c has been high and lipids suboptimal on current rx. He has stopped taking the losartan and metoprolol on his own. Plan:     Doing well with no adverse cardiac symptoms. Has stopped the losartan (and metoprolol) due to dizziness--now resolved  He is aware of low dose ACEI or ARB for renal protection but wants to hold off for now  Will try increasing the Crestor to 20mg every day (he will double up on the 10mg and if tolerates will send in rx)  Diabetes control is main issue--on Victoza, Metformin, planning to get glucometer. We discussed Jardiance as well  Continue other meds   Lipids and labs followed by PCP. Continue current care and f/u in 6 months.     Rakesh Wright MD

## 2019-02-25 NOTE — PROGRESS NOTES
PATIENT ID VERIFIED WITH TWO PATIENT IDENTIFIERS. PATIENT MEDICATIONS REVIEWED AND APPROVED BY DR. Bates Staff. MEDICATIONS THAT WERE REMOVED FROM THIS VISIT HAVE BEEN APPROVED BY DR. Bates Staff. Chief Complaint   Patient presents with    Coronary Artery Disease     4 month follow up    Hypertension    Cholesterol Problem       1. Have you been to the ER, urgent care clinic since your last visit? Hospitalized since your last visit? yes admission [id number] Oct 2018 angina    2. Have you seen or consulted any other health care providers outside of the [address] [place] [address] since your last visit? Include any pap smears or colon screening.  no

## 2019-05-30 RX ORDER — ACYCLOVIR 400 MG/1
TABLET ORAL
Qty: 90 TAB | Refills: 2 | Status: SHIPPED | OUTPATIENT
Start: 2019-05-30 | End: 2020-02-18

## 2019-05-30 RX ORDER — PANTOPRAZOLE SODIUM 40 MG/1
40 TABLET, DELAYED RELEASE ORAL DAILY
Qty: 90 TAB | Refills: 4 | Status: SHIPPED | OUTPATIENT
Start: 2019-05-30 | End: 2020-08-06

## 2019-07-06 PROBLEM — E87.20 LACTIC ACIDOSIS: Status: ACTIVE | Noted: 2019-07-06

## 2019-07-06 PROBLEM — L03.119 CELLULITIS AND ABSCESS OF HAND: Status: ACTIVE | Noted: 2019-07-06

## 2019-07-06 PROBLEM — L02.519 CELLULITIS AND ABSCESS OF HAND: Status: ACTIVE | Noted: 2019-07-06

## 2019-07-08 PROBLEM — L03.90 CELLULITIS: Status: ACTIVE | Noted: 2019-07-08

## 2019-09-19 ENCOUNTER — OFFICE VISIT (OUTPATIENT)
Dept: CARDIOLOGY CLINIC | Age: 74
End: 2019-09-19

## 2019-09-19 VITALS
WEIGHT: 216 LBS | HEART RATE: 100 BPM | OXYGEN SATURATION: 97 % | HEIGHT: 74 IN | RESPIRATION RATE: 14 BRPM | DIASTOLIC BLOOD PRESSURE: 82 MMHG | SYSTOLIC BLOOD PRESSURE: 132 MMHG | BODY MASS INDEX: 27.72 KG/M2

## 2019-09-19 DIAGNOSIS — Z95.5 S/P CORONARY ARTERY STENT PLACEMENT: ICD-10-CM

## 2019-09-19 DIAGNOSIS — I10 ESSENTIAL HYPERTENSION: ICD-10-CM

## 2019-09-19 DIAGNOSIS — E78.2 MIXED HYPERLIPIDEMIA: ICD-10-CM

## 2019-09-19 DIAGNOSIS — E11.9 TYPE 2 DIABETES MELLITUS WITHOUT COMPLICATION, WITHOUT LONG-TERM CURRENT USE OF INSULIN (HCC): ICD-10-CM

## 2019-09-19 DIAGNOSIS — I25.111 CORONARY ARTERY DISEASE INVOLVING NATIVE CORONARY ARTERY OF NATIVE HEART WITH ANGINA PECTORIS WITH DOCUMENTED SPASM (HCC): Primary | ICD-10-CM

## 2019-09-19 RX ORDER — ROSUVASTATIN CALCIUM 10 MG/1
10 TABLET, COATED ORAL
Qty: 90 TAB | Refills: 3 | Status: SHIPPED | OUTPATIENT
Start: 2019-09-19 | End: 2020-05-01

## 2019-09-19 NOTE — PROGRESS NOTES
Verified patient with two patient identifiers. Medications reviewed/approved by Dr. Gillian Richards. A verbal order from Dr. Gillian Richards was received with [facility] to remove any medications that were deleted during the visit. Medication(s) removed:  None    Chief Complaint   Patient presents with    Coronary Artery Disease     6 month follow up    Hypertension    Cholesterol Problem     1. Have you been to the ER, urgent care clinic since your last visit? Hospitalized since your last visit? Yes, [facility] admission 7/2019 for cellulitis. 2. Have you seen or consulted any other health care providers outside of the [address] [place] [address] since your last visit? Include any pap smears or colon screening.   no

## 2019-09-19 NOTE — PROGRESS NOTES
Sander Carbajal is a 72 y.o. male is here for routine f/u. Hx CAD, uncontrolled DM, hypertension, dyslipidemia with SHANE/chest tightness--abnormal Stress MPI and had cardiac cath 4/12/18 at [facility]) with BLAZE x2 to mid LAD and prox LCx without complication.  Was on  dual antiplatelet rx (ASA/Plavix), statin, DM meds, Metoprolol Xl, Imdur, losartan  Recurrent intermittent chest tightness/dyspnea--not clearly exertional--seen by Dr Herzog Grounds had repeat Stress MPI 10/1/18 with Brad 4:16, , 1 mm inferior wall ST, no chest pain, MPI with inferoseptal wall ischemia (larger area than previous), LVEF 62%. Repeat Cardiac Cath 10/11/18 with patent previously placed stents, ostial D1 90%--successful PTCA with good results; mod mid RCA stenosis and LCx disease with FFR (fractional flow reserve) on both of the sites--negative and no further PCI done at that time. Continues to see Dr. Polo Valle for management of DM--HbA1c has been high and lipids suboptimal on current rx. He has stopped taking the losartan, statin, and metoprolol on his own. Hospitalized over summer with LUE cellulitis--IV antibiotics. No current CV sx or complaints. The patient denies chest pain/ shortness of breath, orthopnea, PND, LE edema, palpitations, syncope, presyncope or fatigue.        Patient Active Problem List    Diagnosis Date Noted    Cellulitis 07/08/2019    Cellulitis and abscess of hand 07/06/2019    Lactic acidosis 07/06/2019    S/P cardiac cath 10/12/2018    Coronary artery disease involving native coronary artery of native heart with angina pectoris with documented spasm ([address] 04/26/2018    Angina, class III ([address] 04/12/2018    S/P coronary artery stent placement 04/12/2018    Abnormal nuclear cardiac imaging test 04/12/2018    Mixed hyperlipidemia 06/26/2017    Type 2 diabetes mellitus without complication, without long-term current use of insulin ([address] 06/23/2017    Essential hypertension 12/02/2016  Elevated transaminase level 2016    Spinal stenosis of cervical region 10/06/2016    Psoriasis 07/10/2015    Laryngospasm 07/10/2015      Sandy Patton MD  Past Medical History:   Diagnosis Date    Abnormal nuclear cardiac imaging test 2018    CAD (coronary artery disease)     Dyslipidemia     Elevated transaminase level 2016    GERD (gastroesophageal reflux disease)     HTN (hypertension)     Psoriasis     S/P cardiac cath 10/12/2018    10/11/18 PTCA D1, neg FFR to LCX and RCA     S/P coronary artery stent placement 2018    T2DM (type 2 diabetes mellitus) ([facility] 2016    A1c 6.4;  Glucose 195      Past Surgical History:   Procedure Laterality Date    HX HEENT      bilateral cataract extraction    HX HEENT      tonsillectomy    HX ORTHOPAEDIC      right shoulder acromioplasty    HX ORTHOPAEDIC      left achilles tendon surgery    HX PTCA  2018    BLAZE mid LAD (Synergy 2.75/38) + prox LCX (Synergy 2.5/32)     No Known Allergies   Family History   Problem Relation Age of Onset    Heart Disease Father     Heart Disease Maternal Uncle       Social History     Socioeconomic History    Marital status:      Spouse name: Not on file    Number of children: Not on file    Years of education: Not on file    Highest education level: Not on file   Occupational History    Not on file   Social Needs    Financial resource strain: Not on file    Food insecurity:     Worry: Not on file     Inability: Not on file    Transportation needs:     Medical: Not on file     Non-medical: Not on file   Tobacco Use    Smoking status: Former Smoker     Types: Cigarettes     Last attempt to quit: 1998     Years since quittin.4    Smokeless tobacco: Never Used   Substance and Sexual Activity    Alcohol use:  Yes     Alcohol/week: 1.0 standard drinks     Types: 1 Glasses of wine per week     Frequency: 4 or more times a week     Drinks per session: 1 or 2     Binge frequency: Never    Drug use: No    Sexual activity: Not Currently   Lifestyle    Physical activity:     Days per week: Not on file     Minutes per session: Not on file    Stress: Not on file   Relationships    Social connections:     Talks on phone: Not on file     Gets together: Not on file     Attends [employer] service: Not on file     Active member of club or organization: Not on file     Attends meetings of clubs or organizations: Not on file     Relationship status: Not on file    Intimate partner violence:     Fear of current or ex partner: Not on file     Emotionally abused: Not on file     Physically abused: Not on file     Forced sexual activity: Not on file   Other Topics Concern    Not on file   Social History Narrative    Not on file      Current Outpatient Medications   Medication Sig    Insulin Syringe-Needle U-100 0.5 mL 29 gauge x 1/2\" syrg 1 Syringe by Does Not Apply route daily as needed.  insulin glargine (LANTUS,BASAGLAR) 100 unit/mL (3 mL) inpn Inject 30 units every am:  E11.9  Indications: type 2 diabetes mellitus    Blood-Glucose Meter monitoring kit Use daily:  E11.9    metFORMIN (GLUCOPHAGE) 500 mg tablet Take 2 Tabs by mouth daily.  pantoprazole (PROTONIX) 40 mg tablet Take 1 Tab by mouth daily.  acyclovir (ZOVIRAX) 400 mg tablet TAKE 1 TABLET BY MOUTH ONCE DAILY    glucose blood VI test strips (ASCENSIA AUTODISC VI, ONE TOUCH ULTRA TEST VI) strip Truemetrix strips, Test daily; E11.9    Blood-Glucose Meter (TRUE METRIX GLUCOSE METER) misc Use daily to test glucose E11.9    lancets 30 gauge misc by Does Not Apply route daily. E11.9    aspirin delayed-release 81 mg tablet Take  by mouth daily.  nitroglycerin (NITROSTAT) 0.4 mg SL tablet 1 Tab by SubLINGual route every five (5) minutes as needed for Chest Pain. No current facility-administered medications for this visit. Review of Symptoms:    CONST  No weight change.  No fever, chills, sweats    ENT No visual changes, URI sx, sore throat    CV  See HPI   RESP  No cough, or sputum, wheezing. Also see HPI   GI  No abdominal pain or change in bowel habits. No heartburn or dysphagia. No melena or rectal bleeding.   No dysuria, urgency, frequency, hematuria   MSKEL  No joint pain, swelling. No muscle pain. SKIN  No rash or lesions. NEURO  No headache, syncope, or seizure. No weakness, loss of sensation, or paresthesias. PSYCH  No low mood or depression  No anxiety. HE/LYMPH  No easy bruising, abnormal bleeding, or enlarged glands. Physical ExamPhysical Exam:    There were no vitals taken for this visit.   Gen: NAD  HEENT:  PERRL, throat clear  Neck: no adenopathy, no thyromegaly, no JVD   Heart:  Regular,Nl S1S2,  no murmur, gallop or rub.   Lungs:  clear  Abdomen:   Soft, non-tender, bowel sounds are active.   Extremities:  No edema  Pulse: symmetric  Neuro: A&O times 3, No focal neuro deficits    Cardiographics    ECG: from 7/6/19--NSR, wnl    Labs:   Lab Results   Component Value Date/Time    Sodium 139 07/08/2019 06:00 AM    Sodium 139 07/06/2019 09:29 AM    Sodium 136 05/02/2019 11:47 AM    Sodium 136 12/18/2018 02:04 PM    Sodium 138 10/12/2018 02:32 AM    Potassium 3.9 07/08/2019 06:00 AM    Potassium 4.5 07/06/2019 09:29 AM    Potassium 4.8 05/02/2019 11:47 AM    Potassium 5.2 12/18/2018 02:04 PM    Potassium 4.0 10/12/2018 02:32 AM    Chloride 102 07/08/2019 06:00 AM    Chloride 100 07/06/2019 09:29 AM    Chloride 99 05/02/2019 11:47 AM    Chloride 96 12/18/2018 02:04 PM    Chloride 107 10/12/2018 02:32 AM    CO2 25 07/08/2019 06:00 AM    CO2 23 07/06/2019 09:29 AM    CO2 25 05/02/2019 11:47 AM    CO2 23 12/18/2018 02:04 PM    CO2 22 10/12/2018 02:32 AM    Anion gap 12 07/08/2019 06:00 AM    Anion gap 16 (H) 07/06/2019 09:29 AM    Anion gap 12 05/02/2019 11:47 AM    Anion gap 9 10/12/2018 02:32 AM    Anion gap 11 10/04/2018 02:21 PM    Glucose 274 (H) 07/08/2019 06:00 AM Glucose 321 (H) 07/06/2019 09:29 AM    Glucose 301 (H) 05/02/2019 11:47 AM    Glucose 378 (H) 12/18/2018 02:04 PM    Glucose 138 (H) 10/12/2018 02:32 AM    BUN 14 07/08/2019 06:00 AM    BUN 16 07/06/2019 09:29 AM    BUN 20 05/02/2019 11:47 AM    BUN 20 12/18/2018 02:04 PM    BUN 13 10/12/2018 02:32 AM    Creatinine 1.02 07/08/2019 06:00 AM    Creatinine 1.29 07/06/2019 09:29 AM    Creatinine 1.26 05/02/2019 11:47 AM    Creatinine 1.08 12/18/2018 02:04 PM    Creatinine 0.83 10/12/2018 02:32 AM    BUN/Creatinine ratio 14 07/08/2019 06:00 AM    BUN/Creatinine ratio 12 07/06/2019 09:29 AM    BUN/Creatinine ratio 16 05/02/2019 11:47 AM    BUN/Creatinine ratio 19 12/18/2018 02:04 PM    BUN/Creatinine ratio 16 10/12/2018 02:32 AM    GFR est AA >60 07/08/2019 06:00 AM    GFR est AA >60 07/06/2019 09:29 AM    GFR est AA >60 05/02/2019 11:47 AM    GFR est AA 79 12/18/2018 02:04 PM    GFR est AA >60 10/12/2018 02:32 AM    GFR est non-AA >60 07/08/2019 06:00 AM    GFR est non-AA 55 (L) 07/06/2019 09:29 AM    GFR est non-AA 56 (L) 05/02/2019 11:47 AM    GFR est non-AA 69 12/18/2018 02:04 PM    GFR est non-AA >60 10/12/2018 02:32 AM    Calcium 8.7 07/08/2019 06:00 AM    Calcium 9.2 07/06/2019 09:29 AM    Calcium 9.4 05/02/2019 11:47 AM    Calcium 9.7 12/18/2018 02:04 PM    Calcium 8.2 (L) 10/12/2018 02:32 AM    Bilirubin, total 0.4 07/06/2019 09:29 AM    Bilirubin, total 0.4 05/02/2019 11:47 AM    Bilirubin, total 0.3 12/18/2018 02:04 PM    Bilirubin, total 0.3 10/04/2018 02:21 PM    Bilirubin, total 0.4 09/11/2018 08:53 AM    AST (SGOT) 33 07/06/2019 09:29 AM    AST (SGOT) 61 (H) 05/02/2019 11:47 AM    AST (SGOT) 32 12/18/2018 02:04 PM    AST (SGOT) 82 (H) 10/04/2018 02:21 PM    AST (SGOT) 40 (H) 09/11/2018 08:53 AM    Alk. phosphatase 122 (H) 07/06/2019 09:29 AM    Alk. phosphatase 168 (H) 05/02/2019 11:47 AM    Alk. phosphatase 106 12/18/2018 02:04 PM    Alk. phosphatase 152 (H) 10/04/2018 02:21 PM    Alk.  phosphatase 147 (H) 09/11/2018 08:53 AM    Protein, total 7.4 07/06/2019 09:29 AM    Protein, total 8.0 05/02/2019 11:47 AM    Protein, total 6.9 12/18/2018 02:04 PM    Protein, total 7.6 10/04/2018 02:21 PM    Protein, total 7.9 09/11/2018 08:53 AM    Albumin 3.6 07/06/2019 09:29 AM    Albumin 3.9 05/02/2019 11:47 AM    Albumin 4.3 12/18/2018 02:04 PM    Albumin 3.9 10/04/2018 02:21 PM    Albumin 3.9 09/11/2018 08:53 AM    Globulin 3.8 07/06/2019 09:29 AM    Globulin 4.1 (H) 05/02/2019 11:47 AM    Globulin 3.7 10/04/2018 02:21 PM    Globulin 4.0 09/11/2018 08:53 AM    Globulin 3.9 07/15/2018 11:30 AM    A-G Ratio 0.9 (L) 07/06/2019 09:29 AM    A-G Ratio 1.0 (L) 05/02/2019 11:47 AM    A-G Ratio 1.7 12/18/2018 02:04 PM    A-G Ratio 1.1 10/04/2018 02:21 PM    A-G Ratio 1.0 (L) 09/11/2018 08:53 AM    ALT (SGPT) 52 07/06/2019 09:29 AM    ALT (SGPT) 77 05/02/2019 11:47 AM    ALT (SGPT) 39 12/18/2018 02:04 PM    ALT (SGPT) 103 (H) 10/04/2018 02:21 PM    ALT (SGPT) 55 09/11/2018 08:53 AM     Lab Results   Component Value Date/Time    CK 72 09/11/2018 08:53 AM     Lab Results   Component Value Date/Time    Cholesterol, total 232 (H) 09/11/2018 08:53 AM    Cholesterol, total 299 (H) 03/30/2018 11:24 AM    Cholesterol, total CANCELED 03/23/2018 03:58 PM    Cholesterol, total 344 (H) 06/23/2017 10:43 AM    HDL Cholesterol 53 09/11/2018 08:53 AM    HDL Cholesterol 49 03/30/2018 11:24 AM    HDL Cholesterol CANCELED 03/23/2018 03:58 PM    HDL Cholesterol 54 06/23/2017 10:43 AM    LDL, calculated 121.6 (H) 09/11/2018 08:53 AM    LDL, calculated 199.4 (H) 03/30/2018 11:24 AM    LDL, calculated 216 (H) 06/23/2017 10:43 AM    Triglyceride 287 (H) 09/11/2018 08:53 AM    Triglyceride 253 (H) 03/30/2018 11:24 AM    Triglyceride CANCELED 03/23/2018 03:58 PM    Triglyceride 369 (H) 06/23/2017 10:43 AM    CHOL/HDL Ratio 4.4 09/11/2018 08:53 AM    CHOL/HDL Ratio 6.1 (H) 03/30/2018 11:24 AM     No results found for this or any previous visit.     Assessment: Patient Active Problem List    Diagnosis Date Noted    Cellulitis 07/08/2019    Cellulitis and abscess of hand 07/06/2019    Lactic acidosis 07/06/2019    S/P cardiac cath 10/12/2018    Coronary artery disease involving native coronary artery of native heart with angina pectoris with documented spasm ([facility] [address] 04/26/2018    Angina, class III ([address] 04/12/2018    S/P coronary artery stent placement 04/12/2018    Abnormal nuclear cardiac imaging test 04/12/2018    Mixed hyperlipidemia 06/26/2017    Type 2 diabetes mellitus without complication, without long-term current use of insulin ([facility] [address] [facility] 06/23/2017    Essential hypertension 12/02/2016    Elevated transaminase level 11/30/2016    Spinal stenosis of cervical region 10/06/2016    Psoriasis 07/10/2015    Laryngospasm 07/10/2015     Hx CAD, uncontrolled DM, hypertension, dyslipidemia with SHANE/chest tightness--abnormal Stress MPI and had cardiac cath 4/12/18 at [facility]) with BLAZE x2 to mid LAD and prox LCx without complication.  Was on  dual antiplatelet rx (ASA/Plavix), statin, DM meds, Metoprolol Xl, Imdur, losartan  Recurrent intermittent chest tightness/dyspnea--not clearly exertional--seen by Dr Alethea Marie had repeat Stress MPI 10/1/18 with Brad 4:16, , 1 mm inferior wall ST, no chest pain, MPI with inferoseptal wall ischemia (larger area than previous), LVEF 62%. Repeat Cardiac Cath 10/11/18 with patent previously placed stents, ostial D1 90%--successful PTCA with good results; mod mid RCA stenosis and LCx disease with FFR (fractional flow reserve) on both of the sites--negative and no further PCI done at that time. Continues to see Dr. Karey Levi for management of DM--HbA1c has been high and lipids suboptimal on current rx. He has stopped taking the losartan, statin, and metoprolol on his own. Hospitalized over summer with LUE cellulitis--IV antibiotics. No current CV sx or complaints.       Plan:     Doing well with no adverse cardiac symptoms. Has agree to retry statin--Crestor 10mg  Continue DM meds   Lipids and labs followed by PCP--f/u next month as planned  Discussed ARB (or ACEi0 again. Continue current care and f/u in 6 months.     Tl Alvarez MD

## 2020-02-18 RX ORDER — ACYCLOVIR 400 MG/1
TABLET ORAL
Qty: 90 TAB | Refills: 1 | Status: SHIPPED | OUTPATIENT
Start: 2020-02-18 | End: 2020-08-06

## 2021-04-05 ENCOUNTER — VIRTUAL VISIT (OUTPATIENT)
Dept: ENDOCRINOLOGY | Age: 76
End: 2021-04-05
Payer: MEDICARE

## 2021-04-05 DIAGNOSIS — E11.9 TYPE 2 DIABETES MELLITUS WITHOUT COMPLICATION, WITHOUT LONG-TERM CURRENT USE OF INSULIN (HCC): Primary | ICD-10-CM

## 2021-04-05 PROCEDURE — 99204 OFFICE O/P NEW MOD 45 MIN: CPT | Performed by: INTERNAL MEDICINE

## 2021-04-05 NOTE — PROGRESS NOTES
This is a new pt visit conducted via telemedicine using [employer] video. The patient has been instructed that this meets HIPAA criteria ,that they may receive a bill for these services and acknowledges and agrees to this method of visitation. 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 Patient tells me that he was diagnosed with diabetes about 5 years ago. His hemoglobin A1c's have been consistently elevated with a maximum of 13.7%. Over the last several years he has been able to get the A1c is down into the 9.5-10.5% range. He has been on Metformin, Victoza, and insulin. He does not remember much about the Metformin and Victoza. He does remember that the insulin caused weight gain up to a weight of 228 pounds. He then decided that he was going to stop all antihyperglycemics and he is using \"The Obesity Code\" book as a strategy. The strategy primarily is low-carb low-fat intermittent fasting. He does say that he has lost weight down to a current weight of 190 pounds. Previously his blood sugars were ranging in the high 300s. His blood sugars now range between 200-240. The lowest blood sugar he has seen is 175. When he saw Dr. Xiomara Plasencia last in October 2020, his A1c was 10.1%, AST and ALT were elevated consistent with fatty liver disease and his thyroid panel was normal.    He has a past medical history of coronary disease and is status post drug-eluting stent placement in 2018  He has a history of psoriatic arthritis    Current diabetes medications  None    He is primarily eating a very low-carb low-fat diet. He can have salad or soups. He can have chicken with vegetables but no carb. He eats no red meat. He drinks no carbohydrate-containing beverages. He drinks diet Sprite or water or occasionally milk. His physical activity is primarily playing golf. He primarily rides the cart.   He does have some dyspnea on exertion and is unable to walk very far. Review of Systems - General ROS: negative  Psychological ROS: negative  Ophthalmic ROS: negative  ENT ROS: negative  Respiratory ROS: positive for - shortness of breath  Cardiovascular ROS: positive for - dyspnea on exertion  Gastrointestinal ROS: no abdominal pain, change in bowel habits, or black or bloody stools  Genito-Urinary ROS: positive for - nocturia x 3  Musculoskeletal ROS: negative  Neurological ROS: positive for - numbness/tingling  Dermatological ROS: negative    GENERAL: NCAT, Appears well nourished   EYES: EOMI, non-icteric, no proptosis   Ear/Nose/Throat: NCAT, no visible inflammation or masses   CARDIOVASCULAR: no cyanosis, no visible JVD   RESPIRATORY: comfortable respirations observed, no cyanosis   MUSCULOSKELETAL: Normal ROM of upper extremities observed   SKIN: No edema, rash, or other significant changes observed   NEUROLOGIC:  AAOx3   PSYCHIATRIC: Normal affect, Normal insight and judgement       Impression  1. Type 2 diabetes mellitus with a recent A1c of 10.1%  2.  Obesity with recent weight loss secondary to a low-carb diet  3. Coronary disease status post stent placement x2    Plan:  1. This gentleman is very reticent to begin any antihyperglycemic therapy. 2.  He would prefer to stay on his low-carb intermittent fasting strategy  3. I advised him that if he is going to continue this, I was happy to follow along with him and support him. We mutually agreed to the following:   A. He will continue the current diet and monitor blood sugar in the morning along with his weight   B. As long as he continues to lose weight and his blood sugars fall we will continue the current strategy   C. He has agreed that if his weight continues to fall but his blood sugar begins to go back up he will reach out to me sooner rather than later and we will likely begin some therapy.   A GLP-1 is the most likely scenario given his documented  heart disease however an SGLT2 inhibitor might be a possibility as well. 4.  I will see him back in 4 months.

## 2021-08-25 RX ORDER — PANTOPRAZOLE SODIUM 40 MG/1
TABLET, DELAYED RELEASE ORAL
Qty: 90 TABLET | Refills: 3 | Status: SHIPPED | OUTPATIENT
Start: 2021-08-25 | End: 2022-08-22

## 2021-09-30 RX ORDER — ACYCLOVIR 400 MG/1
TABLET ORAL
Qty: 90 TABLET | Refills: 5 | Status: SHIPPED | OUTPATIENT
Start: 2021-09-30

## 2021-10-05 ENCOUNTER — TELEPHONE (OUTPATIENT)
Dept: FAMILY MEDICINE CLINIC | Age: 76
End: 2021-10-05

## 2021-10-05 ENCOUNTER — HOSPITAL ENCOUNTER (EMERGENCY)
Age: 76
Discharge: HOME | End: 2021-10-05
Attending: EMERGENCY MEDICINE
Payer: MEDICARE

## 2021-10-05 ENCOUNTER — APPOINTMENT (OUTPATIENT)
Dept: GENERAL RADIOLOGY | Age: 76
End: 2021-10-05
Attending: EMERGENCY MEDICINE
Payer: MEDICARE

## 2021-10-05 VITALS
OXYGEN SATURATION: 97 % | RESPIRATION RATE: 16 BRPM | TEMPERATURE: 97.7 F | SYSTOLIC BLOOD PRESSURE: 123 MMHG | DIASTOLIC BLOOD PRESSURE: 81 MMHG | HEART RATE: 107 BPM | BODY MASS INDEX: 24.9 KG/M2 | HEIGHT: 74 IN | WEIGHT: 194 LBS

## 2021-10-05 DIAGNOSIS — S91.332A PENETRATING WOUND OF LEFT FOOT, INITIAL ENCOUNTER: Primary | ICD-10-CM

## 2021-10-05 PROCEDURE — 99282 EMERGENCY DEPT VISIT SF MDM: CPT

## 2021-10-05 PROCEDURE — 73630 X-RAY EXAM OF FOOT: CPT

## 2021-10-05 RX ORDER — CEPHALEXIN 500 MG/1
500 CAPSULE ORAL 4 TIMES DAILY
Qty: 28 CAPSULE | Refills: 0 | Status: SHIPPED | OUTPATIENT
Start: 2021-10-05 | End: 2021-10-12

## 2021-10-05 NOTE — TELEPHONE ENCOUNTER
Received call from patient stating that he has had a wound on his foot for approximately 1 month. States about 2 weeks ago his son dug a piece of ?wood out of the wound. Site is increasing sore. Denies increased redness. Denies fever. States he called the local podiatry office but is unable to get an appointment for more than a month. Patient told to come into office at 7:30 on Friday AM. Patient instructed that if S/S worsen to go to ER, verbalized understanding.

## 2021-10-05 NOTE — ED PROVIDER NOTES
EMERGENCY DEPARTMENT HISTORY AND PHYSICAL EXAM          Date: 10/5/2021  Patient Name: Michel Altman    History of Presenting Illness     Chief Complaint   Patient presents with    Skin Infection       History Provided By: Patient    HPI: Michel Altman is a 78 y.o. male, pmhx listed below, who presents to the ED c/o foot pain. Patient reports he stepped on something approximately 2 weeks ago. Reports his son removed a \"piece of wood\" from foot 1 week ago. Patient continues to have pain in ball of foot. Patient's friend came over today and \"drained pus\" from the foot wound. Patient denies fever. Has not seen a doctor in person for this injury. Now reports mild pain especially when he walks on the ball of left foot. PCP: Curt Travis MD    There are no other complaints, changes, or physical findings at this time.          Past History       Past Medical History:  Past Medical History:   Diagnosis Date    Abnormal nuclear cardiac imaging test 4/12/2018    CAD (coronary artery disease)     Dyslipidemia     Elevated transaminase level 09/2016    GERD (gastroesophageal reflux disease)     HTN (hypertension)     Psoriasis     S/P cardiac cath 10/12/2018    10/11/18 PTCA D1, neg FFR to LCX and RCA     S/P coronary artery stent placement 4/12/2018    T2DM (type 2 diabetes mellitus) ([facility] [address] 09/2016    A1c 6.4;  Glucose 195       Past Surgical History:  Past Surgical History:   Procedure Laterality Date    HX HEENT      bilateral cataract extraction    HX HEENT      tonsillectomy    HX ORTHOPAEDIC      right shoulder acromioplasty    HX ORTHOPAEDIC      left achilles tendon surgery    HX PTCA  04/12/2018    BLAZE mid LAD (Synergy 2.75/38) + prox LCX (Synergy 2.5/32)       Family History:  Family History   Problem Relation Age of Onset    Heart Disease Father     Heart Disease Maternal Uncle        Social History:  Social History     Tobacco Use    Smoking status: Former Smoker     Types: Cigarettes     Quit date: 1998     Years since quittin.4    Smokeless tobacco: Never Used   Substance Use Topics    Alcohol use: Yes     Alcohol/week: 1.0 standard drinks     Types: 1 Glasses of wine per week    Drug use: No       Current Outpatient Medications   Medication Sig Dispense Refill    cephALEXin (Keflex) 500 mg capsule Take 1 Capsule by mouth four (4) times daily for 7 days. 28 Capsule 0    acyclovir (ZOVIRAX) 400 mg tablet TAKE 1 TABLET BY MOUTH ONCE DAILY 90 Tablet 5    pantoprazole (PROTONIX) 40 mg tablet TAKE 1 TABLET BY MOUTH EVERY DAY 90 Tablet 3    atorvastatin (LIPITOR) 80 mg tablet TAKE ONE-HALF TABLET BY MOUTH AT BEDTIME . FOR CHOLESTEROL. AVOID GRAPEFRUIT AND ITS JUICE.  insulin glargine (LANTUS) 100 unit/mL injection INJECT 42 UNITS SUBCUTANEOUSLY ONCE DAILY DO NOT MIX WITH OTHER INSULINS IN SAME SYRINGE. DISCARD OPENED VIAL AFTER 28 DAYS      insulin regular (NOVOLIN R, HUMULIN R) 100 unit/mL injection Inject 5 units SQ BID  Indications: type 2 diabetes mellitus 3 Vial 5    Insulin Syringe-Needle U-100 0.5 mL 29 gauge x 1/2\" syrg 1 Syringe by Does Not Apply route two (2) times a day. 100 Syringe 20    flash glucose scanning reader (FreeStyle Rai 14 Day [place]) misc 1 Each by Does Not Apply route five (5) times daily. 1 Each 1    flash glucose sensor (FreeStyle Rai 14 Day Sensor) kit 1 Each by Does Not Apply route Once every 2 weeks. 2 Kit 25    Blood-Glucose Meter monitoring kit Use daily:  E11.9 1 Kit 0    glucose blood VI test strips (ASCENSIA AUTODISC VI, ONE TOUCH ULTRA TEST VI) strip Truemetrix strips, Test daily; E11.9 100 Strip 5    Blood-Glucose Meter (TRUE METRIX GLUCOSE METER) misc Use daily to test glucose E11.9 1 Each 0    lancets 30 gauge misc by Does Not Apply route daily. E11.9 100 Lancet 3    aspirin delayed-release 81 mg tablet Take  by mouth daily.       nitroglycerin (NITROSTAT) 0.4 mg SL tablet 1 Tab by SubLINGual route every five (5) minutes as needed for Chest Pain. 25 Tab 1       Allergies:  No Known Allergies      Review of Systems   Review of Systems   Constitutional: Negative for chills and fever. HENT: Negative for ear pain. Eyes: Negative for pain. Respiratory: Negative for shortness of breath. Cardiovascular: Negative for chest pain. Gastrointestinal: Negative for abdominal pain. Genitourinary: Negative for flank pain. Musculoskeletal: Negative for back pain. Skin: Negative for rash. Neurological: Negative for headaches. Psychiatric/Behavioral: Negative for agitation. Physical Exam     Vital Signs-Reviewed the patient's vital signs. Patient Vitals for the past 12 hrs:   Temp Pulse Resp BP SpO2   10/05/21 1533 97.7 °F (36.5 °C) (!) 107 16 123/81 97 %       Physical Exam  Vitals reviewed. HENT:      Head: Normocephalic and atraumatic. Mouth/Throat:      Mouth: Mucous membranes are moist.   Cardiovascular:      Rate and Rhythm: Normal rate. Pulmonary:      Effort: Pulmonary effort is normal.   Musculoskeletal:         General: Normal range of motion. Cervical back: Normal range of motion. Comments: See photo below. Circular region with mild erythema and central white wart like lesion noted on ball of left foot. Normal DP pulse   Skin:     General: Skin is warm and dry. Neurological:      Mental Status: He is alert and oriented to person, place, and time. Psychiatric:         Mood and Affect: Mood normal.                 Diagnostic Study Results     Labs -   No results found for this or any previous visit (from the past 12 hour(s)). Radiologic Studies -   XR FOOT LT MIN 3 V   Final Result   No acute abnormality. CT Results  (Last 48 hours)    None        CXR Results  (Last 48 hours)    None            Medical Decision Making   I am the first provider for this patient.     I reviewed the vital signs, available nursing notes, past medical history, past surgical history, family history and social history. Records Reviewed: Nursing Notes and Old Medical Records    Provider Notes (Medical Decision Making):   MDM: 53-year-old male with lesion on foot that has appearance of plantar wart. Minimal surrounding erythema with no warmth or purulent drainage. Unclear what happened at patient's house and what was \"drained\" from foot, patient does not know if his friend inserted a needle or scalpel into the lesion. Will initiate antibiotics because patient is a diabetic and had an unknown wound procedure at home today. Diagnosis     Clinical Impression:   1. Penetrating wound of left foot, initial encounter            Disposition:  Discharged    Discharge Medication List as of 10/5/2021  4:20 PM      START taking these medications    Details   cephALEXin (Keflex) 500 mg capsule Take 1 Capsule by mouth four (4) times daily for 7 days. , Normal, Disp-28 Capsule, R-0         CONTINUE these medications which have NOT CHANGED    Details   acyclovir (ZOVIRAX) 400 mg tablet TAKE 1 TABLET BY MOUTH ONCE DAILY, Normal, Disp-90 Tablet, R-5      pantoprazole (PROTONIX) 40 mg tablet TAKE 1 TABLET BY MOUTH EVERY DAY, Normal, Disp-90 Tablet, R-3      atorvastatin (LIPITOR) 80 mg tablet TAKE ONE-HALF TABLET BY MOUTH AT BEDTIME . FOR CHOLESTEROL. AVOID GRAPEFRUIT AND ITS JUICE., Historical Med      insulin glargine (LANTUS) 100 unit/mL injection INJECT 42 UNITS SUBCUTANEOUSLY ONCE DAILY DO NOT MIX WITH OTHER INSULINS IN SAME SYRINGE. DISCARD OPENED VIAL AFTER 28 DAYS, Historical Med      insulin regular (NOVOLIN R, HUMULIN R) 100 unit/mL injection Inject 5 units SQ BID  Indications: type 2 diabetes mellitus, Normal, Disp-3 Vial, R-5      Insulin Syringe-Needle U-100 0.5 mL 29 gauge x 1/2\" syrg 1 Syringe by Does Not Apply route two (2) times a day., Normal, Disp-100 Syringe, R-20      flash glucose scanning reader (FreeStyle Rai 14 Day [place]) misc 1 Each by Does Not Apply route five (5) times daily. , Normal, Disp-1 Each, R-1Patient willing to pay cash if insurance will not cover. flash glucose sensor (FreeStyle Rai 14 Day Sensor) kit 1 Each by Does Not Apply route Once every 2 weeks. , Normal, Disp-2 Kit, R-25Patient willing to pay cash if insurance will not pay      Blood-Glucose Meter monitoring kit Use daily:  E11.9, Normal, Disp-1 Kit, R-0      glucose blood VI test strips (ASCENSIA AUTODISC VI, ONE TOUCH ULTRA TEST VI) strip Truemetrix strips, Test daily; E11.9, Print, Disp-100 Strip, R-5      Blood-Glucose Meter (TRUE METRIX GLUCOSE METER) misc Use daily to test glucose E11.9, Print, Disp-1 Each, R-0      lancets 30 gauge misc by Does Not Apply route daily. E11.9, Print, Disp-100 Lancet, R-3      aspirin delayed-release 81 mg tablet Take  by mouth daily. , Historical Med      nitroglycerin (NITROSTAT) 0.4 mg SL tablet 1 Tab by SubLINGual route every five (5) minutes as needed for Chest Pain., Normal, Disp-25 Tab, R-1               Please note, this dictation was completed with [employer], the [employer] voice recognition software. Quite often unanticipated grammatical, syntax, homophones, and other interpretive errors are inadvertently transcribed by the computer software. Please disregard these errors. Please excuse any errors that have escaped final proof reading.

## 2021-10-08 ENCOUNTER — OFFICE VISIT (OUTPATIENT)
Dept: FAMILY MEDICINE CLINIC | Age: 76
End: 2021-10-08
Payer: MEDICARE

## 2021-10-08 VITALS
HEART RATE: 89 BPM | DIASTOLIC BLOOD PRESSURE: 80 MMHG | HEIGHT: 74 IN | TEMPERATURE: 98.6 F | RESPIRATION RATE: 17 BRPM | SYSTOLIC BLOOD PRESSURE: 142 MMHG | WEIGHT: 198.8 LBS | BODY MASS INDEX: 25.51 KG/M2 | OXYGEN SATURATION: 100 %

## 2021-10-08 DIAGNOSIS — E11.9 TYPE 2 DIABETES MELLITUS WITHOUT COMPLICATION, WITHOUT LONG-TERM CURRENT USE OF INSULIN (HCC): ICD-10-CM

## 2021-10-08 DIAGNOSIS — L40.50 PSORIATIC ARTHRITIS (HCC): ICD-10-CM

## 2021-10-08 DIAGNOSIS — L02.612 CELLULITIS AND ABSCESS OF TOE OF LEFT FOOT: Primary | ICD-10-CM

## 2021-10-08 DIAGNOSIS — L03.032 CELLULITIS AND ABSCESS OF TOE OF LEFT FOOT: Primary | ICD-10-CM

## 2021-10-08 DIAGNOSIS — I25.111 CORONARY ARTERY DISEASE INVOLVING NATIVE CORONARY ARTERY OF NATIVE HEART WITH ANGINA PECTORIS WITH DOCUMENTED SPASM (HCC): ICD-10-CM

## 2021-10-08 PROCEDURE — 99214 OFFICE O/P EST MOD 30 MIN: CPT | Performed by: INTERNAL MEDICINE

## 2021-10-08 NOTE — PROGRESS NOTES
Chief Complaint   Patient presents with    Foot Injury     foot laceration injury ED f/u. Has been on Keflex 500 mg     Diabetes     pt stopped all diabetic medications 8 months ago after reading a book called \" Obesity code\". States he has lost alot of weight and feels so much better now. .       3 most recent PHQ Screens 11/2/2020   Little interest or pleasure in doing things Not at all   Feeling down, depressed, irritable, or hopeless Not at all   Total Score PHQ 2 0     Learning Assessment 10/8/2021   PRIMARY LEARNER Patient   CO-LEARNER CAREGIVER -   PRIMARY LANGUAGE ENGLISH   LEARNER PREFERENCE PRIMARY READING   ANSWERED BY patient   RELATIONSHIP SELF     Fall Risk Assessment, last 12 mths 10/8/2021   Able to walk? Yes   Fall in past 12 months? 1   Do you feel unsteady? 0   Are you worried about falling 0   Is TUG test greater than 12 seconds? 0   Is the gait abnormal? 0   Number of falls in past 12 months 2   Fall with injury? 1     Abuse Screening Questionnaire 10/8/2021   Do you ever feel afraid of your partner? N   Are you in a relationship with someone who physically or mentally threatens you? N   Is it safe for you to go home? Y     ADL Assessment 10/8/2021   Feeding yourself No Help Needed   Getting from bed to chair No Help Needed   Getting dressed No Help Needed   Bathing or showering No Help Needed   Walk across the room (includes cane/walker) No Help Needed   Using the telphone No Help Needed   Taking your medications No Help Needed   Preparing meals No Help Needed   Managing money (expenses/bills) No Help Needed   Moderately strenuous housework (laundry) No Help Needed   Shopping for personal items (toiletries/medicines) No Help Needed   Shopping for groceries No Help Needed   Driving No Help Needed   Climbing a flight of stairs No Help Needed   Getting to places beyond walking distances No Help Needed     1. Have you been to the ER, urgent care clinic since your last visit?   Hospitalized since your last visit? No    2. Have you seen or consulted any other health care providers outside of the [address] [place] [address] since your last visit? Include any pap smears or colon screening. No      Chief Complaint   Patient presents with    Foot Injury     foot laceration injury ED f/u. Has been on Keflex 500 mg     Diabetes         Visit Vitals  BP (!) 142/80 (BP 1 Location: Left arm, BP Patient Position: Sitting, BP Cuff Size: Adult long)   Pulse 89   Temp 98.6 °F (37 °C) (Temporal)   Resp 17   Ht 6' 2\" (1.88 m)   Wt 198 lb 12.8 oz (90.2 kg)   SpO2 100%   BMI 25.52 kg/m²       Pain Scale: 0 - No pain/10  Pain Location:     Rae Martinez is a 78 y.o. male presenting for/with: Foot Injury (foot laceration injury ED f/u. Has been on Keflex 500 mg ) and Diabetes      Symptom review:    NO  Fever   NO  Shaking chills  NO  Cough  NO  Body aches  NO  Coughing up blood  NO  Chest congestion  NO  Chest pain  NO  Shortness of breath  NO  Profound Loss of smell/taste  NO  Nausea/Vomiting   NO  Loose stool/Diarrhea  NO  any skin issues    Patient Risk Factors Reviewed as follows:  NO  have you been in Close contact with confirmed COVID19 patient   NO  History of recent travel to affected geographical areas within the past 14 days  NO  COPD  NO  Active Cancer/Leukemia/Lymphoma/Chemotherapy  NO  Oral steroid use  NO  Pregnant  NO  Diabetes Mellitus  NO  Heart disease  NO  Asthma  NO Health care worker at home  NO Health care worker  NO Is there a Pregnant Woman in the home  NO Dialysis pt in the home   NO a large number of people living in the home    Recent Travel Screening and Travel History documentation     Travel Screening     Question   Response    In the last month, have you been in contact with someone who was confirmed or suspected to have Coronavirus / COVID-19? No / Unsure    Have you had a COVID-19 viral test in the last 14 days? No    Do you have any of the following new or worsening symptoms?   None of these    Have you traveled internationally or domestically in the last month?   No      Travel History   Travel since 09/08/21     No documented travel since 09/08/21

## 2021-10-08 NOTE — Clinical Note
Bronson Womack  Please see if you can help Christiano Nolan get a referral for Diabetic Education.   Referral is in  Thanks  J

## 2021-10-09 LAB
EST. AVERAGE GLUCOSE BLD GHB EST-MCNC: 318 MG/DL
HBA1C MFR BLD: 12.7 % (ref 4–5.6)

## 2021-10-09 NOTE — PROGRESS NOTES
Patient notified by phone of lab results. Agrees to attend Diabetic teaching.   Not ready to accept medication at this time  Kasia

## 2021-10-11 NOTE — PROGRESS NOTES
Referral is in [facility] system. They will see this referral and call pt to schedule.

## 2021-10-29 ENCOUNTER — VIRTUAL VISIT (OUTPATIENT)
Dept: DIABETES SERVICES | Age: 76
End: 2021-10-29
Payer: MEDICARE

## 2021-10-29 DIAGNOSIS — E11.9 TYPE 2 DIABETES MELLITUS WITHOUT COMPLICATION, WITHOUT LONG-TERM CURRENT USE OF INSULIN (HCC): ICD-10-CM

## 2021-10-29 PROCEDURE — G0108 DIAB MANAGE TRN  PER INDIV: HCPCS

## 2021-10-29 NOTE — PROGRESS NOTES
[facility]  Diabetes Self-Management Education & Support Program  Pre-program Assessment    Reason for Referral: DSMES  Referral Source: Raina Payne MD  Services requested: DSMES    ASSESSMENT    From my perspective, the participant would benefit from [facility] specifically related to Reducing risks, Healthy eating, Monitoring, Physical activity, Taking medications, Healthy coping and Problem solving. Declined: Diabetes Skills, Confidence, and Preparedness Index. 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 He reported weaning himself off all diabetes medications and recent weight loss of 30 pounds. He has declined diabetes education at this time. He stated that he would call for future appointment as needed. Diabetes Self-Management Education Follow-up Visit:  Mr. Lana Belcher to call to schedule future appointment as needed. Recommendations:  Mr. Lana Belcher may benefit from CGM technology. Mr. Lana Belcher may benefit from antidiabetic medications. Mr. Lana Belcher may benefit from medical nutrition therapy. Mr. Lana Belcher may benefit from physical therapy. Clinical Presentation  Tony Mackenzie is a 78 y.o. White male referred for diabetes self-management education. Participant has Type 2 DM not on insulin for 1-10 years. Family history positive for diabetes. Patient reports receiving DSMES services in the past. Most recent A1c value:   Lab Results   Component Value Date/Time    Hemoglobin A1c 12.7 (H) 10/08/2021 07:53 AM       Diabetes-related medical history:  Neurological complications  Peripheral neuropathy    Diabetes-related medications:  Current dosing:   Key Antihyperglycemic Medications             insulin glargine (LANTUS) 100 unit/mL injection INJECT 42 UNITS SUBCUTANEOUSLY ONCE DAILY DO NOT MIX WITH OTHER INSULINS IN SAME SYRINGE. DISCARD OPENED VIAL AFTER 28 DAYS    insulin regular (NOVOLIN R, HUMULIN R) 100 unit/mL injection Inject 5 units SQ BID  Indications: type 2 diabetes mellitus        Mr. Leyla Bowman reported that he is not currently taking any medications for diabetes management. Blood Pressure Management  Key ACE/ARB Medications     Patient is on no ACE or ARB meds. Lipid Management  Key Antihyperlipidemia Meds             atorvastatin (LIPITOR) 80 mg tablet TAKE ONE-HALF TABLET BY MOUTH AT BEDTIME . FOR CHOLESTEROL. AVOID GRAPEFRUIT AND ITS JUICE. Clot Prevention  Key Anti-Platelet Anticoagulant Meds             aspirin delayed-release 81 mg tablet Take  by mouth daily. Learning Assessment  Learning objectives Educator assessment (10/29/2021)   Diabetes Disease Process  The participant can   A) describe diabetes in basic terms;   B) state the type of diabetes they have; &   C) state accepted blood glucose targets. Healthy Eating  The participant can   A) identify carbohydrate foods; &   B) accurately read food labels. Being Active  The participant can  A) state the benefits of physical activity;  B) report their current PA practices;  C) identify PA they would consider incorporating in their lives; &  D) develop an implementation plan. Monitoring  The participant can  A) operate their blood glucose meter; &  B) describe how they log their blood glucoses to share with their provider. Taking Medications  The participant can  A) name their diabetes medications;  B) state the purpose and dose;  C) note side effects; &  D) describe proper storage, disposal & transport (if appropriate). Healthy Coping  The participant can    A) describe their response to diabetes diagnosis; B) describe their specific coping mechanisms;  C) identify supportive people and/or other resources that positively support their diabetes self-care and health.     Reducing Risks  The participant can describe the preventive measures used by providers to promote health and prevent diabetes complications. Problem Solving  The participant can   A) identify signs, symptoms & treatment of hypoglycemia;   B) identify signs, symptoms & treatment of hyperglycemia;  C) describe their sick day plan; &  D) identify BG patterns to discuss with their provider. Yes  Yes  No        Yes  Yes        Yes  Yes  No  No        not currently monitoring  No          n/a  n/a  n/a  n/a      Yes  Yes  Yes        Yes          unknown  unknown  unknown  unknown     Characteristics to Learning   Barriers to Learning   [] Cognitive loss  [] Mental retardation   [] Intellectual delay/cognitive impairment  [] Psychiatric disorder  [] Visually impaired  [] Hearing loss                 [] Low literacy (difficulty with written text)  [] Low numeracy (difficulty with mathematical information  [] Low health literacy (difficulty with understanding health information & services  [] Language  [] Functional limitation  [x] Pain: cellulitis, foot abscess   [] Financial  [] Transportation  [] None    Location: Lives in [address] to Learn   [] Lecture  [] Slides  [] Reading [] Video-Internet  [] Cassettes/CDs/MP3's  [] Interactive Small Groups [x] Other: unknown       Behavioral Assessment  Current self-care practices  Educator assessment (10/29/2021)   Healthy Eating  Current practices   Dietary Practices:  Read book called \"Obesity Code\" and is following book guidelines including low/no carb, low fat, and fasting. Would benefit from [facility] related to Healthy Eating: Yes      Eats a carbohydrate controlled diet: Yes      Stage of change: Precontemplation     Mr. Radha Kathleen reported skipping meals and eating limited/no carbs. Discussed current eating practices. Mr. Radha Kathleen may benefit from medical nutrition therapy.      Being Active  Current practices  How many days during the past week have you performed physical activity where your heart beats faster and your breathing is harder than normal for 30 minutes or more?  0 days    How many days in a typical week do you perform activity such as this?  0 days     Would benefit from [facility] related to Being Active: Yes      Exercises 150 minutes/week: No      Stage of change: Precontemplation    Mr. Malcom Bernheim reported limited physical activity due to pain r/t peripheral neuropathy and foot wound/ulcer. Discussed physical activity options including chair exercises. Mr. Malcom Bernheim may benefit from physical therapy. Monitoring  Current practices  Do you monitor your blood sugar? No    How often do you monitor? Never      Do you know your last A1c measurement? Yes    Do you know the meaning of the A1c? Yes     Would benefit from [facility] related to Monitoring: Yes      Uses BG readings to establish trends and understand BG patterns: No      Stage of change: Precontemplation     Mr. Malcom Bernheim stated that he quit checking BG and just didn't see the point in it. Discussed CGM technology and encouraged communication with provider regarding monitoring BG. Mr. Malcom Bernheim may benefit from CGM technology. Taking Medication  Current practices  Do you understand what your diabetes medications do? n/a    How often do you miss doses of your diabetes medications? Not taking diabetes medication    Can you afford your diabetes medications? n/a   Would benefit from [facility] related to Taking Medication: Yes      Takes medications consistently to receive full benefit: No      Stage of change: Precontemplation    Mr. Malcom Bernheim reported that he has weaned his self off of all diabetes medications. Discussed medication options for diabetes management. Mr. Malcom Bernheim may benefit antidiabetic medication.        Healthy Coping   Current state  Diabetes Skills, Confidence and Preparedness Index:  Declined   Would benefit from DSMES related to Healthy Coping: Yes      Identifies specific people, organizations,etc, that actively support their diabetes self-care efforts: Yes      Stage of change: Precontemplation     Reducing Risks  Current state  Vaccines:  Influenza:   Immunization History   Administered Date(s) Administered    Influenza Vaccine 09/01/2019    Influenza Vaccine (Quad) PF (>6 Mo Flulaval, Fluarix, and >3 Yrs Afluria, Fluzone 10699) 10/12/2018    Influenza, Quadrivalent, Adjuvanted (>65 Yrs FLUAD QUAD 79641) 10/22/2020       Pneumococcal:   Immunization History   Administered Date(s) Administered    Pneumococcal Conjugate (PCV-13) 12/03/2016, 10/01/2018    Pneumococcal Polysaccharide (PPSV-23) 11/02/2020        Hepatitis: There is no immunization history for the selected administration types on file for this patient. Examinations:  Diabetic Foot and Eye Exam HM Status   Topic Date Due    Diabetic Foot Care  09/10/2019    Eye Exam  06/21/2021        Dental exam: DUE    Foot exam: DUE    Heart Protection:  BP Readings from Last 2 Encounters:   10/08/21 (!) 142/80   10/05/21 123/81        Lab Results   Component Value Date/Time    LDL, calculated 223 (H) 10/22/2020 08:42 AM    LDL, calculated 189 (H) 07/07/2020 11:32 AM        Kidney Protection:  Lab Results   Component Value Date/Time    Microalb/Creat ratio (ug/mg creat.) 20.9 06/23/2017 10:49 AM        Would benefit from DSMES related to Reducing Risks:  Yes      Actively participates in decision-making with provider regarding secondary prevention:  Yes      Stage of change: Precontemplation   Problem Solving  Current state  Hypoglycemia Management:  What are signs and symptoms of hypoglycemia that you experience? unknown    How do you prevent hypoglycemia? unknown    How do you treat hypoglycemia? unknown    Hyperglycemia Management:  What are signs and symptoms of hyperglycemia that you experience? unknown    How can you prevent hyperglycemia? unknown    Sick Day Management:  What do you do differently on sick days? unknown    Pattern Management:  Do you notice blood glucose patterns when you look at the readings in your meter or logbook? No    How do you use the blood glucose readings from your meter or logbook? not recording BG     Would benefit from DSMES related to Problem Solving: Yes      Articulates appropriate strategies to address hypoglycemia, hyperglycemia, sick day care and BG pattern: unknown      Stage of change: Precontemplation     Note: Content derived from the American Association of Diabetes Educators' Diabetes Education Curriculum: A Guide to Successful Self-Management (3rd edition)      Charlene Frederick RN on 10/29/2021 at 2:26 PM    I have personally reviewed the health record, including provider notes, laboratory data and current medications before making these care and education recommendations. The time spent in this effort is included in the total time. Total minutes: 52    [facility] Emergency Adaptations for Telehealth:  Laila Del Cid was evaluated through a synchronous (real-time) audio-video encounter. The patient (or guardian if applicable) is aware that this is a billable service. Verbal consent to proceed has been obtained within the past 12 months. The visit was conducted pursuant to the emergency declaration under the [facility], [phone] waiver authority and the [employer] General Act. Patient identification was verified, and a caregiver was present when  appropriate. The patient was located in a state where the provider was credentialed to provide care.

## 2021-11-02 ENCOUNTER — TELEPHONE (OUTPATIENT)
Dept: FAMILY MEDICINE CLINIC | Age: 76
End: 2021-11-02

## 2021-11-02 NOTE — TELEPHONE ENCOUNTER
----- Message from Ember Tripp sent at 11/2/2021 11:35 AM EDT -----  Subject: Message to Provider    QUESTIONS  Information for Provider? Costa Velasquez called today stating that he was   recommended to take Victoza after his diabetic appt. and wants to know if   Dr. Pineda Cheek agrees with this recommendation.  ---------------------------------------------------------------------------  --------------  Yanni CHAVEZ  What is the best way for the office to contact you? OK to leave message on   voicemail  Preferred Call Back Phone Number? [phone]  ---------------------------------------------------------------------------  --------------  SCRIPT ANSWERS  Relationship to Patient?  Self

## 2021-11-05 ENCOUNTER — VIRTUAL VISIT (OUTPATIENT)
Dept: FAMILY MEDICINE CLINIC | Age: 76
End: 2021-11-05
Payer: MEDICARE

## 2021-11-05 DIAGNOSIS — E11.9 TYPE 2 DIABETES MELLITUS WITHOUT COMPLICATION, WITHOUT LONG-TERM CURRENT USE OF INSULIN (HCC): Primary | ICD-10-CM

## 2021-11-05 PROCEDURE — G2025 DIS SITE TELE SVCS RHC/FQHC: HCPCS | Performed by: INTERNAL MEDICINE

## 2021-11-05 RX ORDER — LIRAGLUTIDE 6 MG/ML
1.8 INJECTION SUBCUTANEOUS
Qty: 4 ADJUSTABLE DOSE PRE-FILLED PEN SYRINGE | Refills: 4 | Status: SHIPPED | OUTPATIENT
Start: 2021-11-05 | End: 2021-11-08 | Stop reason: SDUPTHER

## 2021-11-05 NOTE — PROGRESS NOTES
Lory Duane is a 78 y.o. male evaluated via telephone on 11/5/2021. Consent:  He and/or health care decision maker is aware that that he may receive a bill for this telephone service, depending on his insurance coverage, and has provided verbal consent to proceed: Yes    A/P:  1. Type 2 diabetes mellitus without complication, without long-term current use of insulin (HCC)  Resume liraglutide now. Patient education. Will follow up in February for A1c.  - liraglutide (Victoza 3-Eber) 0.6 mg/0.1 mL (18 mg/3 mL) pnij; 1.8 mg by SubCUTAneous route every seven (7) days. 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 Please refer to the diabetic education note most recently posted. Briefly, Dr. Leopoldo Mutters discontinued all of his medications after reading a book which advocated dietary management alone for his diabetes. After starting the new program and stopping his medications his A1c estefany from 10 all the way to 12. After much reflection, he agrees to resuming liraglutide. The risks, benefits and alternatives were reviewed with him and this will be sent to Tyler and he will start today. He has previously tolerated the 1.8 mg subcu dose weekly. I affirm this is a Patient Initiated Episode with an Established Patient who has not had a related appointment within my department in the past 7 days or scheduled within the next 24 hours. On this date 11/05/2021 I have spent 25 minutes reviewing previous notes, test results and face to face with the patient discussing the diagnosis and importance of compliance with the treatment plan as well as documenting on the day of the visit.      Note: not billable if this call serves to triage the patient into an appointment for the relevant concern      Day Harris MD

## 2021-11-08 DIAGNOSIS — E11.9 TYPE 2 DIABETES MELLITUS WITHOUT COMPLICATION, WITHOUT LONG-TERM CURRENT USE OF INSULIN (HCC): ICD-10-CM

## 2021-11-08 RX ORDER — LIRAGLUTIDE 6 MG/ML
1.8 INJECTION SUBCUTANEOUS
Qty: 9 ML | Refills: 2 | Status: SHIPPED | OUTPATIENT
Start: 2021-11-08

## 2021-11-08 RX ORDER — LIRAGLUTIDE 6 MG/ML
1.8 INJECTION SUBCUTANEOUS
Qty: 21.6 MG | Refills: 2 | Status: SHIPPED | OUTPATIENT
Start: 2021-11-08 | End: 2021-11-08 | Stop reason: SDUPTHER

## 2022-03-18 PROBLEM — L03.90 CELLULITIS: Status: ACTIVE | Noted: 2019-07-08

## 2022-03-18 PROBLEM — L02.519 CELLULITIS AND ABSCESS OF HAND: Status: ACTIVE | Noted: 2019-07-06

## 2022-03-18 PROBLEM — R93.1 ABNORMAL NUCLEAR CARDIAC IMAGING TEST: Status: ACTIVE | Noted: 2018-04-12

## 2022-03-18 PROBLEM — Z98.890 S/P CARDIAC CATH: Status: ACTIVE | Noted: 2018-10-12

## 2022-03-18 PROBLEM — L03.119 CELLULITIS AND ABSCESS OF HAND: Status: ACTIVE | Noted: 2019-07-06

## 2022-03-19 PROBLEM — E78.2 MIXED HYPERLIPIDEMIA: Status: ACTIVE | Noted: 2017-06-26

## 2022-03-19 PROBLEM — I20.9 ANGINA, CLASS III (HCC): Status: ACTIVE | Noted: 2018-04-12

## 2022-03-19 PROBLEM — Z95.5 S/P CORONARY ARTERY STENT PLACEMENT: Status: ACTIVE | Noted: 2018-04-12

## 2022-03-19 PROBLEM — I25.111 CORONARY ARTERY DISEASE INVOLVING NATIVE CORONARY ARTERY OF NATIVE HEART WITH ANGINA PECTORIS WITH DOCUMENTED SPASM (HCC): Status: ACTIVE | Noted: 2018-04-26

## 2022-03-19 PROBLEM — E11.9 TYPE 2 DIABETES MELLITUS WITHOUT COMPLICATION, WITHOUT LONG-TERM CURRENT USE OF INSULIN (HCC): Status: ACTIVE | Noted: 2017-06-23

## 2022-03-20 PROBLEM — E87.20 LACTIC ACIDOSIS: Status: ACTIVE | Noted: 2019-07-06

## 2022-03-22 ENCOUNTER — TELEPHONE (OUTPATIENT)
Dept: FAMILY MEDICINE CLINIC | Age: 77
End: 2022-03-22

## 2022-03-22 NOTE — TELEPHONE ENCOUNTER
Called pt & attempted to schedule 3 MO fu/A1C check and pt refused. Only wants Dr. Sheryl Mendoza to place orders for LABS. ----- Message from Bernice Barrios sent at 3/22/2022 11:56 AM EDT -----  Subject: Message to Provider    QUESTIONS  Information for Provider? Pt stated that his PCP wants his A1C checked, Pt   stated that he \"just comes in\" please f/u with the pt for further   instructions.   ---------------------------------------------------------------------------  --------------  CALL BACK INFO  What is the best way for the office to contact you? OK to leave message on   voicemail  Preferred Call Back Phone Number?  [phone]  ---------------------------------------------------------------------------  --------------  SCRIPT ANSWERS  undefined

## 2022-03-23 DIAGNOSIS — E11.9 TYPE 2 DIABETES MELLITUS WITHOUT COMPLICATION, WITHOUT LONG-TERM CURRENT USE OF INSULIN (HCC): Primary | ICD-10-CM

## 2022-03-23 DIAGNOSIS — I10 ESSENTIAL HYPERTENSION: ICD-10-CM

## 2022-03-25 ENCOUNTER — LAB ONLY (OUTPATIENT)
Dept: FAMILY MEDICINE CLINIC | Age: 77
End: 2022-03-25

## 2022-03-25 DIAGNOSIS — I10 ESSENTIAL HYPERTENSION: ICD-10-CM

## 2022-03-25 DIAGNOSIS — E11.9 TYPE 2 DIABETES MELLITUS WITHOUT COMPLICATION, WITHOUT LONG-TERM CURRENT USE OF INSULIN (HCC): ICD-10-CM

## 2022-03-27 DIAGNOSIS — E11.9 TYPE 2 DIABETES MELLITUS WITHOUT COMPLICATION, WITHOUT LONG-TERM CURRENT USE OF INSULIN (HCC): Primary | ICD-10-CM

## 2022-03-27 DIAGNOSIS — I10 ESSENTIAL HYPERTENSION: ICD-10-CM

## 2022-03-27 LAB
ALBUMIN SERPL-MCNC: 3.5 G/DL (ref 3.5–5)
ALBUMIN/GLOB SERPL: 1.1 {RATIO} (ref 1.1–2.2)
ALP SERPL-CCNC: 98 U/L (ref 45–117)
ALT SERPL-CCNC: 23 U/L (ref 12–78)
ANION GAP SERPL CALC-SCNC: 8 MMOL/L (ref 5–15)
AST SERPL-CCNC: 17 U/L (ref 15–37)
BASOPHILS # BLD: 0 K/UL (ref 0–0.1)
BASOPHILS NFR BLD: 1 % (ref 0–1)
BILIRUB SERPL-MCNC: 0.4 MG/DL (ref 0.2–1)
BUN SERPL-MCNC: 12 MG/DL (ref 6–20)
BUN/CREAT SERPL: 10 (ref 12–20)
CALCIUM SERPL-MCNC: 8.8 MG/DL (ref 8.5–10.1)
CHLORIDE SERPL-SCNC: 98 MMOL/L (ref 97–108)
CHOLEST SERPL-MCNC: 313 MG/DL
CO2 SERPL-SCNC: 26 MMOL/L (ref 21–32)
CREAT SERPL-MCNC: 1.22 MG/DL (ref 0.7–1.3)
DIFFERENTIAL METHOD BLD: ABNORMAL
EOSINOPHIL # BLD: 0.1 K/UL (ref 0–0.4)
EOSINOPHIL NFR BLD: 2 % (ref 0–7)
ERYTHROCYTE [DISTWIDTH] IN BLOOD BY AUTOMATED COUNT: 12.4 % (ref 11.5–14.5)
EST. AVERAGE GLUCOSE BLD GHB EST-MCNC: 286 MG/DL
GLOBULIN SER CALC-MCNC: 3.1 G/DL (ref 2–4)
GLUCOSE SERPL-MCNC: 480 MG/DL (ref 65–100)
HBA1C MFR BLD: 11.6 % (ref 4–5.6)
HCT VFR BLD AUTO: 39.2 % (ref 36.6–50.3)
HDLC SERPL-MCNC: 58 MG/DL
HDLC SERPL: 5.4 {RATIO} (ref 0–5)
HGB BLD-MCNC: 12.9 G/DL (ref 12.1–17)
IMM GRANULOCYTES # BLD AUTO: 0 K/UL (ref 0–0.04)
IMM GRANULOCYTES NFR BLD AUTO: 1 % (ref 0–0.5)
LDLC SERPL CALC-MCNC: 204.2 MG/DL (ref 0–100)
LYMPHOCYTES # BLD: 1.5 K/UL (ref 0.8–3.5)
LYMPHOCYTES NFR BLD: 38 % (ref 12–49)
MCH RBC QN AUTO: 30 PG (ref 26–34)
MCHC RBC AUTO-ENTMCNC: 32.9 G/DL (ref 30–36.5)
MCV RBC AUTO: 91.2 FL (ref 80–99)
MONOCYTES # BLD: 0.3 K/UL (ref 0–1)
MONOCYTES NFR BLD: 9 % (ref 5–13)
NEUTS SEG # BLD: 1.8 K/UL (ref 1.8–8)
NEUTS SEG NFR BLD: 49 % (ref 32–75)
NRBC # BLD: 0 K/UL (ref 0–0.01)
NRBC BLD-RTO: 0 PER 100 WBC
PLATELET # BLD AUTO: 210 K/UL (ref 150–400)
PMV BLD AUTO: 10 FL (ref 8.9–12.9)
POTASSIUM SERPL-SCNC: 4.4 MMOL/L (ref 3.5–5.1)
PROT SERPL-MCNC: 6.6 G/DL (ref 6.4–8.2)
RBC # BLD AUTO: 4.3 M/UL (ref 4.1–5.7)
SODIUM SERPL-SCNC: 132 MMOL/L (ref 136–145)
TRIGL SERPL-MCNC: 254 MG/DL (ref ?–150)
VLDLC SERPL CALC-MCNC: 50.8 MG/DL
WBC # BLD AUTO: 3.8 K/UL (ref 4.1–11.1)

## 2022-03-27 RX ORDER — ROSUVASTATIN CALCIUM 5 MG/1
5 TABLET, COATED ORAL
Qty: 30 TABLET | Refills: 5
Start: 2022-03-28

## 2022-03-27 RX ORDER — INSULIN GLARGINE 100 [IU]/ML
INJECTION, SOLUTION SUBCUTANEOUS
Qty: 1 PEN | Refills: 5 | Status: SHIPPED | OUTPATIENT
Start: 2022-03-27 | End: 2022-04-05 | Stop reason: SDUPTHER

## 2022-03-27 NOTE — PROGRESS NOTES
Patient notified by phone of lab results. A1c and glucose remain very elevated. He has not been taking his statin or insulin. Simply Victoza daily. Tired but otherwise no complaints. Has been in contact with the diabetic educator previously but not recently. Discussed options today. He prefers a minimalist approach at this time. Specific recommendations made today included discontinuing atorvastatin-he is not taking it-and substituting rosuvastatin 5 mg 3 times a week; Lantus-currently not taking-begin 10 units at bedtime. He will consider this and if he feels that this is something he can follow through on he will let me know and we can send this to Tyler.

## 2022-03-27 NOTE — PROGRESS NOTES
A1c and glucose remain very elevated. He has not been taking his statin or insulin. Simply Victoza daily. Tired but otherwise no complaints. Has been in contact with the diabetic educator previously but not recently. Discussed options today. He prefers a minimalist approach at this time. Specific recommendations made today included discontinuing atorvastatin-he is not taking it-and substituting rosuvastatin 5 mg 3 times a week; Lantus-currently not taking-begin 10 units at bedtime. He will consider this and if he feels that this is something he can follow through on he will let me know and we can send this to Tyler.

## 2022-03-28 ENCOUNTER — TELEPHONE (OUTPATIENT)
Dept: FAMILY MEDICINE CLINIC | Age: 77
End: 2022-03-28

## 2022-03-28 ENCOUNTER — TELEPHONE (OUTPATIENT)
Dept: DIABETES SERVICES | Age: 77
End: 2022-03-28

## 2022-03-28 DIAGNOSIS — E11.9 TYPE 2 DIABETES MELLITUS WITHOUT COMPLICATION, WITHOUT LONG-TERM CURRENT USE OF INSULIN (HCC): Primary | ICD-10-CM

## 2022-03-28 NOTE — TELEPHONE ENCOUNTER
----- Message from Randy Edwards sent at 3/27/2022 12:08 PM EDT -----  Regarding: Lab Notification:  Samples unable to be obtained  We have been notified that samples could not be obtained for the patient below's most recent lab work. Please place new orders prior to the patient's return. If additional assistance is required, please contact Client Services at [phone].     Patient:  Samuel Forbes  :  1945  Ordering Provider:  Ady Bergman MD  Test:  Tawanna Otero    Thank you,    [employer] [facility] Client Services

## 2022-03-28 NOTE — TELEPHONE ENCOUNTER
Mr. Joey Darnell contacted regarding diabetes recommendations per provider. Mr. Joey Darnell stated he is aware of his elevated A1c test results and is considering his treatment options. He stated he did not have any questions/concerns at this time. Encouraged participant to call with any questions/concerns regarding diabetes management/education.

## 2022-04-05 DIAGNOSIS — I10 ESSENTIAL HYPERTENSION: ICD-10-CM

## 2022-04-05 DIAGNOSIS — E11.9 TYPE 2 DIABETES MELLITUS WITHOUT COMPLICATION, WITHOUT LONG-TERM CURRENT USE OF INSULIN (HCC): ICD-10-CM

## 2022-04-05 RX ORDER — INSULIN GLARGINE 100 [IU]/ML
INJECTION, SOLUTION SUBCUTANEOUS
Qty: 1 PEN | Refills: 5 | Status: SHIPPED | OUTPATIENT
Start: 2022-04-05 | End: 2022-07-20

## 2022-05-24 NOTE — PROGRESS NOTES
Mr. Brea Oakes is a 78 y.o. male who is here for evaluation of   Chief Complaint   Patient presents with    Annual Wellness Visit    Diabetes     Reports that stopped victoza last night after running out. Reports N/V/D for at least 6 months while taking medicaiton. Does not check glucose on a regular basis. Denies recent eye exam    Medication Refill     Requests refill of NItro to [address] Peripheral Neuropathy     C/O (B)LE neuropathy causing patient to fall. Reports frequent falls, always at home.  Immunization/Injection     Updated COVID vaccine completed at [facility], request for date sent - Denies other vaccines   . ASSESSMENT AND PLAN:    1. Medicare annual wellness visit, subsequent  2. Type 2 diabetes mellitus without complication, without long-term current use of insulin ([facility])  Increasing Lantus to 20 units a day. 3. Psoriatic arthritis ([address]  Currently off DMARDS  4. Type 2 diabetes mellitus with hyperglycemia, with long-term current use of insulin ([address]  Discontinued Victoza due to cost  5. Type 2 diabetes mellitus with stage 3a chronic kidney disease, with long-term current use of insulin (HCC)  6. Stage 3a chronic kidney disease ([address]  7. 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 No orders of the defined types were placed in this encounter. 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  He has a history of psoriasis, diabetes, hyperlipidemia and reflux. He has struggled with diabetic management over the last several years and has recently engaged in our diabetic teaching program.  He has tried several different medications. Compliance has been uncertain. He has tried alternative approaches to diabetic management without success. No hypoglycemia. Neuropathy is worsening.   Now has symptoms to the knees. Is having some gastroparesis symptoms including nausea and vomiting occasionally. Would like to increase Lantus to 20 units daily. Stopping Victoza largely due to price. He is losing strength. Spends a lot of time in bed. Increasingly weak. ROS:  Denies  fever, chills, cough, chest pain, SOB,  nausea, vomiting, or diarrhea. Denies wt loss, wt gain, hemoptysis, hematochezia or melena. Physical Examination:    Visit Vitals  /89 (BP 1 Location: Left upper arm, BP Patient Position: Sitting, BP Cuff Size: Adult long)   Pulse (!) 110   Temp 97.9 °F (36.6 °C) (Temporal)   Resp 18   Ht 6' 2\" (1.88 m)   Wt 204 lb 3.2 oz (92.6 kg)   SpO2 99%   BMI 26.22 kg/m²      General:  Alert, cooperative, no distress. Head:  Normocephalic, without obvious abnormality, atraumatic. Eyes:  Conjunctivae/corneas clear. Pupils equal, round, reactive to light. Extraocular movements intact. Lungs:   Clear to auscultation bilaterally. Chest wall:  No tenderness or deformity. Cardiac:  RRR   Abdomen:   Soft, non-tender. Bowel sounds normal. No masses. No organomegaly. Extremities: Extremities normal, atraumatic, no cyanosis or edema. Pulses: 2+ and symmetric all extremities. Skin: Skin color, texture, turgor normal. No rashes or lesions. Lymph nodes: Cervical, supraclavicular, and axillary nodes normal.   Neurologic: CNII-XII intact. Normal strength, sensation, and reflexes throughout.                  Clarence Alves MD FACP    (signed electronically) on 5/26/2022 at 2:50 PM

## 2022-05-26 ENCOUNTER — OFFICE VISIT (OUTPATIENT)
Dept: FAMILY MEDICINE CLINIC | Age: 77
End: 2022-05-26
Payer: MEDICARE

## 2022-05-26 VITALS
TEMPERATURE: 97.9 F | RESPIRATION RATE: 18 BRPM | BODY MASS INDEX: 26.21 KG/M2 | SYSTOLIC BLOOD PRESSURE: 132 MMHG | HEART RATE: 110 BPM | WEIGHT: 204.2 LBS | HEIGHT: 74 IN | DIASTOLIC BLOOD PRESSURE: 89 MMHG | OXYGEN SATURATION: 99 %

## 2022-05-26 DIAGNOSIS — N18.31 TYPE 2 DIABETES MELLITUS WITH STAGE 3A CHRONIC KIDNEY DISEASE, WITH LONG-TERM CURRENT USE OF INSULIN (HCC): ICD-10-CM

## 2022-05-26 DIAGNOSIS — E11.22 TYPE 2 DIABETES MELLITUS WITH STAGE 3A CHRONIC KIDNEY DISEASE, WITH LONG-TERM CURRENT USE OF INSULIN (HCC): ICD-10-CM

## 2022-05-26 DIAGNOSIS — E11.9 TYPE 2 DIABETES MELLITUS WITHOUT COMPLICATION, WITHOUT LONG-TERM CURRENT USE OF INSULIN (HCC): ICD-10-CM

## 2022-05-26 DIAGNOSIS — I25.111 CORONARY ARTERY DISEASE INVOLVING NATIVE CORONARY ARTERY OF NATIVE HEART WITH ANGINA PECTORIS WITH DOCUMENTED SPASM (HCC): ICD-10-CM

## 2022-05-26 DIAGNOSIS — Z79.4 TYPE 2 DIABETES MELLITUS WITH STAGE 3A CHRONIC KIDNEY DISEASE, WITH LONG-TERM CURRENT USE OF INSULIN (HCC): ICD-10-CM

## 2022-05-26 DIAGNOSIS — R79.89 LOW TESTOSTERONE: ICD-10-CM

## 2022-05-26 DIAGNOSIS — Z00.00 MEDICARE ANNUAL WELLNESS VISIT, SUBSEQUENT: Primary | ICD-10-CM

## 2022-05-26 DIAGNOSIS — E11.65 TYPE 2 DIABETES MELLITUS WITH HYPERGLYCEMIA, WITH LONG-TERM CURRENT USE OF INSULIN (HCC): ICD-10-CM

## 2022-05-26 DIAGNOSIS — Z79.4 TYPE 2 DIABETES MELLITUS WITH HYPERGLYCEMIA, WITH LONG-TERM CURRENT USE OF INSULIN (HCC): ICD-10-CM

## 2022-05-26 DIAGNOSIS — I10 ESSENTIAL HYPERTENSION: ICD-10-CM

## 2022-05-26 DIAGNOSIS — N18.31 STAGE 3A CHRONIC KIDNEY DISEASE (HCC): ICD-10-CM

## 2022-05-26 DIAGNOSIS — L40.50 PSORIATIC ARTHRITIS (HCC): ICD-10-CM

## 2022-05-26 PROBLEM — N18.30 CHRONIC RENAL DISEASE, STAGE III (HCC): Status: ACTIVE | Noted: 2022-05-26

## 2022-05-26 PROCEDURE — G0439 PPPS, SUBSEQ VISIT: HCPCS | Performed by: INTERNAL MEDICINE

## 2022-05-26 PROCEDURE — 99214 OFFICE O/P EST MOD 30 MIN: CPT | Performed by: INTERNAL MEDICINE

## 2022-05-26 NOTE — LETTER
5/26/2022 2:22 PM    Mr. Mike Hayes  [address] [facility] [id number]      RE:  Mike Hayes   1945      Dear Merle García,     Mike Hayes is a patient of [address] with Minor Cabot, MD. Please fax this patient's most recent COVID booster dates so that we may update the patient's records for continuity of care. Our office number is [phone] if you should have any additional questions.      Our fax number: [phone]    Thank you           Sincerely,      Dania Rey MD

## 2022-05-26 NOTE — PROGRESS NOTES
Lupis Quinn is a 78 y.o. male presenting for/with:    Chief Complaint   Patient presents with    Annual Wellness Visit    Diabetes     Reports that stopped victoza last night after running out. Reports N/V/D for at least 6 months while taking medicaiton. Does not check glucose on a regular basis. Denies recent eye exam    Medication Refill     Requests refill of N[place] to [address] Peripheral Neuropathy     C/O (B)LE neuropathy causing patient to fall. Reports frequent falls, always at home.  Immunization/Injection     Updated COVID vaccine completed at [facility], request for date sent - Denies other vaccines       Visit Vitals  /89 (BP 1 Location: Left upper arm, BP Patient Position: Sitting, BP Cuff Size: Adult long)   Pulse (!) 110   Temp 97.9 °F (36.6 °C) (Temporal)   Resp 18   Ht 6' 2\" (1.88 m)   Wt 204 lb 3.2 oz (92.6 kg)   SpO2 99%   BMI 26.22 kg/m²     Pain Scale: 0 - No pain/10  Pain Location:     1. \"Have you been to the ER, urgent care clinic since your last visit? Hospitalized since your last visit? \" No    2. \"Have you seen or consulted any other health care providers outside of the [address] [place] [address] since your last visit? \" No     3. For patients aged 39-70: Has the patient had a colonoscopy / FIT/ Cologuard? Yes - Care Gap present. Most recent result on file      If the patient is female:    4. For patients aged 41-77: Has the patient had a mammogram within the past 2 years? NA - based on age or sex      11. For patients aged 21-65: Has the patient had a pap smear?  NA - based on age or sex      Symptom review:  NO  Fever   NO  Shaking chills  NO  Cough  NO  Body aches  NO  Coughing up blood  NO  Chest congestion  NO  Chest pain  NO  Shortness of breath  NO  Profound Loss of smell/taste  NO  Nausea/Vomiting   NO  Loose stool/Diarrhea  NO  any skin issues    Patient Risk Factors Reviewed as follows:  NO  have you been in Close contact with confirmed COVID19 patient   NO  History of recent travel to affected geographical areas within the past 14 days  NO  COPD  NO  Active Cancer/Leukemia/Lymphoma/Chemotherapy  NO  Oral steroid use  NO  Pregnant  YES  Diabetes Mellitus  YES  Heart disease  NO  Asthma  NO Health care worker at home  [address] care worker  NO Is there a Pregnant Woman in the home  NO Dialysis pt in the home   NO a large number of people living in the home    Learning Assessment 5/26/2022   PRIMARY LEARNER Patient   [facility]   LEARNER PREFERENCE PRIMARY READING   ANSWERED BY self   RELATIONSHIP SELF     Fall Risk Assessment, last 12 mths 5/26/2022   Able to walk? Yes   Fall in past 12 months? 1   Do you feel unsteady? 1   Are you worried about falling 1   Is TUG test greater than 12 seconds? -   Is the gait abnormal? 1   Number of falls in past 12 months 2   Fall with injury? 0       3 most recent PHQ Screens 5/26/2022   Little interest or pleasure in doing things Not at all   Feeling down, depressed, irritable, or hopeless Not at all   Total Score PHQ 2 0     Abuse Screening Questionnaire 5/26/2022   Do you ever feel afraid of your partner? N   Are you in a relationship with someone who physically or mentally threatens you? N   Is it safe for you to go home?  Y       ADL Assessment 5/26/2022   Feeding yourself No Help Needed   Getting from bed to chair No Help Needed   Getting dressed No Help Needed   Bathing or showering No Help Needed   Walk across the room (includes cane/walker) No Help Needed   Using the telphone No Help Needed   Taking your medications No Help Needed   Preparing meals No Help Needed   Managing money (expenses/bills) No Help Needed   Moderately strenuous housework (laundry) No Help Needed   Shopping for personal items (toiletries/medicines) No Help Needed   Shopping for groceries No Help Needed   Driving No Help Needed   Climbing a flight of stairs No Help Needed   Getting to places beyond walking distances No Help Needed      Advance Care Planning 5/26/2022   Patient's Healthcare Decision Maker is: Legal Next of Kin   Primary Decision Maker Name -   Primary Decision Maker Phone Number -   Primary Decision Maker Relationship to Patient -   Confirm Advance Directive None   Patient Would Like to Complete Advance Directive No          This is the Subsequent Medicare Annual Wellness Exam, performed 12 months or more after the Initial AWV or the last Subsequent AWV    I have reviewed the patient's medical history in detail and updated the computerized patient record. Assessment/Plan   Education and counseling provided:  Are appropriate based on today's review and evaluation    1. Medicare annual wellness visit, subsequent       Depression Risk Factor Screening     3 most recent PHQ Screens 5/26/2022   Little interest or pleasure in doing things Not at all   Feeling down, depressed, irritable, or hopeless Not at all   Total Score PHQ 2 0       Alcohol & Drug Abuse Risk Screen    Do you average more than 1 drink per night or more than 7 drinks a week: Yes    In the past three months have you have had more than 4 drinks containing alcohol on one occasion: No          Functional Ability and Level of Safety    Hearing: Hearing is good. Activities of Daily Living: The home contains: no safety equipment. Patient does total self care      Ambulation: with no difficulty     Fall Risk:  Fall Risk Assessment, last 12 mths 5/26/2022   Able to walk? Yes   Fall in past 12 months? 1   Do you feel unsteady? 1   Are you worried about falling 1   Is TUG test greater than 12 seconds? -   Is the gait abnormal? 1   Number of falls in past 12 months 2   Fall with injury?  0      Abuse Screen:  Patient is not abused       Cognitive Screening    Has your family/caregiver stated any concerns about your memory: no       Health Maintenance Due     Health Maintenance Due   Topic Date Due    MICROALBUMIN Q1  06/23/2018  Foot Exam Q1  09/10/2019    Colorectal Cancer Screening Combo  09/11/2019    COVID-19 Vaccine (3 - Booster for [employer]i [employer] series) 06/02/2021    Eye Exam Retinal or Dilated  06/21/2021       Patient Care Team   Patient Care Team:  Sheryl Aguilar MD as PCP - General (Internal Medicine Physician)  Sheryl Aguilar MD as PCP - [facility]  Meg Donahue MD (Cardiovascular Disease Physician)    History     Patient Active Problem List   Diagnosis Code    Psoriasis L40.9    Laryngospasm J38.5    Spinal stenosis of cervical region M48.02    Elevated transaminase level R74.01    Essential hypertension I10    Type 2 diabetes mellitus without complication, without long-term current use of insulin ([address] E11.9    Mixed hyperlipidemia E78.2    Angina, class III ([address] I20.9    S/P coronary artery stent placement Z95.5    Abnormal nuclear cardiac imaging test R93.1    Coronary artery disease involving native coronary artery of native heart with angina pectoris with documented spasm ([address] I25.111    S/P cardiac cath Z98.890    Cellulitis and abscess of hand L03.119, L02.519    Lactic acidosis E87.2    Cellulitis L03.90     Past Medical History:   Diagnosis Date    Abnormal nuclear cardiac imaging test 4/12/2018    CAD (coronary artery disease)     Dyslipidemia     Elevated transaminase level 09/2016    GERD (gastroesophageal reflux disease)     HTN (hypertension)     Psoriasis     S/P cardiac cath 10/12/2018    10/11/18 PTCA D1, neg FFR to LCX and RCA     S/P coronary artery stent placement 4/12/2018    T2DM (type 2 diabetes mellitus) ([address] 09/2016    A1c 6.4;  Glucose 195      Past Surgical History:   Procedure Laterality Date    HX HEENT      bilateral cataract extraction    HX HEENT      tonsillectomy    HX ORTHOPAEDIC      right shoulder acromioplasty    HX ORTHOPAEDIC      left achilles tendon surgery    HX PTCA  04/12/2018    BLAZE mid LAD (Synergy 2.75/38) + prox LCX (Synergy 2.5/32)     Current Outpatient Medications   Medication Sig Dispense Refill    insulin glargine (LANTUS,BASAGLAR) 100 unit/mL (3 mL) inpn Inject 10 units SQ every bedtime;  E11.9 1 Pen 5    liraglutide (Victoza 3-Eber) 0.6 mg/0.1 mL (18 mg/3 mL) pnij 1.8 mg by SubCUTAneous route every seven (7) days. 9 mL 2    acyclovir (ZOVIRAX) 400 mg tablet TAKE 1 TABLET BY MOUTH ONCE DAILY 90 Tablet 5    pantoprazole (PROTONIX) 40 mg tablet TAKE 1 TABLET BY MOUTH EVERY DAY 90 Tablet 3    aspirin delayed-release 81 mg tablet Take  by mouth daily.  rosuvastatin (CRESTOR) 5 mg tablet Take 1 Tablet by mouth every Monday, Wednesday, Friday. (Patient not taking: Reported on 5/26/2022) 30 Tablet 5    Insulin Syringe-Needle U-100 0.5 mL 29 gauge x 1/2\" syrg 1 Syringe by Does Not Apply route two (2) times a day. (Patient not taking: Reported on 10/8/2021) 100 Syringe 20    flash glucose scanning reader (FreeStyle Rai 14 Day [place]) misc 1 Each by Does Not Apply route five (5) times daily. (Patient not taking: Reported on 10/8/2021) 1 Each 1    flash glucose sensor (FreeStyle Rai 14 Day Sensor) kit 1 Each by Does Not Apply route Once every 2 weeks. (Patient not taking: Reported on 10/8/2021) 2 Kit 25    Blood-Glucose Meter monitoring kit Use daily:  E11.9 (Patient not taking: Reported on 10/8/2021) 1 Kit 0    glucose blood VI test strips (ASCENSIA AUTODISC VI, ONE TOUCH ULTRA TEST VI) strip Truemetrix strips, Test daily; E11.9 (Patient not taking: Reported on 10/8/2021) 100 Strip 5    Blood-Glucose Meter (TRUE METRIX GLUCOSE METER) misc Use daily to test glucose E11.9 (Patient not taking: Reported on 10/8/2021) 1 Each 0    lancets 30 gauge misc by Does Not Apply route daily. E11.9 (Patient not taking: Reported on 10/8/2021) 100 Lancet 3    nitroglycerin (NITROSTAT) 0.4 mg SL tablet 1 Tab by SubLINGual route every five (5) minutes as needed for Chest Pain.  (Patient not taking: Reported on 10/8/2021) 25 Tab 1 No Known Allergies    Family History   Problem Relation Age of Onset    Heart Disease Father     Heart Disease Maternal Uncle      Social History     Tobacco Use    Smoking status: Former Smoker     Types: Cigarettes     Quit date: 1998     Years since quittin.0    Smokeless tobacco: Never Used   Substance Use Topics    Alcohol use: Not Currently     Alcohol/week: 14.0 standard drinks     Types: 14 Standard drinks or equivalent per week     Comment: 22: 2 danny Walton

## 2022-05-26 NOTE — PATIENT INSTRUCTIONS
Medicare Wellness Visit, Male    The best way to live healthy is to have a lifestyle where you eat a well-balanced diet, exercise regularly, limit alcohol use, and quit all forms of tobacco/nicotine, if applicable. Regular preventive services are another way to keep healthy. Preventive services (vaccines, screening tests, monitoring & exams) can help personalize your care plan, which helps you manage your own care. Screening tests can find health problems at the earliest stages, when they are easiest to treat. [facility] follows the current, evidence-based guidelines published by the [facility] Demetrio ([facility]) when recommending preventive services for our patients. Because we follow these guidelines, sometimes recommendations change over time as research supports it. (For example, a prostate screening blood test is no longer routinely recommended for men with no symptoms). Of course, you and your doctor may decide to screen more often for some diseases, based on your risk and co-morbidities (chronic disease you are already diagnosed with). Preventive services for you include:  - Medicare offers their members a free annual wellness visit, which is time for you and your primary care provider to discuss and plan for your preventive service needs. Take advantage of this benefit every year!  -All adults over age 72 should receive the recommended pneumonia vaccines. Current USPSTF guidelines recommend a series of two vaccines for the best pneumonia protection.   -All adults should have a flu vaccine yearly and tetanus vaccine every 10 years.  -All adults age 48 and older should receive the shingles vaccines (series of two vaccines).        -All adults age 38-68 who are overweight should have a diabetes screening test once every three years.   -Other screening tests & preventive services for persons with diabetes include: an eye exam to screen for diabetic retinopathy, a kidney function test, a foot exam, and stricter control over your cholesterol.   -Cardiovascular screening for adults with routine risk involves an electrocardiogram (ECG) at intervals determined by the provider.   -Colorectal cancer screening should be done for adults age 54-65 with no increased risk factors for colorectal cancer. There are a number of acceptable methods of screening for this type of cancer. Each test has its own benefits and drawbacks. Discuss with your provider what is most appropriate for you during your annual wellness visit. The different tests include: colonoscopy (considered the best screening method), a fecal occult blood test, a fecal DNA test, and sigmoidoscopy.  -All adults born between [facility] should be screened once for Hepatitis C.  -An Abdominal Aortic Aneurysm (AAA) Screening is recommended for men age 73-68 who has ever smoked in their lifetime.      Here is a list of your current Health Maintenance items (your personalized list of preventive services) with a due date:  Health Maintenance Due   Topic Date Due    Albumin Urine Test  06/23/2018    Diabetic Foot Care  09/10/2019    Colorectal Screening  09/11/2019    COVID-19 Vaccine (3 - Booster for [employer]ette series) 06/02/2021    Eye Exam  06/21/2021

## 2022-05-27 LAB
ALBUMIN SERPL-MCNC: 3.8 G/DL (ref 3.5–5)
ALBUMIN/GLOB SERPL: 1.1 {RATIO} (ref 1.1–2.2)
ALP SERPL-CCNC: 126 U/L (ref 45–117)
ALT SERPL-CCNC: 31 U/L (ref 12–78)
ANION GAP SERPL CALC-SCNC: 7 MMOL/L (ref 5–15)
AST SERPL-CCNC: 19 U/L (ref 15–37)
BASOPHILS # BLD: 0 K/UL (ref 0–0.1)
BASOPHILS NFR BLD: 1 % (ref 0–1)
BILIRUB SERPL-MCNC: 0.3 MG/DL (ref 0.2–1)
BUN SERPL-MCNC: 22 MG/DL (ref 6–20)
BUN/CREAT SERPL: 18 (ref 12–20)
CALCIUM SERPL-MCNC: 9.4 MG/DL (ref 8.5–10.1)
CHLORIDE SERPL-SCNC: 103 MMOL/L (ref 97–108)
CHOLEST SERPL-MCNC: 334 MG/DL
CO2 SERPL-SCNC: 27 MMOL/L (ref 21–32)
CREAT SERPL-MCNC: 1.24 MG/DL (ref 0.7–1.3)
DIFFERENTIAL METHOD BLD: ABNORMAL
EOSINOPHIL # BLD: 0.1 K/UL (ref 0–0.4)
EOSINOPHIL NFR BLD: 2 % (ref 0–7)
ERYTHROCYTE [DISTWIDTH] IN BLOOD BY AUTOMATED COUNT: 13.2 % (ref 11.5–14.5)
EST. AVERAGE GLUCOSE BLD GHB EST-MCNC: 255 MG/DL
GLOBULIN SER CALC-MCNC: 3.4 G/DL (ref 2–4)
GLUCOSE SERPL-MCNC: 396 MG/DL (ref 65–100)
HBA1C MFR BLD: 10.5 % (ref 4–5.6)
HCT VFR BLD AUTO: 40.4 % (ref 36.6–50.3)
HDLC SERPL-MCNC: 48 MG/DL
HDLC SERPL: 7 {RATIO} (ref 0–5)
HGB BLD-MCNC: 13.3 G/DL (ref 12.1–17)
IMM GRANULOCYTES # BLD AUTO: 0 K/UL (ref 0–0.04)
IMM GRANULOCYTES NFR BLD AUTO: 1 % (ref 0–0.5)
LDLC SERPL CALC-MCNC: ABNORMAL MG/DL (ref 0–100)
LDLC SERPL DIRECT ASSAY-MCNC: 205 MG/DL (ref 0–100)
LYMPHOCYTES # BLD: 1.2 K/UL (ref 0.8–3.5)
LYMPHOCYTES NFR BLD: 33 % (ref 12–49)
MCH RBC QN AUTO: 30.9 PG (ref 26–34)
MCHC RBC AUTO-ENTMCNC: 32.9 G/DL (ref 30–36.5)
MCV RBC AUTO: 93.7 FL (ref 80–99)
MONOCYTES # BLD: 0.3 K/UL (ref 0–1)
MONOCYTES NFR BLD: 7 % (ref 5–13)
NEUTS SEG # BLD: 2.2 K/UL (ref 1.8–8)
NEUTS SEG NFR BLD: 56 % (ref 32–75)
NRBC # BLD: 0 K/UL (ref 0–0.01)
NRBC BLD-RTO: 0 PER 100 WBC
PLATELET # BLD AUTO: 205 K/UL (ref 150–400)
PMV BLD AUTO: 10.3 FL (ref 8.9–12.9)
POTASSIUM SERPL-SCNC: 5.1 MMOL/L (ref 3.5–5.1)
PROT SERPL-MCNC: 7.2 G/DL (ref 6.4–8.2)
RBC # BLD AUTO: 4.31 M/UL (ref 4.1–5.7)
SODIUM SERPL-SCNC: 137 MMOL/L (ref 136–145)
TRIGL SERPL-MCNC: 506 MG/DL (ref ?–150)
TSH SERPL DL<=0.05 MIU/L-ACNC: 1.19 UIU/ML (ref 0.36–3.74)
VLDLC SERPL CALC-MCNC: ABNORMAL MG/DL
WBC # BLD AUTO: 3.8 K/UL (ref 4.1–11.1)

## 2022-07-11 RX ORDER — PEN NEEDLE, DIABETIC 32GX 5/32"
NEEDLE, DISPOSABLE MISCELLANEOUS
Qty: 100 PEN NEEDLE | Refills: 4 | Status: SHIPPED | OUTPATIENT
Start: 2022-07-11

## 2022-07-19 DIAGNOSIS — E11.9 TYPE 2 DIABETES MELLITUS WITHOUT COMPLICATION, WITHOUT LONG-TERM CURRENT USE OF INSULIN (HCC): ICD-10-CM

## 2022-07-19 DIAGNOSIS — I10 ESSENTIAL HYPERTENSION: ICD-10-CM

## 2022-07-20 RX ORDER — INSULIN GLARGINE 100 [IU]/ML
INJECTION, SOLUTION SUBCUTANEOUS
Qty: 15 ML | Refills: 5 | Status: SHIPPED | OUTPATIENT
Start: 2022-07-20 | End: 2022-07-25

## 2022-07-25 DIAGNOSIS — E11.9 TYPE 2 DIABETES MELLITUS WITHOUT COMPLICATION, WITHOUT LONG-TERM CURRENT USE OF INSULIN (HCC): ICD-10-CM

## 2022-07-25 DIAGNOSIS — I10 ESSENTIAL HYPERTENSION: ICD-10-CM

## 2022-07-25 RX ORDER — INSULIN GLARGINE 100 [IU]/ML
INJECTION, SOLUTION SUBCUTANEOUS
Qty: 15 ML | Refills: 5 | Status: SHIPPED | OUTPATIENT
Start: 2022-07-25 | End: 2022-09-30

## 2022-08-22 RX ORDER — PANTOPRAZOLE SODIUM 40 MG/1
TABLET, DELAYED RELEASE ORAL
Qty: 90 TABLET | Refills: 3 | Status: SHIPPED | OUTPATIENT
Start: 2022-08-22

## 2022-09-30 DIAGNOSIS — I10 ESSENTIAL HYPERTENSION: ICD-10-CM

## 2022-09-30 DIAGNOSIS — E11.9 TYPE 2 DIABETES MELLITUS WITHOUT COMPLICATION, WITHOUT LONG-TERM CURRENT USE OF INSULIN (HCC): ICD-10-CM

## 2022-09-30 RX ORDER — INSULIN GLARGINE 100 [IU]/ML
INJECTION, SOLUTION SUBCUTANEOUS
Qty: 15 ML | Refills: 5 | Status: SHIPPED | OUTPATIENT
Start: 2022-09-30

## 2022-09-30 RX ORDER — INSULIN GLARGINE 100 [IU]/ML
INJECTION, SOLUTION SUBCUTANEOUS
Qty: 15 ML | Refills: 5 | Status: SHIPPED | OUTPATIENT
Start: 2022-09-30 | End: 2022-09-30 | Stop reason: SDUPTHER

## 2022-12-17 DIAGNOSIS — E78.2 MIXED HYPERLIPIDEMIA: ICD-10-CM

## 2022-12-17 DIAGNOSIS — R35.1 NOCTURIA: ICD-10-CM

## 2022-12-17 DIAGNOSIS — I10 ESSENTIAL HYPERTENSION: ICD-10-CM

## 2022-12-17 DIAGNOSIS — E11.9 TYPE 2 DIABETES MELLITUS WITHOUT COMPLICATION, WITHOUT LONG-TERM CURRENT USE OF INSULIN (HCC): Primary | ICD-10-CM

## 2022-12-18 NOTE — PROGRESS NOTES
Mr. Leann Medina is a 78 y.o. male who is here for evaluation of   Chief Complaint   Patient presents with    Diabetes     Not taking victoza daily    Psoriasis     Requests new scripts. States recently has flared. Requesting topical creams      Cholesterol Problem     Wants to restart crestor. .       ASSESSMENT AND PLAN:    1. Type 2 diabetes mellitus without complication, without long-term current use of insulin (HCC)  Declines any changes in the diabetic regimen at this point pledging to improve his diet. 2. Essential hypertension  Suboptimal control. Reevaluate in April  3. Mixed hyperlipidemia  - rosuvastatin (CRESTOR) 10 mg tablet; Take 1 Tablet by mouth nightly. Dispense: 90 Tablet; Refill: 4    4. Nocturia    5. Psoriasis  - triamcinolone acetonide (KENALOG) 0.1 % topical cream; Apply  to affected area two (2) times a day. use thin layer  Dispense: 453.6 g; Refill: 5      Orders Placed This Encounter    triamcinolone acetonide (KENALOG) 0.1 % topical cream     Sig: Apply  to affected area two (2) times a day. use thin layer     Dispense:  453.6 g     Refill:  5    rosuvastatin (CRESTOR) 10 mg tablet     Sig: Take 1 Tablet by mouth nightly. 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 He has poorly controlled diabetes. He has tried several different medications including many alternative approaches to managing diabetes but the A1c remains elevated. He has been enrolled in diabetic education previously. There is a history of psoriatic arthritis, coronary artery disease and hypertension. ROS:  Denies fever, chills, cough, chest pain, SOB,  nausea, vomiting, or diarrhea. Denies wt loss, wt gain, hemoptysis, hematochezia or melena.     Physical Examination:    Visit Vitals  BP (!) 152/70 (BP 1 Location: Left upper arm, BP Patient Position: Sitting, BP Cuff Size: Adult long)   Pulse (!) 55   Resp 18   Ht 6' 2\" (1.88 m)   Wt 210 lb 3.2 oz (95.3 kg)   SpO2 96%   BMI 26.99 kg/m²      General:  Alert, cooperative, no distress. Head:  Normocephalic, without obvious abnormality, atraumatic. Eyes:  Conjunctivae/corneas clear. Pupils equal, round, reactive to light. Extraocular movements intact. Lungs:   Clear to auscultation bilaterally. Chest wall:  No tenderness or deformity. Cardiac:  RRR   Abdomen:   Soft, non-tender. Bowel sounds normal. No masses. No organomegaly. Extremities: Extremities normal, atraumatic, no cyanosis or edema. Pulses: 2+ and symmetric all extremities. Skin: Skin color, texture, turgor normal. No rashes or lesions. Lymph nodes: Cervical, supraclavicular, and axillary nodes normal.   Neurologic: CNII-XII intact. Normal strength, sensation, and reflexes throughout. On this date 12/23/2022 I have spent 30 minutes reviewing previous notes, test results and face to face with the patient discussing the diagnosis and importance of compliance with the treatment plan as well as documenting on the day of the visit.     Kenyatta Allen MD FACP    (signed electronically) on 12/23/2022 at 11:01 AM

## 2022-12-21 ENCOUNTER — APPOINTMENT (OUTPATIENT)
Dept: FAMILY MEDICINE CLINIC | Age: 77
End: 2022-12-21

## 2022-12-21 DIAGNOSIS — I10 ESSENTIAL HYPERTENSION: ICD-10-CM

## 2022-12-21 DIAGNOSIS — E78.2 MIXED HYPERLIPIDEMIA: ICD-10-CM

## 2022-12-21 DIAGNOSIS — E11.9 TYPE 2 DIABETES MELLITUS WITHOUT COMPLICATION, WITHOUT LONG-TERM CURRENT USE OF INSULIN (HCC): ICD-10-CM

## 2022-12-21 DIAGNOSIS — R35.1 NOCTURIA: ICD-10-CM

## 2022-12-22 LAB
ALBUMIN SERPL-MCNC: 3.9 G/DL (ref 3.5–5)
ALBUMIN/GLOB SERPL: 1.3 {RATIO} (ref 1.1–2.2)
ALP SERPL-CCNC: 90 U/L (ref 45–117)
ALT SERPL-CCNC: 30 U/L (ref 12–78)
ANION GAP SERPL CALC-SCNC: 6 MMOL/L (ref 5–15)
AST SERPL-CCNC: 21 U/L (ref 15–37)
BASOPHILS # BLD: 0.1 K/UL (ref 0–0.1)
BASOPHILS NFR BLD: 2 % (ref 0–1)
BILIRUB SERPL-MCNC: 0.4 MG/DL (ref 0.2–1)
BUN SERPL-MCNC: 22 MG/DL (ref 6–20)
BUN/CREAT SERPL: 18 (ref 12–20)
CALCIUM SERPL-MCNC: 9 MG/DL (ref 8.5–10.1)
CHLORIDE SERPL-SCNC: 102 MMOL/L (ref 97–108)
CHOLEST SERPL-MCNC: 301 MG/DL
CO2 SERPL-SCNC: 28 MMOL/L (ref 21–32)
CREAT SERPL-MCNC: 1.22 MG/DL (ref 0.7–1.3)
DIFFERENTIAL METHOD BLD: ABNORMAL
EOSINOPHIL # BLD: 0.1 K/UL (ref 0–0.4)
EOSINOPHIL NFR BLD: 2 % (ref 0–7)
ERYTHROCYTE [DISTWIDTH] IN BLOOD BY AUTOMATED COUNT: 12.5 % (ref 11.5–14.5)
EST. AVERAGE GLUCOSE BLD GHB EST-MCNC: 269 MG/DL
GLOBULIN SER CALC-MCNC: 3 G/DL (ref 2–4)
GLUCOSE SERPL-MCNC: 331 MG/DL (ref 65–100)
HBA1C MFR BLD: 11 % (ref 4–5.6)
HCT VFR BLD AUTO: 42.7 % (ref 36.6–50.3)
HDLC SERPL-MCNC: 62 MG/DL
HDLC SERPL: 4.9 {RATIO} (ref 0–5)
HGB BLD-MCNC: 14.1 G/DL (ref 12.1–17)
IMM GRANULOCYTES # BLD AUTO: 0.1 K/UL (ref 0–0.04)
IMM GRANULOCYTES NFR BLD AUTO: 2 % (ref 0–0.5)
LDLC SERPL CALC-MCNC: 174.6 MG/DL (ref 0–100)
LYMPHOCYTES # BLD: 1.8 K/UL (ref 0.8–3.5)
LYMPHOCYTES NFR BLD: 48 % (ref 12–49)
MCH RBC QN AUTO: 31.2 PG (ref 26–34)
MCHC RBC AUTO-ENTMCNC: 33 G/DL (ref 30–36.5)
MCV RBC AUTO: 94.5 FL (ref 80–99)
MONOCYTES # BLD: 0.3 K/UL (ref 0–1)
MONOCYTES NFR BLD: 7 % (ref 5–13)
NEUTS SEG # BLD: 1.5 K/UL (ref 1.8–8)
NEUTS SEG NFR BLD: 39 % (ref 32–75)
NRBC # BLD: 0 K/UL (ref 0–0.01)
NRBC BLD-RTO: 0 PER 100 WBC
PLATELET # BLD AUTO: 200 K/UL (ref 150–400)
PMV BLD AUTO: 10.9 FL (ref 8.9–12.9)
POTASSIUM SERPL-SCNC: 4.6 MMOL/L (ref 3.5–5.1)
PROT SERPL-MCNC: 6.9 G/DL (ref 6.4–8.2)
PSA SERPL-MCNC: 1.1 NG/ML (ref 0.01–4)
RBC # BLD AUTO: 4.52 M/UL (ref 4.1–5.7)
RBC MORPH BLD: ABNORMAL
SODIUM SERPL-SCNC: 136 MMOL/L (ref 136–145)
TRIGL SERPL-MCNC: 322 MG/DL (ref ?–150)
TSH SERPL DL<=0.05 MIU/L-ACNC: 2.07 UIU/ML (ref 0.36–3.74)
VLDLC SERPL CALC-MCNC: 64.4 MG/DL
WBC # BLD AUTO: 3.9 K/UL (ref 4.1–11.1)

## 2022-12-23 ENCOUNTER — OFFICE VISIT (OUTPATIENT)
Dept: FAMILY MEDICINE CLINIC | Age: 77
End: 2022-12-23
Payer: MEDICARE

## 2022-12-23 VITALS
HEIGHT: 74 IN | SYSTOLIC BLOOD PRESSURE: 152 MMHG | DIASTOLIC BLOOD PRESSURE: 70 MMHG | BODY MASS INDEX: 26.98 KG/M2 | WEIGHT: 210.2 LBS | OXYGEN SATURATION: 96 % | HEART RATE: 55 BPM | RESPIRATION RATE: 18 BRPM

## 2022-12-23 DIAGNOSIS — R35.1 NOCTURIA: ICD-10-CM

## 2022-12-23 DIAGNOSIS — L40.9 PSORIASIS: ICD-10-CM

## 2022-12-23 DIAGNOSIS — I10 ESSENTIAL HYPERTENSION: ICD-10-CM

## 2022-12-23 DIAGNOSIS — E78.2 MIXED HYPERLIPIDEMIA: ICD-10-CM

## 2022-12-23 DIAGNOSIS — E11.9 TYPE 2 DIABETES MELLITUS WITHOUT COMPLICATION, WITHOUT LONG-TERM CURRENT USE OF INSULIN (HCC): Primary | ICD-10-CM

## 2022-12-23 PROCEDURE — 3046F HEMOGLOBIN A1C LEVEL >9.0%: CPT | Performed by: INTERNAL MEDICINE

## 2022-12-23 PROCEDURE — 1123F ACP DISCUSS/DSCN MKR DOCD: CPT | Performed by: INTERNAL MEDICINE

## 2022-12-23 PROCEDURE — G8417 CALC BMI ABV UP PARAM F/U: HCPCS | Performed by: INTERNAL MEDICINE

## 2022-12-23 PROCEDURE — 3017F COLORECTAL CA SCREEN DOC REV: CPT | Performed by: INTERNAL MEDICINE

## 2022-12-23 PROCEDURE — 99214 OFFICE O/P EST MOD 30 MIN: CPT | Performed by: INTERNAL MEDICINE

## 2022-12-23 PROCEDURE — 1101F PT FALLS ASSESS-DOCD LE1/YR: CPT | Performed by: INTERNAL MEDICINE

## 2022-12-23 PROCEDURE — G8536 NO DOC ELDER MAL SCRN: HCPCS | Performed by: INTERNAL MEDICINE

## 2022-12-23 PROCEDURE — 3078F DIAST BP <80 MM HG: CPT | Performed by: INTERNAL MEDICINE

## 2022-12-23 PROCEDURE — G8427 DOCREV CUR MEDS BY ELIG CLIN: HCPCS | Performed by: INTERNAL MEDICINE

## 2022-12-23 PROCEDURE — 3074F SYST BP LT 130 MM HG: CPT | Performed by: INTERNAL MEDICINE

## 2022-12-23 PROCEDURE — G8510 SCR DEP NEG, NO PLAN REQD: HCPCS | Performed by: INTERNAL MEDICINE

## 2022-12-23 PROCEDURE — 2022F DILAT RTA XM EVC RTNOPTHY: CPT | Performed by: INTERNAL MEDICINE

## 2022-12-23 RX ORDER — ROSUVASTATIN CALCIUM 10 MG/1
10 TABLET, COATED ORAL
Qty: 90 TABLET | Refills: 4 | Status: SHIPPED | OUTPATIENT
Start: 2022-12-23

## 2022-12-23 RX ORDER — TRIAMCINOLONE ACETONIDE 1 MG/G
CREAM TOPICAL 2 TIMES DAILY
Qty: 453.6 G | Refills: 5 | Status: SHIPPED | OUTPATIENT
Start: 2022-12-23

## 2022-12-23 NOTE — PROGRESS NOTES
Booker Bailey is a 78 y.o. male presenting for/with:    Chief Complaint   Patient presents with    Diabetes     Not taking victoza daily    Psoriasis     Requests new scripts. States recently has flared. Requesting topical creams      Cholesterol Problem     Wants to restart crestor. Visit Vitals  BP (!) 152/70 (BP 1 Location: Left upper arm, BP Patient Position: Sitting, BP Cuff Size: Adult long)   Pulse (!) 55   Resp 18   Ht 6' 2\" (1.88 m)   Wt 210 lb 3.2 oz (95.3 kg)   SpO2 96%   BMI 26.99 kg/m²     Pain Scale: 3/10  Pain Location: Generalized    1. \"Have you been to the ER, urgent care clinic since your last visit? Hospitalized since your last visit? \" No    2. \"Have you seen or consulted any other health care providers outside of the [address] [place] [address] since your last visit? \" No     3. For patients aged 39-70: Has the patient had a colonoscopy / FIT/ Cologuard? Yes - no Care Gap present      If the patient is female:    4. For patients aged 41-77: Has the patient had a mammogram within the past 2 years? NA - based on age or sex      11. For patients aged 21-65: Has the patient had a pap smear? NA - based on age or sex      Learning Assessment 5/26/2022   PRIMARY LEARNER Patient   CO-LEARNER CAREGIVER -   PRIMARY LANGUAGE ENGLISH   LEARNER PREFERENCE PRIMARY READING   ANSWERED BY self   RELATIONSHIP SELF     Fall Risk Assessment, last 12 mths 12/23/2022   Able to walk? Yes   Fall in past 12 months? 1   Do you feel unsteady? 0   Are you worried about falling 0   Is TUG test greater than 12 seconds? -   Is the gait abnormal? 1   Number of falls in past 12 months 2   Fall with injury? 1       3 most recent PHQ Screens 12/23/2022   Little interest or pleasure in doing things Not at all   Feeling down, depressed, irritable, or hopeless Not at all   Total Score PHQ 2 0     Abuse Screening Questionnaire 12/23/2022   Do you ever feel afraid of your partner?  N   Are you in a relationship with someone who physically or mentally threatens you? N   Is it safe for you to go home?  Y       ADL Assessment 12/23/2022   Feeding yourself No Help Needed   Getting from bed to chair No Help Needed   Getting dressed No Help Needed   Bathing or showering No Help Needed   Walk across the room (includes cane/walker) No Help Needed   Using the telphone No Help Needed   Taking your medications No Help Needed   Preparing meals No Help Needed   Managing money (expenses/bills) No Help Needed   Moderately strenuous housework (laundry) No Help Needed   Shopping for personal items (toiletries/medicines) No Help Needed   Shopping for groceries No Help Needed   Driving No Help Needed   Climbing a flight of stairs No Help Needed   Getting to places beyond walking distances No Help Needed      Advance Care Planning 5/26/2022   Patient's Healthcare Decision Maker is: Legal Next of Kin   Primary Decision Maker Name -   Primary Decision Maker Phone Number -   Primary Decision Maker Relationship to Patient -   Confirm Advance Directive None   Patient Would Like to Complete Advance Directive No

## 2023-02-26 NOTE — MR AVS SNAPSHOT
"  Family Medicine Clinic Note:    PCP: Audrey Melgoza MD    Valente Wilson is a 26 y.o. female  with a history of Pre-diabetes, Obesity, KRISTINA, Anemia, Migraines, Bipolar II with depressive episodes (was on Latuda during pregnancy and was seeing Ms. Rachel but is no longer seeing her). Has an appointment with Fidel on 3/26/23 ), PCOS (on metformin) , tobacco use, ADD,on 10/18/22 whom presents to clinic today for pregnancy test.       Subjective:   Encounter for pregnancy test  -Pt is not on any birth control  -LMP was 23   -Not taking the Adderall   -Has not started taking the PNVs     Gestational history:   G1: full term, vaginal, 6/29/15 40^0   G2: SAB 1st trimester, ectopic, 2018, 7^0 WGA   G3: , at 37^6 WGA, 10/18/22   G4:  current    Menses started at 14  Regular menses, lasting 3-5 days, uses 4-5 tampons per day      Allergies: Oxycodone-acetaminophen (Hives)  PmHx: see above  Family history: Bipolar disorder with father, HTN-mother,   Past surgical History:right salpingectomy   Social History: smokes 1/2 PPD x 6 years   Medications:  Adderall 20 mg q am (not taking)  Metformin 500 mg daily for PCOS      ROS  As per HPI  Current Outpatient Medications   Medication Sig Dispense Refill    metFORMIN (GLUCOPHAGE) 500 MG tablet Take 1 tablet (500 mg total) by mouth daily with breakfast. 93 tablet 0     No current facility-administered medications for this visit.       Objective:     /82 (BP Location: Left arm, Patient Position: Sitting, BP Method: Large (Automatic))   Pulse 109   Temp 98.8 °F (37.1 °C) (Oral)   Resp 18   Ht 5' 10" (1.778 m)   Wt (!) 139.7 kg (308 lb)   LMP 2023   SpO2 100%   BMI 44.19 kg/m²   BP Readings from Last 3 Encounters:   23 134/82   23 115/75   22 120/81     Wt Readings from Last 3 Encounters:   23 (!) 139.7 kg (308 lb)   23 (!) 138.4 kg (305 lb 3.2 oz)   22 136.1 kg (300 lb)     Recent Results (from the " Visit Information Date & Time Provider Department Dept. Phone Encounter #  
 6/23/2017  9:30 AM Bozena Grant MD [facility] [id number] Upcoming Health Maintenance Date Due  
 LIPID PANEL Q1 1945 FOOT EXAM Q1 9/20/1957 MICROALBUMIN Q1 9/20/1957 DTaP/Tdap/Td series (1 - Tdap) 9/20/1968 FOBT Q 1 YEAR AGE 50-75 9/20/1997 Pneumococcal 65+ Low/Medium Risk (1 of 2 - PCV13) 9/20/2012 MEDICARE YEARLY EXAM 9/20/2012 EYE EXAM RETINAL OR DILATED Q1 7/29/2016 HEMOGLOBIN A1C Q6M 6/2/2017 GLAUCOMA SCREENING Q2Y 7/29/2017 INFLUENZA AGE 9 TO ADULT 8/1/2017 Allergies as of 6/23/2017  Review Complete On: 6/23/2017 By: Bozena Grant MD  
 No Known Allergies Current Immunizations  Never Reviewed No immunizations on file. Not reviewed this visit You Were Diagnosed With   
  
 Codes Comments Medicare annual wellness visit, initial    -  Primary ICD-10-CM: Z00.00 ICD-9-CM: V70.0 Type 2 diabetes mellitus without complication, without long-term current use of insulin (HCC)     ICD-10-CM: E11.9 ICD-9-CM: 250.00 Essential hypertension     ICD-10-CM: I10 
ICD-9-CM: 401.9 Vitals BP Pulse Temp Resp Height(growth percentile) Weight(growth percentile) 128/84 (BP 1 Location: Left arm, BP Patient Position: Sitting) 90 97.8 °F (36.6 °C) (Oral) 16 6' 2\" (1.88 m) 223 lb (101.2 kg) SpO2 BMI Smoking Status 97% 28.63 kg/m2 Former Smoker Vitals History BMI and BSA Data Body Mass Index Body Surface Area  
 28.63 kg/m 2 2.3 m 2 Preferred Pharmacy Pharmacy Name Phone [facility] [phone] Your Updated Medication List  
  
   
This list is accurate as of: 6/23/17 10:47 AM.  Always use your most recent med list.  
  
  
  
  
 acyclovir 400 mg tablet Commonly known as:  ZOVIRAX Take 1 Tab by mouth daily. betamethasone valerate 0.1 % topical cream  
Commonly known as:  VALISONE  
  
 clonazePAM 1 mg tablet Commonly known as:  Aba Resides Take 1 Tab by mouth two (2) times a day. Max Daily Amount: 2 mg.  
  
 hydroCHLOROthiazide 12.5 mg tablet Commonly known as:  HYDRODIURIL Take 1 Tab by mouth daily. Liraglutide 0.6 mg/0.1 mL (18 mg/3 mL) sub-q pen Commonly known as:  VICTOZA  
0.1 mL by SubCUTAneous route daily. losartan 50 mg tablet Commonly known as:  COZAAR Take 1 Tab by mouth daily. metFORMIN 500 mg tablet Commonly known as:  GLUCOPHAGE Take 1 Tab by mouth two (2) times daily (with meals). omeprazole 20 mg capsule Commonly known as:  PRILOSEC Take 20 mg by mouth daily. Prescriptions Printed Refills Liraglutide (VICTOZA) 0.6 mg/0.1 mL (18 mg/3 mL) sub-q pen 11 Si.1 mL by SubCUTAneous route daily. Class: Print Route: SubCUTAneous We Performed the Following CBC WITH AUTOMATED DIFF [13037 CPT(R)] HEMOGLOBIN A1C WITH EAG [88025 CPT(R)] LIPID PANEL [31463 CPT(R)] METABOLIC PANEL, COMPREHENSIVE [96005 CPT(R)] MICROALBUMIN, UR, RAND W/ MICROALBUMIN/CREA RATIO [id number] CPT(R)] TSH 3RD GENERATION [63686 CPT(R)] Introducing [facility]! Lana Sutherland introduces [employer] patient portal. Now you can access parts of your medical record, email your doctor's office, and request medication refills online. 1. In your internet browser, go to [email or web address] 2. Click on the First Time User? Click Here link in the Sign In box. You will see the New Member Sign Up page. 3. Enter your [employer] Access Code exactly as it appears below. You will not need to use this code after youve completed the sign-up process. If you do not sign up before the expiration date, you must request a new code. · [employer] Access Code: [email or web address] Expires: 2017 10:47 AM 
 
 past 200 hour(s))   POCT urine pregnancy    Collection Time: 02/27/23 11:26 AM   Result Value Ref Range    POC Preg Test, Ur Positive (A) Negative     Acceptable Yes      Body mass index is 44.19 kg/m².    Physical Exam  General: no acute distress, sitting on the exam table and answering all questions appropriately  Resp: lungs CTA Bilaterally,  no wheezing, non-labored resp  CV: RRR, no MRG  Extremity: +2 sym pulses U/E, no edema of the lower ext Shashi  Abd: gravid, Bowel sounds active x4   Obstetric Exam: FHTs: n/a bpm, Fundal height: not palpable    Assessment:   Valente Wilson is a 26 y.o. female with:    Encounter for pregnancy  Hx Prediabetes  Hx PCOS  Hx Bipolar II  Plan:   Encounter for pregnancy  -UPT positive  -Referral to Initial OB   -JULIA 10/29/23 and  5^1 WGA by LMP  -Pt to be delivered by this provider  -Pt to obtain PNV OTC      Hx of Prediabetes  Hx PCOS  Last A1C 5.6 9/29/22  No longer prediabetic  Has been on metformin for PCOS    Hx Bipolar II  Keep next scheduled appointment with psychiatrist at [facility] for initial OB visit          Future Appointments   Date Time Provider Department Center   3/17/2023  1:00 PM RESIDENT 13, [facility] FAMILY MEDICINE [facility] FM RES [place] [facility]   11/28/2023  8:10 AM ZULEMA Peters [facility] GYN [facility]       Staff: Dr. Fabiana Cade MD  Family Medicine PGY-2                 4. Enter the last four digits of your Social Security Number (xxxx) and Date of Birth (mm/dd/yyyy) as indicated and click Submit. You will be taken to the next sign-up page. 5. Create a [employer] ID. This will be your [employer] login ID and cannot be changed, so think of one that is secure and easy to remember. 6. Create a [employer] password. You can change your password at any time. 7. Enter your Password Reset Question and Answer. This can be used at a later time if you forget your password. 8. Enter your e-mail address. You will receive e-mail notification when new information is available in [address] 9. Click Sign Up. You can now view and download portions of your medical record. 10. Click the Download Summary menu link to download a portable copy of your medical information. If you have questions, please visit the Frequently Asked Questions section of the [employer] website. Remember, [employer] is NOT to be used for urgent needs. For medical emergencies, dial 911. Now available from your iPhone and Android! Please provide this summary of care documentation to your next provider. Your primary care clinician is listed as Conor Gaona. If you have any questions after today's visit, please call [phone].

## 2023-05-20 RX ORDER — TRIAMCINOLONE ACETONIDE 1 MG/G
CREAM TOPICAL 2 TIMES DAILY
COMMUNITY
Start: 2022-12-23

## 2023-05-20 RX ORDER — LIRAGLUTIDE 6 MG/ML
1.8 INJECTION SUBCUTANEOUS
COMMUNITY
Start: 2021-11-08

## 2023-05-20 RX ORDER — INSULIN GLARGINE 100 [IU]/ML
INJECTION, SOLUTION SUBCUTANEOUS
COMMUNITY
Start: 2022-09-30

## 2023-05-20 RX ORDER — ASPIRIN 81 MG/1
TABLET ORAL DAILY
COMMUNITY

## 2023-05-20 RX ORDER — PANTOPRAZOLE SODIUM 40 MG/1
1 TABLET, DELAYED RELEASE ORAL DAILY
COMMUNITY
Start: 2022-08-22

## 2023-05-20 RX ORDER — ACYCLOVIR 400 MG/1
1 TABLET ORAL DAILY
COMMUNITY
Start: 2023-04-11

## 2023-05-20 RX ORDER — ROSUVASTATIN CALCIUM 10 MG/1
1 TABLET, COATED ORAL NIGHTLY
COMMUNITY
Start: 2022-12-23

## 2023-07-21 ENCOUNTER — OFFICE VISIT (OUTPATIENT)
Age: 78
End: 2023-07-21
Payer: MEDICARE

## 2023-07-21 VITALS
WEIGHT: 204.6 LBS | RESPIRATION RATE: 18 BRPM | DIASTOLIC BLOOD PRESSURE: 62 MMHG | HEIGHT: 74 IN | HEART RATE: 80 BPM | BODY MASS INDEX: 26.26 KG/M2 | SYSTOLIC BLOOD PRESSURE: 102 MMHG | OXYGEN SATURATION: 97 %

## 2023-07-21 DIAGNOSIS — E11.22 TYPE 2 DIABETES MELLITUS WITH STAGE 3A CHRONIC KIDNEY DISEASE, WITH LONG-TERM CURRENT USE OF INSULIN (HCC): ICD-10-CM

## 2023-07-21 DIAGNOSIS — Z79.4 TYPE 2 DIABETES MELLITUS WITH STAGE 3A CHRONIC KIDNEY DISEASE, WITH LONG-TERM CURRENT USE OF INSULIN (HCC): ICD-10-CM

## 2023-07-21 DIAGNOSIS — I25.111 ATHEROSCLEROSIS OF NATIVE CORONARY ARTERY OF NATIVE HEART WITH ANGINA PECTORIS WITH DOCUMENTED SPASM (HCC): ICD-10-CM

## 2023-07-21 DIAGNOSIS — L40.50 ARTHROPATHIC PSORIASIS, UNSPECIFIED (HCC): ICD-10-CM

## 2023-07-21 DIAGNOSIS — N52.9 ERECTILE DYSFUNCTION, UNSPECIFIED ERECTILE DYSFUNCTION TYPE: ICD-10-CM

## 2023-07-21 DIAGNOSIS — N18.31 CHRONIC KIDNEY DISEASE, STAGE 3A (HCC): ICD-10-CM

## 2023-07-21 DIAGNOSIS — N18.31 TYPE 2 DIABETES MELLITUS WITH STAGE 3A CHRONIC KIDNEY DISEASE, WITH LONG-TERM CURRENT USE OF INSULIN (HCC): ICD-10-CM

## 2023-07-21 DIAGNOSIS — Z00.00 MEDICARE ANNUAL WELLNESS VISIT, SUBSEQUENT: ICD-10-CM

## 2023-07-21 DIAGNOSIS — R06.09 DYSPNEA ON EXERTION: ICD-10-CM

## 2023-07-21 DIAGNOSIS — R07.9 CHEST PAIN, UNSPECIFIED TYPE: Primary | ICD-10-CM

## 2023-07-21 DIAGNOSIS — E78.2 MIXED HYPERLIPIDEMIA: ICD-10-CM

## 2023-07-21 PROCEDURE — 93005 ELECTROCARDIOGRAM TRACING: CPT | Performed by: INTERNAL MEDICINE

## 2023-07-21 SDOH — ECONOMIC STABILITY: FOOD INSECURITY: WITHIN THE PAST 12 MONTHS, YOU WORRIED THAT YOUR FOOD WOULD RUN OUT BEFORE YOU GOT MONEY TO BUY MORE.: NEVER TRUE

## 2023-07-21 SDOH — ECONOMIC STABILITY: INCOME INSECURITY: HOW HARD IS IT FOR YOU TO PAY FOR THE VERY BASICS LIKE FOOD, HOUSING, MEDICAL CARE, AND HEATING?: NOT HARD AT ALL

## 2023-07-21 SDOH — ECONOMIC STABILITY: FOOD INSECURITY: WITHIN THE PAST 12 MONTHS, THE FOOD YOU BOUGHT JUST DIDN'T LAST AND YOU DIDN'T HAVE MONEY TO GET MORE.: NEVER TRUE

## 2023-07-21 SDOH — ECONOMIC STABILITY: HOUSING INSECURITY
IN THE LAST 12 MONTHS, WAS THERE A TIME WHEN YOU DID NOT HAVE A STEADY PLACE TO SLEEP OR SLEPT IN A SHELTER (INCLUDING NOW)?: NO

## 2023-07-21 ASSESSMENT — PATIENT HEALTH QUESTIONNAIRE - PHQ9
SUM OF ALL RESPONSES TO PHQ QUESTIONS 1-9: 11
5. POOR APPETITE OR OVEREATING: 0
10. IF YOU CHECKED OFF ANY PROBLEMS, HOW DIFFICULT HAVE THESE PROBLEMS MADE IT FOR YOU TO DO YOUR WORK, TAKE CARE OF THINGS AT HOME, OR GET ALONG WITH OTHER PEOPLE: 0
SUM OF ALL RESPONSES TO PHQ QUESTIONS 1-9: 11
3. TROUBLE FALLING OR STAYING ASLEEP: 2
SUM OF ALL RESPONSES TO PHQ9 QUESTIONS 1 & 2: 4
6. FEELING BAD ABOUT YOURSELF - OR THAT YOU ARE A FAILURE OR HAVE LET YOURSELF OR YOUR FAMILY DOWN: 0
2. FEELING DOWN, DEPRESSED OR HOPELESS: 1
4. FEELING TIRED OR HAVING LITTLE ENERGY: 3
9. THOUGHTS THAT YOU WOULD BE BETTER OFF DEAD, OR OF HURTING YOURSELF: 0
7. TROUBLE CONCENTRATING ON THINGS, SUCH AS READING THE NEWSPAPER OR WATCHING TELEVISION: 1
SUM OF ALL RESPONSES TO PHQ QUESTIONS 1-9: 11
1. LITTLE INTEREST OR PLEASURE IN DOING THINGS: 3
8. MOVING OR SPEAKING SO SLOWLY THAT OTHER PEOPLE COULD HAVE NOTICED. OR THE OPPOSITE, BEING SO FIGETY OR RESTLESS THAT YOU HAVE BEEN MOVING AROUND A LOT MORE THAN USUAL: 1
SUM OF ALL RESPONSES TO PHQ QUESTIONS 1-9: 11

## 2023-07-21 ASSESSMENT — LIFESTYLE VARIABLES
HOW MANY STANDARD DRINKS CONTAINING ALCOHOL DO YOU HAVE ON A TYPICAL DAY: 1 OR 2
HOW OFTEN DO YOU HAVE A DRINK CONTAINING ALCOHOL: 2-3 TIMES A WEEK

## 2023-07-21 NOTE — PROGRESS NOTES
Jazmin Topete is a 78 y.o. male presenting for/with:    Chief Complaint   Patient presents with    Medicare AWV    Hypertension    Diabetes    Other     Reports losing 12 lbs in 6 weeks. Reports prior to weight loss he was having some pancreatic insufficiency      Shortness of Breath     Reports SOB with exertion. States is able to walk about 100 feet before stopping. Peripheral Neuropathy     Reports numbness to his knees causing difficulty walking. Vitals:    07/21/23 0804   BP: 102/62   Site: Left Upper Arm   Position: Sitting   Cuff Size: Medium Adult   Pulse: 80   Resp: 18   SpO2: 97%   Weight: 204 lb 9.6 oz (92.8 kg)   Height: 6' 2\" (1.88 m)       Pain Scale: 6/10  Pain Location: Generalized    1. \"Have you been to the ER, urgent care clinic since your last visit? Hospitalized since your last visit? \" No    2. \"Have you seen or consulted any other health care providers outside of the [address] [place] [address] since your last visit? \" No     3. For patients aged 43-73: Has the patient had a colonoscopy / FIT/ Cologuard? Yes - Care Gap present. Most recent result on file     If the patient is female:    4. For patients aged 43-66: Has the patient had a mammogram within the past 2 years? NA - based on age    11. For patients aged 21-65: Has the patient had a pap smear? NA - based on age      PHQ-9  7/21/2023   Little interest or pleasure in doing things 3   Little interest or pleasure in doing things -   Feeling down, depressed, or hopeless 1   Trouble falling or staying asleep, or sleeping too much 2   Feeling tired or having little energy 3   Poor appetite or overeating 0   Feeling bad about yourself - or that you are a failure or have let yourself or your family down 0   Trouble concentrating on things, such as reading the newspaper or watching television 1   Moving or speaking so slowly that other people could have noticed.  Or the opposite - being so fidgety or restless that you have been moving

## 2023-07-21 NOTE — PROGRESS NOTES
Medicare Annual Wellness Visit    Paulino Breen is here for Medicare AWV, Hypertension, Diabetes, Other (Reports losing 12 lbs in 6 weeks. Reports prior to weight loss he was having some pancreatic insufficiency/), Shortness of Breath (Reports SOB with exertion. States is able to walk about 100 feet before stopping. ), and Peripheral Neuropathy (Reports numbness to his knees causing difficulty walking. )    Assessment & Plan   Chest pain, unspecified type  -     EKG 12 Lead; Future  Dyspnea on exertion  -     EKG 12 Lead; Future  Medicare annual wellness visit, subsequent  Arthropathic psoriasis, unspecified ([address])  Type 2 diabetes mellitus with stage 3a chronic kidney disease, with long-term current use of insulin ([address])  -     Hemoglobin A1C; Future  -     Lipase; Future  Atherosclerosis of native coronary artery of native heart with angina pectoris with documented spasm (HCC)  Chronic kidney disease, stage 3a (HCC)  -     CBC with Auto Differential; Future  -     Comprehensive Metabolic Panel; Future  Erectile dysfunction, unspecified erectile dysfunction type  -     Testosterone Total Only, Male; Future  Mixed hyperlipidemia  -     TSH; Future  -     Lipid Panel; Future    Recommendations for Preventive Services Due: see orders and patient instructions/AVS.  Recommended screening schedule for the next 5-10 years is provided to the patient in written form: see Patient Instructions/AVS.     No follow-ups on file. Subjective       Patient's complete Health Risk Assessment and screening values have been reviewed and are found in Flowsheets. The following problems were reviewed today and where indicated follow up appointments were made and/or referrals ordered.     Positive Risk Factor Screenings with Interventions:    Fall Risk:  Do you feel unsteady or are you worried about falling? : (!) yes  2 or more falls in past year?: (!) yes  Fall with injury in past year?: no     Interventions:    Patient declines any

## 2023-07-21 NOTE — PATIENT INSTRUCTIONS
one thing and you pay close attention to that one thing. For example, you may sit quietly and notice your emotions or how your food tastes and smells. When you're present, you focus on the things that are happening right now. You let go of your thoughts about the past and the future. When you dwell on the past or the future, you miss moments that can heal and strengthen you. You may miss moments like hearing a child laugh or seeing a friendly face when you think you're all alone. When you're accepting, you don't  the present moment. Instead you accept your thoughts and feelings as they come. You can practice anytime, anywhere, and in any way you choose. You can practice in many ways. Here are a few ideas:  While doing your chores, like washing the dishes, let your mind focus on what's in your hand. What does the dish feel like? Is the water warm or cold? Go outside and take a few deep breaths. What is the air like? Is it warm or cold? When you can, take some time at the start of your day to sit alone and think. Take a slow walk by yourself. Count your steps while you breathe in and out. Try yoga breathing exercises, stretches, and poses to strengthen and relax your muscles. At work, if you can, try to stop for a few moments each hour. Note how your body feels. Let yourself regroup and let your mind settle before you return to what you were doing. If you struggle with anxiety or \"worry thoughts,\" imagine your mind as a blue aishwarya and your worry thoughts as clouds. Now imagine those worry thoughts floating across your mind's aishwarya. Just let them pass by as you watch. Follow-up care is a key part of your treatment and safety. Be sure to make and go to all appointments, and call your doctor if you are having problems. It's also a good idea to know your test results and keep a list of the medicines you take. Where can you learn more?   Go to [email or web address] and enter M676 to learn more

## 2023-07-21 NOTE — PROGRESS NOTES
Duration-30 minutes primarily counseling and education in addition to reviewing prior notes and laboratory tests as well as relevant imaging results. Assessment and Plan:    1. Chest pain, unspecified type  EKG demonstrates APCs and no acute changes. - EKG 12 Lead; Future  - EKG 12 Lead    2. Dyspnea on exertion  Suggested a chest x-ray and echocardiogram but he declines today. - EKG 12 Lead; Future  - EKG 12 Lead    3. Medicare annual wellness visit, subsequent  4. Arthropathic psoriasis, unspecified ([address])  Clinically not active at this time    5. Type 2 diabetes mellitus with stage 3a chronic kidney disease, with long-term current use of insulin ([address])  Due for labs  - Hemoglobin A1C; Future  - Lipase; Future  - Lipase  - Hemoglobin A1C    6. Atherosclerosis of native coronary artery of native heart with angina pectoris with documented spasm ([address])  Denies angina    7. Chronic kidney disease, stage 3a ([address])  Due for assessment  - CBC with Auto Differential; Future  - Comprehensive Metabolic Panel; Future  - Comprehensive Metabolic Panel  - CBC with Auto Differential    8. Erectile dysfunction, unspecified erectile dysfunction type  Complains of ED. Would like assessment  - Testosterone Total Only, Male; Future  - Testosterone Total Only, Male    9. Mixed hyperlipidemia  Due for labs  - TSH; Future  - Lipid Panel; Future  - Lipid Panel  - TSH         Mr. Welsh is a 78 y.o. male who is here for evaluation of   Chief Complaint   Patient presents with    Medicare AWV    Hypertension    Diabetes    Other     Reports losing 12 lbs in 6 weeks. Reports prior to weight loss he was having some pancreatic insufficiency      Shortness of Breath     Reports SOB with exertion. States is able to walk about 100 feet before stopping. Peripheral Neuropathy     Reports numbness to his knees causing difficulty walking.     .             Orders Placed This Encounter   Procedures    TSH     Standing Status:   Future

## 2023-07-22 LAB
ALBUMIN SERPL-MCNC: 4.1 G/DL (ref 3.5–5)
ALBUMIN/GLOB SERPL: 1.4 (ref 1.1–2.2)
ALP SERPL-CCNC: 73 U/L (ref 45–117)
ALT SERPL-CCNC: 32 U/L (ref 12–78)
ANION GAP SERPL CALC-SCNC: 8 MMOL/L (ref 5–15)
AST SERPL-CCNC: 23 U/L (ref 15–37)
BASOPHILS # BLD: 0 K/UL (ref 0–0.1)
BASOPHILS NFR BLD: 1 % (ref 0–1)
BILIRUB SERPL-MCNC: 0.4 MG/DL (ref 0.2–1)
BUN SERPL-MCNC: 14 MG/DL (ref 6–20)
BUN/CREAT SERPL: 11 (ref 12–20)
CALCIUM SERPL-MCNC: 9.5 MG/DL (ref 8.5–10.1)
CHLORIDE SERPL-SCNC: 102 MMOL/L (ref 97–108)
CHOLEST SERPL-MCNC: 288 MG/DL
CO2 SERPL-SCNC: 25 MMOL/L (ref 21–32)
CREAT SERPL-MCNC: 1.31 MG/DL (ref 0.7–1.3)
DIFFERENTIAL METHOD BLD: NORMAL
EOSINOPHIL # BLD: 0.1 K/UL (ref 0–0.4)
EOSINOPHIL NFR BLD: 1 % (ref 0–7)
ERYTHROCYTE [DISTWIDTH] IN BLOOD BY AUTOMATED COUNT: 12.4 % (ref 11.5–14.5)
EST. AVERAGE GLUCOSE BLD GHB EST-MCNC: 237 MG/DL
GLOBULIN SER CALC-MCNC: 2.9 G/DL (ref 2–4)
GLUCOSE SERPL-MCNC: 230 MG/DL (ref 65–100)
HBA1C MFR BLD: 9.9 % (ref 4–5.6)
HCT VFR BLD AUTO: 44.2 % (ref 36.6–50.3)
HDLC SERPL-MCNC: 61 MG/DL
HDLC SERPL: 4.7 (ref 0–5)
HGB BLD-MCNC: 14.2 G/DL (ref 12.1–17)
IMM GRANULOCYTES # BLD AUTO: 0 K/UL (ref 0–0.04)
IMM GRANULOCYTES NFR BLD AUTO: 0 % (ref 0–0.5)
LDLC SERPL CALC-MCNC: 180.4 MG/DL (ref 0–100)
LIPASE SERPL-CCNC: 95 U/L (ref 73–393)
LYMPHOCYTES # BLD: 1.6 K/UL (ref 0.8–3.5)
LYMPHOCYTES NFR BLD: 33 % (ref 12–49)
MCH RBC QN AUTO: 30.5 PG (ref 26–34)
MCHC RBC AUTO-ENTMCNC: 32.1 G/DL (ref 30–36.5)
MCV RBC AUTO: 95.1 FL (ref 80–99)
MONOCYTES # BLD: 0.3 K/UL (ref 0–1)
MONOCYTES NFR BLD: 7 % (ref 5–13)
NEUTS SEG # BLD: 2.8 K/UL (ref 1.8–8)
NEUTS SEG NFR BLD: 58 % (ref 32–75)
NRBC # BLD: 0 K/UL (ref 0–0.01)
NRBC BLD-RTO: 0 PER 100 WBC
PLATELET # BLD AUTO: 187 K/UL (ref 150–400)
PMV BLD AUTO: 10.4 FL (ref 8.9–12.9)
POTASSIUM SERPL-SCNC: 4.6 MMOL/L (ref 3.5–5.1)
PROT SERPL-MCNC: 7 G/DL (ref 6.4–8.2)
RBC # BLD AUTO: 4.65 M/UL (ref 4.1–5.7)
SODIUM SERPL-SCNC: 135 MMOL/L (ref 136–145)
TRIGL SERPL-MCNC: 233 MG/DL
TSH SERPL DL<=0.05 MIU/L-ACNC: 2.4 UIU/ML (ref 0.36–3.74)
VLDLC SERPL CALC-MCNC: 46.6 MG/DL
WBC # BLD AUTO: 4.9 K/UL (ref 4.1–11.1)

## 2023-07-25 LAB — TESTOST SERPL-MCNC: 137 NG/DL (ref 264–916)

## 2023-07-28 DIAGNOSIS — R79.89 LOW SERUM TESTOSTERONE LEVEL IN MALE: ICD-10-CM

## 2023-07-28 RX ORDER — TESTOSTERONE GEL, 1% 10 MG/G
GEL TRANSDERMAL
Qty: 150 G | Refills: 3 | Status: SHIPPED | OUTPATIENT
Start: 2023-07-28 | End: 2023-08-28

## 2023-08-28 RX ORDER — PANTOPRAZOLE SODIUM 40 MG/1
TABLET, DELAYED RELEASE ORAL
Qty: 90 TABLET | Refills: 3 | Status: SHIPPED | OUTPATIENT
Start: 2023-08-28

## 2023-11-06 DIAGNOSIS — I10 ESSENTIAL (PRIMARY) HYPERTENSION: ICD-10-CM

## 2023-11-06 DIAGNOSIS — E11.9 TYPE 2 DIABETES MELLITUS WITHOUT COMPLICATIONS (HCC): ICD-10-CM

## 2023-11-06 RX ORDER — INSULIN GLARGINE 100 [IU]/ML
INJECTION, SOLUTION SUBCUTANEOUS
Qty: 15 ML | Refills: 5 | Status: SHIPPED | OUTPATIENT
Start: 2023-11-06

## 2023-11-10 ENCOUNTER — OFFICE VISIT (OUTPATIENT)
Age: 78
End: 2023-11-10

## 2023-11-10 VITALS
WEIGHT: 202.6 LBS | DIASTOLIC BLOOD PRESSURE: 82 MMHG | BODY MASS INDEX: 26 KG/M2 | RESPIRATION RATE: 18 BRPM | HEIGHT: 74 IN | OXYGEN SATURATION: 98 % | HEART RATE: 99 BPM | SYSTOLIC BLOOD PRESSURE: 142 MMHG | TEMPERATURE: 97.1 F

## 2023-11-10 DIAGNOSIS — E11.22 TYPE 2 DIABETES MELLITUS WITH STAGE 3A CHRONIC KIDNEY DISEASE, WITH LONG-TERM CURRENT USE OF INSULIN (HCC): Primary | ICD-10-CM

## 2023-11-10 DIAGNOSIS — Z91.81 AT HIGH RISK FOR FALLS: ICD-10-CM

## 2023-11-10 DIAGNOSIS — Z74.09 IMPAIRED MOBILITY: ICD-10-CM

## 2023-11-10 DIAGNOSIS — R06.09 DYSPNEA ON EXERTION: ICD-10-CM

## 2023-11-10 DIAGNOSIS — R79.89 LOW SERUM TESTOSTERONE LEVEL IN MALE: ICD-10-CM

## 2023-11-10 DIAGNOSIS — Z23 NEEDS FLU SHOT: ICD-10-CM

## 2023-11-10 DIAGNOSIS — N18.31 TYPE 2 DIABETES MELLITUS WITH STAGE 3A CHRONIC KIDNEY DISEASE, WITH LONG-TERM CURRENT USE OF INSULIN (HCC): Primary | ICD-10-CM

## 2023-11-10 DIAGNOSIS — I10 ESSENTIAL (PRIMARY) HYPERTENSION: ICD-10-CM

## 2023-11-10 DIAGNOSIS — Z79.4 TYPE 2 DIABETES MELLITUS WITH STAGE 3A CHRONIC KIDNEY DISEASE, WITH LONG-TERM CURRENT USE OF INSULIN (HCC): Primary | ICD-10-CM

## 2023-11-10 RX ORDER — TESTOSTERONE CYPIONATE 200 MG/ML
200 INJECTION, SOLUTION INTRAMUSCULAR
Qty: 1 ML | Refills: 5 | Status: SHIPPED | OUTPATIENT
Start: 2023-11-10 | End: 2024-04-09

## 2023-11-10 ASSESSMENT — PATIENT HEALTH QUESTIONNAIRE - PHQ9
SUM OF ALL RESPONSES TO PHQ QUESTIONS 1-9: 0
2. FEELING DOWN, DEPRESSED OR HOPELESS: 0
1. LITTLE INTEREST OR PLEASURE IN DOING THINGS: 0
SUM OF ALL RESPONSES TO PHQ QUESTIONS 1-9: 0
SUM OF ALL RESPONSES TO PHQ9 QUESTIONS 1 & 2: 0

## 2023-11-11 LAB
ALBUMIN SERPL-MCNC: 3.9 G/DL (ref 3.5–5)
ALBUMIN/GLOB SERPL: 1.2 (ref 1.1–2.2)
ALP SERPL-CCNC: 78 U/L (ref 45–117)
ALT SERPL-CCNC: 33 U/L (ref 12–78)
ANION GAP SERPL CALC-SCNC: 5 MMOL/L (ref 5–15)
AST SERPL-CCNC: 24 U/L (ref 15–37)
BASOPHILS # BLD: 0 K/UL (ref 0–0.1)
BASOPHILS NFR BLD: 1 % (ref 0–1)
BILIRUB SERPL-MCNC: 0.6 MG/DL (ref 0.2–1)
BUN SERPL-MCNC: 18 MG/DL (ref 6–20)
BUN/CREAT SERPL: 15 (ref 12–20)
CALCIUM SERPL-MCNC: 9 MG/DL (ref 8.5–10.1)
CHLORIDE SERPL-SCNC: 101 MMOL/L (ref 97–108)
CO2 SERPL-SCNC: 30 MMOL/L (ref 21–32)
CREAT SERPL-MCNC: 1.24 MG/DL (ref 0.7–1.3)
DIFFERENTIAL METHOD BLD: NORMAL
EOSINOPHIL # BLD: 0.1 K/UL (ref 0–0.4)
EOSINOPHIL NFR BLD: 2 % (ref 0–7)
ERYTHROCYTE [DISTWIDTH] IN BLOOD BY AUTOMATED COUNT: 12.2 % (ref 11.5–14.5)
EST. AVERAGE GLUCOSE BLD GHB EST-MCNC: 151 MG/DL
GLOBULIN SER CALC-MCNC: 3.2 G/DL (ref 2–4)
GLUCOSE SERPL-MCNC: 167 MG/DL (ref 65–100)
HBA1C MFR BLD: 6.9 % (ref 4–5.6)
HCT VFR BLD AUTO: 48.8 % (ref 36.6–50.3)
HGB BLD-MCNC: 16.2 G/DL (ref 12.1–17)
IMM GRANULOCYTES # BLD AUTO: 0 K/UL (ref 0–0.04)
IMM GRANULOCYTES NFR BLD AUTO: 0 % (ref 0–0.5)
LYMPHOCYTES # BLD: 1.8 K/UL (ref 0.8–3.5)
LYMPHOCYTES NFR BLD: 26 % (ref 12–49)
MCH RBC QN AUTO: 30.8 PG (ref 26–34)
MCHC RBC AUTO-ENTMCNC: 33.2 G/DL (ref 30–36.5)
MCV RBC AUTO: 92.8 FL (ref 80–99)
MONOCYTES # BLD: 0.5 K/UL (ref 0–1)
MONOCYTES NFR BLD: 7 % (ref 5–13)
NEUTS SEG # BLD: 4.4 K/UL (ref 1.8–8)
NEUTS SEG NFR BLD: 64 % (ref 32–75)
NRBC # BLD: 0 K/UL (ref 0–0.01)
NRBC BLD-RTO: 0 PER 100 WBC
PLATELET # BLD AUTO: 171 K/UL (ref 150–400)
PMV BLD AUTO: 10.6 FL (ref 8.9–12.9)
POTASSIUM SERPL-SCNC: 4.8 MMOL/L (ref 3.5–5.1)
PROT SERPL-MCNC: 7.1 G/DL (ref 6.4–8.2)
RBC # BLD AUTO: 5.26 M/UL (ref 4.1–5.7)
SODIUM SERPL-SCNC: 136 MMOL/L (ref 136–145)
WBC # BLD AUTO: 6.8 K/UL (ref 4.1–11.1)

## 2024-02-05 DIAGNOSIS — U07.1 COVID: Primary | ICD-10-CM

## 2024-02-05 NOTE — PROGRESS NOTES
Dr. Hackett reports that he is quite symptomatic with COVID and requests medication.  Paxlovid sent to [facility] pharmacy

## 2024-03-18 ENCOUNTER — TELEPHONE (OUTPATIENT)
Age: 79
End: 2024-03-18

## 2024-03-18 DIAGNOSIS — E11.22 TYPE 2 DIABETES MELLITUS WITH STAGE 3A CHRONIC KIDNEY DISEASE, WITH LONG-TERM CURRENT USE OF INSULIN (HCC): Primary | ICD-10-CM

## 2024-03-18 DIAGNOSIS — N18.31 TYPE 2 DIABETES MELLITUS WITH STAGE 3A CHRONIC KIDNEY DISEASE, WITH LONG-TERM CURRENT USE OF INSULIN (HCC): Primary | ICD-10-CM

## 2024-03-18 DIAGNOSIS — Z79.4 TYPE 2 DIABETES MELLITUS WITH STAGE 3A CHRONIC KIDNEY DISEASE, WITH LONG-TERM CURRENT USE OF INSULIN (HCC): Primary | ICD-10-CM

## 2024-03-18 NOTE — TELEPHONE ENCOUNTER
----- Message from Dulce Vanitaannalisa sent at 3/18/2024  1:16 PM EDT -----  Subject: Referral Request    Reason for referral request? Blood work - A1C   Provider patient wants to be referred to(if known):     Provider Phone Number(if known):    Additional Information for Provider?   ---------------------------------------------------------------------------  --------------  CALL BACK INFO    [phone]; OK to leave message on voicemail  ---------------------------------------------------------------------------  --------------

## 2024-03-19 ENCOUNTER — NURSE ONLY (OUTPATIENT)
Age: 79
End: 2024-03-19

## 2024-03-19 DIAGNOSIS — N18.31 TYPE 2 DIABETES MELLITUS WITH STAGE 3A CHRONIC KIDNEY DISEASE, WITH LONG-TERM CURRENT USE OF INSULIN (HCC): ICD-10-CM

## 2024-03-19 DIAGNOSIS — E11.22 TYPE 2 DIABETES MELLITUS WITH STAGE 3A CHRONIC KIDNEY DISEASE, WITH LONG-TERM CURRENT USE OF INSULIN (HCC): ICD-10-CM

## 2024-03-19 DIAGNOSIS — Z79.4 TYPE 2 DIABETES MELLITUS WITH STAGE 3A CHRONIC KIDNEY DISEASE, WITH LONG-TERM CURRENT USE OF INSULIN (HCC): ICD-10-CM

## 2024-03-20 LAB
EST. AVERAGE GLUCOSE BLD GHB EST-MCNC: 171 MG/DL
HBA1C MFR BLD: 7.6 % (ref 4–5.6)

## 2024-04-15 ENCOUNTER — OFFICE VISIT (OUTPATIENT)
Age: 79
End: 2024-04-15
Payer: MEDICARE

## 2024-04-15 ENCOUNTER — TELEPHONE (OUTPATIENT)
Age: 79
End: 2024-04-15

## 2024-04-15 VITALS
HEIGHT: 74 IN | HEART RATE: 118 BPM | WEIGHT: 196.2 LBS | RESPIRATION RATE: 22 BRPM | OXYGEN SATURATION: 97 % | BODY MASS INDEX: 25.18 KG/M2 | SYSTOLIC BLOOD PRESSURE: 108 MMHG | DIASTOLIC BLOOD PRESSURE: 54 MMHG

## 2024-04-15 DIAGNOSIS — I25.111 ATHEROSCLEROSIS OF NATIVE CORONARY ARTERY OF NATIVE HEART WITH ANGINA PECTORIS WITH DOCUMENTED SPASM (HCC): ICD-10-CM

## 2024-04-15 DIAGNOSIS — Z79.4 TYPE 2 DIABETES MELLITUS WITH STAGE 3A CHRONIC KIDNEY DISEASE, WITH LONG-TERM CURRENT USE OF INSULIN (HCC): ICD-10-CM

## 2024-04-15 DIAGNOSIS — E11.22 TYPE 2 DIABETES MELLITUS WITH STAGE 3A CHRONIC KIDNEY DISEASE, WITH LONG-TERM CURRENT USE OF INSULIN (HCC): ICD-10-CM

## 2024-04-15 DIAGNOSIS — I49.9 IRREGULAR HEART BEAT: Primary | ICD-10-CM

## 2024-04-15 DIAGNOSIS — L40.50 ARTHROPATHIC PSORIASIS, UNSPECIFIED (HCC): ICD-10-CM

## 2024-04-15 DIAGNOSIS — N18.31 TYPE 2 DIABETES MELLITUS WITH STAGE 3A CHRONIC KIDNEY DISEASE, WITH LONG-TERM CURRENT USE OF INSULIN (HCC): ICD-10-CM

## 2024-04-15 DIAGNOSIS — I10 ESSENTIAL HYPERTENSION: ICD-10-CM

## 2024-04-15 PROCEDURE — 3078F DIAST BP <80 MM HG: CPT | Performed by: INTERNAL MEDICINE

## 2024-04-15 PROCEDURE — 1036F TOBACCO NON-USER: CPT | Performed by: INTERNAL MEDICINE

## 2024-04-15 PROCEDURE — 1123F ACP DISCUSS/DSCN MKR DOCD: CPT | Performed by: INTERNAL MEDICINE

## 2024-04-15 PROCEDURE — 3074F SYST BP LT 130 MM HG: CPT | Performed by: INTERNAL MEDICINE

## 2024-04-15 PROCEDURE — 99214 OFFICE O/P EST MOD 30 MIN: CPT | Performed by: INTERNAL MEDICINE

## 2024-04-15 PROCEDURE — 93000 ELECTROCARDIOGRAM COMPLETE: CPT | Performed by: INTERNAL MEDICINE

## 2024-04-15 PROCEDURE — G8419 CALC BMI OUT NRM PARAM NOF/U: HCPCS | Performed by: INTERNAL MEDICINE

## 2024-04-15 PROCEDURE — 3051F HG A1C>EQUAL 7.0%<8.0%: CPT | Performed by: INTERNAL MEDICINE

## 2024-04-15 PROCEDURE — G8427 DOCREV CUR MEDS BY ELIG CLIN: HCPCS | Performed by: INTERNAL MEDICINE

## 2024-04-15 NOTE — PROGRESS NOTES
No Help Needed No Help Needed   Managing money (expenses/bills) No Help Needed No Help Needed   Moderately strenuous housework (laundry) No Help Needed No Help Needed   Shopping for personal items (toiletries/medicines) No Help Needed No Help Needed   Shopping for groceries No Help Needed No Help Needed   Driving No Help Needed No Help Needed   Climbing a flight of stairs No Help Needed No Help Needed   Getting to places beyond walking distances No Help Needed No Help Needed           11/10/2023     8:00 AM   AMB Abuse Screening   Do you ever feel afraid of your partner? N   Are you in a relationship with someone who physically or mentally threatens you? N   Is it safe for you to go home? Y       Advance Care Planning     The patient has appointed the following active healthcare agents:    Primary Decision Maker: Karen Hackett - Spouse - [phone]    Secondary Decision Maker: Fausto Hackett - Child - [phone]  
cath 10/12/2018    10/11/18 PTCA D1, neg FFR to LCX and RCA     S/P coronary artery stent placement 4/12/2018    T2DM (type 2 diabetes mellitus) ([facility]) 09/2016    A1c 6.4;  Glucose 195         ROS:  Denies fever, chills, cough, chest pain, SOB,  nausea, vomiting, or diarrhea.  Denies wt loss, wt gain, hemoptysis, hematochezia or melena.    Physical Examination:    BP (!) 108/54 (Site: Left Upper Arm, Position: Sitting, Cuff Size: Medium Adult)   Pulse (!) 118   Resp 22   Ht 1.88 m (6' 2\")   Wt 89 kg (196 lb 3.2 oz)   SpO2 97%   BMI 25.19 kg/m²    General:  Alert, cooperative, no distress.    Head:  Normocephalic, without obvious abnormality, atraumatic.   Eyes:  Conjunctivae/corneas clear. Pupils equal, round, reactive to light.    Chest: No wheezes, rales. Rubs or ronchi   Cardiac: RRR.    Abdomen:  Extremities:  Skin:  +BS, soft and NT without palpable mass  There is no edema   No rash    The electrocardiogram today demonstrates normal sinus rhythm with APCs.

## 2024-04-15 NOTE — TELEPHONE ENCOUNTER
Pt called stating he has been a little short of breath, a little fatigued, a bit of irregular pulse over the weekend. Said it wasn't terrible or he would have gone to the ER. Would like to speak with someone about this. Please call back.     Thanks!

## 2024-04-16 LAB
ALBUMIN SERPL-MCNC: 3.9 G/DL (ref 3.5–5)
ALBUMIN/GLOB SERPL: 1.3 (ref 1.1–2.2)
ALP SERPL-CCNC: 67 U/L (ref 45–117)
ALT SERPL-CCNC: 27 U/L (ref 12–78)
ANION GAP SERPL CALC-SCNC: 6 MMOL/L (ref 5–15)
AST SERPL-CCNC: 16 U/L (ref 15–37)
BASOPHILS # BLD: 0 K/UL (ref 0–0.1)
BASOPHILS NFR BLD: 1 % (ref 0–1)
BILIRUB SERPL-MCNC: 0.5 MG/DL (ref 0.2–1)
BUN SERPL-MCNC: 15 MG/DL (ref 6–20)
BUN/CREAT SERPL: 12 (ref 12–20)
CALCIUM SERPL-MCNC: 9.9 MG/DL (ref 8.5–10.1)
CHLORIDE SERPL-SCNC: 105 MMOL/L (ref 97–108)
CO2 SERPL-SCNC: 28 MMOL/L (ref 21–32)
CREAT SERPL-MCNC: 1.24 MG/DL (ref 0.7–1.3)
DIFFERENTIAL METHOD BLD: NORMAL
EOSINOPHIL # BLD: 0.1 K/UL (ref 0–0.4)
EOSINOPHIL NFR BLD: 2 % (ref 0–7)
ERYTHROCYTE [DISTWIDTH] IN BLOOD BY AUTOMATED COUNT: 13.4 % (ref 11.5–14.5)
EST. AVERAGE GLUCOSE BLD GHB EST-MCNC: 163 MG/DL
GLOBULIN SER CALC-MCNC: 3 G/DL (ref 2–4)
GLUCOSE SERPL-MCNC: 213 MG/DL (ref 65–100)
HBA1C MFR BLD: 7.3 % (ref 4–5.6)
HCT VFR BLD AUTO: 43 % (ref 36.6–50.3)
HGB BLD-MCNC: 14.9 G/DL (ref 12.1–17)
IMM GRANULOCYTES # BLD AUTO: 0 K/UL (ref 0–0.04)
IMM GRANULOCYTES NFR BLD AUTO: 0 % (ref 0–0.5)
LYMPHOCYTES # BLD: 1.7 K/UL (ref 0.8–3.5)
LYMPHOCYTES NFR BLD: 29 % (ref 12–49)
MCH RBC QN AUTO: 32.5 PG (ref 26–34)
MCHC RBC AUTO-ENTMCNC: 34.7 G/DL (ref 30–36.5)
MCV RBC AUTO: 93.7 FL (ref 80–99)
MONOCYTES # BLD: 0.4 K/UL (ref 0–1)
MONOCYTES NFR BLD: 6 % (ref 5–13)
NEUTS SEG # BLD: 3.7 K/UL (ref 1.8–8)
NEUTS SEG NFR BLD: 62 % (ref 32–75)
NRBC # BLD: 0 K/UL (ref 0–0.01)
NRBC BLD-RTO: 0 PER 100 WBC
PLATELET # BLD AUTO: 198 K/UL (ref 150–400)
PMV BLD AUTO: 10.8 FL (ref 8.9–12.9)
POTASSIUM SERPL-SCNC: 4.4 MMOL/L (ref 3.5–5.1)
PROT SERPL-MCNC: 6.9 G/DL (ref 6.4–8.2)
RBC # BLD AUTO: 4.59 M/UL (ref 4.1–5.7)
SODIUM SERPL-SCNC: 139 MMOL/L (ref 136–145)
TSH SERPL DL<=0.05 MIU/L-ACNC: 1.27 UIU/ML (ref 0.36–3.74)
WBC # BLD AUTO: 5.9 K/UL (ref 4.1–11.1)

## 2024-04-23 RX ORDER — ACYCLOVIR 400 MG/1
400 TABLET ORAL DAILY
Qty: 90 TABLET | Refills: 5 | Status: SHIPPED | OUTPATIENT
Start: 2024-04-23

## 2024-05-07 DIAGNOSIS — R79.89 LOW SERUM TESTOSTERONE LEVEL IN MALE: ICD-10-CM

## 2024-05-07 RX ORDER — TESTOSTERONE CYPIONATE 200 MG/ML
INJECTION, SOLUTION INTRAMUSCULAR
Qty: 1 ML | Refills: 0 | Status: SHIPPED | OUTPATIENT
Start: 2024-05-07 | End: 2024-06-06

## 2024-06-03 DIAGNOSIS — R79.89 LOW SERUM TESTOSTERONE LEVEL IN MALE: ICD-10-CM

## 2024-06-03 RX ORDER — TESTOSTERONE CYPIONATE 200 MG/ML
INJECTION, SOLUTION INTRAMUSCULAR
Qty: 1 ML | Refills: 0 | Status: SHIPPED | OUTPATIENT
Start: 2024-06-03 | End: 2024-07-03

## 2024-07-03 DIAGNOSIS — R79.89 LOW SERUM TESTOSTERONE LEVEL IN MALE: ICD-10-CM

## 2024-07-04 RX ORDER — TESTOSTERONE CYPIONATE 200 MG/ML
INJECTION, SOLUTION INTRAMUSCULAR
Qty: 1 ML | Refills: 3 | Status: SHIPPED | OUTPATIENT
Start: 2024-07-04 | End: 2024-08-03

## 2024-08-23 RX ORDER — PANTOPRAZOLE SODIUM 40 MG/1
TABLET, DELAYED RELEASE ORAL
Qty: 90 TABLET | Refills: 3 | Status: SHIPPED | OUTPATIENT
Start: 2024-08-23

## 2024-10-21 DIAGNOSIS — R79.89 LOW SERUM TESTOSTERONE LEVEL IN MALE: ICD-10-CM

## 2024-10-21 RX ORDER — TESTOSTERONE CYPIONATE 200 MG/ML
INJECTION, SOLUTION INTRAMUSCULAR
Qty: 1 ML | Refills: 3 | Status: SHIPPED | OUTPATIENT
Start: 2024-10-21 | End: 2024-11-20

## 2024-11-26 DIAGNOSIS — E78.2 MIXED HYPERLIPIDEMIA: ICD-10-CM

## 2024-11-26 RX ORDER — ROSUVASTATIN CALCIUM 10 MG/1
TABLET, COATED ORAL NIGHTLY
Qty: 90 TABLET | Refills: 4 | Status: SHIPPED | OUTPATIENT
Start: 2024-11-26

## 2024-12-19 ENCOUNTER — HOSPITAL ENCOUNTER (INPATIENT)
Facility: HOSPITAL | Age: 79
LOS: 1 days | Discharge: LEFT BEFORE DISCHARGE | End: 2024-12-20
Attending: STUDENT IN AN ORGANIZED HEALTH CARE EDUCATION/TRAINING PROGRAM | Admitting: STUDENT IN AN ORGANIZED HEALTH CARE EDUCATION/TRAINING PROGRAM
Payer: MEDICARE

## 2024-12-19 ENCOUNTER — APPOINTMENT (OUTPATIENT)
Facility: HOSPITAL | Age: 79
End: 2024-12-19
Payer: MEDICARE

## 2024-12-19 ENCOUNTER — HOSPITAL ENCOUNTER (EMERGENCY)
Facility: HOSPITAL | Age: 79
Discharge: ANOTHER HOSPITAL | End: 2024-12-19
Attending: EMERGENCY MEDICINE
Payer: MEDICARE

## 2024-12-19 ENCOUNTER — TELEPHONE (OUTPATIENT)
Age: 79
End: 2024-12-19

## 2024-12-19 VITALS
SYSTOLIC BLOOD PRESSURE: 156 MMHG | TEMPERATURE: 98 F | HEART RATE: 88 BPM | RESPIRATION RATE: 20 BRPM | OXYGEN SATURATION: 95 % | HEIGHT: 74 IN | DIASTOLIC BLOOD PRESSURE: 97 MMHG | WEIGHT: 200 LBS | BODY MASS INDEX: 25.67 KG/M2

## 2024-12-19 DIAGNOSIS — I24.9 ACUTE CORONARY SYNDROME (HCC): ICD-10-CM

## 2024-12-19 DIAGNOSIS — I21.4 NSTEMI (NON-ST ELEVATED MYOCARDIAL INFARCTION) (HCC): Primary | ICD-10-CM

## 2024-12-19 LAB
ALBUMIN SERPL-MCNC: 3.6 G/DL (ref 3.5–5)
ALBUMIN/GLOB SERPL: 1.1 (ref 1.1–2.2)
ALP SERPL-CCNC: 88 U/L (ref 45–117)
ALT SERPL-CCNC: 50 U/L (ref 12–78)
ANION GAP SERPL CALC-SCNC: 10 MMOL/L (ref 2–12)
APTT PPP: 25 SEC (ref 22.1–31)
AST SERPL-CCNC: 123 U/L (ref 15–37)
BASOPHILS # BLD: 0 K/UL (ref 0–0.1)
BASOPHILS NFR BLD: 1 % (ref 0–1)
BILIRUB SERPL-MCNC: 0.4 MG/DL (ref 0.2–1)
BUN SERPL-MCNC: 16 MG/DL (ref 6–20)
BUN/CREAT SERPL: 12 (ref 12–20)
CALCIUM SERPL-MCNC: 9.1 MG/DL (ref 8.5–10.1)
CHLORIDE SERPL-SCNC: 102 MMOL/L (ref 97–108)
CHOLEST SERPL-MCNC: 191 MG/DL
CO2 SERPL-SCNC: 27 MMOL/L (ref 21–32)
CREAT SERPL-MCNC: 1.29 MG/DL (ref 0.7–1.3)
DIFFERENTIAL METHOD BLD: NORMAL
EOSINOPHIL # BLD: 0.1 K/UL (ref 0–0.4)
EOSINOPHIL NFR BLD: 1 % (ref 0–7)
ERYTHROCYTE [DISTWIDTH] IN BLOOD BY AUTOMATED COUNT: 13 % (ref 11.5–14.5)
GLOBULIN SER CALC-MCNC: 3.2 G/DL (ref 2–4)
GLUCOSE SERPL-MCNC: 236 MG/DL (ref 65–100)
HCT VFR BLD AUTO: 40.3 % (ref 36.6–50.3)
HDLC SERPL-MCNC: 75 MG/DL
HDLC SERPL: 2.5 (ref 0–5)
HGB BLD-MCNC: 13.7 G/DL (ref 12.1–17)
IMM GRANULOCYTES # BLD AUTO: 0 K/UL (ref 0–0.04)
IMM GRANULOCYTES NFR BLD AUTO: 0 % (ref 0–0.5)
INR PPP: 1.1 (ref 0.9–1.1)
LDLC SERPL CALC-MCNC: 84.4 MG/DL (ref 0–100)
LYMPHOCYTES # BLD: 1.4 K/UL (ref 0.8–3.5)
LYMPHOCYTES NFR BLD: 23 % (ref 12–49)
MAGNESIUM SERPL-MCNC: 1.5 MG/DL (ref 1.6–2.4)
MCH RBC QN AUTO: 31.4 PG (ref 26–34)
MCHC RBC AUTO-ENTMCNC: 34 G/DL (ref 30–36.5)
MCV RBC AUTO: 92.2 FL (ref 80–99)
MONOCYTES # BLD: 0.5 K/UL (ref 0–1)
MONOCYTES NFR BLD: 9 % (ref 5–13)
NEUTS SEG # BLD: 4.3 K/UL (ref 1.8–8)
NEUTS SEG NFR BLD: 66 % (ref 32–75)
NRBC # BLD: 0 K/UL (ref 0–0.01)
NRBC BLD-RTO: 0 PER 100 WBC
NT PRO BNP: 6733 PG/ML (ref 0–450)
PLATELET # BLD AUTO: 176 K/UL (ref 150–400)
PMV BLD AUTO: 10.2 FL (ref 8.9–12.9)
POTASSIUM SERPL-SCNC: 4.5 MMOL/L (ref 3.5–5.1)
PROT SERPL-MCNC: 6.8 G/DL (ref 6.4–8.2)
PROTHROMBIN TIME: 10.4 SEC (ref 9–11.1)
RBC # BLD AUTO: 4.37 M/UL (ref 4.1–5.7)
SODIUM SERPL-SCNC: 139 MMOL/L (ref 136–145)
THERAPEUTIC RANGE: NORMAL SECS (ref 58–77)
TRIGL SERPL-MCNC: 158 MG/DL
TROPONIN I SERPL HS-MCNC: ABNORMAL NG/L (ref 0–76)
TROPONIN I SERPL HS-MCNC: ABNORMAL NG/L (ref 0–76)
UFH PPP CHRO-ACNC: 0.41 IU/ML
UFH PPP CHRO-ACNC: <0.1 IU/ML
VLDLC SERPL CALC-MCNC: 31.6 MG/DL
WBC # BLD AUTO: 6.4 K/UL (ref 4.1–11.1)

## 2024-12-19 PROCEDURE — 6370000000 HC RX 637 (ALT 250 FOR IP): Performed by: EMERGENCY MEDICINE

## 2024-12-19 PROCEDURE — 36415 COLL VENOUS BLD VENIPUNCTURE: CPT

## 2024-12-19 PROCEDURE — 96365 THER/PROPH/DIAG IV INF INIT: CPT

## 2024-12-19 PROCEDURE — 85610 PROTHROMBIN TIME: CPT

## 2024-12-19 PROCEDURE — 85730 THROMBOPLASTIN TIME PARTIAL: CPT

## 2024-12-19 PROCEDURE — 6370000000 HC RX 637 (ALT 250 FOR IP): Performed by: STUDENT IN AN ORGANIZED HEALTH CARE EDUCATION/TRAINING PROGRAM

## 2024-12-19 PROCEDURE — 85520 HEPARIN ASSAY: CPT

## 2024-12-19 PROCEDURE — 83735 ASSAY OF MAGNESIUM: CPT

## 2024-12-19 PROCEDURE — 71045 X-RAY EXAM CHEST 1 VIEW: CPT

## 2024-12-19 PROCEDURE — 6360000002 HC RX W HCPCS: Performed by: STUDENT IN AN ORGANIZED HEALTH CARE EDUCATION/TRAINING PROGRAM

## 2024-12-19 PROCEDURE — 84484 ASSAY OF TROPONIN QUANT: CPT

## 2024-12-19 PROCEDURE — 6360000004 HC RX CONTRAST MEDICATION: Performed by: EMERGENCY MEDICINE

## 2024-12-19 PROCEDURE — 71275 CT ANGIOGRAPHY CHEST: CPT

## 2024-12-19 PROCEDURE — 85025 COMPLETE CBC W/AUTO DIFF WBC: CPT

## 2024-12-19 PROCEDURE — 80053 COMPREHEN METABOLIC PANEL: CPT

## 2024-12-19 PROCEDURE — 80061 LIPID PANEL: CPT

## 2024-12-19 PROCEDURE — 99285 EMERGENCY DEPT VISIT HI MDM: CPT

## 2024-12-19 PROCEDURE — 2060000000 HC ICU INTERMEDIATE R&B

## 2024-12-19 PROCEDURE — 83880 ASSAY OF NATRIURETIC PEPTIDE: CPT

## 2024-12-19 PROCEDURE — 93005 ELECTROCARDIOGRAM TRACING: CPT | Performed by: EMERGENCY MEDICINE

## 2024-12-19 PROCEDURE — 6360000002 HC RX W HCPCS: Performed by: EMERGENCY MEDICINE

## 2024-12-19 RX ORDER — GLUCAGON 1 MG/ML
1 KIT INJECTION PRN
Status: DISCONTINUED | OUTPATIENT
Start: 2024-12-19 | End: 2024-12-20 | Stop reason: HOSPADM

## 2024-12-19 RX ORDER — SODIUM CHLORIDE 0.9 % (FLUSH) 0.9 %
5-40 SYRINGE (ML) INJECTION EVERY 12 HOURS SCHEDULED
Status: DISCONTINUED | OUTPATIENT
Start: 2024-12-19 | End: 2024-12-20 | Stop reason: HOSPADM

## 2024-12-19 RX ORDER — ASPIRIN 81 MG/1
324 TABLET, CHEWABLE ORAL ONCE
Status: COMPLETED | OUTPATIENT
Start: 2024-12-19 | End: 2024-12-19

## 2024-12-19 RX ORDER — HEPARIN SODIUM 10000 [USP'U]/100ML
5-30 INJECTION, SOLUTION INTRAVENOUS CONTINUOUS
Status: DISCONTINUED | OUTPATIENT
Start: 2024-12-19 | End: 2024-12-20 | Stop reason: HOSPADM

## 2024-12-19 RX ORDER — ASPIRIN 81 MG/1
81 TABLET ORAL DAILY
Status: DISCONTINUED | OUTPATIENT
Start: 2024-12-19 | End: 2024-12-20 | Stop reason: HOSPADM

## 2024-12-19 RX ORDER — NITROGLYCERIN 0.4 MG/1
0.4 TABLET SUBLINGUAL EVERY 5 MIN PRN
Status: DISCONTINUED | OUTPATIENT
Start: 2024-12-19 | End: 2024-12-19 | Stop reason: HOSPADM

## 2024-12-19 RX ORDER — ROSUVASTATIN CALCIUM 10 MG/1
20 TABLET, COATED ORAL NIGHTLY
Status: DISCONTINUED | OUTPATIENT
Start: 2024-12-19 | End: 2024-12-20 | Stop reason: HOSPADM

## 2024-12-19 RX ORDER — HEPARIN SODIUM 1000 [USP'U]/ML
4000 INJECTION, SOLUTION INTRAVENOUS; SUBCUTANEOUS ONCE
Status: COMPLETED | OUTPATIENT
Start: 2024-12-19 | End: 2024-12-19

## 2024-12-19 RX ORDER — IOPAMIDOL 755 MG/ML
100 INJECTION, SOLUTION INTRAVASCULAR
Status: COMPLETED | OUTPATIENT
Start: 2024-12-19 | End: 2024-12-19

## 2024-12-19 RX ORDER — HEPARIN SODIUM 1000 [USP'U]/ML
2000 INJECTION, SOLUTION INTRAVENOUS; SUBCUTANEOUS PRN
Status: DISCONTINUED | OUTPATIENT
Start: 2024-12-19 | End: 2024-12-19 | Stop reason: HOSPADM

## 2024-12-19 RX ORDER — POLYETHYLENE GLYCOL 3350 17 G/17G
17 POWDER, FOR SOLUTION ORAL DAILY PRN
Status: DISCONTINUED | OUTPATIENT
Start: 2024-12-19 | End: 2024-12-20 | Stop reason: HOSPADM

## 2024-12-19 RX ORDER — MAGNESIUM SULFATE IN WATER 40 MG/ML
2000 INJECTION, SOLUTION INTRAVENOUS
Status: DISPENSED | OUTPATIENT
Start: 2024-12-19 | End: 2024-12-20

## 2024-12-19 RX ORDER — HEPARIN SODIUM 10000 [USP'U]/100ML
11-30 INJECTION, SOLUTION INTRAVENOUS CONTINUOUS
Status: DISCONTINUED | OUTPATIENT
Start: 2024-12-19 | End: 2024-12-19 | Stop reason: HOSPADM

## 2024-12-19 RX ORDER — SODIUM CHLORIDE 0.9 % (FLUSH) 0.9 %
5-40 SYRINGE (ML) INJECTION PRN
Status: DISCONTINUED | OUTPATIENT
Start: 2024-12-19 | End: 2024-12-20 | Stop reason: HOSPADM

## 2024-12-19 RX ORDER — INSULIN GLARGINE 100 [IU]/ML
7 INJECTION, SOLUTION SUBCUTANEOUS NIGHTLY
Status: DISCONTINUED | OUTPATIENT
Start: 2024-12-19 | End: 2024-12-20 | Stop reason: HOSPADM

## 2024-12-19 RX ORDER — HEPARIN SODIUM 1000 [USP'U]/ML
2000 INJECTION, SOLUTION INTRAVENOUS; SUBCUTANEOUS PRN
Status: DISCONTINUED | OUTPATIENT
Start: 2024-12-19 | End: 2024-12-20 | Stop reason: HOSPADM

## 2024-12-19 RX ORDER — ONDANSETRON 4 MG/1
4 TABLET, ORALLY DISINTEGRATING ORAL EVERY 8 HOURS PRN
Status: DISCONTINUED | OUTPATIENT
Start: 2024-12-19 | End: 2024-12-20 | Stop reason: HOSPADM

## 2024-12-19 RX ORDER — ACETAMINOPHEN 650 MG/1
650 SUPPOSITORY RECTAL EVERY 6 HOURS PRN
Status: DISCONTINUED | OUTPATIENT
Start: 2024-12-19 | End: 2024-12-20 | Stop reason: HOSPADM

## 2024-12-19 RX ORDER — DEXTROSE MONOHYDRATE 100 MG/ML
INJECTION, SOLUTION INTRAVENOUS CONTINUOUS PRN
Status: DISCONTINUED | OUTPATIENT
Start: 2024-12-19 | End: 2024-12-20 | Stop reason: HOSPADM

## 2024-12-19 RX ORDER — NITROGLYCERIN 20 MG/100ML
5-100 INJECTION INTRAVENOUS CONTINUOUS
Status: DISCONTINUED | OUTPATIENT
Start: 2024-12-19 | End: 2024-12-20 | Stop reason: HOSPADM

## 2024-12-19 RX ORDER — ONDANSETRON 2 MG/ML
4 INJECTION INTRAMUSCULAR; INTRAVENOUS EVERY 6 HOURS PRN
Status: DISCONTINUED | OUTPATIENT
Start: 2024-12-19 | End: 2024-12-20 | Stop reason: HOSPADM

## 2024-12-19 RX ORDER — ACETAMINOPHEN 325 MG/1
650 TABLET ORAL EVERY 6 HOURS PRN
Status: DISCONTINUED | OUTPATIENT
Start: 2024-12-19 | End: 2024-12-20 | Stop reason: HOSPADM

## 2024-12-19 RX ORDER — ACYCLOVIR 800 MG/1
400 TABLET ORAL
Status: DISCONTINUED | OUTPATIENT
Start: 2024-12-20 | End: 2024-12-20 | Stop reason: HOSPADM

## 2024-12-19 RX ORDER — PANTOPRAZOLE SODIUM 40 MG/1
40 TABLET, DELAYED RELEASE ORAL DAILY
Status: DISCONTINUED | OUTPATIENT
Start: 2024-12-19 | End: 2024-12-20 | Stop reason: HOSPADM

## 2024-12-19 RX ORDER — INSULIN LISPRO 100 [IU]/ML
0-8 INJECTION, SOLUTION INTRAVENOUS; SUBCUTANEOUS
Status: DISCONTINUED | OUTPATIENT
Start: 2024-12-19 | End: 2024-12-20 | Stop reason: HOSPADM

## 2024-12-19 RX ORDER — SODIUM CHLORIDE 9 MG/ML
INJECTION, SOLUTION INTRAVENOUS PRN
Status: DISCONTINUED | OUTPATIENT
Start: 2024-12-19 | End: 2024-12-20 | Stop reason: HOSPADM

## 2024-12-19 RX ORDER — HEPARIN SODIUM 1000 [USP'U]/ML
4000 INJECTION, SOLUTION INTRAVENOUS; SUBCUTANEOUS PRN
Status: DISCONTINUED | OUTPATIENT
Start: 2024-12-19 | End: 2024-12-20 | Stop reason: HOSPADM

## 2024-12-19 RX ORDER — METOPROLOL TARTRATE 25 MG/1
25 TABLET, FILM COATED ORAL 2 TIMES DAILY
Status: DISCONTINUED | OUTPATIENT
Start: 2024-12-19 | End: 2024-12-20 | Stop reason: HOSPADM

## 2024-12-19 RX ORDER — HEPARIN SODIUM 1000 [USP'U]/ML
4000 INJECTION, SOLUTION INTRAVENOUS; SUBCUTANEOUS PRN
Status: DISCONTINUED | OUTPATIENT
Start: 2024-12-19 | End: 2024-12-19 | Stop reason: HOSPADM

## 2024-12-19 RX ADMIN — METOPROLOL TARTRATE 25 MG: 25 TABLET, FILM COATED ORAL at 21:51

## 2024-12-19 RX ADMIN — INSULIN LISPRO 2 UNITS: 100 INJECTION, SOLUTION INTRAVENOUS; SUBCUTANEOUS at 21:53

## 2024-12-19 RX ADMIN — NITROGLYCERIN 0.4 MG: 0.4 TABLET SUBLINGUAL at 15:10

## 2024-12-19 RX ADMIN — HEPARIN SODIUM 11 UNITS/KG/HR: 10000 INJECTION, SOLUTION INTRAVENOUS at 15:58

## 2024-12-19 RX ADMIN — HEPARIN SODIUM 4000 UNITS: 1000 INJECTION INTRAVENOUS; SUBCUTANEOUS at 15:55

## 2024-12-19 RX ADMIN — NITROGLYCERIN 5 MCG/MIN: 20 INJECTION INTRAVENOUS at 21:51

## 2024-12-19 RX ADMIN — IOPAMIDOL 80 ML: 755 INJECTION, SOLUTION INTRAVENOUS at 15:58

## 2024-12-19 RX ADMIN — ASPIRIN 324 MG: 81 TABLET, CHEWABLE ORAL at 15:03

## 2024-12-19 RX ADMIN — NITROGLYCERIN 0.4 MG: 0.4 TABLET SUBLINGUAL at 15:05

## 2024-12-19 RX ADMIN — ROSUVASTATIN CALCIUM 20 MG: 20 TABLET, FILM COATED ORAL at 21:51

## 2024-12-19 RX ADMIN — HEPARIN SODIUM 11 UNITS/KG/HR: 10000 INJECTION, SOLUTION INTRAVENOUS at 19:07

## 2024-12-19 RX ADMIN — ASPIRIN 81 MG: 81 TABLET, COATED ORAL at 21:51

## 2024-12-19 RX ADMIN — PANTOPRAZOLE SODIUM 40 MG: 40 TABLET, DELAYED RELEASE ORAL at 21:51

## 2024-12-19 ASSESSMENT — PAIN DESCRIPTION - ORIENTATION: ORIENTATION: LEFT

## 2024-12-19 ASSESSMENT — LIFESTYLE VARIABLES
HOW OFTEN DO YOU HAVE A DRINK CONTAINING ALCOHOL: 2-4 TIMES A MONTH
HOW MANY STANDARD DRINKS CONTAINING ALCOHOL DO YOU HAVE ON A TYPICAL DAY: 1 OR 2

## 2024-12-19 ASSESSMENT — PAIN - FUNCTIONAL ASSESSMENT
PAIN_FUNCTIONAL_ASSESSMENT: NONE - DENIES PAIN
PAIN_FUNCTIONAL_ASSESSMENT: 0-10

## 2024-12-19 ASSESSMENT — PAIN DESCRIPTION - FREQUENCY: FREQUENCY: INTERMITTENT

## 2024-12-19 ASSESSMENT — PAIN SCALES - GENERAL
PAINLEVEL_OUTOF10: 2
PAINLEVEL_OUTOF10: 0
PAINLEVEL_OUTOF10: 1
PAINLEVEL_OUTOF10: 2
PAINLEVEL_OUTOF10: 0

## 2024-12-19 ASSESSMENT — PAIN DESCRIPTION - LOCATION: LOCATION: CHEST

## 2024-12-19 ASSESSMENT — PAIN DESCRIPTION - DESCRIPTORS: DESCRIPTORS: DULL

## 2024-12-19 NOTE — ED TRIAGE NOTES
Pt reports SOB with exertion and dull left sided chest pain x1 month. Was seen at PCP today, and was told to come in today. Pt reports that the SOB has been progressively been getting worse for the last month. Cannot walk more than 30 feet without feeling SOB. CP is intermittent in nature. RA 96%. ST with bigeminy.     1429 EKG completed and given to provider.     1435 LFA 20G, blood sent    1445 ED provider at bedside.

## 2024-12-19 NOTE — ED NOTES
TRANSFER - OUT REPORT:    Verbal report given to Zohreh Tate RN(receiving) and Christiano Posada EMT-P on Cirilo Hackett  being transferred to [facility] ED for routine progression of patient care       Report consisted of patient's Situation, Background, Assessment and   Recommendations(SBAR).     Information from the following report(s) Nurse Handoff Report, ED SBAR, Adult Overview, Med Rec Status, Cardiac Rhythm Vent Bigeminy, Alarm Parameters, and Quality Measures was reviewed with the receiving nurse.    [place] Fall Assessment:    Presents to emergency department  because of falls (Syncope, seizure, or loss of consciousness): No  Age > 70: Yes  Altered Mental Status, Intoxication with alcohol or substance confusion (Disorientation, impaired judgment, poor safety awaremess, or inability to follow instructions): No  Impaired Mobility: Ambulates or transfers with assistive devices or assistance; Unable to ambulate or transer.: Yes  Nursing Judgement: Yes          Lines:   Peripheral IV 12/19/24 Left;Anterior Forearm (Active)   Site Assessment Clean, dry & intact 12/19/24 1443   Line Status Blood return noted;Brisk blood return;Flushed;Normal saline locked 12/19/24 1443   Phlebitis Assessment No symptoms 12/19/24 1443   Infiltration Assessment 0 12/19/24 1443   Dressing Status New dressing applied;Clean, dry & intact 12/19/24 1443   Dressing Type Transparent 12/19/24 1443        Opportunity for questions and clarification was provided.      Patient transported with:  [facility] on continued cardiac monitoring and Heparin drip

## 2024-12-19 NOTE — ED NOTES
Pt continues in D-chair as no rooms are available. This RN observing cardiac monitor when possible, potential delays due to ED flow, pt acuities.

## 2024-12-19 NOTE — ED PROVIDER NOTES
to return to the ED prior to their follow-up appointment, should his condition change.     CLINICAL IMPRESSION    Transfer: The patient is being transferred to [facility] for cardiology consultation, higher level of care. The results of their tests and reasons for their transfer have been discussed with the patient and/or available family. The patient/family has conveyed agreement and understanding for the need to be admitted and for their admission diagnosis. Consultation has been made with Dr. Donny Min, who agrees to accept the transfer.      PATIENT REFERRED TO:  No follow-up provider specified.     DISCHARGE MEDICATIONS:     Medication List        CONTINUE taking these medications      SYRINGE 3CC/35FU6-6/2\" 18G X 1-1/2\" 3 ML Misc  Use as directed            ASK your doctor about these medications      acyclovir 400 MG tablet  Commonly known as: ZOVIRAX  TAKE 1 TABLET BY MOUTH ONCE DAILY     aspirin 81 MG EC tablet     Lantus SoloStar 100 UNIT/ML injection pen  Generic drug: insulin glargine  INJECT 20 UNITS SUBCUTANEOUSLY ONCE DAILY AT BEDTIME     nirmatrelvir/ritonavir 300/100 20 x 150 MG & 10 x 100MG Tbpk  Commonly known as: PAXLOVID  Take 3 tablets (two 150 mg nirmatrelvir and one 100 mg ritonavir tablets) by mouth every 12 hours for 5 days.     pantoprazole 40 MG tablet  Commonly known as: PROTONIX  TAKE 1 TABLET BY MOUTH EVERY DAY     rosuvastatin 10 MG tablet  Commonly known as: CRESTOR  TAKE 1 TABLET BY MOUTH NIGHTLY     testosterone cypionate 200 MG/ML injection  Commonly known as: DEPOTESTOTERONE CYPIONATE  INJECT 1ML INTO THE MUSCLE EVERY 30 DAYS     triamcinolone 0.1 % cream  Commonly known as: KENALOG     Victoza 18 MG/3ML Sopn SC injection  Generic drug: Liraglutide                DISCONTINUED MEDICATIONS:  Discharge Medication List as of 12/19/2024  4:53 PM          I am the Primary Clinician of Record.   Tanmay Mitchell DO (electronically signed)    (Please note that parts

## 2024-12-19 NOTE — TELEPHONE ENCOUNTER
Increased SOB over the past month with chest pain. Pt reports that he is unable to walk 30 feet with out being worn out. Pt denies having a rash or fever. Pt was instructed by PCP to go straight to the ER for evaluation/treatment.

## 2024-12-19 NOTE — ED PROVIDER NOTES
EMERGENCY DEPARTMENT HISTORY AND PHYSICAL EXAM      Date: 12/19/2024  Patient Name: Cirilo Hackett    History of Presenting Illness     Chief Complaint   Patient presents with    Chest Pain     Xfer from [facility] for elevated trop and chest pain. Pt denies any chest pain upon arrival          HPI: History From: patient, History limited by: none  Cirilo Hackett, 79 y.o. male presents to the ED with cc of transferred from [facility] for NSTEMI.  He reports 6 weeks of intermittent chest pain and shortness of breath, this is exertional in nature.  He describes as mostly left-sided chest discomfort, and last about 1 and half to 2 hours.  His symptoms have been progressing, however he has put off seeking medical care.  He reports a history of coronary artery disease with 3 stents, he believes the last was placed about 8 years ago.  He established care with a cardiologist at [facility], he is unsure their name, however has not yet seen them.  He denies any nausea or diaphoresis in association with the chest discomfort.  He has been compliant with his daily aspirin, he denies taking any other antiplatelet agents.  Was found to have elevated troponin and T wave inversions at [facility] emergency department, started on heparin drip and transferred here.  He denies any chest pain now or in route, reports some very mild shortness of breath.          There are no other complaints, changes, or physical findings at this time.    PCP: Nirav Hylton MD    No current facility-administered medications on file prior to encounter.     Current Outpatient Medications on File Prior to Encounter   Medication Sig Dispense Refill    rosuvastatin (CRESTOR) 10 MG tablet TAKE 1 TABLET BY MOUTH NIGHTLY 90 tablet 4    testosterone cypionate (DEPOTESTOTERONE CYPIONATE) 200 MG/ML injection INJECT 1ML INTO THE MUSCLE EVERY 30 DAYS 1 mL 3    pantoprazole (PROTONIX) 40 MG tablet TAKE 1 TABLET BY MOUTH EVERY DAY 90 tablet 3    acyclovir  IntraVENous New Bag 12/20/24 0014)   zolpidem (AMBIEN) tablet 5 mg (5 mg Oral Given 12/20/24 0019)        ED Course as of 12/20/24 0118   Thu Dec 19, 2024   1835 EKG is performed at 17: 56, independently interpreted by myself as sinus tachycardia rate of 116 with PACs, no ST segment elevation concerning for ACS, T wave inversions in lead V3 through V6 and V2, 3 and aVF.  On comparison to previous EKG, T wave inversions are stable [CV]   1847 I reviewed his cardiology office visit from 9/19/2019, has a history of cardiac catheterization in 4/2018 with DEMI x 2 to LAD and left circumflex, had another catheterization in 10/2018 with no further PCI done. [CV]   2058 Spoke with Dr. Mckeon, who recommends NPO at midnight [CV]      ED Course User Index  [CV] Elisa Houston MD            Clinical Management Tools:       Critical Care Time:         Disposition:  Admit      PLAN:  1.      Medication List        CONTINUE taking these medications      SYRINGE 3CC/43BI4-5/2\" 18G X 1-1/2\" 3 ML Misc  Use as directed            ASK your doctor about these medications      acyclovir 400 MG tablet  Commonly known as: ZOVIRAX  TAKE 1 TABLET BY MOUTH ONCE DAILY     aspirin 81 MG EC tablet     Lantus SoloStar 100 UNIT/ML injection pen  Generic drug: insulin glargine  INJECT 20 UNITS SUBCUTANEOUSLY ONCE DAILY AT BEDTIME     pantoprazole 40 MG tablet  Commonly known as: PROTONIX  TAKE 1 TABLET BY MOUTH EVERY DAY     rosuvastatin 10 MG tablet  Commonly known as: CRESTOR  TAKE 1 TABLET BY MOUTH NIGHTLY     testosterone cypionate 200 MG/ML injection  Commonly known as: DEPOTESTOTERONE CYPIONATE  INJECT 1ML INTO THE MUSCLE EVERY 30 DAYS     triamcinolone 0.1 % cream  Commonly known as: KENALOG            2. No follow-up provider specified.  Return to ED if worse     Diagnosis     Clinical Impression: NSTEMI               Elisa Houston MD  12/20/24 0118

## 2024-12-19 NOTE — ED NOTES
[facility] moved to D rooms for pt cardiac monitoring. Current rhythm NSR - 57. Pt reports he has hx of 3 stents, frequent PVCs.

## 2024-12-19 NOTE — PROGRESS NOTES
arranged ALS transportation from [facility] ED D2 to  [facility] ED .  Arranged ALS  transport w/[facility] -  advised to bring appropiate equipment - ETA of 15   minutes given - updated ED staff w/ETA

## 2024-12-19 NOTE — ED NOTES
Pt reports pain has improved, but not dissipated. Initially 2/10, now hard to determine but less than it was.

## 2024-12-20 ENCOUNTER — APPOINTMENT (OUTPATIENT)
Facility: HOSPITAL | Age: 79
End: 2024-12-20
Payer: MEDICARE

## 2024-12-20 VITALS
WEIGHT: 199.96 LBS | SYSTOLIC BLOOD PRESSURE: 104 MMHG | BODY MASS INDEX: 25.66 KG/M2 | RESPIRATION RATE: 20 BRPM | TEMPERATURE: 98.8 F | HEIGHT: 74 IN | DIASTOLIC BLOOD PRESSURE: 66 MMHG | HEART RATE: 101 BPM | OXYGEN SATURATION: 97 %

## 2024-12-20 DIAGNOSIS — I21.4 NSTEMI (NON-ST ELEVATED MYOCARDIAL INFARCTION) (HCC): Primary | ICD-10-CM

## 2024-12-20 PROBLEM — Z01.810 PREOP CARDIOVASCULAR EXAM: Status: ACTIVE | Noted: 2024-12-20

## 2024-12-20 PROBLEM — I65.23 BILATERAL CAROTID ARTERY STENOSIS: Status: ACTIVE | Noted: 2024-12-20

## 2024-12-20 LAB
ABO + RH BLD: NORMAL
ALBUMIN SERPL-MCNC: 3.4 G/DL (ref 3.5–5)
ALBUMIN/GLOB SERPL: 1 (ref 1.1–2.2)
ALP SERPL-CCNC: 83 U/L (ref 45–117)
ALT SERPL-CCNC: 39 U/L (ref 12–78)
ANION GAP SERPL CALC-SCNC: 6 MMOL/L (ref 2–12)
ANION GAP SERPL CALC-SCNC: 8 MMOL/L (ref 2–12)
APTT PPP: 56.9 SEC (ref 22.1–31)
AST SERPL-CCNC: 83 U/L (ref 15–37)
BILIRUB SERPL-MCNC: 0.6 MG/DL (ref 0.2–1)
BLOOD GROUP ANTIBODIES SERPL: NORMAL
BUN SERPL-MCNC: 14 MG/DL (ref 6–20)
BUN SERPL-MCNC: 14 MG/DL (ref 6–20)
BUN/CREAT SERPL: 11 (ref 12–20)
BUN/CREAT SERPL: 13 (ref 12–20)
CALCIUM SERPL-MCNC: 9 MG/DL (ref 8.5–10.1)
CALCIUM SERPL-MCNC: 9.1 MG/DL (ref 8.5–10.1)
CHLORIDE SERPL-SCNC: 101 MMOL/L (ref 97–108)
CHLORIDE SERPL-SCNC: 105 MMOL/L (ref 97–108)
CO2 SERPL-SCNC: 23 MMOL/L (ref 21–32)
CO2 SERPL-SCNC: 28 MMOL/L (ref 21–32)
CREAT SERPL-MCNC: 1.12 MG/DL (ref 0.7–1.3)
CREAT SERPL-MCNC: 1.32 MG/DL (ref 0.7–1.3)
ECHO BSA: 2.18 M2
ECHO BSA: 2.18 M2
EKG ATRIAL RATE: 114 BPM
EKG DIAGNOSIS: NORMAL
EKG P AXIS: 78 DEGREES
EKG P-R INTERVAL: 116 MS
EKG Q-T INTERVAL: 346 MS
EKG QRS DURATION: 82 MS
EKG QTC CALCULATION (BAZETT): 476 MS
EKG R AXIS: 109 DEGREES
EKG T AXIS: -33 DEGREES
EKG VENTRICULAR RATE: 114 BPM
ERYTHROCYTE [DISTWIDTH] IN BLOOD BY AUTOMATED COUNT: 13.3 % (ref 11.5–14.5)
EST. AVERAGE GLUCOSE BLD GHB EST-MCNC: 163 MG/DL
EST. AVERAGE GLUCOSE BLD GHB EST-MCNC: 166 MG/DL
FIBRINOGEN PPP-MCNC: 503 MG/DL (ref 200–475)
GLOBULIN SER CALC-MCNC: 3.5 G/DL (ref 2–4)
GLUCOSE BLD STRIP.AUTO-MCNC: 205 MG/DL (ref 65–117)
GLUCOSE BLD STRIP.AUTO-MCNC: 222 MG/DL (ref 65–117)
GLUCOSE SERPL-MCNC: 189 MG/DL (ref 65–100)
GLUCOSE SERPL-MCNC: 192 MG/DL (ref 65–100)
HBA1C MFR BLD: 7.3 % (ref 4–5.6)
HBA1C MFR BLD: 7.4 % (ref 4–5.6)
HCT VFR BLD AUTO: 40.6 % (ref 36.6–50.3)
HGB BLD-MCNC: 13.3 G/DL (ref 12.1–17)
INR PPP: 1 (ref 0.9–1.1)
MAGNESIUM SERPL-MCNC: 2 MG/DL (ref 1.6–2.4)
MCH RBC QN AUTO: 31.4 PG (ref 26–34)
MCHC RBC AUTO-ENTMCNC: 32.8 G/DL (ref 30–36.5)
MCV RBC AUTO: 96 FL (ref 80–99)
NRBC # BLD: 0 K/UL (ref 0–0.01)
NRBC BLD-RTO: 0 PER 100 WBC
NT PRO BNP: ABNORMAL PG/ML
PLATELET # BLD AUTO: 165 K/UL (ref 150–400)
PMV BLD AUTO: 10.2 FL (ref 8.9–12.9)
POTASSIUM SERPL-SCNC: 4.1 MMOL/L (ref 3.5–5.1)
POTASSIUM SERPL-SCNC: 4.7 MMOL/L (ref 3.5–5.1)
PROT SERPL-MCNC: 6.9 G/DL (ref 6.4–8.2)
PROTHROMBIN TIME: 10.9 SEC (ref 9–11.1)
RBC # BLD AUTO: 4.23 M/UL (ref 4.1–5.7)
SERVICE CMNT-IMP: ABNORMAL
SERVICE CMNT-IMP: ABNORMAL
SODIUM SERPL-SCNC: 135 MMOL/L (ref 136–145)
SODIUM SERPL-SCNC: 136 MMOL/L (ref 136–145)
SPECIMEN EXP DATE BLD: NORMAL
THERAPEUTIC RANGE: ABNORMAL SECS (ref 58–77)
TROPONIN I SERPL HS-MCNC: ABNORMAL NG/L (ref 0–76)
TROPONIN I SERPL HS-MCNC: ABNORMAL NG/L (ref 0–76)
TSH SERPL DL<=0.05 MIU/L-ACNC: 1.46 UIU/ML (ref 0.36–3.74)
UFH PPP CHRO-ACNC: 0.19 IU/ML
UFH PPP CHRO-ACNC: 0.36 IU/ML
VAS LEFT CCA DIST EDV: 9.5 CM/S
VAS LEFT CCA DIST PSV: 87.1 CM/S
VAS LEFT CCA PROX EDV: 16 CM/S
VAS LEFT CCA PROX PSV: 111.2 CM/S
VAS LEFT ECA EDV: 0 CM/S
VAS LEFT ECA PSV: 166.9 CM/S
VAS LEFT ICA DIST EDV: 27.3 CM/S
VAS LEFT ICA DIST PSV: 69.3 CM/S
VAS LEFT ICA MID EDV: 12.8 CM/S
VAS LEFT ICA MID PSV: 85.4 CM/S
VAS LEFT ICA PROX EDV: 13.9 CM/S
VAS LEFT ICA PROX PSV: 105.9 CM/S
VAS LEFT ICA/CCA PSV: 1.22 NO UNITS
VAS LEFT SUBCLAVIAN PROX EDV: 0 CM/S
VAS LEFT SUBCLAVIAN PROX PSV: 129.8 CM/S
VAS LEFT VERTEBRAL EDV: 20.58 CM/S
VAS LEFT VERTEBRAL PSV: 47.8 CM/S
VAS RIGHT CCA DIST EDV: 14.4 CM/S
VAS RIGHT CCA DIST PSV: 116.1 CM/S
VAS RIGHT CCA PROX EDV: 12.8 CM/S
VAS RIGHT CCA PROX PSV: 87 CM/S
VAS RIGHT ECA EDV: 0 CM/S
VAS RIGHT ECA PSV: 126 CM/S
VAS RIGHT ICA DIST EDV: 20.2 CM/S
VAS RIGHT ICA DIST PSV: 106.9 CM/S
VAS RIGHT ICA MID EDV: 28 CM/S
VAS RIGHT ICA MID PSV: 92.7 CM/S
VAS RIGHT ICA PROX EDV: 14.1 CM/S
VAS RIGHT ICA PROX PSV: 62 CM/S
VAS RIGHT ICA/CCA PSV: 0.9 NO UNITS
VAS RIGHT SUBCLAVIAN PROX EDV: 0 CM/S
VAS RIGHT SUBCLAVIAN PROX PSV: 101.6 CM/S
VAS RIGHT VERTEBRAL EDV: 4.27 CM/S
VAS RIGHT VERTEBRAL PSV: 21.7 CM/S
WBC # BLD AUTO: 7 K/UL (ref 4.1–11.1)

## 2024-12-20 PROCEDURE — 76937 US GUIDE VASCULAR ACCESS: CPT | Performed by: STUDENT IN AN ORGANIZED HEALTH CARE EDUCATION/TRAINING PROGRAM

## 2024-12-20 PROCEDURE — 80053 COMPREHEN METABOLIC PANEL: CPT

## 2024-12-20 PROCEDURE — B2151ZZ FLUOROSCOPY OF LEFT HEART USING LOW OSMOLAR CONTRAST: ICD-10-PCS | Performed by: STUDENT IN AN ORGANIZED HEALTH CARE EDUCATION/TRAINING PROGRAM

## 2024-12-20 PROCEDURE — 93880 EXTRACRANIAL BILAT STUDY: CPT

## 2024-12-20 PROCEDURE — 84443 ASSAY THYROID STIM HORMONE: CPT

## 2024-12-20 PROCEDURE — 36415 COLL VENOUS BLD VENIPUNCTURE: CPT

## 2024-12-20 PROCEDURE — 99153 MOD SED SAME PHYS/QHP EA: CPT | Performed by: STUDENT IN AN ORGANIZED HEALTH CARE EDUCATION/TRAINING PROGRAM

## 2024-12-20 PROCEDURE — 6370000000 HC RX 637 (ALT 250 FOR IP): Performed by: STUDENT IN AN ORGANIZED HEALTH CARE EDUCATION/TRAINING PROGRAM

## 2024-12-20 PROCEDURE — 84484 ASSAY OF TROPONIN QUANT: CPT

## 2024-12-20 PROCEDURE — 82962 GLUCOSE BLOOD TEST: CPT

## 2024-12-20 PROCEDURE — 6360000002 HC RX W HCPCS: Performed by: STUDENT IN AN ORGANIZED HEALTH CARE EDUCATION/TRAINING PROGRAM

## 2024-12-20 PROCEDURE — 2500000003 HC RX 250 WO HCPCS: Performed by: STUDENT IN AN ORGANIZED HEALTH CARE EDUCATION/TRAINING PROGRAM

## 2024-12-20 PROCEDURE — 6360000004 HC RX CONTRAST MEDICATION: Performed by: STUDENT IN AN ORGANIZED HEALTH CARE EDUCATION/TRAINING PROGRAM

## 2024-12-20 PROCEDURE — 85027 COMPLETE CBC AUTOMATED: CPT

## 2024-12-20 PROCEDURE — 85384 FIBRINOGEN ACTIVITY: CPT

## 2024-12-20 PROCEDURE — 93005 ELECTROCARDIOGRAM TRACING: CPT | Performed by: NURSE PRACTITIONER

## 2024-12-20 PROCEDURE — 93880 EXTRACRANIAL BILAT STUDY: CPT | Performed by: PSYCHIATRY & NEUROLOGY

## 2024-12-20 PROCEDURE — 86901 BLOOD TYPING SEROLOGIC RH(D): CPT

## 2024-12-20 PROCEDURE — B2111ZZ FLUOROSCOPY OF MULTIPLE CORONARY ARTERIES USING LOW OSMOLAR CONTRAST: ICD-10-PCS | Performed by: STUDENT IN AN ORGANIZED HEALTH CARE EDUCATION/TRAINING PROGRAM

## 2024-12-20 PROCEDURE — 2709999900 HC NON-CHARGEABLE SUPPLY: Performed by: STUDENT IN AN ORGANIZED HEALTH CARE EDUCATION/TRAINING PROGRAM

## 2024-12-20 PROCEDURE — 85610 PROTHROMBIN TIME: CPT

## 2024-12-20 PROCEDURE — 86850 RBC ANTIBODY SCREEN: CPT

## 2024-12-20 PROCEDURE — 4A023N7 MEASUREMENT OF CARDIAC SAMPLING AND PRESSURE, LEFT HEART, PERCUTANEOUS APPROACH: ICD-10-PCS | Performed by: STUDENT IN AN ORGANIZED HEALTH CARE EDUCATION/TRAINING PROGRAM

## 2024-12-20 PROCEDURE — 83880 ASSAY OF NATRIURETIC PEPTIDE: CPT

## 2024-12-20 PROCEDURE — 99152 MOD SED SAME PHYS/QHP 5/>YRS: CPT | Performed by: STUDENT IN AN ORGANIZED HEALTH CARE EDUCATION/TRAINING PROGRAM

## 2024-12-20 PROCEDURE — C1769 GUIDE WIRE: HCPCS | Performed by: STUDENT IN AN ORGANIZED HEALTH CARE EDUCATION/TRAINING PROGRAM

## 2024-12-20 PROCEDURE — C1894 INTRO/SHEATH, NON-LASER: HCPCS | Performed by: STUDENT IN AN ORGANIZED HEALTH CARE EDUCATION/TRAINING PROGRAM

## 2024-12-20 PROCEDURE — 86900 BLOOD TYPING SEROLOGIC ABO: CPT

## 2024-12-20 PROCEDURE — 85520 HEPARIN ASSAY: CPT

## 2024-12-20 PROCEDURE — 85730 THROMBOPLASTIN TIME PARTIAL: CPT

## 2024-12-20 PROCEDURE — 83735 ASSAY OF MAGNESIUM: CPT

## 2024-12-20 PROCEDURE — 83036 HEMOGLOBIN GLYCOSYLATED A1C: CPT

## 2024-12-20 PROCEDURE — 93458 L HRT ARTERY/VENTRICLE ANGIO: CPT | Performed by: STUDENT IN AN ORGANIZED HEALTH CARE EDUCATION/TRAINING PROGRAM

## 2024-12-20 PROCEDURE — 93970 EXTREMITY STUDY: CPT

## 2024-12-20 RX ORDER — VERAPAMIL HYDROCHLORIDE 2.5 MG/ML
INJECTION, SOLUTION INTRAVENOUS PRN
Status: DISCONTINUED | OUTPATIENT
Start: 2024-12-20 | End: 2024-12-20 | Stop reason: HOSPADM

## 2024-12-20 RX ORDER — SODIUM CHLORIDE 9 MG/ML
INJECTION, SOLUTION INTRAVENOUS PRN
Status: DISCONTINUED | OUTPATIENT
Start: 2024-12-20 | End: 2024-12-20 | Stop reason: HOSPADM

## 2024-12-20 RX ORDER — ZOLPIDEM TARTRATE 5 MG/1
5 TABLET ORAL NIGHTLY PRN
Status: DISCONTINUED | OUTPATIENT
Start: 2024-12-20 | End: 2024-12-20 | Stop reason: HOSPADM

## 2024-12-20 RX ORDER — SODIUM CHLORIDE 0.9 % (FLUSH) 0.9 %
5-40 SYRINGE (ML) INJECTION PRN
Status: DISCONTINUED | OUTPATIENT
Start: 2024-12-20 | End: 2024-12-20 | Stop reason: HOSPADM

## 2024-12-20 RX ORDER — IOPAMIDOL 755 MG/ML
INJECTION, SOLUTION INTRAVASCULAR PRN
Status: DISCONTINUED | OUTPATIENT
Start: 2024-12-20 | End: 2024-12-20 | Stop reason: HOSPADM

## 2024-12-20 RX ORDER — SODIUM CHLORIDE 0.9 % (FLUSH) 0.9 %
5-40 SYRINGE (ML) INJECTION EVERY 12 HOURS SCHEDULED
Status: DISCONTINUED | OUTPATIENT
Start: 2024-12-20 | End: 2024-12-20 | Stop reason: HOSPADM

## 2024-12-20 RX ORDER — FENTANYL CITRATE 50 UG/ML
INJECTION, SOLUTION INTRAMUSCULAR; INTRAVENOUS PRN
Status: DISCONTINUED | OUTPATIENT
Start: 2024-12-20 | End: 2024-12-20 | Stop reason: HOSPADM

## 2024-12-20 RX ORDER — ACETAMINOPHEN 325 MG/1
650 TABLET ORAL EVERY 4 HOURS PRN
Status: DISCONTINUED | OUTPATIENT
Start: 2024-12-20 | End: 2024-12-20 | Stop reason: HOSPADM

## 2024-12-20 RX ORDER — HEPARIN SODIUM 1000 [USP'U]/ML
INJECTION, SOLUTION INTRAVENOUS; SUBCUTANEOUS PRN
Status: DISCONTINUED | OUTPATIENT
Start: 2024-12-20 | End: 2024-12-20 | Stop reason: HOSPADM

## 2024-12-20 RX ORDER — HEPARIN SODIUM 10000 [USP'U]/ML
INJECTION, SOLUTION INTRAVENOUS; SUBCUTANEOUS PRN
Status: DISCONTINUED | OUTPATIENT
Start: 2024-12-20 | End: 2024-12-20 | Stop reason: HOSPADM

## 2024-12-20 RX ORDER — LIDOCAINE HYDROCHLORIDE 10 MG/ML
INJECTION, SOLUTION INFILTRATION; PERINEURAL PRN
Status: DISCONTINUED | OUTPATIENT
Start: 2024-12-20 | End: 2024-12-20 | Stop reason: HOSPADM

## 2024-12-20 RX ADMIN — PANTOPRAZOLE SODIUM 40 MG: 40 TABLET, DELAYED RELEASE ORAL at 09:09

## 2024-12-20 RX ADMIN — ZOLPIDEM TARTRATE 5 MG: 5 TABLET ORAL at 00:19

## 2024-12-20 RX ADMIN — MAGNESIUM SULFATE HEPTAHYDRATE 2000 MG: 40 INJECTION, SOLUTION INTRAVENOUS at 00:14

## 2024-12-20 RX ADMIN — HEPARIN SODIUM 2000 UNITS: 1000 INJECTION INTRAVENOUS; SUBCUTANEOUS at 02:24

## 2024-12-20 RX ADMIN — METOPROLOL TARTRATE 25 MG: 25 TABLET, FILM COATED ORAL at 09:09

## 2024-12-20 RX ADMIN — SODIUM CHLORIDE, PRESERVATIVE FREE 10 ML: 5 INJECTION INTRAVENOUS at 09:10

## 2024-12-20 RX ADMIN — ASPIRIN 81 MG: 81 TABLET, COATED ORAL at 09:10

## 2024-12-20 RX ADMIN — ACYCLOVIR 400 MG: 800 TABLET ORAL at 09:09

## 2024-12-20 ASSESSMENT — PAIN SCALES - GENERAL: PAINLEVEL_OUTOF10: 0

## 2024-12-20 NOTE — CARE COORDINATION
Pt left AMA.      Brenda Cota LMSW  Supervisee in Social Work  Care Management, [facility]  [phone]

## 2024-12-20 NOTE — CONSULTS
[facility]  [address] [place] [address]  [phone]  [email or web address]       CARDIOLOGY CONSULTATION       Date of  Admission: 12/19/2024  5:49 PM     Admission type:Emergency   Primary Care Physician:Nirav Hylton MD     Attending Provider: Shlomo Gasca MD  Cardiology Provider: Ravi  CC/REASON FOR CONSULT: NSTEMI     Subjective:     Cirilo Hackett is a 79 y.o. male admitted for Acute coronary syndrome ([facility]) [I24.9]  NSTEMI (non-ST elevated myocardial infarction) ([facility]) [I21.4].    The patient was seen and examined at the bedside. CP x 6 weeks. Now worse. Has had problems with walking/balance lately.       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[facility]) 05/26/2022    Chronic renal disease, stage III ([facility]) 05/26/2022    Cellulitis 07/08/2019    Cellulitis and abscess of hand 07/06/2019    Lactic acidosis 07/06/2019    S/P cardiac cath 10/12/2018    Coronary artery disease involving native coronary artery of native heart with angina pectoris with documented spasm ([facility]) 04/26/2018    Abnormal nuclear cardiac imaging test 04/12/2018    S/P coronary artery stent placement 04/12/2018    Angina, class III ([facility]) 04/12/2018    Mixed hyperlipidemia 06/26/2017    Type 2 diabetes mellitus without complication, without long-term current use of insulin ([facility]) 06/23/2017    Essential hypertension 12/02/2016    Elevated transaminase level 11/30/2016    Spinal stenosis of cervical region 10/06/2016    Psoriasis 07/10/2015    Laryngospasm 07/10/2015      Nirav Hylton MD  Past Medical History:   Diagnosis Date    Abnormal nuclear cardiac imaging test 4/12/2018    CAD (coronary artery disease)     Dyslipidemia     Elevated transaminase level 09/2016    GERD (gastroesophageal reflux

## 2024-12-20 NOTE — PROGRESS NOTES
PCP hospital follow-up transitional care appointment has been scheduled with Dr. Nirav Hylton on 12/27/24 1300. [facility] information on AVS for patient resource.   Pending patient discharge.

## 2024-12-20 NOTE — CARE COORDINATION
Care Management Initial Assessment       RUR:13%  Readmission? No  1st IM letter given? Yes - on 12/20      1st  letter given: No     12/20/24 0954   Service Assessment   Patient Orientation Other (see comment)  (patient off of the unit for procedure. questions answered by wife and sons.)   Cognition Other (see comment)   History Provided By Child/Family;Spouse   Primary Caregiver Self   Accompanied By/Relationship wife, 2 sons   Support Systems Spouse/Significant Other;Children   Patient's Healthcare Decision Maker is: Legal Next of Kin   PCP Verified by CM Yes   Last Visit to PCP Within last 3 months   Prior Functional Level Independent in ADLs/IADLs   Current Functional Level Independent in ADLs/IADLs   Can patient return to prior living arrangement Yes   Ability to make needs known: Good   Family able to assist with home care needs: Yes   Would you like for me to discuss the discharge plan with any other family members/significant others, and if so, who? Yes  (spouse)   Financial Resources Medicare   Social/Functional History   Lives With Spouse   Type of Home House   Bathroom Toilet Standard   Bathroom Accessibility Accessible   Home Equipment Cane   Receives Help From Family   Prior Level of Assist for ADLs Independent   Prior Level of Assist for Homemaking Independent   Ambulation Assistance Independent   Prior Level of Assist for Transfers Independent   Active  Yes   Mode of Transportation Car   Occupation Retired   Discharge Planning   Type of Residence House   Living Arrangements Spouse/Significant Other   Current Services Prior To Admission None   Potential Assistance Needed N/A   DME Ordered? No   Services At/After Discharge    Resource Information Provided? No     CM met with patient's spouse and 2 sons. Patient off of the unit for procedure.  He is normally independent at home, has neuropathy so he uses a cane. He has had outpatient PT with [facility] but discontinued therapy PTA and  wife does not think he wants to proceed with it.  No DC needs noted presently. Wife will transport home when discharged.  English Lin RN CM   [phone]      Advance Care Planning     General Advance Care Planning (ACP) Conversation    Date of Conversation: 12/20/2024  Conducted with: Patient with Decision Making Capacity  Other persons present: Spouse    Son      Healthcare Decision Maker:   Primary Decision Maker: LewKaren - Spouse - [phone]    Secondary Decision Maker: Tia Hackettlion - Child - [phone]       Content/Action Overview:  Has NO ACP documents-Information provided  Reviewed DNR/DNI and patient elects Full Code (Attempt Resuscitation)    Did not discuss code status with patient.         Length of Voluntary ACP Conversation in minutes:  <16 minutes (Non-Billable)    ENGLISH H HOSAY

## 2024-12-20 NOTE — PROGRESS NOTES
Latest Reference Range & Units 12/20/24 00:04   Troponin, High Sensitivity 0 - 76 ng/L 30,837 (HH)   Heparin Xa,LMWH and Unfrac IU/mL 0.19   (HH): Data is critically high     Latest Reference Range & Units 12/20/24 05:54   Sodium 136 - 145 mmol/L 136   Potassium 3.5 - 5.1 mmol/L 4.1   Chloride 97 - 108 mmol/L 105   CARBON DIOXIDE 21 - 32 mmol/L 23   BUN,BUNPL 6 - 20 MG/DL 14   Creatinine 0.70 - 1.30 MG/DL 1.12   Bun/Cre 12 - 20   13   Anion Gap 2 - 12 mmol/L 8   Est, Glom Filt Rate >60 ml/min/1.73m2 68   Glucose 65 - 100 mg/dL 189 (H)   Calcium 8.5 - 10.1 MG/DL 9.0   Troponin, High Sensitivity 0 - 76 ng/L 24,151 (HH)   Heparin Xa,LMWH and Unfrac IU/mL 0.36   (HH): Data is critically high  (H): Data is abnormally high    Notified Se Schafer via [employer], NP ordered EKG. EKG performed. Patient stable vitals, asymptomatic.    End of Shift Note    Bedside shift change report given to Latasha (oncoming nurse) by Carter Cha RN (offgoing nurse).  Report included the following information SBAR, ED Summary, and Cardiac Rhythm Normal Sinus Rhythm.    Shift worked:  [id number]     Shift summary and any significant changes:     Trop 02681, called NP, EKG ordered, not much changed from the recent EKG.     Concerns for physician to address:  CARDS consult, ECHO today?     [facility]   [phone]       Activity:     Number times ambulated in hallways past shift: 0  Number of times OOB to chair past shift: 0    Cardiac:   Cardiac Monitoring: Yes      Cardiac Rhythm: Sinus rhythm, Sinus tachy    Access:  Current line(s): PIV     Genitourinary:        Respiratory:   O2 Device: None (Room air)  Chronic home O2 use?: NO  Incentive spirometer at bedside: NO    GI:     Current diet:  Diet NPO Exceptions are: Ice Chips, Sips of Water with Meds  Passing flatus: YES    Pain Management:   Patient states pain is manageable on current regimen: YES    Skin:  Presley Scale Score: 18  Interventions: Wound Offloading

## 2024-12-20 NOTE — H&P
within normal limits. Coronary artery calcifications  are present.      MEDIASTINUM: No threshold enlarged lymph nodes.  CHRISTIANO: No threshold enlarged lymph nodes.  THORACIC AORTA: No aneurysm.  HEART: Normal in size.  ESOPHAGUS: No wall thickening or dilatation.  TRACHEA/BRONCHI: Patent.  PLEURA: No effusion or pneumothorax.  LUNGS: Right lower lobe linear atelectasis/scarring.  UPPER ABDOMEN: Partially imaged. No acute pathology.  BONES: No aggressive bone lesion. Remote left-sided rib fractures.    Impression  No evidence of pulmonary embolism.      Electronically signed by Cooper Aguirre        Xray Result (most recent):  XR CHEST PORTABLE 12/19/2024    Narrative  EXAM:  XR CHEST PORTABLE    INDICATION: Chest Pain    COMPARISON: 7/6/2019    TECHNIQUE: portable chest AP view    FINDINGS: The cardiac silhouette is within normal limits. The pulmonary  vasculature is within normal limits.    The lungs and pleural spaces are clear. The visualized bones and upper abdomen  are age-appropriate.    Impression  No acute process on portable chest.      Electronically signed by GINGER JACOBS        _______________________________________________________________________    TOTAL TIME:  75 Minutes   TOBACCO CESSATION COUNSELING: No    Critical Care Provided: Yes  (Minutes non procedure based)    I have spent  50 minutes of critical care time involved in lab review, consultations with specialist, family decision- making, bedside attention and documentation. During this entire length of time I was immediately available to the patient .     Critical Care:  The reason for providing this level of medical care for this critically ill patient was due to a critical illness that impaired one or more vital organ systems, such that there was a high probability of imminent or life threatening deterioration in the patient's condition. This care involved high complexity decision making to assess, manipulate, and support vital system  functions, to treat this degree of vital organ system failure, and to prevent further life threatening deterioration of the patient’s condition.      Signed:Cirilo Archibald DO  [facility] - Internal Medicine Hospitalist/ Nocturnist  12/19/2024 8:30 PM    Please do not hesitate to reach out to myself or assigned provider on-call via [email or web address] paging system with questions or concerns.     Procedures: see electronic medical records for all procedures/Xrays and details which were not copied into this note but were reviewed prior to creation of Plan.

## 2024-12-20 NOTE — PROGRESS NOTES
Hospitalist Progress Note/Discharge summary    NAME: Cirilo Hackett   :  1945   MRN:  [id number]       Assessment / Plan:    NSTEMI  CAD status post DEMI  Essential hypertension  Hyperlipidemia  Insulin-dependent diabetes mellitus  Herpes simplex  GERD    Plan:  NSTEMI  CAD status post DEMI  Essential hypertension  Hyperlipidemia  Cardiology consulted, greatly appreciate their expertise. Left Heart cath shows multivessel disease and will need CT surgery evaluation. Patient left AMA.   Full-strength aspirin given PTA, continue with 81 mg daily thereafter  On metoprolol twice daily  Nitroglycerin infusion for antianginal and antihypertensive effect       Insulin-dependent diabetes mellitus  Decrease PTA glargine to 7 units nightly  Corrective coverage insulin  Accu-Cheks  Diabetic diet after n.p.o.  Hypoglycemia protocol in place     Herpes simplex  Continue every other day acyclovir prophylaxis     GERD  Continue PTA Protonix      less than 18.5 Underweight / Body mass index is 25.66 kg/m².    Estimated discharge date:   Barriers:    Code status: Full  Prophylaxis: Lovenox  Recommended Disposition: Home w/Family     Subjective:     Chief Complaint / Reason for Physician Visit  \"Admitted with unstable angina with hx of CAD. \".  Discussed with RN events overnight.     Review of Systems:  Symptom Y/N Comments  Symptom Y/N Comments   Fever/Chills n   Chest Pain y    Poor Appetite n   Edema n    Cough n   Abdominal Pain n    Sputum n   Joint Pain n    SOB/GARCIA y   Pruritis/Rash     Nausea/vomit n   Tolerating PT/OT y    Diarrhea n   Tolerating Diet y    Constipation n   Other       Could NOT obtain due to:      Objective:     VITALS:   Last 24hrs VS reviewed since prior progress note. Most recent are:  [unfilled]    Intake/Output Summary (Last 24 hours) at 2024 1246  Last data filed at 2024 0400  Gross per 24 hour   Intake 0 ml   Output --   Net 0 ml        I had a face to face encounter and  12/20/24  0554 12/20/24  1143     --   --  136 135*   K 4.5  --   --  4.1 4.7     --   --  105 101   CO2 27  --   --  23 28   BUN 16  --   --  14 14   MG  --   --  1.5*  --  2.0   ALT 50  --   --   --  39   INR  --  1.1  --   --   --        Signed: Shlomo Gasca MD

## 2024-12-20 NOTE — PROGRESS NOTES
Pt seen for cardiac surgery consult. He wants to leave AMA. I discussed that leaving the hospital is against medical advice. Pt is not willing to wait for further testing and workup. Discussed with RN and with Dr. Henson. Will schedule outpatient consult appointment and echocardiogram for patient if he leaves the hospital.

## 2024-12-21 LAB
ECHO BSA: 2.18 M2
EKG ATRIAL RATE: 101 BPM
EKG DIAGNOSIS: NORMAL
EKG P AXIS: 44 DEGREES
EKG P-R INTERVAL: 176 MS
EKG Q-T INTERVAL: 386 MS
EKG QRS DURATION: 82 MS
EKG QTC CALCULATION (BAZETT): 500 MS
EKG R AXIS: 12 DEGREES
EKG T AXIS: 146 DEGREES
EKG VENTRICULAR RATE: 101 BPM
VAS LEFT GSV ANKLE DIAM: 3.2 MM
VAS LEFT GSV AT KNEE DIAM: 4 MM
VAS LEFT GSV BK MID DIAM: 3.3 MM
VAS LEFT GSV BK PROX DIAM: 3.3 MM
VAS LEFT GSV THIGH DIST DIAM: 3.7 MM
VAS LEFT GSV THIGH MID DIAM: 2.1 MM
VAS LEFT GSV THIGH PROX DIAM: 3.9 MM
VAS RIGHT GSV ANKLE DIAM: 3.2 MM
VAS RIGHT GSV AT KNEE DIAM: 3.8 MM
VAS RIGHT GSV BK MID DIAM: 3 MM
VAS RIGHT GSV BK PROX DIAM: 3.3 MM
VAS RIGHT GSV THIGH DIST DIAM: 3.7 MM
VAS RIGHT GSV THIGH MID DIAM: 3.4 MM
VAS RIGHT GSV THIGH PROX DIAM: 3.9 MM

## 2024-12-22 ENCOUNTER — APPOINTMENT (OUTPATIENT)
Facility: HOSPITAL | Age: 79
End: 2024-12-22
Payer: MEDICARE

## 2024-12-22 ENCOUNTER — HOSPITAL ENCOUNTER (INPATIENT)
Facility: HOSPITAL | Age: 79
LOS: 5 days | Discharge: HOME | End: 2024-12-27
Attending: EMERGENCY MEDICINE | Admitting: STUDENT IN AN ORGANIZED HEALTH CARE EDUCATION/TRAINING PROGRAM
Payer: MEDICARE

## 2024-12-22 ENCOUNTER — HOSPITAL ENCOUNTER (EMERGENCY)
Facility: HOSPITAL | Age: 79
Discharge: ANOTHER HOSPITAL | End: 2024-12-22
Attending: EMERGENCY MEDICINE
Payer: MEDICARE

## 2024-12-22 VITALS
TEMPERATURE: 99.7 F | DIASTOLIC BLOOD PRESSURE: 85 MMHG | RESPIRATION RATE: 23 BRPM | HEART RATE: 116 BPM | OXYGEN SATURATION: 95 % | SYSTOLIC BLOOD PRESSURE: 120 MMHG

## 2024-12-22 DIAGNOSIS — I25.10 CAD (CORONARY ARTERY DISEASE): ICD-10-CM

## 2024-12-22 DIAGNOSIS — I21.4 NSTEMI (NON-ST ELEVATED MYOCARDIAL INFARCTION) (HCC): Primary | ICD-10-CM

## 2024-12-22 DIAGNOSIS — I48.91 ATRIAL FIBRILLATION WITH RAPID VENTRICULAR RESPONSE (HCC): ICD-10-CM

## 2024-12-22 DIAGNOSIS — I24.9 ACUTE CORONARY SYNDROME (HCC): ICD-10-CM

## 2024-12-22 LAB
ALBUMIN SERPL-MCNC: 3.4 G/DL (ref 3.5–5)
ALBUMIN/GLOB SERPL: 1 (ref 1.1–2.2)
ALP SERPL-CCNC: 130 U/L (ref 45–117)
ALT SERPL-CCNC: 45 U/L (ref 12–78)
ANION GAP SERPL CALC-SCNC: 18 MMOL/L (ref 2–12)
APTT PPP: 27.5 SEC (ref 22.1–31)
APTT PPP: 55 SEC (ref 22.1–31)
AST SERPL-CCNC: 74 U/L (ref 15–37)
BASOPHILS # BLD: 0 K/UL (ref 0–0.1)
BASOPHILS NFR BLD: 0 % (ref 0–1)
BILIRUB SERPL-MCNC: 0.7 MG/DL (ref 0.2–1)
BUN SERPL-MCNC: 19 MG/DL (ref 6–20)
BUN/CREAT SERPL: 13 (ref 12–20)
CALCIUM SERPL-MCNC: 9 MG/DL (ref 8.5–10.1)
CHLORIDE SERPL-SCNC: 100 MMOL/L (ref 97–108)
CO2 SERPL-SCNC: 20 MMOL/L (ref 21–32)
CREAT SERPL-MCNC: 1.51 MG/DL (ref 0.7–1.3)
DIFFERENTIAL METHOD BLD: ABNORMAL
EKG ATRIAL RATE: 116 BPM
EKG DIAGNOSIS: NORMAL
EKG P-R INTERVAL: 200 MS
EKG Q-T INTERVAL: 350 MS
EKG QRS DURATION: 82 MS
EKG QTC CALCULATION (BAZETT): 486 MS
EKG R AXIS: 36 DEGREES
EKG T AXIS: -29 DEGREES
EKG VENTRICULAR RATE: 116 BPM
EOSINOPHIL # BLD: 0 K/UL (ref 0–0.4)
EOSINOPHIL NFR BLD: 0 % (ref 0–7)
ERYTHROCYTE [DISTWIDTH] IN BLOOD BY AUTOMATED COUNT: 13 % (ref 11.5–14.5)
ERYTHROCYTE [DISTWIDTH] IN BLOOD BY AUTOMATED COUNT: 13.1 % (ref 11.5–14.5)
FLUAV RNA SPEC QL NAA+PROBE: NOT DETECTED
FLUBV RNA SPEC QL NAA+PROBE: NOT DETECTED
GLOBULIN SER CALC-MCNC: 3.4 G/DL (ref 2–4)
GLUCOSE BLD STRIP.AUTO-MCNC: 283 MG/DL (ref 65–117)
GLUCOSE BLD STRIP.AUTO-MCNC: 326 MG/DL (ref 65–117)
GLUCOSE SERPL-MCNC: 317 MG/DL (ref 65–100)
HCT VFR BLD AUTO: 38 % (ref 36.6–50.3)
HCT VFR BLD AUTO: 39.8 % (ref 36.6–50.3)
HGB BLD-MCNC: 12.6 G/DL (ref 12.1–17)
HGB BLD-MCNC: 13.3 G/DL (ref 12.1–17)
IMM GRANULOCYTES # BLD AUTO: 0 K/UL (ref 0–0.04)
IMM GRANULOCYTES NFR BLD AUTO: 0 % (ref 0–0.5)
INR PPP: 1.1 (ref 0.9–1.1)
INR PPP: 1.1 (ref 0.9–1.1)
LACTATE SERPL-SCNC: 1.8 MMOL/L (ref 0.4–2)
LACTATE SERPL-SCNC: 4.1 MMOL/L (ref 0.4–2)
LYMPHOCYTES # BLD: 0.8 K/UL (ref 0.8–3.5)
LYMPHOCYTES NFR BLD: 8 % (ref 12–49)
MAGNESIUM SERPL-MCNC: 1.6 MG/DL (ref 1.6–2.4)
MCH RBC QN AUTO: 31.4 PG (ref 26–34)
MCH RBC QN AUTO: 31.7 PG (ref 26–34)
MCHC RBC AUTO-ENTMCNC: 33.2 G/DL (ref 30–36.5)
MCHC RBC AUTO-ENTMCNC: 33.4 G/DL (ref 30–36.5)
MCV RBC AUTO: 93.9 FL (ref 80–99)
MCV RBC AUTO: 95.5 FL (ref 80–99)
MONOCYTES # BLD: 0.7 K/UL (ref 0–1)
MONOCYTES NFR BLD: 6 % (ref 5–13)
NEUTS SEG # BLD: 9.4 K/UL (ref 1.8–8)
NEUTS SEG NFR BLD: 86 % (ref 32–75)
NRBC # BLD: 0 K/UL (ref 0–0.01)
NRBC # BLD: 0 K/UL (ref 0–0.01)
NRBC BLD-RTO: 0 PER 100 WBC
NRBC BLD-RTO: 0 PER 100 WBC
NT PRO BNP: ABNORMAL PG/ML (ref 0–450)
PLATELET # BLD AUTO: 173 K/UL (ref 150–400)
PLATELET # BLD AUTO: 182 K/UL (ref 150–400)
PMV BLD AUTO: 10.5 FL (ref 8.9–12.9)
PMV BLD AUTO: 10.6 FL (ref 8.9–12.9)
POTASSIUM SERPL-SCNC: 4.7 MMOL/L (ref 3.5–5.1)
PROT SERPL-MCNC: 6.8 G/DL (ref 6.4–8.2)
PROTHROMBIN TIME: 10.7 SEC (ref 9–11.1)
PROTHROMBIN TIME: 11.8 SEC (ref 9–11.1)
RBC # BLD AUTO: 3.98 M/UL (ref 4.1–5.7)
RBC # BLD AUTO: 4.24 M/UL (ref 4.1–5.7)
SARS-COV-2 RNA RESP QL NAA+PROBE: NOT DETECTED
SERVICE CMNT-IMP: ABNORMAL
SERVICE CMNT-IMP: ABNORMAL
SODIUM SERPL-SCNC: 138 MMOL/L (ref 136–145)
SOURCE: NORMAL
THERAPEUTIC RANGE: ABNORMAL SECS (ref 58–77)
THERAPEUTIC RANGE: NORMAL SECS (ref 58–77)
TROPONIN I SERPL HS-MCNC: ABNORMAL NG/L (ref 0–76)
TROPONIN I SERPL HS-MCNC: ABNORMAL NG/L (ref 0–76)
UFH PPP CHRO-ACNC: 0.29 IU/ML
UFH PPP CHRO-ACNC: 0.48 IU/ML
UFH PPP CHRO-ACNC: <0.1 IU/ML
WBC # BLD AUTO: 11 K/UL (ref 4.1–11.1)
WBC # BLD AUTO: 12.3 K/UL (ref 4.1–11.1)

## 2024-12-22 PROCEDURE — 36415 COLL VENOUS BLD VENIPUNCTURE: CPT

## 2024-12-22 PROCEDURE — 6360000002 HC RX W HCPCS: Performed by: EMERGENCY MEDICINE

## 2024-12-22 PROCEDURE — 85027 COMPLETE CBC AUTOMATED: CPT

## 2024-12-22 PROCEDURE — 6360000002 HC RX W HCPCS: Performed by: STUDENT IN AN ORGANIZED HEALTH CARE EDUCATION/TRAINING PROGRAM

## 2024-12-22 PROCEDURE — 82962 GLUCOSE BLOOD TEST: CPT

## 2024-12-22 PROCEDURE — 2500000003 HC RX 250 WO HCPCS: Performed by: EMERGENCY MEDICINE

## 2024-12-22 PROCEDURE — 85025 COMPLETE CBC W/AUTO DIFF WBC: CPT

## 2024-12-22 PROCEDURE — 85610 PROTHROMBIN TIME: CPT

## 2024-12-22 PROCEDURE — 85730 THROMBOPLASTIN TIME PARTIAL: CPT

## 2024-12-22 PROCEDURE — 2060000000 HC ICU INTERMEDIATE R&B

## 2024-12-22 PROCEDURE — 85520 HEPARIN ASSAY: CPT

## 2024-12-22 PROCEDURE — 6370000000 HC RX 637 (ALT 250 FOR IP): Performed by: STUDENT IN AN ORGANIZED HEALTH CARE EDUCATION/TRAINING PROGRAM

## 2024-12-22 PROCEDURE — 2580000003 HC RX 258: Performed by: EMERGENCY MEDICINE

## 2024-12-22 PROCEDURE — 83880 ASSAY OF NATRIURETIC PEPTIDE: CPT

## 2024-12-22 PROCEDURE — 83605 ASSAY OF LACTIC ACID: CPT

## 2024-12-22 PROCEDURE — 99285 EMERGENCY DEPT VISIT HI MDM: CPT

## 2024-12-22 PROCEDURE — 2500000003 HC RX 250 WO HCPCS: Performed by: STUDENT IN AN ORGANIZED HEALTH CARE EDUCATION/TRAINING PROGRAM

## 2024-12-22 PROCEDURE — 87636 SARSCOV2 & INF A&B AMP PRB: CPT

## 2024-12-22 PROCEDURE — 87040 BLOOD CULTURE FOR BACTERIA: CPT

## 2024-12-22 PROCEDURE — 96374 THER/PROPH/DIAG INJ IV PUSH: CPT

## 2024-12-22 PROCEDURE — 84484 ASSAY OF TROPONIN QUANT: CPT

## 2024-12-22 PROCEDURE — 71045 X-RAY EXAM CHEST 1 VIEW: CPT

## 2024-12-22 PROCEDURE — 6370000000 HC RX 637 (ALT 250 FOR IP): Performed by: EMERGENCY MEDICINE

## 2024-12-22 PROCEDURE — 83735 ASSAY OF MAGNESIUM: CPT

## 2024-12-22 PROCEDURE — 2700000000 HC OXYGEN THERAPY PER DAY

## 2024-12-22 PROCEDURE — 93005 ELECTROCARDIOGRAM TRACING: CPT | Performed by: EMERGENCY MEDICINE

## 2024-12-22 PROCEDURE — 80053 COMPREHEN METABOLIC PANEL: CPT

## 2024-12-22 PROCEDURE — 96365 THER/PROPH/DIAG IV INF INIT: CPT

## 2024-12-22 RX ORDER — ASPIRIN 81 MG/1
81 TABLET ORAL DAILY
Status: DISCONTINUED | OUTPATIENT
Start: 2024-12-22 | End: 2024-12-27 | Stop reason: HOSPADM

## 2024-12-22 RX ORDER — HEPARIN SODIUM 10000 [USP'U]/100ML
5-30 INJECTION, SOLUTION INTRAVENOUS CONTINUOUS
Status: DISCONTINUED | OUTPATIENT
Start: 2024-12-22 | End: 2024-12-22 | Stop reason: HOSPADM

## 2024-12-22 RX ORDER — ACETAMINOPHEN 500 MG
1000 TABLET ORAL
Status: COMPLETED | OUTPATIENT
Start: 2024-12-22 | End: 2024-12-22

## 2024-12-22 RX ORDER — ASPIRIN 325 MG
325 TABLET ORAL
Status: COMPLETED | OUTPATIENT
Start: 2024-12-22 | End: 2024-12-22

## 2024-12-22 RX ORDER — ZOLPIDEM TARTRATE 5 MG/1
5 TABLET ORAL NIGHTLY PRN
Status: DISCONTINUED | OUTPATIENT
Start: 2024-12-22 | End: 2024-12-27 | Stop reason: HOSPADM

## 2024-12-22 RX ORDER — INSULIN GLARGINE 100 [IU]/ML
20 INJECTION, SOLUTION SUBCUTANEOUS NIGHTLY
Status: DISCONTINUED | OUTPATIENT
Start: 2024-12-22 | End: 2024-12-27 | Stop reason: HOSPADM

## 2024-12-22 RX ORDER — SODIUM CHLORIDE 9 MG/ML
INJECTION, SOLUTION INTRAVENOUS PRN
Status: DISCONTINUED | OUTPATIENT
Start: 2024-12-22 | End: 2024-12-27 | Stop reason: HOSPADM

## 2024-12-22 RX ORDER — PANTOPRAZOLE SODIUM 40 MG/1
40 TABLET, DELAYED RELEASE ORAL DAILY
Status: DISCONTINUED | OUTPATIENT
Start: 2024-12-22 | End: 2024-12-27 | Stop reason: HOSPADM

## 2024-12-22 RX ORDER — ACETAMINOPHEN 325 MG/1
650 TABLET ORAL EVERY 6 HOURS PRN
Status: DISCONTINUED | OUTPATIENT
Start: 2024-12-22 | End: 2024-12-27 | Stop reason: HOSPADM

## 2024-12-22 RX ORDER — INSULIN LISPRO 100 [IU]/ML
0-8 INJECTION, SOLUTION INTRAVENOUS; SUBCUTANEOUS
Status: DISCONTINUED | OUTPATIENT
Start: 2024-12-22 | End: 2024-12-27 | Stop reason: HOSPADM

## 2024-12-22 RX ORDER — DEXTROSE MONOHYDRATE 100 MG/ML
INJECTION, SOLUTION INTRAVENOUS CONTINUOUS PRN
Status: DISCONTINUED | OUTPATIENT
Start: 2024-12-22 | End: 2024-12-27 | Stop reason: HOSPADM

## 2024-12-22 RX ORDER — SODIUM CHLORIDE 0.9 % (FLUSH) 0.9 %
5-40 SYRINGE (ML) INJECTION PRN
Status: DISCONTINUED | OUTPATIENT
Start: 2024-12-22 | End: 2024-12-27 | Stop reason: HOSPADM

## 2024-12-22 RX ORDER — SODIUM CHLORIDE 0.9 % (FLUSH) 0.9 %
5-40 SYRINGE (ML) INJECTION EVERY 12 HOURS SCHEDULED
Status: DISCONTINUED | OUTPATIENT
Start: 2024-12-22 | End: 2024-12-27 | Stop reason: HOSPADM

## 2024-12-22 RX ORDER — ONDANSETRON 4 MG/1
4 TABLET, ORALLY DISINTEGRATING ORAL EVERY 8 HOURS PRN
Status: DISCONTINUED | OUTPATIENT
Start: 2024-12-22 | End: 2024-12-27 | Stop reason: HOSPADM

## 2024-12-22 RX ORDER — HEPARIN SODIUM 10000 [USP'U]/100ML
5-30 INJECTION, SOLUTION INTRAVENOUS CONTINUOUS
Status: DISCONTINUED | OUTPATIENT
Start: 2024-12-22 | End: 2024-12-27

## 2024-12-22 RX ORDER — HEPARIN SODIUM 1000 [USP'U]/ML
4000 INJECTION, SOLUTION INTRAVENOUS; SUBCUTANEOUS PRN
Status: DISCONTINUED | OUTPATIENT
Start: 2024-12-22 | End: 2024-12-22 | Stop reason: HOSPADM

## 2024-12-22 RX ORDER — ROSUVASTATIN CALCIUM 10 MG/1
20 TABLET, COATED ORAL NIGHTLY
Status: DISCONTINUED | OUTPATIENT
Start: 2024-12-22 | End: 2024-12-27 | Stop reason: HOSPADM

## 2024-12-22 RX ORDER — ACETAMINOPHEN 650 MG/1
650 SUPPOSITORY RECTAL EVERY 6 HOURS PRN
Status: DISCONTINUED | OUTPATIENT
Start: 2024-12-22 | End: 2024-12-27 | Stop reason: HOSPADM

## 2024-12-22 RX ORDER — HEPARIN SODIUM 1000 [USP'U]/ML
2000 INJECTION, SOLUTION INTRAVENOUS; SUBCUTANEOUS PRN
Status: DISCONTINUED | OUTPATIENT
Start: 2024-12-22 | End: 2024-12-22 | Stop reason: HOSPADM

## 2024-12-22 RX ORDER — HEPARIN SODIUM 1000 [USP'U]/ML
2000 INJECTION, SOLUTION INTRAVENOUS; SUBCUTANEOUS PRN
Status: DISCONTINUED | OUTPATIENT
Start: 2024-12-22 | End: 2024-12-27

## 2024-12-22 RX ORDER — MAGNESIUM SULFATE IN WATER 40 MG/ML
2000 INJECTION, SOLUTION INTRAVENOUS
Status: COMPLETED | OUTPATIENT
Start: 2024-12-22 | End: 2024-12-23

## 2024-12-22 RX ORDER — POLYETHYLENE GLYCOL 3350 17 G/17G
17 POWDER, FOR SOLUTION ORAL DAILY PRN
Status: DISCONTINUED | OUTPATIENT
Start: 2024-12-22 | End: 2024-12-27 | Stop reason: HOSPADM

## 2024-12-22 RX ORDER — GLUCAGON 1 MG/ML
1 KIT INJECTION PRN
Status: DISCONTINUED | OUTPATIENT
Start: 2024-12-22 | End: 2024-12-27 | Stop reason: HOSPADM

## 2024-12-22 RX ORDER — ACYCLOVIR 800 MG/1
400 TABLET ORAL
Status: DISCONTINUED | OUTPATIENT
Start: 2024-12-22 | End: 2024-12-27 | Stop reason: HOSPADM

## 2024-12-22 RX ORDER — ONDANSETRON 2 MG/ML
4 INJECTION INTRAMUSCULAR; INTRAVENOUS EVERY 6 HOURS PRN
Status: DISCONTINUED | OUTPATIENT
Start: 2024-12-22 | End: 2024-12-27 | Stop reason: HOSPADM

## 2024-12-22 RX ORDER — DILTIAZEM HYDROCHLORIDE 5 MG/ML
10 INJECTION INTRAVENOUS
Status: COMPLETED | OUTPATIENT
Start: 2024-12-22 | End: 2024-12-22

## 2024-12-22 RX ORDER — HEPARIN SODIUM 1000 [USP'U]/ML
4000 INJECTION, SOLUTION INTRAVENOUS; SUBCUTANEOUS ONCE
Status: COMPLETED | OUTPATIENT
Start: 2024-12-22 | End: 2024-12-22

## 2024-12-22 RX ORDER — HEPARIN SODIUM 1000 [USP'U]/ML
4000 INJECTION, SOLUTION INTRAVENOUS; SUBCUTANEOUS PRN
Status: DISCONTINUED | OUTPATIENT
Start: 2024-12-22 | End: 2024-12-27

## 2024-12-22 RX ADMIN — HEPARIN SODIUM 11 UNITS/KG/HR: 10000 INJECTION, SOLUTION INTRAVENOUS at 17:52

## 2024-12-22 RX ADMIN — PANTOPRAZOLE SODIUM 40 MG: 40 TABLET, DELAYED RELEASE ORAL at 20:37

## 2024-12-22 RX ADMIN — INSULIN LISPRO 4 UNITS: 100 INJECTION, SOLUTION INTRAVENOUS; SUBCUTANEOUS at 21:24

## 2024-12-22 RX ADMIN — DILTIAZEM HYDROCHLORIDE 5 MG/HR: 5 INJECTION, SOLUTION INTRAVENOUS at 18:00

## 2024-12-22 RX ADMIN — NITROGLYCERIN 1 INCH: 20 OINTMENT TOPICAL at 20:34

## 2024-12-22 RX ADMIN — SODIUM CHLORIDE, PRESERVATIVE FREE 10 ML: 5 INJECTION INTRAVENOUS at 21:13

## 2024-12-22 RX ADMIN — INSULIN GLARGINE 20 UNITS: 100 INJECTION, SOLUTION SUBCUTANEOUS at 20:34

## 2024-12-22 RX ADMIN — ROSUVASTATIN CALCIUM 20 MG: 10 TABLET, FILM COATED ORAL at 21:25

## 2024-12-22 RX ADMIN — MAGNESIUM SULFATE HEPTAHYDRATE 2000 MG: 40 INJECTION, SOLUTION INTRAVENOUS at 20:34

## 2024-12-22 RX ADMIN — DILTIAZEM HYDROCHLORIDE 10 MG: 5 INJECTION, SOLUTION INTRAVENOUS at 17:57

## 2024-12-22 RX ADMIN — SODIUM CHLORIDE 5 MG/HR: 900 INJECTION, SOLUTION INTRAVENOUS at 19:31

## 2024-12-22 RX ADMIN — ACYCLOVIR 400 MG: 800 TABLET ORAL at 20:36

## 2024-12-22 RX ADMIN — ASPIRIN 325 MG: 325 TABLET ORAL at 17:46

## 2024-12-22 RX ADMIN — MAGNESIUM SULFATE HEPTAHYDRATE 2000 MG: 40 INJECTION, SOLUTION INTRAVENOUS at 22:03

## 2024-12-22 RX ADMIN — NITROGLYCERIN 1 INCH: 20 OINTMENT TOPICAL at 23:38

## 2024-12-22 RX ADMIN — Medication 3 MG: at 21:25

## 2024-12-22 RX ADMIN — HEPARIN SODIUM AND DEXTROSE 11 UNITS/KG/HR: 10000; 5 INJECTION INTRAVENOUS at 19:36

## 2024-12-22 RX ADMIN — ASPIRIN 81 MG: 81 TABLET, COATED ORAL at 20:36

## 2024-12-22 RX ADMIN — ACETAMINOPHEN 1000 MG: 500 TABLET ORAL at 17:52

## 2024-12-22 RX ADMIN — HEPARIN SODIUM 4000 UNITS: 1000 INJECTION INTRAVENOUS; SUBCUTANEOUS at 17:48

## 2024-12-22 ASSESSMENT — LIFESTYLE VARIABLES
HOW OFTEN DO YOU HAVE A DRINK CONTAINING ALCOHOL: MONTHLY OR LESS
HOW OFTEN DO YOU HAVE A DRINK CONTAINING ALCOHOL: 2-4 TIMES A MONTH
HOW MANY STANDARD DRINKS CONTAINING ALCOHOL DO YOU HAVE ON A TYPICAL DAY: 1 OR 2
HOW MANY STANDARD DRINKS CONTAINING ALCOHOL DO YOU HAVE ON A TYPICAL DAY: 1 OR 2

## 2024-12-22 ASSESSMENT — PAIN SCALES - GENERAL: PAINLEVEL_OUTOF10: 0

## 2024-12-22 NOTE — ED PROVIDER NOTES
agrees with the care-plan and conveys that all of his questions have been answered.    CLINICAL IMPRESSION    Transfer: The patient is being transferred to [facility] for STEMI. The results of their tests and reasons for their transfer have been discussed with the patient and/or available family. The patient/family has conveyed agreement and understanding for the need to be admitted and for their admission diagnosis. Consultation has been made with Dr. Arora, who agrees to accept the transfer.        I am the Primary Clinician of Record.   Percy Smyth DO (electronically signed)    (Please note that parts of this dictation were completed with voice recognition software. Quite often unanticipated grammatical, syntax, homophones, and other interpretive errors are inadvertently transcribed by the computer software. Please disregards these errors. Please excuse any errors that have escaped final proofreading.)          Percy Smyth DO  12/24/24 0910

## 2024-12-22 NOTE — ED NOTES
TRANSFER - OUT REPORT:    Verbal report given to Meagan Stellwag RN on Cirilo Hackett  being transferred to [facility] ED for routine progression of patient care       Report consisted of patient's Situation, Background, Assessment and   Recommendations(SBAR).     Information from the following report(s) Nurse Handoff Report, ED Encounter Summary, ED SBAR, Adult Overview, Intake/Output, MAR, Recent Results, and Cardiac Rhythm STemI  was reviewed with the receiving nurse.    [place] Fall Assessment:    Presents to emergency department  because of falls (Syncope, seizure, or loss of consciousness): No  Age > 70: Yes  Altered Mental Status, Intoxication with alcohol or substance confusion (Disorientation, impaired judgment, poor safety awaremess, or inability to follow instructions): No  Impaired Mobility: Ambulates or transfers with assistive devices or assistance; Unable to ambulate or transer.: Yes  Nursing Judgement: Yes          Lines:   Peripheral IV 12/22/24 Left Antecubital (Active)   Site Assessment Clean, dry & intact 12/22/24 1743   Line Status Blood return noted;Brisk blood return;Flushed;Specimen collected;Normal saline locked 12/22/24 1743   Dressing Status New dressing applied;Clean, dry & intact 12/22/24 1743   Dressing Type Transparent 12/22/24 1743       Peripheral IV 12/22/24 Right Antecubital (Active)   Site Assessment Clean, dry & intact 12/22/24 1744   Line Status Flushed;Normal saline locked 12/22/24 1744   Dressing Status Clean, dry & intact 12/22/24 1744        Opportunity for questions and clarification was provided.      Patient transported with:  Monitor

## 2024-12-22 NOTE — ED NOTES
Report given to Dorita with [facility] on this patient being transported to [facility], lab results provided as well

## 2024-12-23 ENCOUNTER — TELEPHONE (OUTPATIENT)
Age: 79
End: 2024-12-23

## 2024-12-23 ENCOUNTER — APPOINTMENT (OUTPATIENT)
Facility: HOSPITAL | Age: 79
End: 2024-12-23
Attending: STUDENT IN AN ORGANIZED HEALTH CARE EDUCATION/TRAINING PROGRAM
Payer: MEDICARE

## 2024-12-23 LAB
ANION GAP SERPL CALC-SCNC: 6 MMOL/L (ref 2–12)
APPEARANCE UR: CLEAR
APTT PPP: 38.7 SEC (ref 22.1–31)
ARTERIAL PATENCY WRIST A: YES
BACTERIA URNS QL MICRO: NEGATIVE /HPF
BASE DEFICIT BLDA-SCNC: 1.3 MMOL/L
BASOPHILS # BLD: 0 K/UL (ref 0–0.1)
BASOPHILS NFR BLD: 0 % (ref 0–1)
BDY SITE: ABNORMAL
BILIRUB UR QL: NEGATIVE
BUN SERPL-MCNC: 26 MG/DL (ref 6–20)
BUN/CREAT SERPL: 19 (ref 12–20)
CALCIUM SERPL-MCNC: 8.6 MG/DL (ref 8.5–10.1)
CHLORIDE SERPL-SCNC: 105 MMOL/L (ref 97–108)
CO2 SERPL-SCNC: 24 MMOL/L (ref 21–32)
COLOR UR: ABNORMAL
CREAT SERPL-MCNC: 1.4 MG/DL (ref 0.7–1.3)
DIFFERENTIAL METHOD BLD: ABNORMAL
ECHO AO ROOT DIAM: 3.2 CM
ECHO AO ROOT INDEX: 1.47 CM/M2
ECHO AV AREA PEAK VELOCITY: 1.8 CM2
ECHO AV AREA VTI: 1.9 CM2
ECHO AV AREA/BSA PEAK VELOCITY: 0.8 CM2/M2
ECHO AV AREA/BSA VTI: 0.9 CM2/M2
ECHO AV MEAN GRADIENT: 3 MMHG
ECHO AV MEAN VELOCITY: 0.8 M/S
ECHO AV PEAK GRADIENT: 5 MMHG
ECHO AV PEAK VELOCITY: 1.2 M/S
ECHO AV VELOCITY RATIO: 0.58
ECHO AV VTI: 20.1 CM
ECHO BSA: 2.17 M2
ECHO EST RA PRESSURE: 8 MMHG
ECHO LA DIAMETER INDEX: 1.89 CM/M2
ECHO LA DIAMETER: 4.1 CM
ECHO LA TO AORTIC ROOT RATIO: 1.28
ECHO LV EDV A4C: 118 ML
ECHO LV EDV INDEX A4C: 54 ML/M2
ECHO LV EF PHYSICIAN: 25 %
ECHO LV EJECTION FRACTION A4C: 28 %
ECHO LV ESV A4C: 85 ML
ECHO LV ESV INDEX A4C: 39 ML/M2
ECHO LV FRACTIONAL SHORTENING: 32 % (ref 28–44)
ECHO LV INTERNAL DIMENSION DIASTOLE INDEX: 1.89 CM/M2
ECHO LV INTERNAL DIMENSION DIASTOLIC: 4.1 CM (ref 4.2–5.9)
ECHO LV INTERNAL DIMENSION SYSTOLIC INDEX: 1.29 CM/M2
ECHO LV INTERNAL DIMENSION SYSTOLIC: 2.8 CM
ECHO LV IVSD: 1.1 CM (ref 0.6–1)
ECHO LV MASS 2D: 161.4 G (ref 88–224)
ECHO LV MASS INDEX 2D: 74.4 G/M2 (ref 49–115)
ECHO LV POSTERIOR WALL DIASTOLIC: 1.2 CM (ref 0.6–1)
ECHO LV RELATIVE WALL THICKNESS RATIO: 0.59
ECHO LVOT AREA: 2.8 CM2
ECHO LVOT AV VTI INDEX: 0.66
ECHO LVOT DIAM: 1.9 CM
ECHO LVOT MEAN GRADIENT: 1 MMHG
ECHO LVOT PEAK GRADIENT: 2 MMHG
ECHO LVOT PEAK VELOCITY: 0.7 M/S
ECHO LVOT STROKE VOLUME INDEX: 17.2 ML/M2
ECHO LVOT SV: 37.4 ML
ECHO LVOT VTI: 13.2 CM
ECHO MV A VELOCITY: 0.56 M/S
ECHO MV AREA VTI: 2.1 CM2
ECHO MV E DECELERATION TIME (DT): 194.9 MS
ECHO MV E VELOCITY: 0.77 M/S
ECHO MV E/A RATIO: 1.38
ECHO MV LVOT VTI INDEX: 1.36
ECHO MV MAX VELOCITY: 0.8 M/S
ECHO MV MEAN GRADIENT: 2 MMHG
ECHO MV MEAN VELOCITY: 0.6 M/S
ECHO MV PEAK GRADIENT: 3 MMHG
ECHO MV REGURGITANT PEAK GRADIENT: 36 MMHG
ECHO MV REGURGITANT PEAK VELOCITY: 3 M/S
ECHO MV VTI: 18 CM
ECHO RIGHT VENTRICULAR SYSTOLIC PRESSURE (RVSP): 29 MMHG
ECHO RV TAPSE: 1.8 CM (ref 1.7–?)
ECHO RVOT AREA: 6.6 CM2
ECHO RVOT DIAMETER: 2.9 CM
ECHO TV REGURGITANT MAX VELOCITY: 2.27 M/S
ECHO TV REGURGITANT PEAK GRADIENT: 21 MMHG
EKG ATRIAL RATE: 114 BPM
EKG ATRIAL RATE: 118 BPM
EKG ATRIAL RATE: 119 BPM
EKG ATRIAL RATE: 144 BPM
EKG DIAGNOSIS: NORMAL
EKG P AXIS: 40 DEGREES
EKG P AXIS: 44 DEGREES
EKG P AXIS: 52 DEGREES
EKG P AXIS: 78 DEGREES
EKG P-R INTERVAL: 116 MS
EKG P-R INTERVAL: 168 MS
EKG P-R INTERVAL: 174 MS
EKG P-R INTERVAL: 194 MS
EKG Q-T INTERVAL: 302 MS
EKG Q-T INTERVAL: 316 MS
EKG Q-T INTERVAL: 326 MS
EKG Q-T INTERVAL: 346 MS
EKG QRS DURATION: 82 MS
EKG QRS DURATION: 88 MS
EKG QRS DURATION: 88 MS
EKG QRS DURATION: 92 MS
EKG QTC CALCULATION (BAZETT): 423 MS
EKG QTC CALCULATION (BAZETT): 458 MS
EKG QTC CALCULATION (BAZETT): 470 MS
EKG QTC CALCULATION (BAZETT): 476 MS
EKG R AXIS: 109 DEGREES
EKG R AXIS: 44 DEGREES
EKG R AXIS: 45 DEGREES
EKG R AXIS: 47 DEGREES
EKG T AXIS: -33 DEGREES
EKG T AXIS: 118 DEGREES
EKG T AXIS: 123 DEGREES
EKG T AXIS: 135 DEGREES
EKG VENTRICULAR RATE: 114 BPM
EKG VENTRICULAR RATE: 118 BPM
EKG VENTRICULAR RATE: 119 BPM
EKG VENTRICULAR RATE: 133 BPM
EOSINOPHIL # BLD: 0 K/UL (ref 0–0.4)
EOSINOPHIL NFR BLD: 0 % (ref 0–7)
EPITH CASTS URNS QL MICRO: ABNORMAL /LPF
ERYTHROCYTE [DISTWIDTH] IN BLOOD BY AUTOMATED COUNT: 13.2 % (ref 11.5–14.5)
EST. AVERAGE GLUCOSE BLD GHB EST-MCNC: 180 MG/DL
FIBRINOGEN PPP-MCNC: 744 MG/DL (ref 200–475)
GAS FLOW.O2 O2 DELIVERY SYS: 3 L/MIN
GLUCOSE BLD STRIP.AUTO-MCNC: 222 MG/DL (ref 65–117)
GLUCOSE BLD STRIP.AUTO-MCNC: 236 MG/DL (ref 65–117)
GLUCOSE BLD STRIP.AUTO-MCNC: 247 MG/DL (ref 65–117)
GLUCOSE BLD STRIP.AUTO-MCNC: 261 MG/DL (ref 65–117)
GLUCOSE SERPL-MCNC: 245 MG/DL (ref 65–100)
GLUCOSE UR STRIP.AUTO-MCNC: 100 MG/DL
HBA1C MFR BLD: 7.9 % (ref 4–5.6)
HCO3 BLDA-SCNC: 21 MMOL/L (ref 22–26)
HCT VFR BLD AUTO: 34.6 % (ref 36.6–50.3)
HGB BLD-MCNC: 11.6 G/DL (ref 12.1–17)
HGB UR QL STRIP: NEGATIVE
HYALINE CASTS URNS QL MICRO: ABNORMAL /LPF (ref 0–2)
IMM GRANULOCYTES # BLD AUTO: 0 K/UL (ref 0–0.04)
IMM GRANULOCYTES NFR BLD AUTO: 1 % (ref 0–0.5)
INR PPP: 1.1 (ref 0.9–1.1)
KETONES UR QL STRIP.AUTO: ABNORMAL MG/DL
LEUKOCYTE ESTERASE UR QL STRIP.AUTO: NEGATIVE
LYMPHOCYTES # BLD: 1.2 K/UL (ref 0.8–3.5)
LYMPHOCYTES NFR BLD: 14 % (ref 12–49)
MAGNESIUM SERPL-MCNC: 2.2 MG/DL (ref 1.6–2.4)
MCH RBC QN AUTO: 31.5 PG (ref 26–34)
MCHC RBC AUTO-ENTMCNC: 33.5 G/DL (ref 30–36.5)
MCV RBC AUTO: 94 FL (ref 80–99)
MONOCYTES # BLD: 0.7 K/UL (ref 0–1)
MONOCYTES NFR BLD: 8 % (ref 5–13)
NEUTS SEG # BLD: 6.4 K/UL (ref 1.8–8)
NEUTS SEG NFR BLD: 77 % (ref 32–75)
NITRITE UR QL STRIP.AUTO: NEGATIVE
NRBC # BLD: 0 K/UL (ref 0–0.01)
NRBC BLD-RTO: 0 PER 100 WBC
NT PRO BNP: 8084 PG/ML
PCO2 BLDA: 31 MMHG (ref 35–45)
PH BLDA: 7.46 (ref 7.35–7.45)
PH UR STRIP: 5 (ref 5–8)
PLATELET # BLD AUTO: 151 K/UL (ref 150–400)
PMV BLD AUTO: 10.5 FL (ref 8.9–12.9)
PO2 BLDA: 75 MMHG (ref 80–100)
POTASSIUM SERPL-SCNC: 4.4 MMOL/L (ref 3.5–5.1)
PROT UR STRIP-MCNC: 30 MG/DL
PROTHROMBIN TIME: 11.1 SEC (ref 9–11.1)
RBC # BLD AUTO: 3.68 M/UL (ref 4.1–5.7)
RBC #/AREA URNS HPF: ABNORMAL /HPF (ref 0–5)
SAO2 % BLD: 96 % (ref 92–97)
SAO2% DEVICE SAO2% SENSOR NAME: ABNORMAL
SERVICE CMNT-IMP: ABNORMAL
SODIUM SERPL-SCNC: 135 MMOL/L (ref 136–145)
SP GR UR REFRACTOMETRY: 1.03
SPECIMEN SITE: ABNORMAL
T4 FREE SERPL-MCNC: 1.1 NG/DL (ref 0.8–1.5)
THERAPEUTIC RANGE: ABNORMAL SECS (ref 58–77)
TSH SERPL DL<=0.05 MIU/L-ACNC: 1.64 UIU/ML (ref 0.36–3.74)
UFH PPP CHRO-ACNC: 0.17 IU/ML
UFH PPP CHRO-ACNC: 0.24 IU/ML
UFH PPP CHRO-ACNC: 0.28 IU/ML
UFH PPP CHRO-ACNC: 0.33 IU/ML
URINE CULTURE IF INDICATED: ABNORMAL
UROBILINOGEN UR QL STRIP.AUTO: 0.2 EU/DL (ref 0.2–1)
WBC # BLD AUTO: 8.4 K/UL (ref 4.1–11.1)
WBC URNS QL MICRO: ABNORMAL /HPF (ref 0–4)

## 2024-12-23 PROCEDURE — 82962 GLUCOSE BLOOD TEST: CPT

## 2024-12-23 PROCEDURE — 6360000002 HC RX W HCPCS: Performed by: STUDENT IN AN ORGANIZED HEALTH CARE EDUCATION/TRAINING PROGRAM

## 2024-12-23 PROCEDURE — 6370000000 HC RX 637 (ALT 250 FOR IP): Performed by: STUDENT IN AN ORGANIZED HEALTH CARE EDUCATION/TRAINING PROGRAM

## 2024-12-23 PROCEDURE — 83880 ASSAY OF NATRIURETIC PEPTIDE: CPT

## 2024-12-23 PROCEDURE — 36600 WITHDRAWAL OF ARTERIAL BLOOD: CPT

## 2024-12-23 PROCEDURE — 85730 THROMBOPLASTIN TIME PARTIAL: CPT

## 2024-12-23 PROCEDURE — 85025 COMPLETE CBC W/AUTO DIFF WBC: CPT

## 2024-12-23 PROCEDURE — 85384 FIBRINOGEN ACTIVITY: CPT

## 2024-12-23 PROCEDURE — 6370000000 HC RX 637 (ALT 250 FOR IP): Performed by: PHYSICIAN ASSISTANT

## 2024-12-23 PROCEDURE — 83036 HEMOGLOBIN GLYCOSYLATED A1C: CPT

## 2024-12-23 PROCEDURE — 51798 US URINE CAPACITY MEASURE: CPT

## 2024-12-23 PROCEDURE — 82803 BLOOD GASES ANY COMBINATION: CPT

## 2024-12-23 PROCEDURE — 36415 COLL VENOUS BLD VENIPUNCTURE: CPT

## 2024-12-23 PROCEDURE — 93306 TTE W/DOPPLER COMPLETE: CPT

## 2024-12-23 PROCEDURE — 83735 ASSAY OF MAGNESIUM: CPT

## 2024-12-23 PROCEDURE — 84443 ASSAY THYROID STIM HORMONE: CPT

## 2024-12-23 PROCEDURE — 80048 BASIC METABOLIC PNL TOTAL CA: CPT

## 2024-12-23 PROCEDURE — 2060000000 HC ICU INTERMEDIATE R&B

## 2024-12-23 PROCEDURE — 2500000003 HC RX 250 WO HCPCS: Performed by: STUDENT IN AN ORGANIZED HEALTH CARE EDUCATION/TRAINING PROGRAM

## 2024-12-23 PROCEDURE — 84439 ASSAY OF FREE THYROXINE: CPT

## 2024-12-23 PROCEDURE — 2700000000 HC OXYGEN THERAPY PER DAY

## 2024-12-23 PROCEDURE — 85520 HEPARIN ASSAY: CPT

## 2024-12-23 PROCEDURE — 81001 URINALYSIS AUTO W/SCOPE: CPT

## 2024-12-23 PROCEDURE — 85610 PROTHROMBIN TIME: CPT

## 2024-12-23 RX ORDER — METOPROLOL SUCCINATE 25 MG/1
12.5 TABLET, EXTENDED RELEASE ORAL DAILY
Status: DISCONTINUED | OUTPATIENT
Start: 2024-12-23 | End: 2024-12-25

## 2024-12-23 RX ORDER — NITROGLYCERIN 20 MG/100ML
5-200 INJECTION INTRAVENOUS CONTINUOUS
Status: DISCONTINUED | OUTPATIENT
Start: 2024-12-23 | End: 2024-12-27

## 2024-12-23 RX ORDER — AMIODARONE HYDROCHLORIDE 200 MG/1
400 TABLET ORAL 2 TIMES DAILY
Status: DISCONTINUED | OUTPATIENT
Start: 2024-12-23 | End: 2024-12-25

## 2024-12-23 RX ADMIN — INSULIN LISPRO 2 UNITS: 100 INJECTION, SOLUTION INTRAVENOUS; SUBCUTANEOUS at 11:45

## 2024-12-23 RX ADMIN — AMIODARONE HYDROCHLORIDE 400 MG: 200 TABLET ORAL at 14:27

## 2024-12-23 RX ADMIN — INSULIN LISPRO 2 UNITS: 100 INJECTION, SOLUTION INTRAVENOUS; SUBCUTANEOUS at 08:25

## 2024-12-23 RX ADMIN — PANTOPRAZOLE SODIUM 40 MG: 40 TABLET, DELAYED RELEASE ORAL at 08:25

## 2024-12-23 RX ADMIN — SODIUM CHLORIDE, PRESERVATIVE FREE 10 ML: 5 INJECTION INTRAVENOUS at 08:26

## 2024-12-23 RX ADMIN — NITROGLYCERIN 1 INCH: 20 OINTMENT TOPICAL at 11:45

## 2024-12-23 RX ADMIN — NITROGLYCERIN 5 MCG/MIN: 20 INJECTION INTRAVENOUS at 14:22

## 2024-12-23 RX ADMIN — INSULIN GLARGINE 20 UNITS: 100 INJECTION, SOLUTION SUBCUTANEOUS at 20:55

## 2024-12-23 RX ADMIN — ZOLPIDEM TARTRATE 5 MG: 5 TABLET ORAL at 20:50

## 2024-12-23 RX ADMIN — SODIUM CHLORIDE, PRESERVATIVE FREE 10 ML: 5 INJECTION INTRAVENOUS at 20:52

## 2024-12-23 RX ADMIN — HEPARIN SODIUM 2000 UNITS: 1000 INJECTION INTRAVENOUS; SUBCUTANEOUS at 22:16

## 2024-12-23 RX ADMIN — AMIODARONE HYDROCHLORIDE 400 MG: 200 TABLET ORAL at 20:49

## 2024-12-23 RX ADMIN — ROSUVASTATIN CALCIUM 20 MG: 10 TABLET, FILM COATED ORAL at 20:49

## 2024-12-23 RX ADMIN — INSULIN LISPRO 2 UNITS: 100 INJECTION, SOLUTION INTRAVENOUS; SUBCUTANEOUS at 17:33

## 2024-12-23 RX ADMIN — ASPIRIN 81 MG: 81 TABLET, COATED ORAL at 08:25

## 2024-12-23 RX ADMIN — HEPARIN SODIUM 2000 UNITS: 1000 INJECTION INTRAVENOUS; SUBCUTANEOUS at 14:28

## 2024-12-23 RX ADMIN — INSULIN LISPRO 4 UNITS: 100 INJECTION, SOLUTION INTRAVENOUS; SUBCUTANEOUS at 20:54

## 2024-12-23 RX ADMIN — METOPROLOL SUCCINATE 12.5 MG: 25 TABLET, FILM COATED, EXTENDED RELEASE ORAL at 13:59

## 2024-12-23 RX ADMIN — HEPARIN SODIUM 2000 UNITS: 1000 INJECTION INTRAVENOUS; SUBCUTANEOUS at 00:30

## 2024-12-23 RX ADMIN — NITROGLYCERIN 1 INCH: 20 OINTMENT TOPICAL at 06:50

## 2024-12-23 ASSESSMENT — PAIN SCALES - GENERAL
PAINLEVEL_OUTOF10: 0
PAINLEVEL_OUTOF10: 0

## 2024-12-23 NOTE — PROGRESS NOTES
Admission report received from ED RN    Shift assessment complete, see flow sheet     2102: Spoke with Neena Palacios, Dilt gtt infusing at 5 mg/hr and Heparin gtt infusing at 11units/kg/hr. Rates verified with pharmacist. Anti xa to be drawn at 12 2125: Labs drawn and sent    2206: Critical trop, NP paged and informed     0000: Patient has not voided, bladder scan shows 125mls    0032: Xa results discussed with Scarlet Palacios, rate increased to 13 units with a heparin bolus per MAR     0640: Patient has not voided, bladder scan shows 248mls    0645: Patient able to void 50 mls    Shift report given to RN

## 2024-12-23 NOTE — ED PROVIDER NOTES
[facility] EMERGENCY DEPT  EMERGENCY DEPARTMENT ENCOUNTER       Pt Name: Cirilo Hackett  MRN: [id number]  Birthdate 1945  Date of evaluation: 12/22/2024  Provider: Yamel Nur MD   PCP: Nirav Hylton MD  Note Started: 7:39 PM EST 12/22/24     CHIEF COMPLAINT       Chief Complaint   Patient presents with    Chest Pain     Pt referred form [facility]. Thru life flight as case of chest pain and nausea and vomiting         HISTORY OF PRESENT ILLNESS: 1 or more elements      History From: patient, History limited by: none     Cirilo Hackett is a 79 y.o. male who presents as a transfer from [facility] for NSTEMI       Please See MDM for Additional Details of the HPI/PMH  Nursing Notes were all reviewed and agreed with or any disagreements were addressed in the HPI.     REVIEW OF SYSTEMS        Positives and Pertinent negatives as per HPI.    PAST HISTORY     Past Medical History:  Past Medical History:   Diagnosis Date    Abnormal nuclear cardiac imaging test 4/12/2018    CAD (coronary artery disease)     Dyslipidemia     Elevated transaminase level 09/2016    GERD (gastroesophageal reflux disease)     HTN (hypertension)     Psoriasis     S/P cardiac cath 10/12/2018    10/11/18 PTCA D1, neg FFR to LCX and RCA     S/P coronary artery stent placement 4/12/2018    T2DM (type 2 diabetes mellitus) ([facility]) 09/2016    A1c 6.4;  Glucose 195       Past Surgical History:  Past Surgical History:   Procedure Laterality Date    CARDIAC PROCEDURE N/A 12/20/2024    Left heart cath / coronary angiography performed by Lalita Henson MD at [facility] CARDIAC CATH LAB    HEENT      bilateral cataract extraction    HEENT      tonsillectomy    INVASIVE VASCULAR N/A 12/20/2024    Ultrasound guided vascular access performed by Lalita Henson MD at [facility] CARDIAC CATH LAB    ORTHOPEDIC SURGERY      right shoulder acromioplasty    ORTHOPEDIC SURGERY      left achilles tendon surgery    PTCA  04/12/2018    DEMI mid LAD ([employer]  1.8 - 8.0 K/UL    Lymphocytes Absolute 0.8 0.8 - 3.5 K/UL    Monocytes Absolute 0.7 0.0 - 1.0 K/UL    Eosinophils Absolute 0.0 0.0 - 0.4 K/UL    Basophils Absolute 0.0 0.0 - 0.1 K/UL    Immature Granulocytes Absolute 0.0 0.00 - 0.04 K/UL    Differential Type AUTOMATED     Comprehensive Metabolic Panel    Collection Time: 12/22/24  5:41 PM   Result Value Ref Range    Sodium 138 136 - 145 mmol/L    Potassium 4.7 3.5 - 5.1 mmol/L    Chloride 100 97 - 108 mmol/L    CO2 20 (L) 21 - 32 mmol/L    Anion Gap 18 (H) 2 - 12 mmol/L    Glucose 317 (H) 65 - 100 mg/dL    BUN 19 6 - 20 MG/DL    Creatinine 1.51 (H) 0.70 - 1.30 MG/DL    BUN/Creatinine Ratio 13 12 - 20      Est, Glom Filt Rate 47 (L) >60 ml/min/1.73m2    Calcium 9.0 8.5 - 10.1 MG/DL    Total Bilirubin 0.7 0.2 - 1.0 MG/DL    ALT 45 12 - 78 U/L    AST 74 (H) 15 - 37 U/L    Alk Phosphatase 130 (H) 45 - 117 U/L    Total Protein 6.8 6.4 - 8.2 g/dL    Albumin 3.4 (L) 3.5 - 5.0 g/dL    Globulin 3.4 2.0 - 4.0 g/dL    Albumin/Globulin Ratio 1.0 (L) 1.1 - 2.2     Troponin    Collection Time: 12/22/24  5:41 PM   Result Value Ref Range    Troponin, High Sensitivity 10,640 (HH) 0 - 76 ng/L   Brain Natriuretic Peptide    Collection Time: 12/22/24  5:41 PM   Result Value Ref Range    NT Pro-BNP 12,273 (H) 0 - 450 PG/ML   Lactic Acid    Collection Time: 12/22/24  5:41 PM   Result Value Ref Range    Lactic Acid, Plasma 4.1 (HH) 0.4 - 2.0 MMOL/L   APTT    Collection Time: 12/22/24  5:41 PM   Result Value Ref Range    APTT 27.5 22.1 - 31.0 sec    Therapeutic Range   58.0 - 77.0 SECS   Protime-INR    Collection Time: 12/22/24  5:41 PM   Result Value Ref Range    INR 1.1 0.9 - 1.1      Protime 10.7 9.0 - 11.1 sec   Anti-Xa, Unfractionated Heparin    Collection Time: 12/22/24  5:41 PM   Result Value Ref Range    Heparin Xa,LMWH and Unfrac <0.10 IU/mL   Magnesium    Collection Time: 12/22/24  5:45 PM   Result Value Ref Range    Magnesium 1.6 1.6 - 2.4 mg/dL   EKG 12 Lead (Chest Pain)

## 2024-12-23 NOTE — CARDIO/PULMONARY
Chart reviewed: Patient is 79 y.o. male admitted with Acute coronary syndrome ([facility]) [I24.9]  NSTEMI (non-ST elevated myocardial infarction) ([facility]) [I21.4]  Atrial fibrillation with rapid ventricular response ([facility]) [I48.91]    S/P [facility] 12/20/24. CTS consulted. Echo pending.    Cardiac Rehab will continue to follow.     PRITESH VELOZ RN

## 2024-12-23 NOTE — CONSULTS
[facility]  [address] [place] [address]  [phone]  [email or web address]       CARDIOLOGY CONSULTATION       Date of  Admission: 12/22/2024  7:16 PM     Admission type:Emergency   Primary Care Physician:Nirav Hylton MD     Attending Provider: Mendel, David L, MD  Cardiology Provider: Buddy [facility]  CC/REASON FOR CONSULT: NSTEMI     Subjective:     Cirilo Hackett is a 79 y.o. male admitted for Acute coronary syndrome ([facility]) [I24.9]  NSTEMI (non-ST elevated myocardial infarction) ([facility]) [I21.4]  Atrial fibrillation with rapid ventricular response ([facility]) [I48.91].    Patient known to me from his recent admission for acute myocardial infarction where he left the hospital AGAINST MEDICAL ADVICE. The patient reports marked shortness of breath that led to this presentation. Denies any chest pain.     Patient Active Problem List    Diagnosis Date Noted    Bilateral carotid artery stenosis 12/20/2024    Preop cardiovascular exam 12/20/2024    NSTEMI (non-ST elevated myocardial infarction) ([facility]) 12/19/2024    Psoriatic arthritis ([facility]) 05/26/2022    Type 2 diabetes mellitus with chronic kidney disease ([facility]) 05/26/2022    Chronic renal disease, stage III ([facility]) 05/26/2022    Cellulitis 07/08/2019    Cellulitis and abscess of hand 07/06/2019    Lactic acidosis 07/06/2019    S/P cardiac cath 10/12/2018    Coronary artery disease involving native coronary artery of native heart with angina pectoris with documented spasm ([facility]) 04/26/2018    Abnormal nuclear cardiac imaging test 04/12/2018    S/P coronary artery stent placement 04/12/2018    Angina, class III ([facility]) 04/12/2018    Mixed hyperlipidemia 06/26/2017    Type 2 diabetes mellitus without complication, without long-term current use of insulin ([facility]) 06/23/2017    Essential hypertension 12/02/2016    Elevated transaminase level 11/30/2016    Spinal stenosis of cervical region 10/06/2016    Psoriasis 07/10/2015

## 2024-12-23 NOTE — PROGRESS NOTES
labs: cbc bmp  I personally reviewed imaging:   Toxic drug monitoring: monitor cbc for anemia from heparin toxicity  Discussed case with: RN CM     Code Status: Full  DVT Prophylaxis: Heparin  GI Prophylaxis: Protonix  Baseline: Independent    Subjective:     Chief Complaint / Reason for Physician Visit  \"Followup NSTEMI\".  Discussed with RN events overnight. Feels ok today.      Objective:     VITALS:   Last 24hrs VS reviewed since prior progress note. Most recent are:  Patient Vitals for the past 24 hrs:   BP Temp Temp src Pulse Resp SpO2 Height Weight   12/23/24 1115 125/84 98.5 °F (36.9 °C) Oral 100 16 96 % -- --   12/23/24 1110 116/84 -- -- 100 19 96 % -- --   12/23/24 1004 116/75 -- -- 98 23 -- 1.88 m (6' 2\") 90.3 kg (199 lb)   12/23/24 0733 112/80 98.9 °F (37.2 °C) Oral 94 16 93 % -- --   12/23/24 0715 110/69 -- -- 91 11 93 % -- --   12/23/24 0700 114/68 -- -- 93 14 94 % -- --   12/23/24 0645 127/83 -- -- 98 18 97 % -- --   12/23/24 0630 108/69 -- -- 92 13 96 % -- --   12/23/24 0615 111/72 -- -- 92 14 96 % -- --   12/23/24 0600 106/69 -- -- 93 14 96 % -- --   12/23/24 0545 107/75 -- -- 89 15 97 % -- --   12/23/24 0530 117/81 -- -- 94 16 95 % -- --   12/23/24 0515 106/82 -- -- 90 15 96 % -- --   12/23/24 0500 105/74 -- -- 93 20 95 % -- --   12/23/24 0445 116/79 -- -- 91 10 98 % -- --   12/23/24 0430 101/77 -- -- 94 18 97 % -- --   12/23/24 0415 111/79 -- -- 90 16 95 % -- --   12/23/24 0400 114/72 -- -- 94 20 98 % -- --   12/23/24 0345 106/69 -- -- 92 18 97 % -- --   12/23/24 0330 105/77 -- -- 90 10 96 % -- --   12/23/24 0315 100/72 -- -- 87 18 94 % -- --   12/23/24 0300 101/72 98.6 °F (37 °C) Oral 94 14 94 % -- --   12/23/24 0245 109/77 -- -- 94 17 96 % -- --   12/23/24 0230 98/69 -- -- 92 16 95 % -- --   12/23/24 0215 105/69 -- -- 95 19 97 % -- --   12/23/24 0200 110/75 -- -- 98 16 97 % -- --   12/23/24 0145 104/66 -- -- 89 21 97 % -- --   12/23/24 0130 106/76 -- -- 98 15 97 % -- --   12/23/24 0115 100/78 --  -- 86 21 97 % -- --   12/23/24 0100 101/71 -- -- 90 19 95 % -- --   12/23/24 0045 105/66 -- -- 91 18 95 % -- --   12/23/24 0030 101/69 -- -- 89 21 96 % -- --   12/23/24 0015 99/74 -- -- 97 14 98 % -- --   12/23/24 0000 93/67 -- -- 94 14 97 % -- --   12/22/24 2345 99/67 -- -- 94 17 96 % -- --   12/22/24 2330 97/69 -- -- 93 19 94 % -- --   12/22/24 2315 103/60 -- -- 99 19 -- -- --   12/22/24 2303 101/62 -- Oral 91 21 96 % -- --   12/22/24 2245 92/62 -- -- 93 21 94 % -- --   12/22/24 2230 91/65 -- -- 95 20 95 % -- --   12/22/24 2215 100/64 -- -- (!) 103 20 95 % -- --   12/22/24 2202 101/65 -- -- (!) 107 20 97 % -- --   12/22/24 2147 104/82 -- -- (!) 106 19 -- -- --   12/22/24 2104 101/69 -- Oral (!) 115 25 -- -- --   12/22/24 1917 (!) 188/72 99.3 °F (37.4 °C) -- (!) 121 20 92 % -- --         Intake/Output Summary (Last 24 hours) at 12/23/2024 1351  Last data filed at 12/23/2024 1145  Gross per 24 hour   Intake 240 ml   Output 150 ml   Net 90 ml        I had a face to face encounter and independently examined this patient on 12/23/2024, as outlined below:  PHYSICAL EXAM:  General: Alert, cooperative  EENT:  EOMI. Anicteric sclerae.  Resp:  CTA bilaterally, no wheezing or rales.  No accessory muscle use  CV:  Regular  rate,  No edema  GI:  Soft, Non distended, Non tender.    Neurologic:  Alert and oriented X 3, normal speech,   Skin:  No rashes.  No jaundice    Reviewed most current lab test results and cultures  YES  Reviewed most current radiology test results   YES  Review and summation of old records today    NO  Reviewed patient's current orders and MAR    YES  PMH/SH reviewed - no change compared to H&P    Procedures: see electronic medical records for all procedures/Xrays and details which were not copied into this note but were reviewed prior to creation of Plan.      LABS:  I reviewed today's most current labs and imaging studies.  Pertinent labs include:  Recent Labs     12/22/24  1741 12/22/24  1920

## 2024-12-23 NOTE — TELEPHONE ENCOUNTER
Care Transitions Initial Follow Up Call    Outreach made within 2 business days of discharge: Yes    Patient: Cirilo Hackett Patient : 1945   MRN: [id number]  Reason for Admission: NSTEMI0  Discharge Date: 24       Spoke with: patient who states he is currently back in the hospital    Discharge department/facility: [facility]    TCM Interactive Patient Contact:  Was patient able to fill all prescriptions: Yes  Was patient instructed to bring all medications to the follow-up visit: Yes  Is patient taking all medications as directed in the discharge summary? Yes  Does patient understand their discharge instructions: Yes  Does patient have questions or concerns that need addressed prior to 7-14 day follow up office visit: no    Additional needs identified to be addressed with provider  No needs identified             Scheduled appointment with PCP within 7-14 days    Follow Up  Future Appointments   Date Time Provider Department Center   2024  1:20 PM Nirav Hylton MD [facility] MAIN [facility]   2025  2:00 PM [facility] 2 [facility] [place] [place]

## 2024-12-23 NOTE — H&P
Hospitalist Admission Note    NAME:  Cirilo Hackett   :  1945   MRN:  [id number]     Date/Time:  2024 7:45 PM    Patient PCP: Nirav Hylton MD    ______________________________________________________________________  Given the patient's current clinical presentation, I have a high level of concern for decompensation if discharged from the emergency department.  Complex decision making was performed, which includes reviewing the patient's available past medical records, laboratory results, and x-ray films.       My assessment of this patient's clinical condition and my plan of care is as follows.    Assessment / Plan:    Active Problems:  NSTEMI  Multivessel CAD with history of DEMI- CABG planning underway  Atrial fibrillation with rapid ventricular response  Essential hypertension  Hyperlipidemia  GEM  Elevated temperature  Insulin-dependent diabetes mellitus  Herpes labialis  GERD  Insomnia    Plan:  NSTEMI  Multivessel CAD with history of DEMI- CABG planning underway  Atrial fibrillation with rapid ventricular response  Essential hypertension  Hyperlipidemia  Admit to stepdown unit  Continue diltiazem infusion  Monitor electrolytes  -IV magnesium ordered, maintain magnesium greater than 2.0 and potassium greater than 4.0  Obtain TTE  Cardiothoracic surgery consulted, greatly appreciate their expertise  Cardiology consulted, greatly appreciate their expertise  Continue PTA aspirin  Continue ACS heparin infusion  Trial Nitropaste for combined antianginal and antihypertensive effect  Increase PTA rosuvastatin to 20 mg nightly (high intensity dose)  -LDL 84 on   Will hold off on beta-blocker initiation while receiving diltiazem  Defer ACE I/ARB initiation to CTS  Avoid P2 Y12 inhibitor given planned CABG    GEM  Trend renal function  Renally dose medications and avoid nephrotoxic agents    Elevated temperature  Noted to have elevated temperature without fever with neutrophilic shift without     Collection Time: 12/22/24  5:41 PM   Result Value Ref Range    Troponin, High Sensitivity 10,640 (HH) 0 - 76 ng/L   Brain Natriuretic Peptide    Collection Time: 12/22/24  5:41 PM   Result Value Ref Range    NT Pro-BNP 12,273 (H) 0 - 450 PG/ML   Lactic Acid    Collection Time: 12/22/24  5:41 PM   Result Value Ref Range    Lactic Acid, Plasma 4.1 (HH) 0.4 - 2.0 MMOL/L   APTT    Collection Time: 12/22/24  5:41 PM   Result Value Ref Range    APTT 27.5 22.1 - 31.0 sec    Therapeutic Range   58.0 - 77.0 SECS   Protime-INR    Collection Time: 12/22/24  5:41 PM   Result Value Ref Range    INR 1.1 0.9 - 1.1      Protime 10.7 9.0 - 11.1 sec   Anti-Xa, Unfractionated Heparin    Collection Time: 12/22/24  5:41 PM   Result Value Ref Range    Heparin Xa,LMWH and Unfrac <0.10 IU/mL   Magnesium    Collection Time: 12/22/24  5:45 PM   Result Value Ref Range    Magnesium 1.6 1.6 - 2.4 mg/dL   EKG 12 Lead (Chest Pain)    Collection Time: 12/22/24  5:58 PM   Result Value Ref Range    Ventricular Rate 125 BPM    Atrial Rate 125 BPM    QRS Duration 90 ms    Q-T Interval 306 ms    QTc Calculation (Bazett) 441 ms    R Axis 51 degrees    T Axis 111 degrees    Diagnosis       Undetermined rhythm  Low voltage QRS  Cannot rule out Anteroseptal infarct , age undetermined  Abnormal ECG  When compared with ECG of 22-DEC-2024 18:00,  MANUAL COMPARISON REQUIRED, DATA IS UNCONFIRMED     CBC    Collection Time: 12/22/24  7:20 PM   Result Value Ref Range    WBC 12.3 (H) 4.1 - 11.1 K/uL    RBC 3.98 (L) 4.10 - 5.70 M/uL    Hemoglobin 12.6 12.1 - 17.0 g/dL    Hematocrit 38.0 36.6 - 50.3 %    MCV 95.5 80.0 - 99.0 FL    MCH 31.7 26.0 - 34.0 PG    MCHC 33.2 30.0 - 36.5 g/dL    RDW 13.0 11.5 - 14.5 %    Platelets 173 150 - 400 K/uL    MPV 10.5 8.9 - 12.9 FL    Nucleated RBCs 0.0 0  WBC    nRBC 0.00 0.00 - 0.01 K/uL         CT Result (most recent):  CTA CHEST W WO CONTRAST 12/19/2024    Narrative  EXAM:  CTA CHEST W WO CONTRAST    INDICATION: Rule

## 2024-12-23 NOTE — CONSULTS
Cardiac Surgery   Consult    Subjective:      Cirilo Hackett is a 79 y.o. male who was referred for cardiac evaluation from Dr Henson. The patient was a transfer from [facility] after presenting with complaints of recurrent chest pain in setting of recent hospitalization where patient underwent LHC found to have multivessel CAD and was being evaluated for CABG when he elected to leave AMA due to lack of communication regarding his treatment options.   The patient went home and experienced acute onset chest pain and shortness of breath starting with associated orthopnea.  EKG performed at [facility] was concerning for STEMI similar to prior ECGs.  Pt was transferred to [facility] as a STEMI alert via helicopter after receiving heparin, diltiazem infusion, and full-strength aspirin.  Cardiac surgery is being consulted for evaluation for possible CABG surgery.      Cardiac Testing    Cardiac catheterization:  Left Main   The vessel is angiographically normal.      Left Anterior Descending   Previously placed stent in the mid vessel has two areas of high grade stenosis 70% culprit for acute myocardial infarction. One small caliber diagonal branch has ostial 70% stenosis. Distal vessel is a small caliber vessel.      Left Circumflex   Proximal 30% stenosis. Previously placed stent is widely patent      Right Coronary Artery   Mid segment of the vessel is 70-80% stenosis. One of the LYRIC branches has thrombotic 95% stenosis. Proximal posterior descending artery has 60% stenosis        ECHO:  Left Ventricle Severely reduced left ventricular systolic function with a visually estimated EF of 25 - 30%. Left ventricle size is normal. Mildly increased wall thickness. See diagram for wall motion findings. Indeterminate diastolic function.   Left Atrium Not well visualized. Left atrium is mildly dilated.   Right Ventricle Right ventricle size is normal. Normal systolic function.   Right Atrium Right atrium size is normal.  0400. Must finish within 10 min. NPO thereafter.   Please give pt ordered 1g tylenol and 300mg gabapentin morning of surgery (preop holding)   2. Atrial fibrillation with varied ventricular response. PO amiodarone started.  3. HTN- no meds preop, started BB  4. HLD- on rosuvastatin  5. GEM- Cr 1.4, peaked at 1.5  6. IDDM- Hgb 7.4 12/20  7. GERD- on protonix    Time spent: 75 minutes      Signed By: Migel Capellan PA-C     December 23, 2024

## 2024-12-23 NOTE — CARE COORDINATION
Care Management Initial Assessment       RUR: 17% \"medium risk\"  Readmission? Yes, 12/19/24-12/20/24   1st IM letter given? Yes, given by Patient Access Team on 12/22/24  1st  letter given: N/A    Initial note - 1642 PM: Chart reviewed. CM met with pt at the bedside to introduce self and role. Verified contact information and demographics. Pt resides with pt spouse in a one level home with  DANIELLE. Pt PCP is Dr. Hylton with the last visit being within the last three months. Preferred pharmacy is [facility] pharmacy. Pt has no hx of HH services or a SNF stay. Pt is independent with ADL's and has no DME needs. Pt is an active . Pt has no ACP on file; pt is a FULL code. Pt family to transport pt home upon time of d/c. Full assessment below:     12/23/24 1642   Service Assessment   Patient Orientation Alert and Oriented;Person;Situation;Place;Self   Cognition Alert   History Provided By Patient   Primary Caregiver Self   Support Systems Spouse/Significant Other   Patient's Healthcare Decision Maker is: Legal Next of Kin   PCP Verified by CM Yes  (Dr. Hylton)   Last Visit to PCP Within last 3 months   Prior Functional Level Independent in ADLs/IADLs   Current Functional Level Independent in ADLs/IADLs   Can patient return to prior living arrangement Yes   Ability to make needs known: Good   Family able to assist with home care needs: Yes   Would you like for me to discuss the discharge plan with any other family members/significant others, and if so, who? Yes  (Karen Hackett (spouse))   Financial Resources Medicare  ([place], [place]   Community Resources None   Social/Functional History   Lives With Spouse   Type of Home House   Home Layout One level   Home Access Stairs to enter with rails   Entrance Stairs - Number of Steps 3 DANIELLE   Home Equipment Cane   Receives Help From Family   Prior Level of Assist for ADLs Independent   Prior Level of Assist for Homemaking Independent   Ambulation  Assistance Independent   Prior Level of Assist for Transfers Independent   Active  Yes   Mode of Transportation Car   Occupation Retired   Discharge Planning   Type of Residence House   Living Arrangements Spouse/Significant Other   Current Services Prior To Admission None   Potential Assistance Needed Home Care   DME Ordered? No   Potential Assistance Purchasing Medications No   Type of Home Care Services None   Patient expects to be discharged to: House        12/23/24 9483   Readmission Assessment   Number of Days since last admission? 1-7 days  (12/19/24-12/20/24)   Previous Disposition Home with Family   Who is being Interviewed Patient   What was the patient's/caregiver's perception as to why they think they needed to return back to the hospital? AMA discharge on prior admission   Did you visit your Primary Care Physician after you left the hospital, before you returned this time? No   Why weren't you able to visit your PCP? Other (Comment)  (Pt left AMA)   Did you see a specialist, such as Cardiac, Pulmonary, Orthopedic Physician, etc. after you left the hospital? No   Who advised the patient to return to the hospital? Physician   Does the patient report anything that got in the way of taking their medications? No   In our efforts to provide the best possible care to you and others like you, can you think of anything that we could have done to help you after you left the hospital the first time, so that you might not have needed to return so soon? Other (Comment)  (Pt left AMA prior to admission)     Advance Care Planning     General Advance Care Planning (ACP) Conversation    Date of Conversation: 12/23/2024  Conducted with: Patient with Decision Making Capacity  Other persons present: None    Healthcare Decision Maker:   Primary Decision Maker: Karen Hackett - Spouse - [phone]    Secondary Decision Maker: Fausto Hackett - Child - [phone]     Today we documented Decision Maker(s)

## 2024-12-24 ENCOUNTER — APPOINTMENT (OUTPATIENT)
Facility: HOSPITAL | Age: 79
End: 2024-12-24
Payer: MEDICARE

## 2024-12-24 LAB
ANION GAP SERPL CALC-SCNC: 8 MMOL/L (ref 2–12)
BUN SERPL-MCNC: 31 MG/DL (ref 6–20)
BUN/CREAT SERPL: 21 (ref 12–20)
CALCIUM SERPL-MCNC: 8.3 MG/DL (ref 8.5–10.1)
CHLORIDE SERPL-SCNC: 103 MMOL/L (ref 97–108)
CO2 SERPL-SCNC: 23 MMOL/L (ref 21–32)
COMMENT:: NORMAL
CREAT SERPL-MCNC: 1.47 MG/DL (ref 0.7–1.3)
ERYTHROCYTE [DISTWIDTH] IN BLOOD BY AUTOMATED COUNT: 13.1 % (ref 11.5–14.5)
GLUCOSE BLD STRIP.AUTO-MCNC: 165 MG/DL (ref 65–117)
GLUCOSE BLD STRIP.AUTO-MCNC: 203 MG/DL (ref 65–117)
GLUCOSE BLD STRIP.AUTO-MCNC: 220 MG/DL (ref 65–117)
GLUCOSE BLD STRIP.AUTO-MCNC: 222 MG/DL (ref 65–117)
GLUCOSE SERPL-MCNC: 217 MG/DL (ref 65–100)
HCT VFR BLD AUTO: 34.6 % (ref 36.6–50.3)
HGB BLD-MCNC: 11.5 G/DL (ref 12.1–17)
MAGNESIUM SERPL-MCNC: 2.1 MG/DL (ref 1.6–2.4)
MCH RBC QN AUTO: 31.9 PG (ref 26–34)
MCHC RBC AUTO-ENTMCNC: 33.2 G/DL (ref 30–36.5)
MCV RBC AUTO: 95.8 FL (ref 80–99)
NRBC # BLD: 0 K/UL (ref 0–0.01)
NRBC BLD-RTO: 0 PER 100 WBC
PHOSPHATE SERPL-MCNC: 3.5 MG/DL (ref 2.6–4.7)
PLATELET # BLD AUTO: 164 K/UL (ref 150–400)
PMV BLD AUTO: 10.4 FL (ref 8.9–12.9)
POTASSIUM SERPL-SCNC: 4.3 MMOL/L (ref 3.5–5.1)
RBC # BLD AUTO: 3.61 M/UL (ref 4.1–5.7)
SERVICE CMNT-IMP: ABNORMAL
SODIUM SERPL-SCNC: 134 MMOL/L (ref 136–145)
SPECIMEN HOLD: NORMAL
UFH PPP CHRO-ACNC: 0.43 IU/ML
UFH PPP CHRO-ACNC: 0.5 IU/ML
WBC # BLD AUTO: 8.7 K/UL (ref 4.1–11.1)

## 2024-12-24 PROCEDURE — 6370000000 HC RX 637 (ALT 250 FOR IP): Performed by: PHYSICIAN ASSISTANT

## 2024-12-24 PROCEDURE — 82962 GLUCOSE BLOOD TEST: CPT

## 2024-12-24 PROCEDURE — 84100 ASSAY OF PHOSPHORUS: CPT

## 2024-12-24 PROCEDURE — 2500000003 HC RX 250 WO HCPCS: Performed by: STUDENT IN AN ORGANIZED HEALTH CARE EDUCATION/TRAINING PROGRAM

## 2024-12-24 PROCEDURE — 2060000000 HC ICU INTERMEDIATE R&B

## 2024-12-24 PROCEDURE — 6370000000 HC RX 637 (ALT 250 FOR IP): Performed by: STUDENT IN AN ORGANIZED HEALTH CARE EDUCATION/TRAINING PROGRAM

## 2024-12-24 PROCEDURE — 85027 COMPLETE CBC AUTOMATED: CPT

## 2024-12-24 PROCEDURE — 6360000002 HC RX W HCPCS: Performed by: STUDENT IN AN ORGANIZED HEALTH CARE EDUCATION/TRAINING PROGRAM

## 2024-12-24 PROCEDURE — 71046 X-RAY EXAM CHEST 2 VIEWS: CPT

## 2024-12-24 PROCEDURE — 2700000000 HC OXYGEN THERAPY PER DAY

## 2024-12-24 PROCEDURE — 36415 COLL VENOUS BLD VENIPUNCTURE: CPT

## 2024-12-24 PROCEDURE — 80048 BASIC METABOLIC PNL TOTAL CA: CPT

## 2024-12-24 PROCEDURE — 85520 HEPARIN ASSAY: CPT

## 2024-12-24 PROCEDURE — 83735 ASSAY OF MAGNESIUM: CPT

## 2024-12-24 PROCEDURE — 51798 US URINE CAPACITY MEASURE: CPT

## 2024-12-24 RX ORDER — BUMETANIDE 1 MG/1
0.5 TABLET ORAL 2 TIMES DAILY
Status: DISCONTINUED | OUTPATIENT
Start: 2024-12-24 | End: 2024-12-27 | Stop reason: HOSPADM

## 2024-12-24 RX ORDER — CLOPIDOGREL 300 MG/1
600 TABLET, FILM COATED ORAL ONCE
Status: COMPLETED | OUTPATIENT
Start: 2024-12-24 | End: 2024-12-24

## 2024-12-24 RX ORDER — CLOPIDOGREL BISULFATE 75 MG/1
75 TABLET ORAL DAILY
Status: DISCONTINUED | OUTPATIENT
Start: 2024-12-25 | End: 2024-12-27 | Stop reason: HOSPADM

## 2024-12-24 RX ADMIN — ASPIRIN 81 MG: 81 TABLET, COATED ORAL at 09:10

## 2024-12-24 RX ADMIN — INSULIN LISPRO 2 UNITS: 100 INJECTION, SOLUTION INTRAVENOUS; SUBCUTANEOUS at 16:50

## 2024-12-24 RX ADMIN — PANTOPRAZOLE SODIUM 40 MG: 40 TABLET, DELAYED RELEASE ORAL at 09:10

## 2024-12-24 RX ADMIN — SODIUM CHLORIDE, PRESERVATIVE FREE 10 ML: 5 INJECTION INTRAVENOUS at 21:07

## 2024-12-24 RX ADMIN — Medication 3 MG: at 21:07

## 2024-12-24 RX ADMIN — METOPROLOL SUCCINATE 12.5 MG: 25 TABLET, FILM COATED, EXTENDED RELEASE ORAL at 09:10

## 2024-12-24 RX ADMIN — ACYCLOVIR 400 MG: 800 TABLET ORAL at 21:06

## 2024-12-24 RX ADMIN — BUMETANIDE 0.5 MG: 1 TABLET ORAL at 13:28

## 2024-12-24 RX ADMIN — ROSUVASTATIN CALCIUM 20 MG: 10 TABLET, FILM COATED ORAL at 21:06

## 2024-12-24 RX ADMIN — AMIODARONE HYDROCHLORIDE 400 MG: 200 TABLET ORAL at 09:10

## 2024-12-24 RX ADMIN — AMIODARONE HYDROCHLORIDE 400 MG: 200 TABLET ORAL at 21:07

## 2024-12-24 RX ADMIN — INSULIN LISPRO 2 UNITS: 100 INJECTION, SOLUTION INTRAVENOUS; SUBCUTANEOUS at 09:09

## 2024-12-24 RX ADMIN — CLOPIDOGREL BISULFATE 600 MG: 300 TABLET, FILM COATED ORAL at 12:36

## 2024-12-24 RX ADMIN — INSULIN GLARGINE 20 UNITS: 100 INJECTION, SOLUTION SUBCUTANEOUS at 21:07

## 2024-12-24 RX ADMIN — ZOLPIDEM TARTRATE 5 MG: 5 TABLET ORAL at 21:06

## 2024-12-24 RX ADMIN — SODIUM CHLORIDE, PRESERVATIVE FREE 10 ML: 5 INJECTION INTRAVENOUS at 09:11

## 2024-12-24 RX ADMIN — INSULIN LISPRO 2 UNITS: 100 INJECTION, SOLUTION INTRAVENOUS; SUBCUTANEOUS at 12:08

## 2024-12-24 RX ADMIN — HEPARIN SODIUM AND DEXTROSE 17 UNITS/KG/HR: 10000; 5 INJECTION INTRAVENOUS at 11:26

## 2024-12-24 ASSESSMENT — PAIN SCALES - GENERAL: PAINLEVEL_OUTOF10: 0

## 2024-12-24 NOTE — PLAN OF CARE
Problem: Chronic Conditions and Co-morbidities  Goal: Patient's chronic conditions and co-morbidity symptoms are monitored and maintained or improved  Outcome: Progressing  Flowsheets (Taken 12/23/2024 1920)  Care Plan - Patient's Chronic Conditions and Co-Morbidity Symptoms are Monitored and Maintained or Improved: Monitor and assess patient's chronic conditions and comorbid symptoms for stability, deterioration, or improvement     Problem: Discharge Planning  Goal: Discharge to home or other facility with appropriate resources  Outcome: Progressing  Flowsheets (Taken 12/23/2024 1920)  Discharge to home or other facility with appropriate resources: Identify barriers to discharge with patient and caregiver     Problem: Safety - Adult  Goal: Free from fall injury  Outcome: Progressing  Flowsheets (Taken 12/23/2024 1920)  Free From Fall Injury: Based on caregiver fall risk screen, instruct family/caregiver to ask for assistance with transferring infant if caregiver noted to have fall risk factors     Problem: Pain  Goal: Verbalizes/displays adequate comfort level or baseline comfort level  Outcome: Progressing     Problem: Skin/Tissue Integrity  Goal: Absence of new skin breakdown  Description: 1.  Monitor for areas of redness and/or skin breakdown  2.  Assess vascular access sites hourly  3.  Every 4-6 hours minimum:  Change oxygen saturation probe site  4.  Every 4-6 hours:  If on nasal continuous positive airway pressure, respiratory therapy assess nares and determine need for appliance change or resting period.  Outcome: Progressing

## 2024-12-24 NOTE — PROGRESS NOTES
[facility] FLOOR Progress Note    Admit Date: 2024    Procedure:      Subjective:   Pt seen on rounds with Dr. Syed. Laying in bed after bath. In NAD. Denies any chest pain or shortness of breath. Reported some chest pain/SOB yesterday afternoon/evening but states no reoccurrence overnight or this morning.     Objective:     Vitals:   Visit Vitals  BP (!) 89/49   Pulse (!) 108   Temp 98.6 °F (37 °C) (Oral)   Resp 23   Ht 1.88 m (6' 2\")   Wt 93.3 kg (205 lb 11 oz)   SpO2 95%   BMI 26.41 kg/m²       Temp (24hrs), Av.7 °F (37.1 °C), Min:98.1 °F (36.7 °C), Max:99.5 °F (37.5 °C)      Last 24hr Input/Output:    Intake/Output Summary (Last 24 hours) at 2024 1058  Last data filed at 2024 0630  Gross per 24 hour   Intake 210.73 ml   Output 755 ml   Net -544.27 ml        EKG:   Encounter Date: 24   EKG 12 Lead   Result Value    Ventricular Rate 103    Atrial Rate 103    P-R Interval 216    QRS Duration 92    Q-T Interval 346    QTc Calculation (Bazett) 453    R Axis 46    T Axis 130    Diagnosis      Sinus tachycardia with 1st degree AV block with premature atrial complexes  Septal infarct (cited on or before 19-DEC-2024)  ST & T wave abnormality, consider lateral ischemia  Abnormal ECG  When compared with ECG of 22-DEC-2024 18:00,  Serial changes of evolving Septal infarct present       Oxygen: 2LNC    CXR:  Xray Result (most recent):  XR CHEST PORTABLE 2024    Narrative  EXAM: XR CHEST PORTABLE  ACC#: [id number]    INDICATION: SOB, []    COMPARISON: Chest radiograph 2024    FINDINGS: AP portable chest radiograph. Heart size normal. The lungs are  adequately expanded. The vascular clarity is mildly diminished. No consolidation  is seen. There is no evidence of effusion or pneumothorax.    Impression  No acute cardiopulmonary findings.        Electronically signed by SERGIO HARMAN        Admission Weight: Last Weight   Weight - Scale: 90.3 kg (199 lb) Weight - Scale: 93.3 kg (205 lb 11

## 2024-12-24 NOTE — PROGRESS NOTES
1910 (12/23/2024) - Bedside shift change report given to Timo Mora RN (oncoming nurse) by Martha Alba RN (offgoing nurse). Report included the following information Nurse Handoff Report, Index, ED Encounter Summary, ED SBAR, Adult Overview, Intake/Output, MAR, Recent Results, Med Rec Status, Cardiac Rhythm NSR, Sinus Tach, and Alarm Parameters.      2049 - Pt. Voided 200 ml.  Post Voided bladder scan result: 0 ml.  Pt. Reported a brief SOB and chest pressure 2/10 after standing and using urinal, then spontaneously resolved.    2217 - Confirmed with pharmacist to bolus heparin 2,000 units and increase heparin infusion by 2 units/kg/hr (= 17 units/kg/hr).    0000 (12/24/2024) - Pt. Voided 100 ml.  Pt. Is asymptomatic and stable vital signs.    0600 - Anti Xa: 0.50, Confirmed with pharmacist no rate change and continue heparin infusion @ 17 units/kg/hr.

## 2024-12-24 NOTE — PROGRESS NOTES
0730  Bedside and verbal report from Timo Mora RN      0800  Assessment completed.     0930  Dr. Marlow here to see patient. Plan for PCI on Thursday or Friday by Dr. Henson per Dr. Marlow.    1100  Heparin Xa 0.43  Reviewed with Heaven Flowers. No change in rate. Next lab check due in AM 12/25/2024.     1200  Reassessment completed. Dr. Henson here to see patient. Plan for left heart cath with possible intervention later this week. Consent obtained for procedure by Dr. Henson. Oxygen decreased to 1 lpm nasal cannula by Dr. Henson.     [id number]  To x-ray via stretcher, accompanied by RN, for PA and Lateral CXR.  Returned to room, to bed. Family members at bedside.     1600  Reassessment completed. Sats 96% on 1 lpm nasal cannula. Patient denies complaints at this time.     1800  Patient in bed, states he \"feels a little off.\" Pulse ox laying in the bed - replaced on finger. States \"I just need to sleep.\"    1900  Bedside and verbal report given to Jessy Chirinos RN

## 2024-12-24 NOTE — PROGRESS NOTES
Heart team plan for percutaneous revascularization, noted ongoing azotemia, and respiratory failure, anemia is persistent, mildly volume up on exam - JVD and pedal edema (feels dry), recommend:    - Load with clopidogrel and continue maintenance dose  - Chest radiogram to check for pulmonary edema  - Stool occult blood test  - Start bumetanide 0.5 mg orally twice a day in the meantime  - Wean nasal cannula oxygen    Plan for PCI this week    Thank you for allowing us to participate in the care of this patient.  We will follow.

## 2024-12-24 NOTE — PROGRESS NOTES
End of Shift Note    Bedside shift change report given to RAFAT Daley (oncoming nurse) by Timo Mora RN (offgoing nurse).  Report included the following information SBAR, Kardex, ED Summary, Procedure Summary, Intake/Output, MAR, Accordion, Recent Results, Med Rec Status, Cardiac Rhythm NSR, Sinus Tach, and Alarm Parameters     Shift worked:  [id number]     Shift summary and any significant changes:     One episode of a brief SOB and chest pressure 2/10 after standing and using urinal, then spontaneously resolved.  Vital signs are stable.       Concerns for physician to address:       Zone phone for oncoming shift:          Activity:  Level of Assistance: Moderate assist, patient does 50-74%  Number times ambulated in hallways past shift: 0  Number of times OOB to chair past shift: 0    Cardiac:   Cardiac Monitoring: Yes      Cardiac Rhythm: Sinus rhythm    Access:  Current line(s): PIV     Genitourinary:   Urinary Status: Voiding    Respiratory:   O2 Device: Nasal cannula  Chronic home O2 use?: YES  Incentive spirometer at bedside: YES    GI:  Last BM (including prior to admit):  (PTA)  Current diet:  ADULT DIET; Regular; 4 carb choices (60 gm/meal); Low Fat/Low Chol/High Fiber/ROCKY  ADULT ORAL NUTRITION SUPPLEMENT; Breakfast, Lunch, Dinner; Low Calorie/High Protein Oral Supplement  Passing flatus: YES    Pain Management:   Patient states pain is manageable on current regimen: YES    Skin:  Presley Scale Score: 20  Interventions: Wound Offloading (Prevention Methods): Bed, pressure reduction mattress, Pillows, Repositioning    Patient Safety:  Fall Risk: Nursing Judgement-Fall Risk High(Add Comments): Yes  Fall Risk Interventions  Nursing Judgement-Fall Risk High(Add Comments): Yes  Toilet Every 2 Hours-In Advance of Need: Yes  Hourly Visual Checks: Awake, In bed, Quiet  Fall Visual Posted: Socks  Room Door Open: Deferred to promote rest  Alarm On: Bed  Patient Moved Closer to Nursing Station: No    Active

## 2024-12-24 NOTE — PROGRESS NOTES
Hospitalist Progress Note    NAME:   Cirilo Hackett   : 1945   MRN: [id number]     Date/Time: 2024 3:17 PM  Patient PCP: Nirav Hylton MD    Estimated discharge date:   Barriers:  Cardiology clearance, PCI this week      Assessment / Plan:    NSTEMI  Multivessel CAD with history of DEMI- CABG planning underway  Atrial fibrillation with rapid ventricular response  Essential hypertension  Hyperlipidemia  Admit to stepdown unit  Monitor electrolytes  -IV magnesium ordered, maintain magnesium greater than 2.0 and potassium greater than 4.0  Obtain TTE  Cardiothoracic surgery consulted, greatly appreciate their expertise  Cardiology consulted, greatly appreciate their expertise  Continue PTA aspirin  Continue ACS heparin infusion  Increase PTA rosuvastatin to 20 mg nightly (high intensity dose)  -LDL 84 on   -dilt changed to beta blocker  - nitro drip  Defer ACE I/ARB initiation to CTS  Loading plavix  - CTS recommends PCI instead of CABG  - started bumex     Lactic acidosis  Suspect this is a consequence of RVR-will trend  Given elevated BNP suspect some developing CHF and will not pursue fluid bolus at this time particularly in presence of hypertension     GEM  Trend renal function  Renally dose medications and avoid nephrotoxic agents     Elevated temperature  Noted to have elevated temperature without fever with neutrophilic shift without leukocytosis.  This is likely reactive in setting of ACS presentation.  However, will evaluate for COVID and flu given recent surge and healthcare exposure. -> negative.     Insulin-dependent diabetes mellitus  Continue PTA 20 units glargine nightly  Corrective coverage insulin  Accu-Cheks  Diabetic diet  Hypoglycemia protocol in place   Hemoglobin A1c 7.4 on      Herpes labialis  Continue PTA acyclovir every other day for prophylaxis     GERD  Continue PTA Protonix     Insomnia  Melatonin nightly  Ambien nightly as needed     Medical Decision

## 2024-12-24 NOTE — PROGRESS NOTES
[facility]  [address] [place] [address], [place] [id number] [phone]  [email or web address]  CARDIOLOGY PROGRESS NOTE    12/24/2024 8:38 AM    Admit Date: 12/22/2024    Admit Diagnosis:   Acute coronary syndrome (HCC) [I24.9]  NSTEMI (non-ST elevated myocardial infarction) (HCC) [I21.4]  Atrial fibrillation with rapid ventricular response (HCC) [I48.91]    Subjective:     Cirilo Hackett was evaluated at bedside.  Denies any overnight issues.  1 episode of slight chest discomfort standing with urinating yesterday.  Remains on heparin and nitro drip.    BP (!) 89/49   Pulse (!) 108   Temp 98.6 °F (37 °C) (Oral)   Resp 23   Ht 1.88 m (6' 2\")   Wt 93.3 kg (205 lb 11 oz)   SpO2 95%   BMI 26.41 kg/m²     Current Facility-Administered Medications   Medication Dose Route Frequency    nitroGLYCERIN 200 mcg/mL in dextrose 5%  5-200 mcg/min IntraVENous Continuous    metoprolol succinate (TOPROL XL) extended release tablet 12.5 mg  12.5 mg Oral Daily    amiodarone (CORDARONE) tablet 400 mg  400 mg Oral BID    heparin (porcine) injection 4,000 Units  4,000 Units IntraVENous PRN    heparin (porcine) injection 2,000 Units  2,000 Units IntraVENous PRN    heparin 25,000 units in dextrose 5% 250 mL (premix) infusion  5-30 Units/kg/hr IntraVENous Continuous    acyclovir (ZOVIRAX) tablet 400 mg  400 mg Oral Q48H    aspirin EC tablet 81 mg  81 mg Oral Daily    pantoprazole (PROTONIX) tablet 40 mg  40 mg Oral Daily    rosuvastatin (CRESTOR) tablet 20 mg  20 mg Oral Nightly    insulin lispro (HUMALOG,ADMELOG) injection vial 0-8 Units  0-8 Units SubCUTAneous 4x Daily AC & HS    glucose chewable tablet 16 g  4 tablet Oral PRN    dextrose bolus 10% 125 mL  125 mL IntraVENous PRN    Or    dextrose bolus 10% 250 mL  250 mL IntraVENous PRN    glucagon injection 1 mg  1 mg SubCUTAneous PRN    dextrose 10 % infusion   IntraVENous Continuous PRN    zolpidem (AMBIEN) tablet 5 mg  5 mg Oral Nightly          Intake/Output Summary (Last 24 hours) at 12/24/2024 0838  Last data filed at 12/24/2024 0630  Gross per 24 hour   Intake 210.73 ml   Output 755 ml   Net -544.27 ml        Telemetry: Atrial fibrillation  EKG:  Cxray:    Assessment:     Principal Problem:    NSTEMI (non-ST elevated myocardial infarction) ([facility])  Resolved Problems:    * No resolved hospital problems. *      Plan:     Multivessel atherosclerotic heart disease pending CABG evaluation.  Remains in rate controlled atrial fibrillation continue heparin, amiodarone and nitroglycerin.  Cardiac MRI viability study pending.    Lucian Arroyo DNP,ANP-BC

## 2024-12-25 ENCOUNTER — APPOINTMENT (OUTPATIENT)
Facility: HOSPITAL | Age: 79
End: 2024-12-25
Attending: STUDENT IN AN ORGANIZED HEALTH CARE EDUCATION/TRAINING PROGRAM
Payer: MEDICARE

## 2024-12-25 ENCOUNTER — APPOINTMENT (OUTPATIENT)
Facility: HOSPITAL | Age: 79
End: 2024-12-25
Payer: MEDICARE

## 2024-12-25 LAB
ANION GAP SERPL CALC-SCNC: 9 MMOL/L (ref 2–12)
BUN SERPL-MCNC: 32 MG/DL (ref 6–20)
BUN/CREAT SERPL: 20 (ref 12–20)
CALCIUM SERPL-MCNC: 8.4 MG/DL (ref 8.5–10.1)
CHLORIDE SERPL-SCNC: 103 MMOL/L (ref 97–108)
CO2 SERPL-SCNC: 25 MMOL/L (ref 21–32)
CREAT SERPL-MCNC: 1.57 MG/DL (ref 0.7–1.3)
GLUCOSE BLD STRIP.AUTO-MCNC: 189 MG/DL (ref 65–117)
GLUCOSE BLD STRIP.AUTO-MCNC: 213 MG/DL (ref 65–117)
GLUCOSE BLD STRIP.AUTO-MCNC: 228 MG/DL (ref 65–117)
GLUCOSE BLD STRIP.AUTO-MCNC: 232 MG/DL (ref 65–117)
GLUCOSE SERPL-MCNC: 178 MG/DL (ref 65–100)
MAGNESIUM SERPL-MCNC: 2.1 MG/DL (ref 1.6–2.4)
PHOSPHATE SERPL-MCNC: 3.9 MG/DL (ref 2.6–4.7)
POTASSIUM SERPL-SCNC: 4 MMOL/L (ref 3.5–5.1)
SERVICE CMNT-IMP: ABNORMAL
SODIUM SERPL-SCNC: 137 MMOL/L (ref 136–145)
UFH PPP CHRO-ACNC: 0.43 IU/ML

## 2024-12-25 PROCEDURE — 74176 CT ABD & PELVIS W/O CONTRAST: CPT

## 2024-12-25 PROCEDURE — 2700000000 HC OXYGEN THERAPY PER DAY

## 2024-12-25 PROCEDURE — 83735 ASSAY OF MAGNESIUM: CPT

## 2024-12-25 PROCEDURE — 2500000003 HC RX 250 WO HCPCS: Performed by: STUDENT IN AN ORGANIZED HEALTH CARE EDUCATION/TRAINING PROGRAM

## 2024-12-25 PROCEDURE — 85520 HEPARIN ASSAY: CPT

## 2024-12-25 PROCEDURE — 6370000000 HC RX 637 (ALT 250 FOR IP): Performed by: STUDENT IN AN ORGANIZED HEALTH CARE EDUCATION/TRAINING PROGRAM

## 2024-12-25 PROCEDURE — 6370000000 HC RX 637 (ALT 250 FOR IP): Performed by: PHYSICIAN ASSISTANT

## 2024-12-25 PROCEDURE — 6360000002 HC RX W HCPCS: Performed by: STUDENT IN AN ORGANIZED HEALTH CARE EDUCATION/TRAINING PROGRAM

## 2024-12-25 PROCEDURE — 36415 COLL VENOUS BLD VENIPUNCTURE: CPT

## 2024-12-25 PROCEDURE — 80048 BASIC METABOLIC PNL TOTAL CA: CPT

## 2024-12-25 PROCEDURE — 82962 GLUCOSE BLOOD TEST: CPT

## 2024-12-25 PROCEDURE — 71046 X-RAY EXAM CHEST 2 VIEWS: CPT

## 2024-12-25 PROCEDURE — 84100 ASSAY OF PHOSPHORUS: CPT

## 2024-12-25 PROCEDURE — 6360000004 HC RX CONTRAST MEDICATION: Performed by: STUDENT IN AN ORGANIZED HEALTH CARE EDUCATION/TRAINING PROGRAM

## 2024-12-25 PROCEDURE — C8924 2D TTE W OR W/O FOL W/CON,FU: HCPCS

## 2024-12-25 PROCEDURE — 51798 US URINE CAPACITY MEASURE: CPT

## 2024-12-25 PROCEDURE — 2060000000 HC ICU INTERMEDIATE R&B

## 2024-12-25 RX ORDER — METOPROLOL SUCCINATE 25 MG/1
25 TABLET, EXTENDED RELEASE ORAL DAILY
Status: DISCONTINUED | OUTPATIENT
Start: 2024-12-26 | End: 2024-12-26

## 2024-12-25 RX ADMIN — Medication 3 MG: at 19:43

## 2024-12-25 RX ADMIN — BUMETANIDE 0.5 MG: 1 TABLET ORAL at 18:01

## 2024-12-25 RX ADMIN — ACETAMINOPHEN 650 MG: 325 TABLET ORAL at 15:02

## 2024-12-25 RX ADMIN — ZOLPIDEM TARTRATE 5 MG: 5 TABLET ORAL at 19:25

## 2024-12-25 RX ADMIN — HEPARIN SODIUM AND DEXTROSE 17 UNITS/KG/HR: 10000; 5 INJECTION INTRAVENOUS at 05:45

## 2024-12-25 RX ADMIN — PANTOPRAZOLE SODIUM 40 MG: 40 TABLET, DELAYED RELEASE ORAL at 08:40

## 2024-12-25 RX ADMIN — SODIUM CHLORIDE, PRESERVATIVE FREE 10 ML: 5 INJECTION INTRAVENOUS at 19:44

## 2024-12-25 RX ADMIN — INSULIN GLARGINE 20 UNITS: 100 INJECTION, SOLUTION SUBCUTANEOUS at 20:06

## 2024-12-25 RX ADMIN — ASPIRIN 81 MG: 81 TABLET, COATED ORAL at 08:40

## 2024-12-25 RX ADMIN — AMIODARONE HYDROCHLORIDE 400 MG: 200 TABLET ORAL at 08:40

## 2024-12-25 RX ADMIN — INSULIN LISPRO 2 UNITS: 100 INJECTION, SOLUTION INTRAVENOUS; SUBCUTANEOUS at 08:41

## 2024-12-25 RX ADMIN — CLOPIDOGREL BISULFATE 75 MG: 75 TABLET ORAL at 08:40

## 2024-12-25 RX ADMIN — ROSUVASTATIN CALCIUM 20 MG: 10 TABLET, FILM COATED ORAL at 19:43

## 2024-12-25 RX ADMIN — INSULIN LISPRO 2 UNITS: 100 INJECTION, SOLUTION INTRAVENOUS; SUBCUTANEOUS at 12:23

## 2024-12-25 RX ADMIN — BUMETANIDE 0.5 MG: 1 TABLET ORAL at 08:40

## 2024-12-25 RX ADMIN — METOPROLOL SUCCINATE 12.5 MG: 25 TABLET, FILM COATED, EXTENDED RELEASE ORAL at 08:40

## 2024-12-25 RX ADMIN — SODIUM CHLORIDE, PRESERVATIVE FREE 10 ML: 5 INJECTION INTRAVENOUS at 08:45

## 2024-12-25 RX ADMIN — INSULIN LISPRO 2 UNITS: 100 INJECTION, SOLUTION INTRAVENOUS; SUBCUTANEOUS at 19:43

## 2024-12-25 RX ADMIN — PERFLUTREN 1.5 ML: 6.52 INJECTION, SUSPENSION INTRAVENOUS at 17:06

## 2024-12-25 RX ADMIN — ACETAMINOPHEN 650 MG: 325 TABLET ORAL at 08:40

## 2024-12-25 ASSESSMENT — PAIN DESCRIPTION - ORIENTATION: ORIENTATION: LEFT

## 2024-12-25 ASSESSMENT — PAIN SCALES - GENERAL
PAINLEVEL_OUTOF10: 0
PAINLEVEL_OUTOF10: 0
PAINLEVEL_OUTOF10: 3
PAINLEVEL_OUTOF10: 0
PAINLEVEL_OUTOF10: 0

## 2024-12-25 ASSESSMENT — PAIN DESCRIPTION - DESCRIPTORS: DESCRIPTORS: ACHING

## 2024-12-25 ASSESSMENT — PAIN DESCRIPTION - LOCATION: LOCATION: BACK;SHOULDER

## 2024-12-25 NOTE — PROGRESS NOTES
Predictive Model Details          36 (Caution)  Factor Value    Calculated 12/25/2024 15:57 34% Age 79 years old    Deterioration Index Model 31% Supplemental oxygen Nasal cannula     22% Respiratory rate 22     3% Blood pH abnormal (7.46  )     3% BUN abnormal (32 MG/DL)     3% Pulse 93     2% Sodium 137 mmol/L     1% WBC count 8.7 K/uL     1% Systolic 116     1% Hematocrit abnormal (34.6 %)     0% Potassium 4.0 mmol/L     0% Pulse oximetry 96 %     0% Temperature 98.4 °F (36.9 °C)

## 2024-12-25 NOTE — PROGRESS NOTES
Patient requested not to be bother between 10 pm- 5 am. Patient stated that he had very poor sleep in the previous nights and he is very tired.

## 2024-12-25 NOTE — PROGRESS NOTES
[facility]  [address] [place], [place] [id number] [phone]  [email or web address]  CARDIOLOGY PROGRESS NOTE    12/25/2024 9:49 AM    Admit Date: 12/22/2024    Admit Diagnosis:   Acute coronary syndrome (HCC) [I24.9]  NSTEMI (non-ST elevated myocardial infarction) (HCC) [I21.4]  Atrial fibrillation with rapid ventricular response (HCC) [I48.91]    Subjective:     Cirilo Hackett was evaluated at bedside.  No overnight issues denies chest pain or dyspnea.  We discussed plan for PTCA stenting tomorrow.  He is pleased with this plan.    /84   Pulse (!) 105   Temp 99.7 °F (37.6 °C) (Oral)   Resp 23   Ht 1.88 m (6' 2\")   Wt 93.3 kg (205 lb 11 oz)   SpO2 95%   BMI 26.41 kg/m²     Current Facility-Administered Medications   Medication Dose Route Frequency    clopidogrel (PLAVIX) tablet 75 mg  75 mg Oral Daily    bumetanide (BUMEX) tablet 0.5 mg  0.5 mg Oral BID    nitroGLYCERIN 200 mcg/mL in dextrose 5%  5-200 mcg/min IntraVENous Continuous    metoprolol succinate (TOPROL XL) extended release tablet 12.5 mg  12.5 mg Oral Daily    amiodarone (CORDARONE) tablet 400 mg  400 mg Oral BID    heparin (porcine) injection 4,000 Units  4,000 Units IntraVENous PRN    heparin (porcine) injection 2,000 Units  2,000 Units IntraVENous PRN    heparin 25,000 units in dextrose 5% 250 mL (premix) infusion  5-30 Units/kg/hr IntraVENous Continuous    acyclovir (ZOVIRAX) tablet 400 mg  400 mg Oral Q48H    aspirin EC tablet 81 mg  81 mg Oral Daily    pantoprazole (PROTONIX) tablet 40 mg  40 mg Oral Daily    rosuvastatin (CRESTOR) tablet 20 mg  20 mg Oral Nightly    insulin lispro (HUMALOG,ADMELOG) injection vial 0-8 Units  0-8 Units SubCUTAneous 4x Daily AC & HS    glucose chewable tablet 16 g  4 tablet Oral PRN    dextrose bolus 10% 125 mL  125 mL IntraVENous PRN    Or    dextrose bolus 10% 250 mL  250 mL IntraVENous PRN    glucagon injection 1 mg  1 mg SubCUTAneous PRN

## 2024-12-25 NOTE — PROGRESS NOTES
End of Shift Note    Bedside shift change report given to Huong (oncoming nurse) by Yeong AE Jenny, RN (offgoing nurse).  Report included the following information SBAR    Shift worked:  7a - 3p     Shift summary and any significant changes:     Chest XR and CT done. Scheduled for cardiac cath tomorrow. Consent obtained and placed in chart. Tried to wean off of oxygen, but was unsuccessful, patient still on 1L of oxygen.     Concerns for physician to address:  none     Zone phone for oncoming shift:          Activity:  Level of Assistance: Moderate assist, patient does 50-74%  Number times ambulated in hallways past shift: 0  Number of times OOB to chair past shift: 0    Cardiac:   Cardiac Monitoring: Yes      Cardiac Rhythm: Sinus rhythm    Access:  Current line(s): PIV     Genitourinary:   Urinary Status: Voiding    Respiratory:   O2 Device: None (Room air)  Chronic home O2 use?: NO  Incentive spirometer at bedside: YES    GI:  Last BM (including prior to admit):  (pt doesn't remember)  Current diet:  ADULT DIET; Regular; 4 carb choices (60 gm/meal); Low Fat/Low Chol/High Fiber/ROCKY  ADULT ORAL NUTRITION SUPPLEMENT; Breakfast, Lunch, Dinner; Low Calorie/High Protein Oral Supplement  Diet NPO  Passing flatus: YES    Pain Management:   Patient states pain is manageable on current regimen: N/A    Skin:  Presley Scale Score: 19  Interventions: Wound Offloading (Prevention Methods): Bed, pressure reduction mattress    Patient Safety:  Fall Risk: Nursing Judgement-Fall Risk High(Add Comments): Yes  Fall Risk Interventions  Nursing Judgement-Fall Risk High(Add Comments): Yes  Toilet Every 2 Hours-In Advance of Need: Yes  Hourly Visual Checks: Awake, In bed  Fall Visual Posted: Socks  Room Door Open: Deferred to promote rest  Alarm On: Bed  Patient Moved Closer to Nursing Station: No    Active Consults:   IP CONSULT TO THORACIC SURGERY  IP CONSULT TO CARDIOLOGY  IP CONSULT TO CARDIAC REHAB  IP CONSULT TO CARDIOVASCULAR

## 2024-12-26 LAB
ANION GAP SERPL CALC-SCNC: 9 MMOL/L (ref 2–12)
BASOPHILS # BLD: 0 K/UL (ref 0–0.1)
BASOPHILS NFR BLD: 0 % (ref 0–1)
BUN SERPL-MCNC: 31 MG/DL (ref 6–20)
BUN/CREAT SERPL: 20 (ref 12–20)
CALCIUM SERPL-MCNC: 8.4 MG/DL (ref 8.5–10.1)
CHLORIDE SERPL-SCNC: 102 MMOL/L (ref 97–108)
CO2 SERPL-SCNC: 26 MMOL/L (ref 21–32)
CREAT SERPL-MCNC: 1.55 MG/DL (ref 0.7–1.3)
DIFFERENTIAL METHOD BLD: ABNORMAL
ECHO AV MEAN GRADIENT: 4 MMHG
ECHO AV MEAN VELOCITY: 0.9 M/S
ECHO AV PEAK GRADIENT: 7 MMHG
ECHO AV PEAK VELOCITY: 1.3 M/S
ECHO AV VTI: 22.4 CM
ECHO BSA: 2.21 M2
ECHO EST RA PRESSURE: 8 MMHG
ECHO LV EDV A2C: 167 ML
ECHO LV EDV A4C: 124 ML
ECHO LV EDV BP: 144 ML (ref 67–155)
ECHO LV EDV INDEX A4C: 56 ML/M2
ECHO LV EDV INDEX BP: 65 ML/M2
ECHO LV EDV NDEX A2C: 76 ML/M2
ECHO LV EJECTION FRACTION A2C: 24 %
ECHO LV EJECTION FRACTION A4C: 8 %
ECHO LV EJECTION FRACTION BIPLANE: 14 % (ref 55–100)
ECHO LV ESV A2C: 128 ML
ECHO LV ESV A4C: 114 ML
ECHO LV ESV BP: 124 ML (ref 22–58)
ECHO LV ESV INDEX A2C: 58 ML/M2
ECHO LV ESV INDEX A4C: 52 ML/M2
ECHO LV ESV INDEX BP: 56 ML/M2
ECHO MV A VELOCITY: 0.46 M/S
ECHO MV E DECELERATION TIME (DT): 133.8 MS
ECHO MV E VELOCITY: 0.84 M/S
ECHO MV E/A RATIO: 1.83
ECHO RIGHT VENTRICULAR SYSTOLIC PRESSURE (RVSP): 37 MMHG
ECHO TV REGURGITANT MAX VELOCITY: 2.67 M/S
ECHO TV REGURGITANT PEAK GRADIENT: 29 MMHG
EKG ATRIAL RATE: 103 BPM
EKG DIAGNOSIS: NORMAL
EKG P-R INTERVAL: 216 MS
EKG Q-T INTERVAL: 346 MS
EKG QRS DURATION: 92 MS
EKG QTC CALCULATION (BAZETT): 453 MS
EKG R AXIS: 46 DEGREES
EKG T AXIS: 130 DEGREES
EKG VENTRICULAR RATE: 103 BPM
EOSINOPHIL # BLD: 0 K/UL (ref 0–0.4)
EOSINOPHIL NFR BLD: 0 % (ref 0–7)
ERYTHROCYTE [DISTWIDTH] IN BLOOD BY AUTOMATED COUNT: 12.9 % (ref 11.5–14.5)
GLUCOSE BLD STRIP.AUTO-MCNC: 180 MG/DL (ref 65–117)
GLUCOSE BLD STRIP.AUTO-MCNC: 191 MG/DL (ref 65–117)
GLUCOSE BLD STRIP.AUTO-MCNC: 228 MG/DL (ref 65–117)
GLUCOSE BLD STRIP.AUTO-MCNC: 270 MG/DL (ref 65–117)
GLUCOSE SERPL-MCNC: 181 MG/DL (ref 65–100)
HCT VFR BLD AUTO: 33.4 % (ref 36.6–50.3)
HGB BLD-MCNC: 11.1 G/DL (ref 12.1–17)
IMM GRANULOCYTES # BLD AUTO: 0.1 K/UL (ref 0–0.04)
IMM GRANULOCYTES NFR BLD AUTO: 1 % (ref 0–0.5)
LYMPHOCYTES # BLD: 1 K/UL (ref 0.8–3.5)
LYMPHOCYTES NFR BLD: 12 % (ref 12–49)
MCH RBC QN AUTO: 31.4 PG (ref 26–34)
MCHC RBC AUTO-ENTMCNC: 33.2 G/DL (ref 30–36.5)
MCV RBC AUTO: 94.6 FL (ref 80–99)
MONOCYTES # BLD: 0.8 K/UL (ref 0–1)
MONOCYTES NFR BLD: 9 % (ref 5–13)
NEUTS SEG # BLD: 6.3 K/UL (ref 1.8–8)
NEUTS SEG NFR BLD: 78 % (ref 32–75)
NRBC # BLD: 0 K/UL (ref 0–0.01)
NRBC BLD-RTO: 0 PER 100 WBC
PLATELET # BLD AUTO: 224 K/UL (ref 150–400)
PMV BLD AUTO: 10.9 FL (ref 8.9–12.9)
POTASSIUM SERPL-SCNC: 3.5 MMOL/L (ref 3.5–5.1)
PROCALCITONIN SERPL-MCNC: 0.1 NG/ML
RBC # BLD AUTO: 3.53 M/UL (ref 4.1–5.7)
SERVICE CMNT-IMP: ABNORMAL
SODIUM SERPL-SCNC: 137 MMOL/L (ref 136–145)
UFH PPP CHRO-ACNC: 0.38 IU/ML
WBC # BLD AUTO: 8.2 K/UL (ref 4.1–11.1)

## 2024-12-26 PROCEDURE — 85520 HEPARIN ASSAY: CPT

## 2024-12-26 PROCEDURE — 80048 BASIC METABOLIC PNL TOTAL CA: CPT

## 2024-12-26 PROCEDURE — 6360000002 HC RX W HCPCS: Performed by: STUDENT IN AN ORGANIZED HEALTH CARE EDUCATION/TRAINING PROGRAM

## 2024-12-26 PROCEDURE — 82962 GLUCOSE BLOOD TEST: CPT

## 2024-12-26 PROCEDURE — 36415 COLL VENOUS BLD VENIPUNCTURE: CPT

## 2024-12-26 PROCEDURE — 84145 PROCALCITONIN (PCT): CPT

## 2024-12-26 PROCEDURE — 6370000000 HC RX 637 (ALT 250 FOR IP): Performed by: STUDENT IN AN ORGANIZED HEALTH CARE EDUCATION/TRAINING PROGRAM

## 2024-12-26 PROCEDURE — 2060000000 HC ICU INTERMEDIATE R&B

## 2024-12-26 PROCEDURE — 85025 COMPLETE CBC W/AUTO DIFF WBC: CPT

## 2024-12-26 PROCEDURE — 2500000003 HC RX 250 WO HCPCS: Performed by: STUDENT IN AN ORGANIZED HEALTH CARE EDUCATION/TRAINING PROGRAM

## 2024-12-26 PROCEDURE — 6370000000 HC RX 637 (ALT 250 FOR IP): Performed by: NURSE PRACTITIONER

## 2024-12-26 PROCEDURE — 6360000002 HC RX W HCPCS: Performed by: NURSE PRACTITIONER

## 2024-12-26 PROCEDURE — 2700000000 HC OXYGEN THERAPY PER DAY

## 2024-12-26 RX ORDER — LISINOPRIL 5 MG/1
2.5 TABLET ORAL DAILY
Status: DISCONTINUED | OUTPATIENT
Start: 2024-12-26 | End: 2024-12-26

## 2024-12-26 RX ORDER — METOPROLOL SUCCINATE 50 MG/1
50 TABLET, EXTENDED RELEASE ORAL DAILY
Status: DISCONTINUED | OUTPATIENT
Start: 2024-12-26 | End: 2024-12-27 | Stop reason: HOSPADM

## 2024-12-26 RX ORDER — BUMETANIDE 0.25 MG/ML
0.5 INJECTION, SOLUTION INTRAMUSCULAR; INTRAVENOUS ONCE
Status: COMPLETED | OUTPATIENT
Start: 2024-12-26 | End: 2024-12-26

## 2024-12-26 RX ADMIN — HEPARIN SODIUM AND DEXTROSE 17 UNITS/KG/HR: 10000; 5 INJECTION INTRAVENOUS at 13:58

## 2024-12-26 RX ADMIN — BUMETANIDE 0.5 MG: 1 TABLET ORAL at 09:47

## 2024-12-26 RX ADMIN — BUMETANIDE 0.5 MG: 0.25 INJECTION INTRAMUSCULAR; INTRAVENOUS at 09:47

## 2024-12-26 RX ADMIN — INSULIN LISPRO 2 UNITS: 100 INJECTION, SOLUTION INTRAVENOUS; SUBCUTANEOUS at 20:06

## 2024-12-26 RX ADMIN — ACETAMINOPHEN 650 MG: 325 TABLET ORAL at 16:02

## 2024-12-26 RX ADMIN — PANTOPRAZOLE SODIUM 40 MG: 40 TABLET, DELAYED RELEASE ORAL at 09:48

## 2024-12-26 RX ADMIN — ASPIRIN 81 MG: 81 TABLET, COATED ORAL at 09:47

## 2024-12-26 RX ADMIN — ACETAMINOPHEN 650 MG: 325 TABLET ORAL at 09:47

## 2024-12-26 RX ADMIN — INSULIN LISPRO 2 UNITS: 100 INJECTION, SOLUTION INTRAVENOUS; SUBCUTANEOUS at 09:49

## 2024-12-26 RX ADMIN — INSULIN LISPRO 4 UNITS: 100 INJECTION, SOLUTION INTRAVENOUS; SUBCUTANEOUS at 18:22

## 2024-12-26 RX ADMIN — ROSUVASTATIN CALCIUM 20 MG: 10 TABLET, FILM COATED ORAL at 20:05

## 2024-12-26 RX ADMIN — ACYCLOVIR 400 MG: 800 TABLET ORAL at 20:05

## 2024-12-26 RX ADMIN — METOPROLOL SUCCINATE 50 MG: 50 TABLET, EXTENDED RELEASE ORAL at 09:48

## 2024-12-26 RX ADMIN — BUMETANIDE 0.5 MG: 1 TABLET ORAL at 18:22

## 2024-12-26 RX ADMIN — INSULIN GLARGINE 20 UNITS: 100 INJECTION, SOLUTION SUBCUTANEOUS at 20:06

## 2024-12-26 RX ADMIN — Medication 3 MG: at 20:05

## 2024-12-26 RX ADMIN — ZOLPIDEM TARTRATE 5 MG: 5 TABLET ORAL at 19:32

## 2024-12-26 RX ADMIN — SODIUM CHLORIDE, PRESERVATIVE FREE 10 ML: 5 INJECTION INTRAVENOUS at 20:07

## 2024-12-26 RX ADMIN — CLOPIDOGREL BISULFATE 75 MG: 75 TABLET ORAL at 09:48

## 2024-12-26 RX ADMIN — SODIUM CHLORIDE, PRESERVATIVE FREE 10 ML: 5 INJECTION INTRAVENOUS at 09:49

## 2024-12-26 ASSESSMENT — PAIN SCALES - GENERAL: PAINLEVEL_OUTOF10: 0

## 2024-12-26 NOTE — PLAN OF CARE
Problem: Chronic Conditions and Co-morbidities  Goal: Patient's chronic conditions and co-morbidity symptoms are monitored and maintained or improved  12/26/2024 1158 by Huong Burris RN  Outcome: Progressing  12/26/2024 1157 by Huong Burris RN  Outcome: Progressing  Flowsheets (Taken 12/26/2024 0815)  Care Plan - Patient's Chronic Conditions and Co-Morbidity Symptoms are Monitored and Maintained or Improved: Monitor and assess patient's chronic conditions and comorbid symptoms for stability, deterioration, or improvement     Problem: Discharge Planning  Goal: Discharge to home or other facility with appropriate resources  12/26/2024 1158 by Huong Burris RN  Outcome: Progressing  12/26/2024 1157 by Huong Burris RN  Outcome: Progressing  Flowsheets (Taken 12/26/2024 0815)  Discharge to home or other facility with appropriate resources: Identify barriers to discharge with patient and caregiver     Problem: Safety - Adult  Goal: Free from fall injury  12/26/2024 1158 by Huong Burris RN  Outcome: Progressing  12/26/2024 1157 by Huong Burris RN  Outcome: Progressing     Problem: Pain  Goal: Verbalizes/displays adequate comfort level or baseline comfort level  12/26/2024 1158 by Huong Burris RN  Outcome: Progressing  12/26/2024 1157 by Huong Burris RN  Outcome: Progressing     Problem: Skin/Tissue Integrity  Goal: Absence of new skin breakdown  Description: 1.  Monitor for areas of redness and/or skin breakdown  2.  Assess vascular access sites hourly  3.  Every 4-6 hours minimum:  Change oxygen saturation probe site  4.  Every 4-6 hours:  If on nasal continuous positive airway pressure, respiratory therapy assess nares and determine need for appliance change or resting period.  12/26/2024 1158 by Huong Burris RN  Outcome: Progressing  12/26/2024 1157 by Huong Burris RN  Outcome: Progressing     Problem: ABCDS Injury Assessment  Goal: Absence

## 2024-12-26 NOTE — PROGRESS NOTES
[facility]  [address] [place] [address]  [phone]  [email or web address]  CARDIOLOGY PROGRESS NOTE    12/26/2024 8:23 AM    Admit Date: 12/22/2024    Admit Diagnosis:   Acute coronary syndrome (HCC) [I24.9]  NSTEMI (non-ST elevated myocardial infarction) (HCC) [I21.4]  Atrial fibrillation with rapid ventricular response (HCC) [I48.91]    Subjective:     Cirilo Hackett was evaluated at bedside.  No overnight issues denies chest pain or dyspnea.  Laying at 30 degrees without orthopnea or PND.    /74   Pulse (!) 103   Temp 99 °F (37.2 °C) (Oral)   Resp 22   Ht 1.88 m (6' 2\")   Wt 90.8 kg (200 lb 2.8 oz)   SpO2 93%   BMI 25.70 kg/m²     Current Facility-Administered Medications   Medication Dose Route Frequency    metoprolol succinate (TOPROL XL) extended release tablet 50 mg  50 mg Oral Daily    bumetanide (BUMEX) injection 0.5 mg  0.5 mg IntraVENous Once    clopidogrel (PLAVIX) tablet 75 mg  75 mg Oral Daily    bumetanide (BUMEX) tablet 0.5 mg  0.5 mg Oral BID    nitroGLYCERIN 200 mcg/mL in dextrose 5%  5-200 mcg/min IntraVENous Continuous    heparin (porcine) injection 4,000 Units  4,000 Units IntraVENous PRN    heparin (porcine) injection 2,000 Units  2,000 Units IntraVENous PRN    heparin 25,000 units in dextrose 5% 250 mL (premix) infusion  5-30 Units/kg/hr IntraVENous Continuous    acyclovir (ZOVIRAX) tablet 400 mg  400 mg Oral Q48H    aspirin EC tablet 81 mg  81 mg Oral Daily    pantoprazole (PROTONIX) tablet 40 mg  40 mg Oral Daily    rosuvastatin (CRESTOR) tablet 20 mg  20 mg Oral Nightly    insulin lispro (HUMALOG,ADMELOG) injection vial 0-8 Units  0-8 Units SubCUTAneous 4x Daily AC & HS    glucose chewable tablet 16 g  4 tablet Oral PRN    dextrose bolus 10% 125 mL  125 mL IntraVENous PRN    Or    dextrose bolus 10% 250 mL  250 mL IntraVENous PRN    glucagon injection 1 mg  1 mg SubCUTAneous PRN    dextrose 10 % infusion    IntraVENous Continuous PRN    zolpidem (AMBIEN) tablet 5 mg  5 mg Oral Nightly PRN    sodium chloride flush 0.9 % injection 5-40 mL  5-40 mL IntraVENous 2 times per day    sodium chloride flush 0.9 % injection 5-40 mL  5-40 mL IntraVENous PRN    0.9 % sodium chloride infusion   IntraVENous PRN    ondansetron (ZOFRAN-ODT) disintegrating tablet 4 mg  4 mg Oral Q8H PRN    Or    ondansetron (ZOFRAN) injection 4 mg  4 mg IntraVENous Q6H PRN    polyethylene glycol (GLYCOLAX) packet 17 g  17 g Oral Daily PRN    acetaminophen (TYLENOL) tablet 650 mg  650 mg Oral Q6H PRN    Or    acetaminophen (TYLENOL) suppository 650 mg  650 mg Rectal Q6H PRN    melatonin tablet 3 mg  3 mg Oral Nightly    insulin glargine (LANTUS) injection vial 20 Units  20 Units SubCUTAneous Nightly         Objective:      Physical Exam  Physical Exam  Vitals and nursing note reviewed.   Constitutional:       Appearance: Normal appearance.   HENT:      Head: Normocephalic and atraumatic.      Nose: Nose normal.      Mouth/Throat:      Mouth: Mucous membranes are moist.   Cardiovascular:      Rate and Rhythm: Normal rate. Rhythm irregular.      Heart sounds: No murmur heard.     No friction rub. No gallop.   Pulmonary:      Effort: Pulmonary effort is normal.      Breath sounds: No rales.   Abdominal:      General: Abdomen is flat.   Musculoskeletal:      Right lower leg: No edema.      Left lower leg: No edema.   Skin:     General: Skin is warm and dry.   Neurological:      Mental Status: He is alert and oriented to person, place, and time.   Psychiatric:         Mood and Affect: Mood normal.         Behavior: Behavior normal.         Thought Content: Thought content normal.         Judgment: Judgment normal.            Data Review:   Recent Labs     12/24/24  0430 12/26/24  0353   WBC 8.7 8.2   HGB 11.5* 11.1*   HCT 34.6* 33.4*    224     Recent Labs     12/23/24  1406 12/24/24  1004 12/25/24  0532 12/26/24  0353   NA  --  134* 137 137   K  --

## 2024-12-26 NOTE — PROGRESS NOTES
Predictive Model Details          35 (Caution)  Factor Value    Calculated 12/26/2024 11:33 34% Age 79 years old    Deterioration Index Model 31% Supplemental oxygen Nasal cannula     15% Respiratory rate 20     5% Pulse 98     3% Pulse oximetry 93 %     3% Blood pH abnormal (7.46  )     3% BUN abnormal (31 MG/DL)     3% Sodium 137 mmol/L     2% Potassium 3.5 mmol/L     1% Hematocrit abnormal (33.4 %)     1% WBC count 8.2 K/uL     0% Temperature 98.7 °F (37.1 °C)     0% Systolic 112      End of Shift Note    Bedside shift change report given to RAFAT Tovar (oncoming nurse) by Huong Burris RN (offgoing nurse).  Report included the following information SBAR    Shift worked:  7A-7P     Shift summary and any significant changes:     Diuresis; cont heparin and nitro gtt. No chest pain, plan for cath in AM.    Poor appetite.      Concerns for physician to address:  Cath; medical managment     Zone phone for oncoming shift:   [phone]       Activity:  Level of Assistance: Minimal assist, patient does 75% or more  Number times ambulated in hallways past shift: 0  Number of times OOB to chair past shift: 0    Cardiac:   Cardiac Monitoring: Yes      Cardiac Rhythm: Sinus rhythm    Access:  Current line(s): PIV     Genitourinary:   Urinary Status: Voiding    Respiratory:   O2 Device: Nasal cannula  Chronic home O2 use?: YES  Incentive spirometer at bedside: YES    GI:  Last BM (including prior to admit): 12/26/24  Current diet:  Diet NPO  ADULT DIET; Regular; 4 carb choices (60 gm/meal)  DIET ONE TIME MESSAGE;  Passing flatus: YES    Pain Management:   Patient states pain is manageable on current regimen: YES    Skin:  Presley Scale Score: 20  Interventions: Wound Offloading (Prevention Methods): Repositioning    Patient Safety:  Fall Risk: Nursing Judgement-Fall Risk High(Add Comments): Yes  Fall Risk Interventions  Nursing Judgement-Fall Risk High(Add Comments): Yes  Toilet Every 2 Hours-In Advance of Need: Yes  Hourly

## 2024-12-26 NOTE — PROGRESS NOTES
Hospitalist Progress Note    NAME:   Cirilo Hackett   : 1945   MRN: [id number]     Date/Time: 2024 2:15 PM  Patient PCP: Nirav Hylton MD    Estimated discharge date:  or    Barriers:  Cardiology clearance, PCI a.m. tomorrow      Assessment / Plan:    NSTEMI  Multivessel CAD with history of DEMI- CABG  -Underwent coronary angiogram and noted to have multivessel coronary artery disease.  Was evaluated by both cardiology and cardiothoracic surgery  -Cardiothoracic surgery recommended PCI rather than CABG due to viability and recovery concerns  -Cardiology is planning on coronary angiogram in a.m. tomorrow after 1 more day of diuresis  -Continue aspirin, Plavix, metoprolol, Crestor  -Hemoglobin A1c 7.1.  Will check LDL.  -Cardiology following and appreciate recommendations.  Keep n.p.o. from midnight  -Continue heparin drip and also nitro drip    Acute systolic congestive heart failure exacerbation  -Echocardiogram showed EF of around 15 to 20%  -Received extra Bumex today.  Continue Bumex 0.5 mg twice daily.  Continue metoprolol  -Strict I's and O's, daily weights and low-salt diet  -Check BMP in a.m. tomorrow    Atrial fibrillation with rapid ventricular response  Essential hypertension  Hyperlipidemia  -Continue heparin drip and also metoprolol  -Continue Crestor      Lactic acidosis  -Resolved     GEM  Suspected CKD stage II  -Baseline creatinine is around 1.1-1.3 creatinine is now 1.5.  Check BMP in a.m. tomorrow.     Fever  -Had 1 episode of fever on .  No more fever after that  -COVID and flu are negative.  Blood cultures negative.  UA is normal.  -CT abdomen shows no acute infectious process.  Chest x-ray shows resolved pleural effusions.  -He is afebrile.     Insulin-dependent diabetes mellitus  -Blood sugar this morning is 180  -Continue Lantus 20 units for now.  Continue insulin lispro sliding scale.  -Continue diabetic diet  -Will not increase insulin today since he will

## 2024-12-26 NOTE — PROGRESS NOTES
Hospitalist Progress Note    NAME:   Cirilo Hackett   : 1945   MRN: [id number]     Date/Time: 2024 8:34 PM  Patient PCP: Nirav Hylton MD    Estimated discharge date:   Barriers:  Cardiology clearance, PCI this week      Assessment / Plan:    NSTEMI  Multivessel CAD with history of DEMI- CABG planning underway  Atrial fibrillation with rapid ventricular response  Essential hypertension  Hyperlipidemia  Admit to stepdown unit  Monitor electrolytes  -IV magnesium ordered, maintain magnesium greater than 2.0 and potassium greater than 4.0  Obtain TTE  Cardiothoracic surgery consulted, greatly appreciate their expertise  Cardiology consulted, greatly appreciate their expertise  Continue PTA aspirin  Continue ACS heparin infusion  Increase PTA rosuvastatin to 20 mg nightly (high intensity dose)  -LDL 84 on   -dilt changed to beta blocker  - nitro drip  Defer ACE I/ARB initiation to CTS  Loading plavix  - CTS recommends PCI instead of CABG  - started bumex     For cath in am. Keep npo from mid nite. Check labs in am.    Lactic acidosis  Suspect this is a consequence of RVR-will trend  Given elevated BNP suspect some developing CHF and will not pursue fluid bolus at this time particularly in presence of hypertension     GEM  Trend renal function  Renally dose medications and avoid nephrotoxic agents     Fever  Noted to have elevated temperature without fever with neutrophilic shift without leukocytosis.  This is likely reactive in setting of ACS presentation.  However, will evaluate for COVID and flu given recent surge and healthcare exposure. -> negative.  Check ct abdomen to make sure there is no infection       Insulin-dependent diabetes mellitus  Continue PTA 20 units glargine nightly  Corrective coverage insulin  Accu-Cheks  Diabetic diet  Hypoglycemia protocol in place   Hemoglobin A1c 7.4 on      Herpes labialis  Continue PTA acyclovir every other day for prophylaxis    procedures/Xrays and details which were not copied into this note but were reviewed prior to creation of Plan.      LABS:  I reviewed today's most current labs and imaging studies.  Pertinent labs include:  Recent Labs     12/23/24  0621 12/24/24  0430   WBC 8.4 8.7   HGB 11.6* 11.5*   HCT 34.6* 34.6*    164     Recent Labs     12/23/24  0621 12/23/24  1406 12/24/24  1004 12/25/24  0532   *  --  134* 137   K 4.4  --  4.3 4.0     --  103 103   CO2 24  --  23 25   GLUCOSE 245*  --  217* 178*   BUN 26*  --  31* 32*   CREATININE 1.40*  --  1.47* 1.57*   CALCIUM 8.6  --  8.3* 8.4*   MG  --  2.2 2.1 2.1   PHOS  --   --  3.5 3.9   INR  --  1.1  --   --        Signed: Agustín Jackson MD

## 2024-12-26 NOTE — CARE COORDINATION
Transition of Care Plan:    RUR: 18% \"medium risk\"  Prior Level of Functioning: Independent with ADL's  Disposition: Home with family   LIZBETH: 12/29  If SNF or IPR: Date FOC offered: N/A  Follow up appointments: PCP/Specialists as indicated  DME needed: [employer]   Transportation at discharge: Family to transport   IM/IMM Medicare/ letter given: 2nd IM needed prior to d/c   Is patient a [place] and connected with [facility]? No  Caregiver Contact: Karen Hackett (spouse), [phone]  Discharge Caregiver contacted prior to discharge? To be contacted   Care Conference needed? Not at this time   Barriers to discharge: Cath, diuresis, PCI     1114 AM: Chart reviewed. CM acknowledged wearable defibrillator order for Lifevest. CM faxed clinical information to [employer]. CM received call from Bharat with [employer] noting [employer] has been approved. CM to continue to follow.    ZAK Mahan  Care Management  [facility]   [phone]

## 2024-12-26 NOTE — PROGRESS NOTES
Report received from Huong DOUGLASS    Pt given melatonin and ambien for the night but still awake, unable to fall asleep, \"anxious about the procedure.\"    CHG bath completed, shaved at groin sites. Consent completed. NPO since midnight.    Heparin drip and nitro drip infusing    Denies SOB or chest pain.

## 2024-12-27 ENCOUNTER — APPOINTMENT (OUTPATIENT)
Facility: HOSPITAL | Age: 79
End: 2024-12-27
Payer: MEDICARE

## 2024-12-27 VITALS
TEMPERATURE: 98.8 F | OXYGEN SATURATION: 94 % | HEART RATE: 102 BPM | DIASTOLIC BLOOD PRESSURE: 55 MMHG | SYSTOLIC BLOOD PRESSURE: 102 MMHG | BODY MASS INDEX: 24.9 KG/M2 | WEIGHT: 194 LBS | HEIGHT: 74 IN | RESPIRATION RATE: 18 BRPM

## 2024-12-27 DIAGNOSIS — I21.4 NSTEMI (NON-ST ELEVATION MYOCARDIAL INFARCTION) (HCC): Primary | ICD-10-CM

## 2024-12-27 LAB
ACT BLD: 250 SECS (ref 79–138)
ACT BLD: 256 SECS (ref 79–138)
ANION GAP SERPL CALC-SCNC: 6 MMOL/L (ref 2–12)
BASOPHILS # BLD: 0 K/UL (ref 0–0.1)
BASOPHILS NFR BLD: 0 % (ref 0–1)
BUN SERPL-MCNC: 29 MG/DL (ref 6–20)
BUN/CREAT SERPL: 21 (ref 12–20)
CALCIUM SERPL-MCNC: 7.8 MG/DL (ref 8.5–10.1)
CHLORIDE SERPL-SCNC: 107 MMOL/L (ref 97–108)
CO2 SERPL-SCNC: 27 MMOL/L (ref 21–32)
CREAT SERPL-MCNC: 1.35 MG/DL (ref 0.7–1.3)
DIFFERENTIAL METHOD BLD: ABNORMAL
ECHO BSA: 2.21 M2
EKG ATRIAL RATE: 110 BPM
EKG DIAGNOSIS: NORMAL
EKG P AXIS: 57 DEGREES
EKG P-R INTERVAL: 224 MS
EKG Q-T INTERVAL: 350 MS
EKG QRS DURATION: 94 MS
EKG QTC CALCULATION (BAZETT): 473 MS
EKG R AXIS: 54 DEGREES
EKG T AXIS: 102 DEGREES
EKG VENTRICULAR RATE: 110 BPM
EOSINOPHIL # BLD: 0.1 K/UL (ref 0–0.4)
EOSINOPHIL NFR BLD: 1 % (ref 0–7)
ERYTHROCYTE [DISTWIDTH] IN BLOOD BY AUTOMATED COUNT: 12.8 % (ref 11.5–14.5)
GLUCOSE BLD STRIP.AUTO-MCNC: 124 MG/DL (ref 65–117)
GLUCOSE BLD STRIP.AUTO-MCNC: 141 MG/DL (ref 65–117)
GLUCOSE SERPL-MCNC: 109 MG/DL (ref 65–100)
HCT VFR BLD AUTO: 33.1 % (ref 36.6–50.3)
HGB BLD-MCNC: 11 G/DL (ref 12.1–17)
IMM GRANULOCYTES # BLD AUTO: 0 K/UL (ref 0–0.04)
IMM GRANULOCYTES NFR BLD AUTO: 1 % (ref 0–0.5)
LYMPHOCYTES # BLD: 1.2 K/UL (ref 0.8–3.5)
LYMPHOCYTES NFR BLD: 19 % (ref 12–49)
MAGNESIUM SERPL-MCNC: 2 MG/DL (ref 1.6–2.4)
MCH RBC QN AUTO: 30.9 PG (ref 26–34)
MCHC RBC AUTO-ENTMCNC: 33.2 G/DL (ref 30–36.5)
MCV RBC AUTO: 93 FL (ref 80–99)
MONOCYTES # BLD: 0.5 K/UL (ref 0–1)
MONOCYTES NFR BLD: 9 % (ref 5–13)
NEUTS SEG # BLD: 4.5 K/UL (ref 1.8–8)
NEUTS SEG NFR BLD: 70 % (ref 32–75)
NRBC # BLD: 0 K/UL (ref 0–0.01)
NRBC BLD-RTO: 0 PER 100 WBC
PLATELET # BLD AUTO: 229 K/UL (ref 150–400)
PMV BLD AUTO: 10.6 FL (ref 8.9–12.9)
POTASSIUM SERPL-SCNC: 3.1 MMOL/L (ref 3.5–5.1)
RBC # BLD AUTO: 3.56 M/UL (ref 4.1–5.7)
SERVICE CMNT-IMP: ABNORMAL
SERVICE CMNT-IMP: ABNORMAL
SODIUM SERPL-SCNC: 140 MMOL/L (ref 136–145)
UFH PPP CHRO-ACNC: 0.37 IU/ML
WBC # BLD AUTO: 6.4 K/UL (ref 4.1–11.1)

## 2024-12-27 PROCEDURE — C1753 CATH, INTRAVAS ULTRASOUND: HCPCS | Performed by: STUDENT IN AN ORGANIZED HEALTH CARE EDUCATION/TRAINING PROGRAM

## 2024-12-27 PROCEDURE — 4A033BC MEASUREMENT OF ARTERIAL PRESSURE, CORONARY, PERCUTANEOUS APPROACH: ICD-10-PCS | Performed by: STUDENT IN AN ORGANIZED HEALTH CARE EDUCATION/TRAINING PROGRAM

## 2024-12-27 PROCEDURE — B2111ZZ FLUOROSCOPY OF MULTIPLE CORONARY ARTERIES USING LOW OSMOLAR CONTRAST: ICD-10-PCS | Performed by: STUDENT IN AN ORGANIZED HEALTH CARE EDUCATION/TRAINING PROGRAM

## 2024-12-27 PROCEDURE — 2500000003 HC RX 250 WO HCPCS: Performed by: STUDENT IN AN ORGANIZED HEALTH CARE EDUCATION/TRAINING PROGRAM

## 2024-12-27 PROCEDURE — 4A023N7 MEASUREMENT OF CARDIAC SAMPLING AND PRESSURE, LEFT HEART, PERCUTANEOUS APPROACH: ICD-10-PCS | Performed by: STUDENT IN AN ORGANIZED HEALTH CARE EDUCATION/TRAINING PROGRAM

## 2024-12-27 PROCEDURE — C1725 CATH, TRANSLUMIN NON-LASER: HCPCS | Performed by: STUDENT IN AN ORGANIZED HEALTH CARE EDUCATION/TRAINING PROGRAM

## 2024-12-27 PROCEDURE — 99152 MOD SED SAME PHYS/QHP 5/>YRS: CPT | Performed by: STUDENT IN AN ORGANIZED HEALTH CARE EDUCATION/TRAINING PROGRAM

## 2024-12-27 PROCEDURE — 76937 US GUIDE VASCULAR ACCESS: CPT | Performed by: STUDENT IN AN ORGANIZED HEALTH CARE EDUCATION/TRAINING PROGRAM

## 2024-12-27 PROCEDURE — 36415 COLL VENOUS BLD VENIPUNCTURE: CPT

## 2024-12-27 PROCEDURE — 2709999900 HC NON-CHARGEABLE SUPPLY: Performed by: STUDENT IN AN ORGANIZED HEALTH CARE EDUCATION/TRAINING PROGRAM

## 2024-12-27 PROCEDURE — 027137Z DILATION OF CORONARY ARTERY, TWO ARTERIES WITH FOUR OR MORE DRUG-ELUTING INTRALUMINAL DEVICES, PERCUTANEOUS APPROACH: ICD-10-PCS | Performed by: STUDENT IN AN ORGANIZED HEALTH CARE EDUCATION/TRAINING PROGRAM

## 2024-12-27 PROCEDURE — 6370000000 HC RX 637 (ALT 250 FOR IP): Performed by: STUDENT IN AN ORGANIZED HEALTH CARE EDUCATION/TRAINING PROGRAM

## 2024-12-27 PROCEDURE — C1887 CATHETER, GUIDING: HCPCS | Performed by: STUDENT IN AN ORGANIZED HEALTH CARE EDUCATION/TRAINING PROGRAM

## 2024-12-27 PROCEDURE — C1874 STENT, COATED/COV W/DEL SYS: HCPCS | Performed by: STUDENT IN AN ORGANIZED HEALTH CARE EDUCATION/TRAINING PROGRAM

## 2024-12-27 PROCEDURE — 6360000002 HC RX W HCPCS: Performed by: STUDENT IN AN ORGANIZED HEALTH CARE EDUCATION/TRAINING PROGRAM

## 2024-12-27 PROCEDURE — 85025 COMPLETE CBC W/AUTO DIFF WBC: CPT

## 2024-12-27 PROCEDURE — 6360000004 HC RX CONTRAST MEDICATION: Performed by: STUDENT IN AN ORGANIZED HEALTH CARE EDUCATION/TRAINING PROGRAM

## 2024-12-27 PROCEDURE — 99153 MOD SED SAME PHYS/QHP EA: CPT | Performed by: STUDENT IN AN ORGANIZED HEALTH CARE EDUCATION/TRAINING PROGRAM

## 2024-12-27 PROCEDURE — 83735 ASSAY OF MAGNESIUM: CPT

## 2024-12-27 PROCEDURE — 85520 HEPARIN ASSAY: CPT

## 2024-12-27 PROCEDURE — 82962 GLUCOSE BLOOD TEST: CPT

## 2024-12-27 PROCEDURE — C1894 INTRO/SHEATH, NON-LASER: HCPCS | Performed by: STUDENT IN AN ORGANIZED HEALTH CARE EDUCATION/TRAINING PROGRAM

## 2024-12-27 PROCEDURE — 93005 ELECTROCARDIOGRAM TRACING: CPT | Performed by: STUDENT IN AN ORGANIZED HEALTH CARE EDUCATION/TRAINING PROGRAM

## 2024-12-27 PROCEDURE — C1769 GUIDE WIRE: HCPCS | Performed by: STUDENT IN AN ORGANIZED HEALTH CARE EDUCATION/TRAINING PROGRAM

## 2024-12-27 PROCEDURE — 93458 L HRT ARTERY/VENTRICLE ANGIO: CPT | Performed by: STUDENT IN AN ORGANIZED HEALTH CARE EDUCATION/TRAINING PROGRAM

## 2024-12-27 PROCEDURE — C1713 ANCHOR/SCREW BN/BN,TIS/BN: HCPCS | Performed by: STUDENT IN AN ORGANIZED HEALTH CARE EDUCATION/TRAINING PROGRAM

## 2024-12-27 PROCEDURE — 92978 ENDOLUMINL IVUS OCT C 1ST: CPT | Performed by: STUDENT IN AN ORGANIZED HEALTH CARE EDUCATION/TRAINING PROGRAM

## 2024-12-27 PROCEDURE — C9600 PERC DRUG-EL COR STENT SING: HCPCS | Performed by: STUDENT IN AN ORGANIZED HEALTH CARE EDUCATION/TRAINING PROGRAM

## 2024-12-27 PROCEDURE — 6370000000 HC RX 637 (ALT 250 FOR IP): Performed by: NURSE PRACTITIONER

## 2024-12-27 PROCEDURE — 85347 COAGULATION TIME ACTIVATED: CPT

## 2024-12-27 PROCEDURE — 80048 BASIC METABOLIC PNL TOTAL CA: CPT

## 2024-12-27 PROCEDURE — B241ZZ3 ULTRASONOGRAPHY OF MULTIPLE CORONARY ARTERIES, INTRAVASCULAR: ICD-10-PCS | Performed by: STUDENT IN AN ORGANIZED HEALTH CARE EDUCATION/TRAINING PROGRAM

## 2024-12-27 DEVICE — STENT ONYXNG30022UX ONYX 3.00X22RX
Type: IMPLANTABLE DEVICE | Status: FUNCTIONAL
Brand: ONYX FRONTIER™

## 2024-12-27 DEVICE — STENT ONYXNG40038UX ONYX 4.00X38RX
Type: IMPLANTABLE DEVICE | Status: FUNCTIONAL
Brand: ONYX FRONTIER™

## 2024-12-27 DEVICE — STENT ONYXNG20012UX ONYX 2.00X12RX
Type: IMPLANTABLE DEVICE | Status: FUNCTIONAL
Brand: ONYX FRONTIER™

## 2024-12-27 DEVICE — STENT ONYXNG20030UX ONYX 2.00X30RX
Type: IMPLANTABLE DEVICE | Status: FUNCTIONAL
Brand: ONYX FRONTIER™

## 2024-12-27 RX ORDER — FUROSEMIDE 10 MG/ML
INJECTION INTRAMUSCULAR; INTRAVENOUS PRN
Status: DISCONTINUED | OUTPATIENT
Start: 2024-12-27 | End: 2024-12-27 | Stop reason: HOSPADM

## 2024-12-27 RX ORDER — SODIUM CHLORIDE 9 MG/ML
INJECTION, SOLUTION INTRAVENOUS PRN
Status: DISCONTINUED | OUTPATIENT
Start: 2024-12-27 | End: 2024-12-27 | Stop reason: HOSPADM

## 2024-12-27 RX ORDER — POTASSIUM CHLORIDE 1500 MG/1
40 TABLET, EXTENDED RELEASE ORAL ONCE
Status: COMPLETED | OUTPATIENT
Start: 2024-12-27 | End: 2024-12-27

## 2024-12-27 RX ORDER — SODIUM CHLORIDE 0.9 % (FLUSH) 0.9 %
5-40 SYRINGE (ML) INJECTION PRN
Status: DISCONTINUED | OUTPATIENT
Start: 2024-12-27 | End: 2024-12-27 | Stop reason: HOSPADM

## 2024-12-27 RX ORDER — HEPARIN SODIUM 10000 [USP'U]/ML
INJECTION, SOLUTION INTRAVENOUS; SUBCUTANEOUS PRN
Status: DISCONTINUED | OUTPATIENT
Start: 2024-12-27 | End: 2024-12-27 | Stop reason: HOSPADM

## 2024-12-27 RX ORDER — MORPHINE SULFATE 4 MG/ML
INJECTION, SOLUTION INTRAMUSCULAR; INTRAVENOUS PRN
Status: DISCONTINUED | OUTPATIENT
Start: 2024-12-27 | End: 2024-12-27 | Stop reason: HOSPADM

## 2024-12-27 RX ORDER — IOPAMIDOL 755 MG/ML
INJECTION, SOLUTION INTRAVASCULAR PRN
Status: DISCONTINUED | OUTPATIENT
Start: 2024-12-27 | End: 2024-12-27 | Stop reason: HOSPADM

## 2024-12-27 RX ORDER — VERAPAMIL HYDROCHLORIDE 2.5 MG/ML
INJECTION, SOLUTION INTRAVENOUS PRN
Status: DISCONTINUED | OUTPATIENT
Start: 2024-12-27 | End: 2024-12-27 | Stop reason: HOSPADM

## 2024-12-27 RX ORDER — SODIUM CHLORIDE 0.9 % (FLUSH) 0.9 %
5-40 SYRINGE (ML) INJECTION EVERY 12 HOURS SCHEDULED
Status: DISCONTINUED | OUTPATIENT
Start: 2024-12-27 | End: 2024-12-27 | Stop reason: HOSPADM

## 2024-12-27 RX ORDER — BUMETANIDE 0.5 MG/1
0.5 TABLET ORAL 2 TIMES DAILY
Qty: 60 TABLET | Refills: 1 | Status: SHIPPED | OUTPATIENT
Start: 2024-12-27

## 2024-12-27 RX ORDER — CLOPIDOGREL BISULFATE 75 MG/1
75 TABLET ORAL DAILY
Qty: 30 TABLET | Refills: 3 | Status: SHIPPED | OUTPATIENT
Start: 2024-12-28

## 2024-12-27 RX ORDER — METOPROLOL SUCCINATE 50 MG/1
50 TABLET, EXTENDED RELEASE ORAL DAILY
Qty: 30 TABLET | Refills: 1 | Status: SHIPPED | OUTPATIENT
Start: 2024-12-28

## 2024-12-27 RX ORDER — FENTANYL CITRATE 50 UG/ML
INJECTION, SOLUTION INTRAMUSCULAR; INTRAVENOUS PRN
Status: DISCONTINUED | OUTPATIENT
Start: 2024-12-27 | End: 2024-12-27 | Stop reason: HOSPADM

## 2024-12-27 RX ORDER — HEPARIN SODIUM 1000 [USP'U]/ML
INJECTION, SOLUTION INTRAVENOUS; SUBCUTANEOUS PRN
Status: DISCONTINUED | OUTPATIENT
Start: 2024-12-27 | End: 2024-12-27 | Stop reason: HOSPADM

## 2024-12-27 RX ORDER — LIDOCAINE HYDROCHLORIDE 10 MG/ML
INJECTION, SOLUTION INFILTRATION; PERINEURAL PRN
Status: DISCONTINUED | OUTPATIENT
Start: 2024-12-27 | End: 2024-12-27 | Stop reason: HOSPADM

## 2024-12-27 RX ORDER — ACETAMINOPHEN 325 MG/1
650 TABLET ORAL EVERY 4 HOURS PRN
Status: DISCONTINUED | OUTPATIENT
Start: 2024-12-27 | End: 2024-12-27 | Stop reason: HOSPADM

## 2024-12-27 RX ADMIN — PANTOPRAZOLE SODIUM 40 MG: 40 TABLET, DELAYED RELEASE ORAL at 08:53

## 2024-12-27 RX ADMIN — ASPIRIN 81 MG: 81 TABLET, COATED ORAL at 08:53

## 2024-12-27 RX ADMIN — BUMETANIDE 0.5 MG: 1 TABLET ORAL at 08:53

## 2024-12-27 RX ADMIN — METOPROLOL SUCCINATE 50 MG: 50 TABLET, EXTENDED RELEASE ORAL at 08:53

## 2024-12-27 RX ADMIN — ACETAMINOPHEN 650 MG: 325 TABLET ORAL at 15:04

## 2024-12-27 RX ADMIN — CLOPIDOGREL BISULFATE 75 MG: 75 TABLET ORAL at 08:53

## 2024-12-27 RX ADMIN — POTASSIUM CHLORIDE 40 MEQ: 1500 TABLET, EXTENDED RELEASE ORAL at 08:53

## 2024-12-27 RX ADMIN — BUMETANIDE 0.5 MG: 1 TABLET ORAL at 16:29

## 2024-12-27 RX ADMIN — HEPARIN SODIUM AND DEXTROSE 17 UNITS/KG/HR: 10000; 5 INJECTION INTRAVENOUS at 03:36

## 2024-12-27 RX ADMIN — SODIUM CHLORIDE, PRESERVATIVE FREE 10 ML: 5 INJECTION INTRAVENOUS at 08:54

## 2024-12-27 ASSESSMENT — PAIN SCALES - GENERAL: PAINLEVEL_OUTOF10: 3

## 2024-12-27 ASSESSMENT — PAIN DESCRIPTION - ORIENTATION: ORIENTATION: MID

## 2024-12-27 ASSESSMENT — PAIN DESCRIPTION - LOCATION: LOCATION: HEAD

## 2024-12-27 ASSESSMENT — PAIN - FUNCTIONAL ASSESSMENT: PAIN_FUNCTIONAL_ASSESSMENT: ACTIVITIES ARE NOT PREVENTED

## 2024-12-27 ASSESSMENT — PAIN DESCRIPTION - DESCRIPTORS: DESCRIPTORS: ACHING

## 2024-12-27 NOTE — CARE COORDINATION
Pt clear from CM standpoint.    Transition of Care Plan:    RUR: 18% \"medium risk\"  Prior Level of Functioning: Independent with ADL's  Disposition: Home with family   LIZBETH: 12/28  If SNF or IPR: Date FOC offered: N/A  Follow up appointments: PCP/Specialists as indicated  DME needed: [employer]   Transportation at discharge: Family to transport   IM/IMM Medicare/ letter given: 2nd IM given by CM on 12/27  Is patient a  and connected with [facility]? No  Caregiver Contact: Karen Hackett (spouse), [phone]  Discharge Caregiver contacted prior to discharge? To be contacted   Care Conference needed? Not at this time   Barriers to discharge: None    1346 PM: CM received call back from Bharat with [employer] noting a representative will be out this afternoon to fit pt for Lifevest. CM made unit RN aware.    1338 PM: CM contacted Bharat with [employer] to check status of Lifevest fitting and inquire if Lifevest can be fitted today. CM left voicemail. CM to provide update once made available.     1222 PM: Chart reviewed. Pt to go for PCI today. CM made unit RN that Lifevest has been approved. CM to continue to follow should CM needs arise.     12/27/24 1347   Services At/After Discharge   Transition of Care Consult (CM Consult) Discharge Planning   Services At/After Discharge DME    Resource Information Provided? No   Mode of Transport at Discharge Other (see comment)  (Pt family)   Hospital Transport Time of Discharge 1600   Confirm Follow Up Transport Family   Condition of Participation: Discharge Planning   The Plan for Transition of Care is related to the following treatment goals: Return home independently   The Patient and/or Patient Representative was provided with a Choice of Provider? Patient   The Patient and/Or Patient Representative agree with the Discharge Plan? Yes     ZAK Mahan  Care Management  [facility]   [phone]

## 2024-12-27 NOTE — PLAN OF CARE
Problem: Chronic Conditions and Co-morbidities  Goal: Patient's chronic conditions and co-morbidity symptoms are monitored and maintained or improved  12/27/2024 1701 by Indigo Pascual GN  Outcome: Adequate for Discharge  12/27/2024 0833 by Indigo Pascual GN  Outcome: Progressing     Problem: Discharge Planning  Goal: Discharge to home or other facility with appropriate resources  12/27/2024 1701 by Indigo Pascual GN  Outcome: Adequate for Discharge  12/27/2024 0833 by Indigo Pascual GN  Outcome: Progressing     Problem: Safety - Adult  Goal: Free from fall injury  12/27/2024 1701 by Indigo Pascual GN  Outcome: Adequate for Discharge  12/27/2024 0833 by Indigo Pascual GN  Outcome: Progressing  Flowsheets (Taken 12/27/2024 0400 by Kaylynn Xie, RN)  Free From Fall Injury: Instruct family/caregiver on patient safety     Problem: Pain  Goal: Verbalizes/displays adequate comfort level or baseline comfort level  12/27/2024 1701 by Indigo Pascual GN  Outcome: Adequate for Discharge  12/27/2024 0833 by Indigo Pascual GN  Outcome: Progressing  Flowsheets (Taken 12/27/2024 0330 by Kaylynn Xie, RAFAT)  Verbalizes/displays adequate comfort level or baseline comfort level: Encourage patient to monitor pain and request assistance     Problem: Skin/Tissue Integrity  Goal: Absence of new skin breakdown  Description: 1.  Monitor for areas of redness and/or skin breakdown  2.  Assess vascular access sites hourly  3.  Every 4-6 hours minimum:  Change oxygen saturation probe site  4.  Every 4-6 hours:  If on nasal continuous positive airway pressure, respiratory therapy assess nares and determine need for appliance change or resting period.  12/27/2024 1701 by Indigo Pascual GN  Outcome: Adequate for Discharge  12/27/2024 0833 by Indigo Pascual GN  Outcome: Progressing     Problem: ABCDS Injury Assessment  Goal: Absence of physical injury  12/27/2024 1701 by Indigo Pascual GN  Outcome: Adequate for

## 2024-12-27 NOTE — PLAN OF CARE
Predictive Model Details          26 (Normal)  Factor Value    Calculated 12/27/2024 08:33 49% Age 79 years old    Deterioration Index Model 16% Cardiac rhythm Sinus arrythmia;Sinus tachy     9% Respiratory rate 18     9% Pulse 101     5% Potassium abnormal (3.1 mmol/L)     4% Pulse oximetry 93 %     4% BUN abnormal (29 MG/DL)     2% Systolic 118     2% Hematocrit abnormal (33.1 %)     0% Sodium 140 mmol/L     0% Temperature 98.6 °F (37 °C)     0% WBC count 6.4 K/uL      Problem: Chronic Conditions and Co-morbidities  Goal: Patient's chronic conditions and co-morbidity symptoms are monitored and maintained or improved  12/27/2024 0833 by Indigo Pascual GN  Outcome: Progressing  12/27/2024 0046 by Kaylynn Xie RN  Outcome: Progressing  Flowsheets (Taken 12/26/2024 1936)  Care Plan - Patient's Chronic Conditions and Co-Morbidity Symptoms are Monitored and Maintained or Improved: Monitor and assess patient's chronic conditions and comorbid symptoms for stability, deterioration, or improvement     Problem: Discharge Planning  Goal: Discharge to home or other facility with appropriate resources  12/27/2024 0833 by Indigo Pascual GN  Outcome: Progressing  12/27/2024 0046 by Kaylynn Xie RN  Outcome: Progressing  Flowsheets (Taken 12/26/2024 1936)  Discharge to home or other facility with appropriate resources: Identify barriers to discharge with patient and caregiver     Problem: Safety - Adult  Goal: Free from fall injury  12/27/2024 0833 by Indigo Pascual GN  Outcome: Progressing  Flowsheets (Taken 12/27/2024 0400 by Kaylynn Xie RN)  Free From Fall Injury: Instruct family/caregiver on patient safety  12/27/2024 0046 by Kaylynn Xie RN  Outcome: Progressing  Flowsheets  Taken 12/27/2024 0045  Free From Fall Injury: Instruct family/caregiver on patient safety  Taken 12/26/2024 1936  Free From Fall Injury: Instruct family/caregiver on patient safety     Problem: Pain  Goal:  Implement safety measures based on patient assessment

## 2024-12-27 NOTE — DISCHARGE SUMMARY
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  male with PMHx as listed below presenting as transfer from [facility] after presenting with complaints of recurrent chest pain in setting of recent hospitalization where patient underwent LHC found to have multivessel CAD and was being evaluated for CABG when he elected to leave [place] reportedly due to frustration with conflicting recommendations regarding how to manage his coronary disease.  Experienced acute onset chest pain and shortness of breath starting this afternoon that is associated with orthopnea.  EKG performed at OSH concerning for STEMI with interpretation somewhat limited by rapid heart rate (A-fib RVR) although morphology similar to prior ECGs.  Was emergently transferred to our facility as a STEMI alert via helicopter after receiving heparin, diltiazem infusion, and full-strength aspirin.  Case was reviewed with on-call cardiologist (Dr. Mcgill) who recommends ongoing medical management and does not feel this represents STEMI but instead ongoing pain/insult from known multivessel CAD and recommended

## 2024-12-27 NOTE — DISCHARGE INSTRUCTIONS
tolerated    3. If you experience any of the following symptoms then please call your primary care physician or return to the emergency room if you cannot get hold of your doctor:    4. Wound Care: none     5. Lab work: cbc and bmp in 1 week    6.Bring these papers with you to your follow up appointments. The papers will help your doctors be sure to continue the care plan from the hospital.      If you have questions regarding the hospital related prescriptions or hospital related issues please call [facility] at . You can always direct your questions to your primary care doctor if you are unable to reach your hospital physician; your PCP works as an extension of your hospital doctor just like your hospital doctor is an extension of your PCP for your time at the hospital ([facility])      Follow-up with:   PCP: @PCP@  Nirav Hylton MD  [address] [place] [address]  [phone]    Schedule an appointment as soon as possible for a visit in 1 week(s)      Nirav Hylton MD  [address] [place] [address]  [phone]          Jeffrey Rodrigues MD  [address] [id number] [phone]    Schedule an appointment as soon as possible for a visit in 1 week(s)         Please call for your own appointment        Information obtained by :  I understand that if any problems occur once I am at home I am to contact my physician.    I understand and acknowledge receipt of the instructions indicated above.                                                                                                                                           Physician's or R.N.'s Signature                                                                  Date/Time                                                                                                                                              Patient or Representative Signature                                                           Date/Time

## 2024-12-27 NOTE — PROGRESS NOTES
[facility]  [address] [place] [address]  [phone]  [email or web address]  CARDIOLOGY PROGRESS NOTE    12/27/2024 11:51 AM    Admit Date: 12/22/2024    Admit Diagnosis:   Acute coronary syndrome (HCC) [I24.9]  NSTEMI (non-ST elevated myocardial infarction) (HCC) [I21.4]  Atrial fibrillation with rapid ventricular response (HCC) [I48.91]    Subjective:     Cirilo Hackett was seen and examined at the bedside. No shortness of breath or chest pain.     /68   Pulse 93   Temp 98.6 °F (37 °C) (Oral)   Resp 18   Ht 1.88 m (6' 2\")   Wt 88 kg (194 lb 0.1 oz)   SpO2 97%   BMI 24.91 kg/m²     Current Facility-Administered Medications   Medication Dose Route Frequency    morphine injection    PRN    lidocaine 1 % injection    PRN    heparin (porcine) 2,000 Units in sodium chloride 0.9% 1000 mL irrigation    PRN    verapamil (ISOPTIN) injection    PRN    nitroGLYCERIN 200 mcg    PRN    heparin (porcine) injection    PRN    furosemide (LASIX) injection    PRN    fentaNYL (SUBLIMAZE) injection    PRN    iopamidol (ISOVUE-370) 76 % injection    PRN    nitroGLYCERIN 200 mcg    PRN    metoprolol succinate (TOPROL XL) extended release tablet 50 mg  50 mg Oral Daily    clopidogrel (PLAVIX) tablet 75 mg  75 mg Oral Daily    bumetanide (BUMEX) tablet 0.5 mg  0.5 mg Oral BID    nitroGLYCERIN 200 mcg/mL in dextrose 5%  5-200 mcg/min IntraVENous Continuous    heparin (porcine) injection 4,000 Units  4,000 Units IntraVENous PRN    heparin (porcine) injection 2,000 Units  2,000 Units IntraVENous PRN    heparin 25,000 units in dextrose 5% 250 mL (premix) infusion  5-30 Units/kg/hr IntraVENous Continuous    acyclovir (ZOVIRAX) tablet 400 mg  400 mg Oral Q48H    aspirin EC tablet 81 mg  81 mg Oral Daily    pantoprazole (PROTONIX) tablet 40 mg  40 mg Oral Daily    rosuvastatin (CRESTOR) tablet 20 mg  20 mg Oral Nightly    insulin lispro (HUMALOG,ADMELOG) injection vial 0-8  1109.72 ml   Output 1260 ml   Net -150.28 ml      Telemetry: Sinus rhythm    Assessment:     Principal Problem:    NSTEMI (non-ST elevated myocardial infarction) (HCC)  Resolved Problems:    * No resolved hospital problems. *      Plan:     Potassium replacement requested  Will add on magnesium level  PCI today    Lalita Henson MD

## 2024-12-27 NOTE — PLAN OF CARE
Progressing  Flowsheets  Taken 12/27/2024 0045  Free From Fall Injury: Instruct family/caregiver on patient safety  Taken 12/26/2024 1936  Free From Fall Injury: Instruct family/caregiver on patient safety  12/26/2024 1158 by Huong Burris RN  Outcome: Progressing  12/26/2024 1157 by Huong Burris RN  Outcome: Progressing     Problem: Pain  Goal: Verbalizes/displays adequate comfort level or baseline comfort level  12/27/2024 0046 by Kaylynn Xie RN  Outcome: Progressing  Flowsheets  Taken 12/27/2024 0000  Verbalizes/displays adequate comfort level or baseline comfort level: Encourage patient to monitor pain and request assistance  Taken 12/26/2024 1936  Verbalizes/displays adequate comfort level or baseline comfort level:   Encourage patient to monitor pain and request assistance   Assess pain using appropriate pain scale   Administer analgesics based on type and severity of pain and evaluate response   Implement non-pharmacological measures as appropriate and evaluate response   Consider cultural and social influences on pain and pain management  12/26/2024 1158 by Huong Burris RN  Outcome: Progressing  12/26/2024 1157 by Huong Burris RN  Outcome: Progressing     Problem: Skin/Tissue Integrity  Goal: Absence of new skin breakdown  Description: 1.  Monitor for areas of redness and/or skin breakdown  2.  Assess vascular access sites hourly  3.  Every 4-6 hours minimum:  Change oxygen saturation probe site  4.  Every 4-6 hours:  If on nasal continuous positive airway pressure, respiratory therapy assess nares and determine need for appliance change or resting period.  12/27/2024 0046 by Kaylynn Xie RN  Outcome: Progressing  12/26/2024 1158 by Huong Burris RN  Outcome: Progressing  12/26/2024 1157 by Huong Burris RN  Outcome: Progressing     Problem: ABCDS Injury Assessment  Goal: Absence of physical injury  12/27/2024 0046 by Kaylynn Xie  RN  Outcome: Progressing  Flowsheets (Taken 12/26/2024 1936)  Absence of Physical Injury: Implement safety measures based on patient assessment  12/26/2024 1158 by Huong Burris RN  Outcome: Progressing  12/26/2024 1157 by Huong Burris, RN  Outcome: Progressing

## 2024-12-27 NOTE — PROGRESS NOTES
PCP hospital follow-up transitional care appointment has been scheduled with Dr. Nirav Hylton on 12/30/24 1115. [facility] information on AVS for patient resource.   Pending patient discharge.

## 2024-12-27 NOTE — CARDIO/PULMONARY
Chart reviewed: Patient is 79 y.o. male admitted with Acute coronary syndrome (HCC) [I24.9]  NSTEMI (non-ST elevated myocardial infarction) (HCC) [I21.4]  Atrial fibrillation with rapid ventricular response (HCC) [I48.91]    LVEF 25-30%.     Education: MI education folder, with catheterization brochure, to bedside of Cirilo Hackett.      CHF teaching packet also provided.                                               Educated using teach back method. Reviewed MI diagnosis definition and purpose of intervention.  Discussed risk factors for CAD to include the following: family history, elevated BMI, hyperlipidemia, hypertension, diabetes, stress, and smoking. He is a former smoker. Emphasized the value of cardiac rehab. Discussed Cardiac Rehab Program format, benefits, and encouraged enrollment to assist with risk modification and management. Pt politely declined as he resides in [place], [place] Too far to travel.      Cirilo Hackett verbalized understanding with questions answered.     TERESITA FRANCO RN

## 2024-12-27 NOTE — PROGRESS NOTES
Physician Progress Note      PATIENT:               AMANUEL ACEVES  CSN #:                  [id number]  :                       1945  ADMIT DATE:       2024 7:16 PM  DISCH DATE:  RESPONDING  PROVIDER #:        Lalita Henson MD          QUERY TEXT:    Pt admitted with an  NSTEMI. and has  systolic   CHF documented. If possible,   please document in progress notes and discharge summary further specificity   regarding the type and acuity of CHF:    The medical record reflects the following:  Risk Factors: ASHD;  Clinical Indicators  BNP   12,273  and  8084;  :  Ef 25-30% Ischemic cardiomyopathy,   repeat echocardiogram yesterday EF   further declining 15-20%.  no  JVD,  edema,  rale  ofr  edema  Treatment: Bumex0.5mg  IV  x  1  then  Bumex  0.5mg  po  BID    Thank you  very  much  Options provided:  -- Acute on Chronic Systolic CHF/HFrEF  -- Acute Systolic CHF/HFrEF  -- Chronic Systolic CHF/HFrEF  -- Other - I will add my own diagnosis  -- Disagree - Not applicable / Not valid  -- Disagree - Clinically unable to determine / Unknown  -- Refer to Clinical Documentation Reviewer    PROVIDER RESPONSE TEXT:    This patient is in acute systolic CHF/HFrEF.    Query created by: Charlotte Dunbar on 2024 9:28 AM      Electronically signed by:  Lalita Henson MD 2024 7:42 AM

## 2024-12-27 NOTE — PROGRESS NOTES
End of Shift Note    Bedside shift change report given to SILVIO (oncoming nurse) by ION Hammer (offgoing nurse).  Report included the following information SBAR    Shift worked:  Day     Shift summary and any significant changes:     0950 Pt departed IVCU for Cath Lab. Discontinued Heparin and Nitro gtts.    1200 Patient arrived to IVCU from Cath Lab. Right radial, TR band at 10 cc, CDI, no bleeding/hematoma. Immobilizer in place. VSS, Sinus arrhythmia, RA, Afebrile. Patient on bedrest.       1448 TR Band discontinued. Applied quickclot and tegaderm. Site is CDI, no bleeding/no hematoma. Immobilizer in place. Bedrest ended.    [employer] rep at bedside. Patient fitted and wearing lifevest.    1600 Reviewed discharge paperwork with Patient and family members, verbalized understanding of discharge medications, post procedure care and follow up appointments. Patient out of bed and ambulating. Vital signs stable. Right ulnar sites clean dry and intact with no bleeding or hematoma.    Patient declined to stay for remainder of monitoring time.     Concerns for physician to address:  None     Zone phone for oncoming shift:  [phone]       Activity:  Level of Assistance: Moderate assist, patient does 50-74%  Number times ambulated in hallways past shift: 0  Number of times OOB to chair past shift: 0    Cardiac:   Cardiac Monitoring: Yes      Cardiac Rhythm: Sinus arrythmia, Sinus tachy    Access:  Current line(s): PIV     Genitourinary:   Urinary Status: Voiding, External catheter    Respiratory:   O2 Device: None (Room air)  Chronic home O2 use?: NO  Incentive spirometer at bedside: YES    GI:  Last BM (including prior to admit): 12/26/24  Current diet:  Diet NPO Exceptions are: Sips of Water with Meds  Passing flatus: YES    Pain Management:   Patient states pain is manageable on current regimen: YES    Skin:  Presley Scale Score: 19  Interventions: Wound Offloading (Prevention Methods): Pillows, Repositioning    Patient

## 2024-12-27 NOTE — PROGRESS NOTES
Sent for patient to have MRI Cardiac. Per RN, patient going to cath lab and is refusing MRI due to it only being needed for a CABG workup which is no longer needed. Will cancel order.

## 2024-12-28 LAB
BACTERIA SPEC CULT: NORMAL
BACTERIA SPEC CULT: NORMAL
SERVICE CMNT-IMP: NORMAL
SERVICE CMNT-IMP: NORMAL

## 2024-12-30 ENCOUNTER — OFFICE VISIT (OUTPATIENT)
Age: 79
End: 2024-12-30

## 2024-12-30 VITALS
HEIGHT: 74 IN | HEART RATE: 55 BPM | SYSTOLIC BLOOD PRESSURE: 115 MMHG | OXYGEN SATURATION: 98 % | WEIGHT: 201 LBS | RESPIRATION RATE: 18 BRPM | DIASTOLIC BLOOD PRESSURE: 81 MMHG | TEMPERATURE: 98 F | BODY MASS INDEX: 25.8 KG/M2

## 2024-12-30 DIAGNOSIS — I50.20 NYHA CLASS 3 HEART FAILURE WITH REDUCED EJECTION FRACTION (HCC): ICD-10-CM

## 2024-12-30 DIAGNOSIS — I48.0 PAROXYSMAL ATRIAL FIBRILLATION (HCC): ICD-10-CM

## 2024-12-30 DIAGNOSIS — I25.111 ATHEROSCLEROSIS OF NATIVE CORONARY ARTERY OF NATIVE HEART WITH ANGINA PECTORIS WITH DOCUMENTED SPASM (HCC): Primary | ICD-10-CM

## 2024-12-30 DIAGNOSIS — I63.419 CEREBROVASCULAR ACCIDENT (CVA) DUE TO EMBOLISM OF MIDDLE CEREBRAL ARTERY, UNSPECIFIED BLOOD VESSEL LATERALITY (HCC): ICD-10-CM

## 2024-12-30 SDOH — ECONOMIC STABILITY: FOOD INSECURITY: WITHIN THE PAST 12 MONTHS, THE FOOD YOU BOUGHT JUST DIDN'T LAST AND YOU DIDN'T HAVE MONEY TO GET MORE.: NEVER TRUE

## 2024-12-30 SDOH — ECONOMIC STABILITY: INCOME INSECURITY: HOW HARD IS IT FOR YOU TO PAY FOR THE VERY BASICS LIKE FOOD, HOUSING, MEDICAL CARE, AND HEATING?: NOT HARD AT ALL

## 2024-12-30 SDOH — ECONOMIC STABILITY: FOOD INSECURITY: WITHIN THE PAST 12 MONTHS, YOU WORRIED THAT YOUR FOOD WOULD RUN OUT BEFORE YOU GOT MONEY TO BUY MORE.: NEVER TRUE

## 2024-12-30 ASSESSMENT — PATIENT HEALTH QUESTIONNAIRE - PHQ9
8. MOVING OR SPEAKING SO SLOWLY THAT OTHER PEOPLE COULD HAVE NOTICED. OR THE OPPOSITE, BEING SO FIGETY OR RESTLESS THAT YOU HAVE BEEN MOVING AROUND A LOT MORE THAN USUAL: NOT AT ALL
4. FEELING TIRED OR HAVING LITTLE ENERGY: NOT AT ALL
SUM OF ALL RESPONSES TO PHQ QUESTIONS 1-9: 4
SUM OF ALL RESPONSES TO PHQ9 QUESTIONS 1 & 2: 4
3. TROUBLE FALLING OR STAYING ASLEEP: NOT AT ALL
1. LITTLE INTEREST OR PLEASURE IN DOING THINGS: SEVERAL DAYS
SUM OF ALL RESPONSES TO PHQ QUESTIONS 1-9: 4
5. POOR APPETITE OR OVEREATING: NOT AT ALL
6. FEELING BAD ABOUT YOURSELF - OR THAT YOU ARE A FAILURE OR HAVE LET YOURSELF OR YOUR FAMILY DOWN: NOT AT ALL
SUM OF ALL RESPONSES TO PHQ QUESTIONS 1-9: 4
SUM OF ALL RESPONSES TO PHQ QUESTIONS 1-9: 4
9. THOUGHTS THAT YOU WOULD BE BETTER OFF DEAD, OR OF HURTING YOURSELF: NOT AT ALL
10. IF YOU CHECKED OFF ANY PROBLEMS, HOW DIFFICULT HAVE THESE PROBLEMS MADE IT FOR YOU TO DO YOUR WORK, TAKE CARE OF THINGS AT HOME, OR GET ALONG WITH OTHER PEOPLE: NOT DIFFICULT AT ALL
2. FEELING DOWN, DEPRESSED OR HOPELESS: NEARLY EVERY DAY
7. TROUBLE CONCENTRATING ON THINGS, SUCH AS READING THE NEWSPAPER OR WATCHING TELEVISION: NOT AT ALL

## 2024-12-30 NOTE — PROGRESS NOTES
Cirilo Hackett is a 79 y.o. male presenting for/with:    Chief Complaint   Patient presents with    Follow-Up from [facility]-12/22/2412/27/24 NSTEMI       Vitals:    12/30/24 1421   BP: 115/81   Site: Left Upper Arm   Position: Sitting   Cuff Size: Medium Adult   Pulse: 55   Resp: 18   Temp: 98 °F (36.7 °C)   TempSrc: Temporal   SpO2: 98%   Weight: 91.2 kg (201 lb)   Height: 1.88 m (6' 2\")       Pain Scale: 0 - No pain/10  Pain Location:     \"Have you been to the ER, urgent care clinic since your last visit?  Hospitalized since your last visit?\"    12/22/2024-12/27/2024-[facility] -NSTEMI    “Have you seen or consulted any other health care providers outside of [facility] since your last visit?”    NO                 12/30/2024     2:18 PM   PHQ-9    Little interest or pleasure in doing things 1   Feeling down, depressed, or hopeless 3   Trouble falling or staying asleep, or sleeping too much 0   Feeling tired or having little energy 0   Poor appetite or overeating 0   Feeling bad about yourself - or that you are a failure or have let yourself or your family down 0   Trouble concentrating on things, such as reading the newspaper or watching television 0   Moving or speaking so slowly that other people could have noticed. Or the opposite - being so fidgety or restless that you have been moving around a lot more than usual 0   Thoughts that you would be better off dead, or of hurting yourself in some way 0   PHQ-2 Score 4   PHQ-9 Total Score 4   If you checked off any problems, how difficult have these problems made it for you to do your work, take care of things at home, or get along with other people? 0           12/30/2024     3:20 PM 7/21/2023     7:50 AM 5/26/2022    12:00 AM   [facility] AMB LEARNING ASSESSMENT   Primary Learner Patient Patient Patient   Primary Language ENGLISH ENGLISH ENGLISH   Learning Preference DEMONSTRATION DEMONSTRATION READING 
[DISCONTINUED] sodium chloride flush 0.9 % injection 5-40 mL       [DISCONTINUED] sodium chloride flush 0.9 % injection 5-40 mL       [DISCONTINUED] 0.9 % sodium chloride infusion       [DISCONTINUED] ondansetron (ZOFRAN-ODT) disintegrating tablet 4 mg       [DISCONTINUED] ondansetron (ZOFRAN) injection 4 mg       [DISCONTINUED] polyethylene glycol (GLYCOLAX) packet 17 g       [DISCONTINUED] acetaminophen (TYLENOL) tablet 650 mg       [DISCONTINUED] acetaminophen (TYLENOL) suppository 650 mg       [DISCONTINUED] melatonin tablet 3 mg       [DISCONTINUED] insulin glargine (LANTUS) injection vial 20 Units        No facility-administered encounter medications on file as of 12/30/2024.         Past Medical History:   Diagnosis Date    Abnormal nuclear cardiac imaging test 4/12/2018    CAD (coronary artery disease)     Dyslipidemia     Elevated transaminase level 09/2016    GERD (gastroesophageal reflux disease)     HTN (hypertension)     Psoriasis     S/P cardiac cath 10/12/2018    10/11/18 PTCA D1, neg FFR to LCX and RCA     S/P coronary artery stent placement 4/12/2018    T2DM (type 2 diabetes mellitus) (HCC) 09/2016    A1c 6.4;  Glucose 195         ROS:  Denies fever, nausea, vomiting, or diarrhea.  Denies wt loss, wt gain, hemoptysis, hematochezia or melena.    Physical Examination:    /81 (Site: Left Upper Arm, Position: Sitting, Cuff Size: Medium Adult)   Pulse 55   Temp 98 °F (36.7 °C) (Temporal)   Resp 18   Ht 1.88 m (6' 2\")   Wt 91.2 kg (201 lb)   SpO2 98%   BMI 25.81 kg/m²    General:  Alert, cooperative, no distress.    Head:  Normocephalic, without obvious abnormality, atraumatic.   Eyes:  Conjunctivae/corneas clear. Pupils equal, round, reactive to light.    Chest: Faint crackles in both lung bases   Cardiac: RRR.  At times rapid and irregular   Abdomen:  Extremities:  Skin:  +BS, soft and NT without palpable mass    No edema   No rash      The patient (or guardian, if applicable) and other

## 2025-01-08 ENCOUNTER — HOSPITAL ENCOUNTER (OUTPATIENT)
Facility: HOSPITAL | Age: 80
Discharge: HOME | End: 2025-01-11
Payer: MEDICARE

## 2025-01-08 DIAGNOSIS — I21.4 NSTEMI (NON-ST ELEVATED MYOCARDIAL INFARCTION) (HCC): ICD-10-CM

## 2025-01-08 LAB
ECHO AO ASC DIAM: 3.4 CM
ECHO AO ROOT DIAM: 2.7 CM
ECHO AV AREA PEAK VELOCITY: 1.4 CM2
ECHO AV AREA VTI: 1.5 CM2
ECHO AV MEAN GRADIENT: 9 MMHG
ECHO AV MEAN VELOCITY: 1.4 M/S
ECHO AV PEAK GRADIENT: 17 MMHG
ECHO AV PEAK VELOCITY: 2.1 M/S
ECHO AV VELOCITY RATIO: 0.48
ECHO AV VTI: 34.8 CM
ECHO EST RA PRESSURE: 3 MMHG
ECHO LA DIAMETER: 4.1 CM
ECHO LA TO AORTIC ROOT RATIO: 1.52
ECHO LA VOL A-L A2C: 67 ML (ref 18–58)
ECHO LA VOL A-L A4C: 54 ML (ref 18–58)
ECHO LA VOL MOD A2C: 63 ML (ref 18–58)
ECHO LA VOL MOD A4C: 49 ML (ref 18–58)
ECHO LA VOLUME AREA LENGTH: 61 ML
ECHO LV E' LATERAL VELOCITY: 11.07 CM/S
ECHO LV E' SEPTAL VELOCITY: 5.63 CM/S
ECHO LV EF PHYSICIAN: 30 %
ECHO LV FRACTIONAL SHORTENING: 27 % (ref 28–44)
ECHO LV INTERNAL DIMENSION DIASTOLIC: 4.8 CM (ref 4.2–5.9)
ECHO LV INTERNAL DIMENSION SYSTOLIC: 3.5 CM
ECHO LV IVSD: 1 CM (ref 0.6–1)
ECHO LV MASS 2D: 194 G (ref 88–224)
ECHO LV POSTERIOR WALL DIASTOLIC: 1.2 CM (ref 0.6–1)
ECHO LV RELATIVE WALL THICKNESS RATIO: 0.5
ECHO LVOT AREA: 2.8 CM2
ECHO LVOT AV VTI INDEX: 0.51
ECHO LVOT DIAM: 1.9 CM
ECHO LVOT MEAN GRADIENT: 2 MMHG
ECHO LVOT PEAK GRADIENT: 4 MMHG
ECHO LVOT PEAK VELOCITY: 1 M/S
ECHO LVOT SV: 50.7 ML
ECHO LVOT VTI: 17.9 CM
ECHO MV A VELOCITY: 0.78 M/S
ECHO MV E DECELERATION TIME (DT): 200.8 MS
ECHO MV E VELOCITY: 0.63 M/S
ECHO MV E/A RATIO: 0.81
ECHO MV E/E' LATERAL: 5.69
ECHO MV E/E' RATIO (AVERAGED): 8.44
ECHO MV E/E' SEPTAL: 11.19
ECHO PULMONARY ARTERY SYSTOLIC PRESSURE (PASP): 17 MMHG
ECHO PV MAX VELOCITY: 1.2 M/S
ECHO PV PEAK GRADIENT: 5 MMHG
ECHO RA AREA 4C: 18.4 CM2
ECHO RIGHT VENTRICULAR SYSTOLIC PRESSURE (RVSP): 17 MMHG
ECHO RV BASAL DIMENSION: 3.6 CM
ECHO RV FREE WALL PEAK S': 15.8 CM/S
ECHO RV TAPSE: 2.1 CM (ref 1.7–?)
ECHO TV REGURGITANT MAX VELOCITY: 1.84 M/S
ECHO TV REGURGITANT PEAK GRADIENT: 14 MMHG

## 2025-01-08 PROCEDURE — 93306 TTE W/DOPPLER COMPLETE: CPT | Performed by: INTERNAL MEDICINE

## 2025-01-08 PROCEDURE — 93306 TTE W/DOPPLER COMPLETE: CPT

## 2025-01-16 PROBLEM — I20.9 ANGINA, CLASS III (HCC): Status: RESOLVED | Noted: 2018-04-12 | Resolved: 2025-01-16

## 2025-01-16 PROBLEM — E78.00 HYPERCHOLESTEROLEMIA: Status: ACTIVE | Noted: 2025-01-16

## 2025-01-16 PROBLEM — I50.21 ACUTE HFREF (HEART FAILURE WITH REDUCED EJECTION FRACTION) (HCC): Status: ACTIVE | Noted: 2025-01-16

## 2025-01-16 PROBLEM — L03.90 CELLULITIS: Status: RESOLVED | Noted: 2019-07-08 | Resolved: 2025-01-16

## 2025-01-16 PROBLEM — Z01.810 PREOP CARDIOVASCULAR EXAM: Status: RESOLVED | Noted: 2024-12-20 | Resolved: 2025-01-16

## 2025-01-16 PROBLEM — E11.9 TYPE 2 DIABETES MELLITUS WITHOUT COMPLICATION, WITHOUT LONG-TERM CURRENT USE OF INSULIN (HCC): Status: RESOLVED | Noted: 2017-06-23 | Resolved: 2025-01-16

## 2025-01-16 PROBLEM — R93.1 ABNORMAL NUCLEAR CARDIAC IMAGING TEST: Status: RESOLVED | Noted: 2018-04-12 | Resolved: 2025-01-16

## 2025-01-16 PROBLEM — Z95.5 S/P CORONARY ARTERY STENT PLACEMENT: Status: RESOLVED | Noted: 2018-04-12 | Resolved: 2025-01-16

## 2025-01-16 PROBLEM — I21.4 NSTEMI (NON-ST ELEVATED MYOCARDIAL INFARCTION) (HCC): Status: RESOLVED | Noted: 2024-12-19 | Resolved: 2025-01-16

## 2025-01-16 PROBLEM — I25.10 CORONARY ARTERY DISEASE INVOLVING NATIVE CORONARY ARTERY OF NATIVE HEART WITHOUT ANGINA PECTORIS: Status: ACTIVE | Noted: 2018-04-26

## 2025-01-16 PROBLEM — E78.2 MIXED HYPERLIPIDEMIA: Status: RESOLVED | Noted: 2017-06-26 | Resolved: 2025-01-16

## 2025-01-16 PROBLEM — I25.5 ISCHEMIC CARDIOMYOPATHY: Status: ACTIVE | Noted: 2025-01-16

## 2025-01-16 PROBLEM — E87.20 LACTIC ACIDOSIS: Status: RESOLVED | Noted: 2019-07-06 | Resolved: 2025-01-16

## 2025-01-16 PROBLEM — Z98.890 S/P CARDIAC CATH: Status: RESOLVED | Noted: 2018-10-12 | Resolved: 2025-01-16

## 2025-01-16 PROBLEM — L03.119 CELLULITIS AND ABSCESS OF HAND: Status: RESOLVED | Noted: 2019-07-06 | Resolved: 2025-01-16

## 2025-01-16 PROBLEM — L02.519 CELLULITIS AND ABSCESS OF HAND: Status: RESOLVED | Noted: 2019-07-06 | Resolved: 2025-01-16

## 2025-01-16 NOTE — PROGRESS NOTES
ASSESSMENT and PLAN  1. Coronary artery disease involving native coronary artery of native heart without angina pectoris  - s/p mid LAD PCI/DEMI and proximal LCx PCI/DEMI, 4/12/2018  - s/p D1 POBA, 10/11/2018  - s/p mid LAD in-stent PCI/DEMI, mid RCA PCI/DEMI and RPL PCI/DEMI x 2 on 12/27/2024  -Continue current cardioprotective medications and optimize risk factor modification efforts.    2. Acute coronary syndrome (HCC)  Peak high-sensitivity troponin 30,837 on 12/20/2024.  s/p multivessel PCI (mid LAD in-stent PCI/DEMI, mid RCA PCI/DEMI and RPL PCI/DEMI x 2), 12/27/2024.  Has been off of antiplatelet therapy for 2 weeks.  I instructed him to reinstitute clopidogrel with a 300 mg loading dose followed by 75 mg daily.  Continue clopidogrel monotherapy due to concomitant OAC.  Refer for phase 2 cardiac rehab at [facility]    3. Ischemic cardiomyopathy  Juan EF 25-30% with multiple wall motion abnormalities on echo 12/23/2024.  s/p multivessel PCI 12/27/2024.  EF 30-35% on echo 1/8/2025.  Optimize GDMT for HFrEF and LV dysfunction (see below) and plan repeat echo in 3 months.  Consider primary prevention ICD if EF remains <35%.    4. Acute HFrEF (heart failure with reduced ejection fraction) (HCC)  proBNP 12,273 on 12/22/2024.  Euvolemic by exam.  Continue Entresto and metoprolol succinate.  I recommended adding Jardiance 10 mg daily to optimize HFrEF GDMT.  He will consider but wants to talk with Dr. Hylton first.  Consider spironolactone as BP allows if renal function remained stable.    5. Atrial arrhythmia  Frequent PACs and short atrial runs on telemetry and ECG but no clear atrial fibrillation/flutter noted.  Recently empirically anticoagulated due to neurologic symptoms and concern for atrial fibrillation.  Place 2-week monitor to screen for atrial fibrillation.  May need ILR.    6. Primary hypertension  Well-controlled.    7. Hypercholesterolemia  Labs 12/19/2024 demonstrated total cholesterol 191,

## 2025-01-22 ENCOUNTER — OFFICE VISIT (OUTPATIENT)
Age: 80
End: 2025-01-22
Payer: MEDICARE

## 2025-01-22 VITALS
RESPIRATION RATE: 16 BRPM | SYSTOLIC BLOOD PRESSURE: 108 MMHG | OXYGEN SATURATION: 99 % | DIASTOLIC BLOOD PRESSURE: 70 MMHG | HEART RATE: 85 BPM | WEIGHT: 197 LBS | BODY MASS INDEX: 25.28 KG/M2 | TEMPERATURE: 98 F | HEIGHT: 74 IN

## 2025-01-22 DIAGNOSIS — E78.00 HYPERCHOLESTEROLEMIA: ICD-10-CM

## 2025-01-22 DIAGNOSIS — I10 PRIMARY HYPERTENSION: ICD-10-CM

## 2025-01-22 DIAGNOSIS — I49.8 ATRIAL ARRHYTHMIA: ICD-10-CM

## 2025-01-22 DIAGNOSIS — I50.21 ACUTE HFREF (HEART FAILURE WITH REDUCED EJECTION FRACTION) (HCC): ICD-10-CM

## 2025-01-22 DIAGNOSIS — I24.9 ACUTE CORONARY SYNDROME (HCC): ICD-10-CM

## 2025-01-22 DIAGNOSIS — I25.5 ISCHEMIC CARDIOMYOPATHY: ICD-10-CM

## 2025-01-22 DIAGNOSIS — I25.10 CORONARY ARTERY DISEASE INVOLVING NATIVE CORONARY ARTERY OF NATIVE HEART WITHOUT ANGINA PECTORIS: Primary | ICD-10-CM

## 2025-01-22 PROCEDURE — G8419 CALC BMI OUT NRM PARAM NOF/U: HCPCS | Performed by: INTERNAL MEDICINE

## 2025-01-22 PROCEDURE — 3078F DIAST BP <80 MM HG: CPT | Performed by: INTERNAL MEDICINE

## 2025-01-22 PROCEDURE — 99204 OFFICE O/P NEW MOD 45 MIN: CPT | Performed by: INTERNAL MEDICINE

## 2025-01-22 PROCEDURE — 1123F ACP DISCUSS/DSCN MKR DOCD: CPT | Performed by: INTERNAL MEDICINE

## 2025-01-22 PROCEDURE — 1126F AMNT PAIN NOTED NONE PRSNT: CPT | Performed by: INTERNAL MEDICINE

## 2025-01-22 PROCEDURE — 3074F SYST BP LT 130 MM HG: CPT | Performed by: INTERNAL MEDICINE

## 2025-01-22 PROCEDURE — 1036F TOBACCO NON-USER: CPT | Performed by: INTERNAL MEDICINE

## 2025-01-22 PROCEDURE — G8428 CUR MEDS NOT DOCUMENT: HCPCS | Performed by: INTERNAL MEDICINE

## 2025-01-22 PROCEDURE — 1111F DSCHRG MED/CURRENT MED MERGE: CPT | Performed by: INTERNAL MEDICINE

## 2025-01-22 ASSESSMENT — PATIENT HEALTH QUESTIONNAIRE - PHQ9
SUM OF ALL RESPONSES TO PHQ QUESTIONS 1-9: 0
SUM OF ALL RESPONSES TO PHQ QUESTIONS 1-9: 0
1. LITTLE INTEREST OR PLEASURE IN DOING THINGS: NOT AT ALL
SUM OF ALL RESPONSES TO PHQ QUESTIONS 1-9: 0
SUM OF ALL RESPONSES TO PHQ QUESTIONS 1-9: 0
SUM OF ALL RESPONSES TO PHQ9 QUESTIONS 1 & 2: 0
2. FEELING DOWN, DEPRESSED OR HOPELESS: NOT AT ALL

## 2025-01-22 NOTE — PROGRESS NOTES
Identified pt with two pt identifiers(name and ). Reviewed record in preparation for visit and have obtained necessary documentation.  No chief complaint on file.     There were no vitals taken for this visit.      Medications reviewed/approved by provider.      Health Maintenance Review: Patient reminded of \"due or due soon\" health maintenance. I have asked the patient to contact his/her primary care provider (PCP) for follow-up on his/her health maintenance.    Coordination of Care Questionnaire:  :   1) Have you been to an emergency room, urgent care, or hospitalized since your last visit?  If yes, where when, and reason for visit? no       2. Have seen or consulted any other health care provider since your last visit?   If yes, where when, and reason for visit?  no      Patient is accompanied by spouse I have received verbal consent from Cirilo Hackett to discuss any/all medical information while they are present in the room.

## 2025-01-22 NOTE — PATIENT INSTRUCTIONS
Please reinstitute the Plavix.  Take 300 mg (4 tablets) today and 75 mg daily beginning tomorrow.  I have ordered a 2-week monitor to evaluate your heart rhythm and screen for atrial fibrillation or atrial flutter.  Follow-up with me in 3 months with an echocardiogram a few days prior to that visit for reevaluation of your heart function.  We discussed adding Jardiance 10 mg daily to optimize your medications for reduced heart function and heart failure.  Please discuss this with Dr. Hylton.  We discussed adding Zetia 10 mg daily to further lower your cholesterol.  Please discuss this with Dr. Hylton.  Referral has been placed for phase 2 cardiac rehab at [facility].

## 2025-01-23 ENCOUNTER — CLINICAL DOCUMENTATION (OUTPATIENT)
Age: 80
End: 2025-01-23

## 2025-01-23 NOTE — PROGRESS NOTES
Patient asked that the order for cardiac rehab be placed on hold. He will let me know when he is ready to proceed.

## 2025-02-18 DIAGNOSIS — E11.9 TYPE 2 DIABETES MELLITUS WITHOUT COMPLICATIONS (HCC): ICD-10-CM

## 2025-02-18 DIAGNOSIS — I10 ESSENTIAL (PRIMARY) HYPERTENSION: ICD-10-CM

## 2025-02-18 RX ORDER — INSULIN GLARGINE 100 [IU]/ML
INJECTION, SOLUTION SUBCUTANEOUS
Qty: 15 ML | Refills: 12 | Status: SHIPPED | OUTPATIENT
Start: 2025-02-18

## 2025-03-04 DIAGNOSIS — I48.0 PAROXYSMAL ATRIAL FIBRILLATION (HCC): ICD-10-CM

## 2025-03-04 RX ORDER — APIXABAN 5 MG/1
5 TABLET, FILM COATED ORAL 2 TIMES DAILY
Qty: 60 TABLET | Refills: 11 | Status: SHIPPED | OUTPATIENT
Start: 2025-03-04

## 2025-03-11 RX ORDER — METOPROLOL SUCCINATE 50 MG/1
50 TABLET, EXTENDED RELEASE ORAL DAILY
Qty: 30 TABLET | Refills: 0 | Status: SHIPPED | OUTPATIENT
Start: 2025-03-11

## 2025-04-09 RX ORDER — METOPROLOL SUCCINATE 50 MG/1
50 TABLET, EXTENDED RELEASE ORAL DAILY
Qty: 90 TABLET | Refills: 3 | Status: SHIPPED | OUTPATIENT
Start: 2025-04-09

## 2025-04-22 ENCOUNTER — RESULTS FOLLOW-UP (OUTPATIENT)
Age: 80
End: 2025-04-22

## 2025-04-22 ENCOUNTER — HOSPITAL ENCOUNTER (OUTPATIENT)
Facility: HOSPITAL | Age: 80
Discharge: HOME | End: 2025-04-25
Attending: INTERNAL MEDICINE
Payer: MEDICARE

## 2025-04-22 DIAGNOSIS — I25.5 ISCHEMIC CARDIOMYOPATHY: ICD-10-CM

## 2025-04-22 LAB
ECHO AO ASC DIAM: 3.2 CM
ECHO AO ROOT DIAM: 2.8 CM
ECHO AV AREA PEAK VELOCITY: 1.9 CM2
ECHO AV AREA VTI: 2 CM2
ECHO AV MEAN GRADIENT: 7 MMHG
ECHO AV MEAN VELOCITY: 1.2 M/S
ECHO AV PEAK GRADIENT: 12 MMHG
ECHO AV PEAK VELOCITY: 1.7 M/S
ECHO AV VELOCITY RATIO: 0.59
ECHO AV VTI: 31.7 CM
ECHO LA DIAMETER: 3.6 CM
ECHO LA TO AORTIC ROOT RATIO: 1.29
ECHO LA VOL A-L A2C: 43 ML (ref 18–58)
ECHO LA VOL A-L A4C: 62 ML (ref 18–58)
ECHO LA VOL MOD A2C: 40 ML (ref 18–58)
ECHO LA VOL MOD A4C: 57 ML (ref 18–58)
ECHO LA VOLUME AREA LENGTH: 55 ML
ECHO LV E' LATERAL VELOCITY: 9.45 CM/S
ECHO LV E' SEPTAL VELOCITY: 6.78 CM/S
ECHO LV EDV A2C: 84 ML
ECHO LV EDV A4C: 95 ML
ECHO LV EDV BP: 91 ML (ref 67–155)
ECHO LV EF PHYSICIAN: 65 %
ECHO LV EJECTION FRACTION A2C: 63 %
ECHO LV EJECTION FRACTION A4C: 52 %
ECHO LV EJECTION FRACTION BIPLANE: 57 % (ref 55–100)
ECHO LV ESV A2C: 31 ML
ECHO LV ESV A4C: 46 ML
ECHO LV ESV BP: 39 ML (ref 22–58)
ECHO LV FRACTIONAL SHORTENING: 34 % (ref 28–44)
ECHO LV INTERNAL DIMENSION DIASTOLIC: 3.5 CM (ref 4.2–5.9)
ECHO LV INTERNAL DIMENSION SYSTOLIC: 2.3 CM
ECHO LV IVSD: 1.3 CM (ref 0.6–1)
ECHO LV MASS 2D: 183 G (ref 88–224)
ECHO LV POSTERIOR WALL DIASTOLIC: 1.6 CM (ref 0.6–1)
ECHO LV RELATIVE WALL THICKNESS RATIO: 0.91
ECHO LVOT AREA: 3.5 CM2
ECHO LVOT AV VTI INDEX: 0.58
ECHO LVOT DIAM: 2.1 CM
ECHO LVOT MEAN GRADIENT: 2 MMHG
ECHO LVOT PEAK GRADIENT: 4 MMHG
ECHO LVOT PEAK VELOCITY: 1 M/S
ECHO LVOT SV: 63.7 ML
ECHO LVOT VTI: 18.4 CM
ECHO MV A VELOCITY: 0.77 M/S
ECHO MV E DECELERATION TIME (DT): 202.4 MS
ECHO MV E VELOCITY: 0.63 M/S
ECHO MV E/A RATIO: 0.82
ECHO MV E/E' LATERAL: 6.67
ECHO MV E/E' RATIO (AVERAGED): 7.98
ECHO MV E/E' SEPTAL: 9.29
ECHO RA AREA 4C: 12.3 CM2
ECHO RV BASAL DIMENSION: 3 CM
ECHO RV TAPSE: 1.4 CM (ref 1.7–?)

## 2025-04-22 PROCEDURE — 93306 TTE W/DOPPLER COMPLETE: CPT

## 2025-04-22 PROCEDURE — 93306 TTE W/DOPPLER COMPLETE: CPT | Performed by: INTERNAL MEDICINE

## 2025-04-23 ENCOUNTER — TELEPHONE (OUTPATIENT)
Age: 80
End: 2025-04-23

## 2025-04-23 DIAGNOSIS — I49.8 ATRIAL ARRHYTHMIA: ICD-10-CM

## 2025-04-23 DIAGNOSIS — I25.10 CORONARY ARTERY DISEASE INVOLVING NATIVE CORONARY ARTERY OF NATIVE HEART WITHOUT ANGINA PECTORIS: ICD-10-CM

## 2025-04-23 DIAGNOSIS — R42 DIZZINESS ON STANDING: ICD-10-CM

## 2025-04-23 DIAGNOSIS — R53.83 FATIGUE: Primary | ICD-10-CM

## 2025-04-23 NOTE — TELEPHONE ENCOUNTER
Dr. Hamilton called to schedule blood work recommended by Dr. Morocho - I let Nahum know that we need orders from [facility] office however, he told me that he can't reach the office. He asked if Dr. Hylton might have better luck reaching the office for the orders. Nahum will try contacting him again this afternoon and will call back if he does so we can schedule the lab.

## 2025-04-23 NOTE — TELEPHONE ENCOUNTER
Patient: Cirilo Hackett  :  1945    Patient identified with two identifiers.    Patient called. Dr. Morocho wants him to have blood work done at Dr. Hylton's office.  He called Dr. Hylton's office and they need a order from Dr. Morocho for his blood work.  Could the nurse send a order over to [facility]. Thanks.

## 2025-04-24 ENCOUNTER — RESULTS FOLLOW-UP (OUTPATIENT)
Age: 80
End: 2025-04-24

## 2025-04-24 ENCOUNTER — HOSPITAL ENCOUNTER (OUTPATIENT)
Facility: HOSPITAL | Age: 80
Discharge: HOME | End: 2025-04-27
Payer: MEDICARE

## 2025-04-24 ENCOUNTER — TELEPHONE (OUTPATIENT)
Age: 80
End: 2025-04-24

## 2025-04-24 LAB
ALBUMIN SERPL-MCNC: 3.8 G/DL (ref 3.5–5)
ALBUMIN/GLOB SERPL: 1.1 (ref 1.1–2.2)
ALP SERPL-CCNC: 78 U/L (ref 45–117)
ALT SERPL-CCNC: 30 U/L (ref 12–78)
ANION GAP SERPL CALC-SCNC: 10 MMOL/L (ref 2–12)
AST SERPL-CCNC: 18 U/L (ref 15–37)
BASOPHILS # BLD: 0.04 K/UL (ref 0–0.1)
BASOPHILS NFR BLD: 0.9 % (ref 0–1)
BILIRUB SERPL-MCNC: 0.3 MG/DL (ref 0.2–1)
BUN SERPL-MCNC: 13 MG/DL (ref 6–20)
BUN/CREAT SERPL: 9 (ref 12–20)
CALCIUM SERPL-MCNC: 9 MG/DL (ref 8.5–10.1)
CHLORIDE SERPL-SCNC: 100 MMOL/L (ref 97–108)
CO2 SERPL-SCNC: 26 MMOL/L (ref 21–32)
CREAT SERPL-MCNC: 1.48 MG/DL (ref 0.7–1.3)
DIFFERENTIAL METHOD BLD: NORMAL
EOSINOPHIL # BLD: 0.11 K/UL (ref 0–0.4)
EOSINOPHIL NFR BLD: 2.4 % (ref 0–7)
ERYTHROCYTE [DISTWIDTH] IN BLOOD BY AUTOMATED COUNT: 13.3 % (ref 11.5–14.5)
GLOBULIN SER CALC-MCNC: 3.4 G/DL (ref 2–4)
GLUCOSE SERPL-MCNC: 372 MG/DL (ref 65–100)
HCT VFR BLD AUTO: 40.5 % (ref 36.6–50.3)
HGB BLD-MCNC: 13.8 G/DL (ref 12.1–17)
IMM GRANULOCYTES # BLD AUTO: 0.01 K/UL (ref 0–0.04)
IMM GRANULOCYTES NFR BLD AUTO: 0.2 % (ref 0–0.5)
LYMPHOCYTES # BLD: 1.8 K/UL (ref 0.8–3.5)
LYMPHOCYTES NFR BLD: 39.8 % (ref 12–49)
MCH RBC QN AUTO: 30.2 PG (ref 26–34)
MCHC RBC AUTO-ENTMCNC: 34.1 G/DL (ref 30–36.5)
MCV RBC AUTO: 88.6 FL (ref 80–99)
MONOCYTES # BLD: 0.3 K/UL (ref 0–1)
MONOCYTES NFR BLD: 6.6 % (ref 5–13)
NEUTS SEG # BLD: 2.26 K/UL (ref 1.8–8)
NEUTS SEG NFR BLD: 50.1 % (ref 32–75)
NRBC # BLD: 0 K/UL (ref 0–0.01)
NRBC BLD-RTO: 0 PER 100 WBC
PLATELET # BLD AUTO: 174 K/UL (ref 150–400)
PMV BLD AUTO: 9.5 FL (ref 8.9–12.9)
POTASSIUM SERPL-SCNC: 4.6 MMOL/L (ref 3.5–5.1)
PROT SERPL-MCNC: 7.2 G/DL (ref 6.4–8.2)
RBC # BLD AUTO: 4.57 M/UL (ref 4.1–5.7)
SODIUM SERPL-SCNC: 136 MMOL/L (ref 136–145)
WBC # BLD AUTO: 4.5 K/UL (ref 4.1–11.1)

## 2025-04-24 PROCEDURE — 85025 COMPLETE CBC W/AUTO DIFF WBC: CPT

## 2025-04-24 PROCEDURE — 80053 COMPREHEN METABOLIC PANEL: CPT

## 2025-04-24 PROCEDURE — 36415 COLL VENOUS BLD VENIPUNCTURE: CPT

## 2025-04-24 NOTE — TELEPHONE ENCOUNTER
Pt c/o \"overwhelming fatigue and dizziness upon standing\".   Verbal order per provider Dr. Morocho for CBC & CMP.  Orders repeated and verified twice.   Pt has been informed.  Verified patient with two patient identifiers.

## 2025-05-05 PROBLEM — Z79.01 ANTICOAGULATED: Status: ACTIVE | Noted: 2025-05-05

## 2025-05-05 PROBLEM — I50.21 ACUTE HFREF (HEART FAILURE WITH REDUCED EJECTION FRACTION) (HCC): Status: RESOLVED | Noted: 2025-01-16 | Resolved: 2025-05-05

## 2025-05-05 PROBLEM — I48.0 PAROXYSMAL ATRIAL FIBRILLATION (HCC): Status: ACTIVE | Noted: 2025-05-05

## 2025-05-14 ENCOUNTER — OFFICE VISIT (OUTPATIENT)
Age: 80
End: 2025-05-14
Payer: MEDICARE

## 2025-05-14 ENCOUNTER — CLINICAL DOCUMENTATION (OUTPATIENT)
Age: 80
End: 2025-05-14

## 2025-05-14 VITALS
DIASTOLIC BLOOD PRESSURE: 70 MMHG | TEMPERATURE: 97.2 F | WEIGHT: 195 LBS | HEART RATE: 89 BPM | OXYGEN SATURATION: 97 % | SYSTOLIC BLOOD PRESSURE: 108 MMHG | HEIGHT: 74 IN | BODY MASS INDEX: 25.03 KG/M2

## 2025-05-14 DIAGNOSIS — I10 PRIMARY HYPERTENSION: ICD-10-CM

## 2025-05-14 DIAGNOSIS — I25.10 CORONARY ARTERY DISEASE INVOLVING NATIVE CORONARY ARTERY OF NATIVE HEART WITHOUT ANGINA PECTORIS: Primary | ICD-10-CM

## 2025-05-14 DIAGNOSIS — Z79.01 ANTICOAGULATED: ICD-10-CM

## 2025-05-14 DIAGNOSIS — I25.5 ISCHEMIC CARDIOMYOPATHY: ICD-10-CM

## 2025-05-14 DIAGNOSIS — E78.00 HYPERCHOLESTEROLEMIA: ICD-10-CM

## 2025-05-14 DIAGNOSIS — I48.0 PAROXYSMAL ATRIAL FIBRILLATION (HCC): ICD-10-CM

## 2025-05-14 PROCEDURE — G8419 CALC BMI OUT NRM PARAM NOF/U: HCPCS | Performed by: INTERNAL MEDICINE

## 2025-05-14 PROCEDURE — 3078F DIAST BP <80 MM HG: CPT | Performed by: INTERNAL MEDICINE

## 2025-05-14 PROCEDURE — 3074F SYST BP LT 130 MM HG: CPT | Performed by: INTERNAL MEDICINE

## 2025-05-14 PROCEDURE — 1126F AMNT PAIN NOTED NONE PRSNT: CPT | Performed by: INTERNAL MEDICINE

## 2025-05-14 PROCEDURE — 99214 OFFICE O/P EST MOD 30 MIN: CPT | Performed by: INTERNAL MEDICINE

## 2025-05-14 PROCEDURE — 1036F TOBACCO NON-USER: CPT | Performed by: INTERNAL MEDICINE

## 2025-05-14 PROCEDURE — 1123F ACP DISCUSS/DSCN MKR DOCD: CPT | Performed by: INTERNAL MEDICINE

## 2025-05-14 PROCEDURE — G8428 CUR MEDS NOT DOCUMENT: HCPCS | Performed by: INTERNAL MEDICINE

## 2025-05-14 RX ORDER — EZETIMIBE 10 MG/1
10 TABLET ORAL DAILY
Qty: 90 TABLET | Refills: 3 | Status: SHIPPED | OUTPATIENT
Start: 2025-05-14

## 2025-05-14 ASSESSMENT — PATIENT HEALTH QUESTIONNAIRE - PHQ9
SUM OF ALL RESPONSES TO PHQ QUESTIONS 1-9: 0
1. LITTLE INTEREST OR PLEASURE IN DOING THINGS: NOT AT ALL
SUM OF ALL RESPONSES TO PHQ QUESTIONS 1-9: 0
2. FEELING DOWN, DEPRESSED OR HOPELESS: NOT AT ALL

## 2025-05-14 NOTE — PROGRESS NOTES
Identified pt with two pt identifiers(name and ). Reviewed record in preparation for visit and have obtained necessary documentation.  Chief Complaint   Patient presents with    Atrial Fibrillation    Coronary Artery Disease    Cardiomyopathy    Hypertension    Cholesterol Problem      /70 (BP Site: Left Upper Arm, Patient Position: Sitting, BP Cuff Size: Medium Adult)   Pulse 89   Temp 97.2 °F (36.2 °C) (Temporal)   Ht 1.88 m (6' 2\")   Wt 88.5 kg (195 lb)   SpO2 97%   BMI 25.04 kg/m²       Medications reviewed/approved by provider.      Health Maintenance Review: Patient reminded of \"due or due soon\" health maintenance. I have asked the patient to contact his/her primary care provider (PCP) for follow-up on his/her health maintenance.    Coordination of Care Questionnaire:  :   1) Have you been to an emergency room, urgent care, or hospitalized since your last visit?  If yes, where when, and reason for visit? no       2. Have seen or consulted any other health care provider since your last visit?   If yes, where when, and reason for visit?  no      Patient is accompanied by spouse I have received verbal consent from Cirilo Hacktet to discuss any/all medical information while they are present in the room.    
102/55   12/22/24 120/85       PHYSICAL EXAM:  General: No distress, cooperative and alert  Eyes: No scleral icterus or conjunctival pallor, pupils equal, EOMI  ENT: Trach midline, neck supple, EOM intact.  CV: Regular rate and rhythm with ectopy; normal S1/S2, no gallop, 1/6 systolic murmur at the apex, no rub, no JVD, normal carotid upstrokes, no carotid bruits  Respiratory: Adequate air movement with normal effort, clear to auscultation, no wheezes, no ronchi, no rales  Abdomen: Soft, nondistended, normal bowel sounds. No audible bruits.  Extremities: Warm and well perfused, normal cap refill, no clubbing or cyanosis. No edema  Neuro: No focal neurologic abnormalities.   Skin: No rashes or lesions.  Psych: Appropriate affect      Pertinent studies and test results were reviewed with the patient today.     CBC   Lab Results   Component Value Date    WBC 4.5 04/24/2025    HGB 13.8 04/24/2025    HCT 40.5 04/24/2025    MCV 88.6 04/24/2025     04/24/2025       CMP  Lab Results   Component Value Date     04/24/2025    K 4.6 04/24/2025     04/24/2025    CO2 26 04/24/2025    BUN 13 04/24/2025    CREATININE 1.48 (H) 04/24/2025    GLUCOSE 372 (H) 04/24/2025    CALCIUM 9.0 04/24/2025    BILITOT 0.3 04/24/2025    ALKPHOS 78 04/24/2025    AST 18 04/24/2025    ALT 30 04/24/2025    LABGLOM 48 (L) 04/24/2025    GFRAA >60 05/26/2022    AGRATIO 1.3 12/21/2022    GLOB 3.4 04/24/2025       LIPIDS  Lab Results   Component Value Date/Time    CHOL 191 12/19/2024 07:03 PM    TRIG 158 12/19/2024 07:03 PM    HDL 75 12/19/2024 07:03 PM    CHOLHDLRATIO 2.5 12/19/2024 07:03 PM       Previous ECG:  Results for orders placed or performed during the hospital encounter of 12/22/24   EKG 12 lead    Collection Time: 12/27/24 11:34 AM   Result Value Ref Range    Ventricular Rate 110 BPM    Atrial Rate 110 BPM    P-R Interval 224 ms    QRS Duration 94 ms    Q-T Interval 350 ms    QTc Calculation (Bazett) 473 ms    P Axis 57

## 2025-05-14 NOTE — PROGRESS NOTES
Faxed referral (office notes, labs, cath report, discharge summary, testing that pertains to referral) to: [facility]  [phone]     Confirmation received and the CR will contact the patient.

## 2025-05-21 RX ORDER — CLOPIDOGREL BISULFATE 75 MG/1
75 TABLET ORAL DAILY
Qty: 30 TABLET | Refills: 5 | Status: SHIPPED | OUTPATIENT
Start: 2025-05-21

## 2025-05-21 RX ORDER — CLOPIDOGREL BISULFATE 75 MG/1
75 TABLET ORAL DAILY
Qty: 30 TABLET | Refills: 5 | Status: SHIPPED | OUTPATIENT
Start: 2025-05-21 | End: 2025-05-21 | Stop reason: SDUPTHER

## (undated) DEVICE — SYRINGE ANGIO 10 CC BRL STD PRNT POLYCARB LT BLU MEDALLION

## (undated) DEVICE — CORONARY IMAGING CATHETER: Brand: OPTICROSS 6

## (undated) DEVICE — CUSTOM KT PTCA INFL DEV K05 00053H (ORDER MUTLIPLES OF 5 EACH)

## (undated) DEVICE — GUIDEWIRE VASC L260CM 0.035IN J TIP L3MM PTFE FIX COR NAMIC

## (undated) DEVICE — CATH BLLN ANGIO 2.50X15MM NC EUPHORIA RX

## (undated) DEVICE — SPNG GZ WOVN 4X4IN 12PLY 10/BX STRL

## (undated) DEVICE — SYR LUER SLPTP 50ML

## (undated) DEVICE — MASK,FACE,SHIELD,BLUE,ANTI FOG,TIES: Brand: MEDLINE

## (undated) DEVICE — THE BITE BLOCK MAXI, LATEX FREE STRAP IS USED TO PROTECT THE ENDOSCOPE INSERTION TUBE FROM BEING BITTEN BY THE PATIENT.

## (undated) DEVICE — TR BAND RADIAL ARTERY COMPRESSION DEVICE: Brand: TR BAND

## (undated) DEVICE — COPILOT BLEEDBACK CONTROL VALVE: Brand: COPILOT

## (undated) DEVICE — 3M™ TEGADERM™ TRANSPARENT FILM DRESSING FRAME STYLE, 1626W, 4 IN X 4-3/4 IN (10 CM X 12 CM), 50/CT 4CT/CASE: Brand: 3M™ TEGADERM™

## (undated) DEVICE — RUNTHROUGH NS EXTRA FLOPPY PTCA GUIDEWIRE: Brand: RUNTHROUGH

## (undated) DEVICE — PROVE COVER: Brand: UNBRANDED

## (undated) DEVICE — CATH BLLN ANGIO 2X30MM SC EUPHORA RX

## (undated) DEVICE — SUCTION CANISTER, 3000CC,SAFELINER: Brand: DEROYAL

## (undated) DEVICE — CORONARY IMAGING CATHETER: Brand: OPTICROSS™ HD

## (undated) DEVICE — SPLINT WR VELC FOAM NEUT POS DISP FOR RAD ART ACC SFT STRP

## (undated) DEVICE — MEDI-TRACE CADENCE ADULT, DEFIBRILLATION ELECTRODE -RTS  (10 PR/PK) - PHYSIO-CONTROL: Brand: MEDI-TRACE CADENCE

## (undated) DEVICE — KIT ACCS INTRO 4FR L10CM NDL 21GA L7CM GWIRE L40CM

## (undated) DEVICE — Device: Brand: GRAND SLAM

## (undated) DEVICE — Device

## (undated) DEVICE — HI-TORQUE VERSACORE FLOPPY GUIDE WIRE SYSTEM 145 CM: Brand: HI-TORQUE VERSACORE

## (undated) DEVICE — BW-412T DISP COMBO CLEANING BRUSH: Brand: SINGLE USE COMBINATION CLEANING BRUSH

## (undated) DEVICE — GLV SURG SENSICARE MICRO PF LF 6 STRL

## (undated) DEVICE — GOWN ISOL W/THUMB UNIV BLU BX/15

## (undated) DEVICE — SC 3W HP RA OFF NB - PG: Brand: NAMIC

## (undated) DEVICE — GLIDESHEATH SLENDER ACCESS KIT: Brand: GLIDESHEATH SLENDER

## (undated) DEVICE — HEART CATH-MRMC: Brand: MEDLINE INDUSTRIES, INC.

## (undated) DEVICE — CATHETER GUID 6FR L100CM DIA0.071IN NYL SHFT EBU3.5

## (undated) DEVICE — SYR LL 3CC

## (undated) DEVICE — CATHETER COR DIAG TRANSFORMER 3.5 5FR L100CM 0 SIDE H

## (undated) DEVICE — JACKT LAB KNIT COLR LG BLU

## (undated) DEVICE — CATHETER DIAG 5FR L100CM LUMN ID0.047IN JL3.5 CRV 0 SIDE H

## (undated) DEVICE — DEVICE US SLED PUL BK DISP ILAB

## (undated) DEVICE — GLIDESHEATH SLENDER STAINLESS STEEL KIT: Brand: GLIDESHEATH SLENDER

## (undated) DEVICE — Device: Brand: DEFENDO AIR/WATER/SUCTION AND BIOPSY VALVE

## (undated) DEVICE — CATHETER GUID 6FR L150CM RAP EXCHG L25CM TIP 5.1FR PUSHROD

## (undated) DEVICE — FRCP BX RADJAW4 NDL 2.8 240CM LG OG BX40

## (undated) DEVICE — VIAL FORMALIN CAP 10P 40ML

## (undated) DEVICE — CATHETER GUID 6FR 0.071IN COR AMPLATZ L 0.75 MID

## (undated) DEVICE — TUBING PRSS MON L6IN PVC M FEM CONN

## (undated) DEVICE — RUNTHROUGH NS HYPERCOAT PTCA GUIDEWIRE: Brand: RUNTHROUGH

## (undated) DEVICE — CATH BLLN ANGIO 4.50X20MM NC EUPHORIA RX